# Patient Record
Sex: FEMALE | Race: WHITE | NOT HISPANIC OR LATINO | Employment: OTHER | URBAN - METROPOLITAN AREA
[De-identification: names, ages, dates, MRNs, and addresses within clinical notes are randomized per-mention and may not be internally consistent; named-entity substitution may affect disease eponyms.]

---

## 2017-01-25 ENCOUNTER — HOSPITAL ENCOUNTER (EMERGENCY)
Facility: CLINIC | Age: 57
Discharge: HOME | End: 2017-01-25
Attending: EMERGENCY MEDICINE | Admitting: EMERGENCY MEDICINE
Payer: MEDICARE

## 2017-01-25 VITALS
HEIGHT: 66 IN | TEMPERATURE: 98.3 F | DIASTOLIC BLOOD PRESSURE: 94 MMHG | OXYGEN SATURATION: 93 % | BODY MASS INDEX: 27.8 KG/M2 | RESPIRATION RATE: 18 BRPM | HEART RATE: 64 BPM | WEIGHT: 173 LBS | SYSTOLIC BLOOD PRESSURE: 144 MMHG

## 2017-01-25 DIAGNOSIS — R51.9 ACUTE NONINTRACTABLE HEADACHE, UNSPECIFIED HEADACHE TYPE: ICD-10-CM

## 2017-01-25 LAB — GLUCOSE SERPL-MCNC: 203 MG/DL (ref 70–99)

## 2017-01-25 PROCEDURE — 96374 THER/PROPH/DIAG INJ IV PUSH: CPT

## 2017-01-25 PROCEDURE — 99284 EMERGENCY DEPT VISIT MOD MDM: CPT | Mod: 25

## 2017-01-25 PROCEDURE — 25000125 ZZHC RX 250: Performed by: EMERGENCY MEDICINE

## 2017-01-25 PROCEDURE — 96375 TX/PRO/DX INJ NEW DRUG ADDON: CPT

## 2017-01-25 PROCEDURE — 25000128 H RX IP 250 OP 636: Performed by: EMERGENCY MEDICINE

## 2017-01-25 PROCEDURE — 96361 HYDRATE IV INFUSION ADD-ON: CPT

## 2017-01-25 PROCEDURE — 82947 ASSAY GLUCOSE BLOOD QUANT: CPT | Performed by: EMERGENCY MEDICINE

## 2017-01-25 RX ORDER — METOCLOPRAMIDE HYDROCHLORIDE 5 MG/ML
5 INJECTION INTRAMUSCULAR; INTRAVENOUS ONCE
Status: DISCONTINUED | OUTPATIENT
Start: 2017-01-25 | End: 2017-01-25 | Stop reason: DRUGHIGH

## 2017-01-25 RX ORDER — METOCLOPRAMIDE HYDROCHLORIDE 5 MG/ML
INJECTION INTRAMUSCULAR; INTRAVENOUS
Status: DISCONTINUED
Start: 2017-01-25 | End: 2017-01-25 | Stop reason: HOSPADM

## 2017-01-25 RX ORDER — METOCLOPRAMIDE HYDROCHLORIDE 5 MG/ML
10 INJECTION INTRAMUSCULAR; INTRAVENOUS ONCE
Status: COMPLETED | OUTPATIENT
Start: 2017-01-25 | End: 2017-01-25

## 2017-01-25 RX ORDER — DIPHENHYDRAMINE HYDROCHLORIDE 50 MG/ML
25 INJECTION INTRAMUSCULAR; INTRAVENOUS ONCE
Status: COMPLETED | OUTPATIENT
Start: 2017-01-25 | End: 2017-01-25

## 2017-01-25 RX ADMIN — DIPHENHYDRAMINE HYDROCHLORIDE 25 MG: 50 INJECTION, SOLUTION INTRAMUSCULAR; INTRAVENOUS at 02:36

## 2017-01-25 RX ADMIN — SODIUM CHLORIDE 1000 ML: 9 INJECTION, SOLUTION INTRAVENOUS at 02:41

## 2017-01-25 RX ADMIN — METOCLOPRAMIDE 10 MG: 5 INJECTION, SOLUTION INTRAMUSCULAR; INTRAVENOUS at 02:38

## 2017-01-25 ASSESSMENT — ENCOUNTER SYMPTOMS
NAUSEA: 1
SHORTNESS OF BREATH: 0
HEADACHES: 1
VOMITING: 1
PHOTOPHOBIA: 1
WEAKNESS: 0

## 2017-01-25 NOTE — ED PROVIDER NOTES
History     Chief Complaint:    Headache      HPI   Mary Lou Gil is a 56 year old female who presents with headache. The patient states that she normally takes verapamil daily for her migraines. However, she has recently run out of this as she states that was in [place] for the last 3 weeks. She reports that she developed a headache consistent with her typical migraine 4 days ago. She filled her prescription for verapamil two days ago and has been taking it since. She states that he headache worsened today, and she had an associated aura.  Her headache at this time is an 8/10. She also has nausea and reports that she vomited today, which she does not normally have with her headache, though she usually gets nauseas. She is also photophobic. The patient took two Imitrex doses tonight, without relief of her symptoms. She denies chest pain, shortness of breath, abdominal pain, abnormal vision, or extremity weakness. The patient also has a history of depression which she states is well managed at this time.  She also has a history of diabetes and states that he blood sugars usually run at around 250.     Allergies:  Nicotine Polacrilex   Neurontin  Norvasc  Percocet  Vinyl Ether    Medications:    Insulin detemir (levemir flexpen/flextouch) 100 unit/ml injection  Metformin (glucophage) 1000 mg tablet  Pregabalin (lyrica) 150 mg capsule  Tramadol (ultram) 50 mg tablet  Albuterol (proventil hfa) 108 (90 base) mcg/act inhaler  Prednisone (deltasone) 20 mg tablet  Doxycycline (vibramycin) 100 mg capsule  Duloxetine (cymbalta) 60 mg capsule  Fluticasone (flonase) 50 mcg/act nasal spray  Guaifenesin-codeine (robitussin ac) 100-10 mg/5ml soln  Acetaminophen (tylenol) 500 mg tablet  Ipratropium - albuterol 0.5 mg/2.5 mg/3 ml (duoneb) 0.5-2.5 (3) mg/3ml nebulization  Order for dme  Albuterol (2.5 mg/3ml) 0.083% nebulizer solution  Sumatriptan (imitrex) 100 mg tablet  Needles, any size  Blood glucose monitoring (no brand  "specified) test strip  Albuterol (proair hfa, proventil hfa, ventolin hfa) 108 (90 base) mcg/act inhaler  Flunisolide hfa (aerospan) 80 mcg/act aers  Ibuprofen (advil,motrin) 800 mg tablet  Verapamil (calan-sr) 240 mg tablet  Levothyroxine (synthroid, levothroid) 150 mcg tablet  Cholecalciferol 5000 units caps  Pravastatin (pravachol) 80 mg tablet  Fenofibrate 160 mg tablet  Magnesium oxide (mag-ox) 400 mg tablet  Ferrous sulfate dried 140 (45 fe) mg tbcr  Lisinopril-hydrochlorothiazide (prinzide,zestoretic) 20-25 mg per tablet  Glipizide (glipizide xl) 10 mg 24 hr tablet  Cyanocobalamin (vitamin b12) 1000 mcg/ml injection  Syringe/needle, disp, (bd eclipse syringe) 25g x 5/8\" 3 ml misc  Order for dme  Citalopram (celexa) 40 mg tablet  Vitamin c-electrolytes (emergen-c) 1000mg vitamin c super orange drink mix  Lidocaine (xylocaine) 5 % ointment  Order for dme  Omeprazole (prilosec) 20 mg capsule  Epinephrine (epipen) 0.3 mg/0.3ml injection  Blood glucose monitoring (accu-chek teresa plus) meter device kit  Diphenhydramine hcl (benadryl po)    Past Medical History:  Migraine  Diabetes mellitus (H)  Essential hypertension, benign  Unspecified hypothyroidism  Anemia, unspecified  Tobacco use disorder  Hyperlipidemia LDL goal < 100  Spinal stenosis  Uncomplicated asthma  Other chronic pain  Depressive disorder  Hyperlipidaemia  Degeneration of lumbar intervertebral disc  Bilateral carpal tunnel syndrome  Bilateral low back pain with sciatica, sciatica laterality unspecified  Endometrial hyperplasia  Scurvy  Dyspepsia  Neuropathy, diabetic   Pain in shoulder  Anemia    Past Surgical History:    Tonsillectomy and adenoidectomy   Appendectomy    Thyroidectomy   Hysteroscopy, surgical; w/ endometrial ablation, any method  Operative hysteroscopy with morcellator  Abdomen surgery    Family History:    CAD - Mother, Sister    Diabetes - Mother, Sister    Hypertension - Mother, Sister       Social History:  Marital " "Status:   Presents to the ED alone  Tobacco Use: current everyday smoker. 1 ppd for 25  Years   Alcohol Use: no  PCP: Abilio Casiano      Review of Systems   Eyes: Positive for photophobia. Negative for visual disturbance.   Respiratory: Negative for shortness of breath.    Cardiovascular: Negative for chest pain.   Gastrointestinal: Positive for nausea and vomiting.   Neurological: Positive for headaches. Negative for weakness.   Psychiatric/Behavioral: Negative for suicidal ideas.        Negative for homicidal ideation   All other systems reviewed and are negative.      Physical Exam   First Vitals:  BP: (!) 168/98 mmHg  Pulse: 86  Temp: 98.3  F (36.8  C)  Resp: 18  Height: 167.6 cm (5' 6\")  Weight: 78.472 kg (173 lb)  SpO2: 96 %    Physical Exam    General: Alert and cooperative with exam. Patient in moderate distress.  Patient lying in darkened room with eyes closed. Normal mentation  Head:  Scalp is NC/AT  Eyes:  No scleral icterus, PERRL  ENT:  The external nose and ears are normal. The oropharynx is normal and without erythema; mucus membranes are moist.   Neck:  Normal range of motion without rigidity.   CV:  Regular rate and rhythm    No pathologic murmur   Resp:  Breath sounds are clear bilaterally    Non-labored, no retractions or accessory muscle use  GI:  Abdomen is soft, no distension, no tenderness.   MS:  No lower extremity edema      No midline cervical, thoracic, or lumbar tenderness  Skin:  Warm and dry, No rash or lesions noted.  Neuro: Oriented x 3. No gross motor/sensory deficits. CN 2-12 intact        Emergency Department Course   Laboratory:  (0229) Glucose by meter: 203 (H)     Interventions:  (0236) Benadryl, 25 mg, IV injection    (0238) Reglan, 10 mg, IV   (0241) Normal Saline, 1 liter, IV bolus      Emergency Department Course:  Nursing notes and vitals reviewed.  I performed an exam of the patient as documented above.   (0322) I rechecked on the patient. She reports that her " headache is now a 1/10  Findings and plan explained to the patient. Patient discharged home with instructions regarding supportive care, medications, and reasons to return. The importance of close follow-up was reviewed.       Impression & Plan      Medical Decision Making:  Patient presenting with headache. History of similar headaches, no abnormal symptoms for patient. No evidence of acute intracranial hemorrhage, venous sinus thrombosis, central nervous system infection, ocular disease, hypertensive encephalitis, temporal arteritis, dental or sinus infection, artery dissection, or concern for carbon monoxide poisoning. No indication at this time for laboratory or imaging workup. Patient was provided with IV fluids, benadryl, reglan with significant improvement in headache on reevaluation. Patient was discharged home. Home supportive therapies and return precautions were discussed. Patient advised to return to ER if alteration in mental status, onset of fevers, visual changes, or peripheral weakness/numbness/tingling. Recommended follow up with pain clinic as need and continued use of verapamil and Tylenol for symptoms relief. Additional patient to follow-up with PCP regarding need for tighter glucose control.    Diagnosis:    ICD-10-CM    1. Acute nonintractable headache, unspecified headache type R51      Disposition:  Discharge to home.       Carlos ROWE, sina serving as a scribe on 1/25/2017 at 2:32 AM to personally document services performed by Dr. Dimas based on my observations and the provider's statements to me.     1/25/2017    EMERGENCY DEPARTMENT        William Dimas,   01/25/17 4040

## 2017-01-25 NOTE — ED AVS SNAPSHOT
Emergency Department    [address] [facility] [address]    Phone:  [phone]    Fax:  [phone]                                       Mar yLou Gil   MRN: [id number]    Department:   Emergency Department   Date of Visit:  1/25/2017           Patient Information     Date Of Birth          1960        Your diagnoses for this visit were:     Acute nonintractable headache, unspecified headache type        You were seen by William Dimas DO.      Follow-up Information     Follow up with Abilio Casiano MD In 2 days.    Specialties:  Internal Medicine, Pediatrics    Contact information:    [facility]  [address] [facility] [address]  [phone]          Follow up with  Emergency Department.    Specialty:  EMERGENCY MEDICINE    Why:  If symptoms worsen    Contact information:    [address] [facility] [address] [phone]        Discharge Instructions          * HEADACHE [unspecified]    The cause of your headache today is not clear, but it does not appear to be the sign of any serious illness.  Under stress, some people tense the muscles of their shoulder, neck and scalp without knowing it. If this condition lasts long enough, a TENSION HEADACHE can occur.  A MIGRAINE HEADACHE is caused by changes in blood flow to the brain. It can be mild or severe. A migraine attack may be triggered by emotional stress, hormone changes during the menstrual cycle, oral contraceptives, alcohol use, certain foods containing tyramine, eye strain, weather changes, missing meals, lack of sleep or oversleeping.  Other causes of headache include a viral illness, sinus, ear or throat infection, dental pain and TMJ (jaw joint) pain.  HOME CARE:    If you were given pain medicine for this headache, do not drive yourself home. Arrange for a ride, instead. When you get home, try to sleep. You should feel much better when you wake up.    If you are having  nausea or vomiting, follow a light diet until your headache is relieved.    If you have a migraine type headache, use sunglasses when in the daylight or around bright indoor lighting until symptoms improve. Bright glaring light can worsen this kind of headache.  FOLLOW UP with your doctor if the headache is not better within the next 24 hours. If you have frequent headaches you should discuss a treatment plan with your primary care doctor. By being aware of the earliest signs of headache, and starting treatment right away, you may be able to stop the pain yourself.  GET PROMPT MEDICAL ATTENTION if any of the following occur:    Worsening of your head pain or no improvement within 24 hours    Repeated vomiting (unable to keep liquids down)    Fever over 101 F (38.3 C)    Stiff neck    Extreme drowsiness, confusion or fainting    Weakness of an arm or leg or one side of the face    Difficulty with speech or vision    9684-3247 [facility], [address] [place] [address]. All rights reserved. This information is not intended as a substitute for professional medical care. Always follow your healthcare professional's instructions.    Return to the emergency department or seek medical care as instructed if your symptoms fail to improve or significantly worsen.    Take Tylenol as needed for symptom/pain relief; use as directed.    Follow-up as indicated on page 1.  Maintain adequate hydration and get plenty of rest.      24 Hour Appointment Hotline       To make an appointment at any [facility] clinic, call [phone] ([phone]). If you don't have a family doctor or clinic, we will help you find one. [facility] clinics are conveniently located to serve the needs of you and your family.             Review of your medicines      CONTINUE these medicines which may have CHANGED, or have new prescriptions. If we are uncertain of the size of tablets/capsules you have at home, strength may be listed as  something that might have changed.        Dose / Directions Last dose taken    * order for DME   What changed:  additional instructions   Quantity:  2 each        Equipment being ordered : bilateral hard wrist splints   Refills:  0        * order for DME   What changed:  Another medication with the same name was changed. Make sure you understand how and when to take each.   Quantity:  1 each        Equipment being ordered: tennis elbow splint left   Refills:  0        * order for DME   What changed:  Another medication with the same name was changed. Make sure you understand how and when to take each.   Quantity:  1 Device        Equipment being ordered: Nebulizer with tubing   Refills:  0        * Notice:  This list has 3 medication(s) that are the same as other medications prescribed for you. Read the directions carefully, and ask your doctor or other care provider to review them with you.      Our records show that you are taking the medicines listed below. If these are incorrect, please call your family doctor or clinic.        Dose / Directions Last dose taken    acetaminophen 500 MG tablet   Commonly known as:  TYLENOL   Dose:  1000 mg   Quantity:  180 tablet        Take 2 tablets (1,000 mg) by mouth every 8 hours   Refills:  0        * albuterol 108 (90 BASE) MCG/ACT Inhaler   Commonly known as:  PROAIR HFA/PROVENTIL HFA/VENTOLIN HFA   Dose:  2 puff   Quantity:  3 Inhaler        Inhale 2 puffs into the lungs every 6 hours as needed for shortness of breath / dyspnea or wheezing   Refills:  1        * albuterol (2.5 MG/3ML) 0.083% neb solution   Dose:  1 vial   Quantity:  25 vial        Take 1 vial (2.5 mg) by nebulization every 6 hours as needed for shortness of breath / dyspnea or wheezing   Refills:  0        * albuterol 108 (90 BASE) MCG/ACT Inhaler   Commonly known as:  PROVENTIL HFA   Dose:  2 puff   Quantity:  1 Inhaler        Inhale 2 puffs into the lungs every 6 hours   Refills:  0        BENADRYL PO    Dose:  25-50 mg        Take 25-50 mg by mouth See Admin Instructions 25 mg during the day if needed and 50 mg at bedtime for sleep as needed   Refills:  0        * blood glucose monitoring meter device kit   Commonly known as:  no brand specified   Dose:  1 kit   Quantity:  1 kit        1 kit as needed.   Refills:  0        * blood glucose monitoring meter device kit   Quantity:  1 kit        by In Vitro route as needed   Refills:  PRN        blood glucose monitoring test strip   Commonly known as:  no brand specified   Dose:  1 strip   Quantity:  2 Box        1 strip by In Vitro route 3 times daily Uses Accuchek Ana Paula   Refills:  11        cholecalciferol 5000 UNITS Caps   Dose:  1 capsule   Quantity:  100 capsule        Take 1 capsule (5,000 Units) by mouth daily FOR VITAMIN D DEFICIENCY (LOW VITAMIN D),TAKE 2 CAPSULES DOROTHY FOR THE NEXT 4 WEEKS, THEN 1 CAPSULE DAILY, MUST TAKE WITH WITH FAT CONTAINING MEAL   Refills:  3        citalopram 40 MG tablet   Commonly known as:  celeXA   Dose:  40 mg   Quantity:  90 tablet        Take 1 tablet (40 mg) by mouth daily TO CONTROL SYMPTOMS OF DEPRESSION   Refills:  PRN        cyanocobalamin 1000 MCG/ML injection   Commonly known as:  VITAMIN B12   Dose:  1 mL   Quantity:  30 mL        Inject 1 mL (1,000 mcg) Subcutaneous every 14 days INDICATION: FOR VITAMIN B12 SUPPLEMENTATION   Refills:  5        doxycycline 100 MG capsule   Commonly known as:  VIBRAMYCIN   Dose:  100 mg   Quantity:  20 capsule        Take 1 capsule (100 mg) by mouth 2 times daily   Refills:  0        DULoxetine 60 MG EC capsule   Commonly known as:  CYMBALTA   Dose:  120 mg   Quantity:  120 capsule        Take 2 capsules (120 mg) by mouth daily   Refills:  3        EPINEPHrine 0.3 MG/0.3ML injection   Dose:  0.3 mg   Quantity:  1 each        Inject 0.3 mLs (0.3 mg) into the muscle once as needed   Refills:  prn        fenofibrate 160 MG tablet   Dose:  160 mg   Quantity:  90 tablet        Take 1 tablet  (160 mg) by mouth At Bedtime with food. INDICATION: TO LOWER CHOLESTEROL   Refills:  3        ferrous sulfate Dried 140 (45 FE) MG Tbcr   Dose:  1 tablet   Quantity:  180 tablet        Take 1 tablet by mouth every morning (before breakfast) IRON SUPPLEMENT - PLEASE TAKE WITH 4 OZ OF OJ WITH PULP, SUBSTITUTION OF IRON SUPPLEMENT PERMITTED   Refills:  PRN        Flunisolide HFA 80 MCG/ACT Aers   Commonly known as:  AEROSPAN   Dose:  2 puff   Quantity:  1 Inhaler        Inhale 2 puffs into the lungs 2 times daily INDICATION: ASTHMA CONTROLLER, ALWAYS RINSE MOUTH AFTER USE   Refills:  5        fluticasone 50 MCG/ACT spray   Commonly known as:  FLONASE   Dose:  2 spray   Quantity:  16 g        Spray 2 sprays in nostril At Bedtime INDICATION: TO CONTROL NASAL ALLERGY SYMPTOMS, DO NOT BLOW NOSE FOR 30 MINUTES AFTER USING   Refills:  PRN        glipiZIDE 10 MG 24 hr tablet   Commonly known as:  glipiZIDE XL   Dose:  20 mg   Quantity:  180 tablet        Take 2 tablets (20 mg) by mouth daily INDICATION: TO TREAT DIABETES.   Refills:  3        guaiFENesin-codeine 100-10 MG/5ML Soln solution   Commonly known as:  ROBITUSSIN AC   Dose:  1 tsp.   Quantity:  120 mL        Take 5 mLs by mouth every 4 hours as needed for cough   Refills:  0        ibuprofen 800 MG tablet   Commonly known as:  ADVIL/MOTRIN   Dose:  800 mg   Quantity:  90 tablet        Take 1 tablet (800 mg) by mouth every 8 hours as needed for moderate pain or pain   Refills:  2        insulin detemir 100 UNIT/ML injection   Commonly known as:  LEVEMIR FLEXPEN/FLEXTOUCH   Dose:  20 Units   Quantity:  9 mL        Inject 20 Units Subcutaneous At Bedtime   Refills:  0        ipratropium - albuterol 0.5 mg/2.5 mg/3 mL 0.5-2.5 (3) MG/3ML neb solution   Commonly known as:  DUONEB   Quantity:  1 vial        One in neb in clinic   Refills:  0        levothyroxine 150 MCG tablet   Commonly known as:  SYNTHROID/LEVOTHROID   Dose:  150 mcg   Quantity:  90 tablet        Take 1  tablet (150 mcg) by mouth daily INDICATION: THYROID SUPPLEMENTATION, TAKE FIRST THING IN THE MORNING ON AN EMPTY STOMACH,  NO FOOD FOR AN HOUR   Refills:  3        lidocaine 5 % ointment   Commonly known as:  XYLOCAINE   Quantity:  50 g        Apply a quarter size amount to painful areas up to 4 times per day as needed.   Refills:  3        lisinopril-hydrochlorothiazide 20-25 MG per tablet   Commonly known as:  PRINZIDE/ZESTORETIC   Dose:  1 tablet   Quantity:  90 tablet        Take 1 tablet by mouth daily INDICATION:TO LOWER BLOOD PRESSURE AND TO PRESERVE KIDNEY FUNCTION   Refills:  1        magnesium oxide 400 MG tablet   Commonly known as:  MAG-OX   Dose:  400 mg   Quantity:  180 tablet        Take 1 tablet (400 mg) by mouth 2 times daily INDICATION: MAGNESIUM SUPPLEMENT   Refills:  3        metFORMIN 1000 MG tablet   Commonly known as:  GLUCOPHAGE   Dose:  1000 mg   Quantity:  60 tablet        Take 1 tablet (1,000 mg) by mouth 2 times daily (with meals) INDICATION: TO TREAT DIABETES, CONTROL BLOOD SUGAR AND TO CORRECT METABOLISM   Refills:  0        NEEDLES, ANY SIZE   Dose:  1 each   Quantity:  1 Box        Inject 1 each Subcutaneous At Bedtime   Refills:  prn        omeprazole 20 MG CR capsule   Commonly known as:  priLOSEC   Dose:  20 mg   Quantity:  90 capsule        Take 1 capsule (20 mg) by mouth 2 times daily INDICATION: TO CONTROL REFLUX SYMPTOMS   Refills:  3        pravastatin 80 MG tablet   Commonly known as:  PRAVACHOL   Dose:  80 mg   Quantity:  90 tablet        Take 1 tablet (80 mg) by mouth daily INDICATION: TO LOWER CHOLESTEROL AND TO HELP  PREVENT HEART DISEASE   Refills:  PRN        predniSONE 20 MG tablet   Commonly known as:  DELTASONE   Dose:  20 mg   Quantity:  10 tablet        Take 1 tablet (20 mg) by mouth 2 times daily   Refills:  0        pregabalin 150 MG capsule   Commonly known as:  LYRICA   Dose:  150 mg   Indication:  Neuropathic Pain   Quantity:  30 capsule        Take 1 capsule  "(150 mg) by mouth daily   Refills:  0        SUMAtriptan 100 MG tablet   Commonly known as:  IMITREX   Dose:  100 mg   Quantity:  18 tablet        Take 1 tablet (100 mg) by mouth every 8 hours as needed for migraine   Refills:  11        Syringe/Needle (Disp) 25G X 5/8\" 3 ML Misc   Commonly known as:  BD ECLIPSE SYRINGE   Dose:  1000 mcg   Quantity:  20 each        1,000 mcg See Admin Instructions AS DIRECTED FOR B12 SHOTS   Refills:  PRN        thin lancets   Commonly known as:  NO BRAND SPECIFIED   Dose:  1 Device   Quantity:  3 Box        1 Device 2 times daily   Refills:  2        traMADol 50 MG tablet   Commonly known as:  ULTRAM   Quantity:  100 tablet        Take 1-2 tablet(s) every 6 hours as needed for pain. 3-4 per day. May dispense on/after 10/13 to start taking on/after 10/15.   Refills:  0        verapamil 240 MG CR tablet   Commonly known as:  CALAN-SR   Dose:  240 mg   Quantity:  30 tablet        Take 1 tablet (240 mg) by mouth At Bedtime INDICATION: TO LOWER BLOOD PRESSURE AND TO CONTROL MIGRAINE HEADACHES   Refills:  3        vitamin C-electrolytes 1000mg vitamin C super orange drink mix   Commonly known as:  EMERGEN-C   Quantity:  90 packet        Mix 1 packet in 4-6oz water and take once daily, INDICATION: VITAMIN C SUPPLEMENTION   Refills:  PRN        * Notice:  This list has 5 medication(s) that are the same as other medications prescribed for you. Read the directions carefully, and ask your doctor or other care provider to review them with you.            Procedures and tests performed during your visit     Glucose    Peripheral IV catheter      Orders Needing Specimen Collection     None      Pending Results     No orders found from 1/24/2017 to 1/26/2017.            Pending Culture Results     No orders found from 1/24/2017 to 1/26/2017.       Test Results from your hospital stay           1/25/2017  3:04 AM - Interface, [employer] Results      Component Results     Component Value Ref Range & " Units Status    Glucose 203 (H) 70 - 99 mg/dL Final                Clinical Quality Measure: Blood Pressure Screening     Your blood pressure was checked while you were in the emergency department today. The last reading we obtained was  BP: (!) 168/98 mmHg . Please read the guidelines below about what these numbers mean and what you should do about them.  If your systolic blood pressure (the top number) is less than 120 and your diastolic blood pressure (the bottom number) is less than 80, then your blood pressure is normal. There is nothing more that you need to do about it.  If your systolic blood pressure (the top number) is 120-139 or your diastolic blood pressure (the bottom number) is 80-89, your blood pressure may be higher than it should be. You should have your blood pressure rechecked within a year by a primary care provider.  If your systolic blood pressure (the top number) is 140 or greater or your diastolic blood pressure (the bottom number) is 90 or greater, you may have high blood pressure. High blood pressure is treatable, but if left untreated over time it can put you at risk for heart attack, stroke, or kidney failure. You should have your blood pressure rechecked by a primary care provider within the next 4 weeks.  If your provider in the emergency department today gave you specific instructions to follow-up with your doctor or provider even sooner than that, you should follow that instruction and not wait for up to 4 weeks for your follow-up visit.        Thank you for choosing [facility]       Thank you for choosing [facility] for your care. Our goal is always to provide you with excellent care. Hearing back from our patients is one way we can continue to improve our services. Please take a few minutes to complete the written survey that you may receive in the mail after you visit with us. Thank you!        [facility] Information     [facility] gives you secure access to your electronic health record. If  you see a primary care provider, you can also send messages to your care team and make appointments. If you have questions, please call your primary care clinic.  If you do not have a primary care provider, please call [phone] and they will assist you.        Care EveryWhere ID     This is your Care EveryWhere ID. This could be used by other organizations to access your [facility] medical records  [id number]        After Visit Summary       This is your record. Keep this with you and show to your community pharmacist(s) and doctor(s) at your next visit.

## 2017-01-25 NOTE — ED AVS SNAPSHOT
Emergency Department    [address] [place] [id number]    Phone:  [phone]    Fax:  [phone]                                       Mary Lou Gil   MRN: [id number]    Department:   Emergency Department   Date of Visit:  1/25/2017           After Visit Summary Signature Page     I have received my discharge instructions, and my questions have been answered. I have discussed any challenges I see with this plan with the nurse or doctor.    ..........................................................................................................................................  Patient/Patient Representative Signature      ..........................................................................................................................................  Patient Representative Print Name and Relationship to Patient    ..................................................               ................................................  Date                                            Time    ..........................................................................................................................................  Reviewed by Signature/Title    ...................................................              ..............................................  Date                                                            Time

## 2017-01-25 NOTE — DISCHARGE INSTRUCTIONS
* HEADACHE [unspecified]    The cause of your headache today is not clear, but it does not appear to be the sign of any serious illness.  Under stress, some people tense the muscles of their shoulder, neck and scalp without knowing it. If this condition lasts long enough, a TENSION HEADACHE can occur.  A MIGRAINE HEADACHE is caused by changes in blood flow to the brain. It can be mild or severe. A migraine attack may be triggered by emotional stress, hormone changes during the menstrual cycle, oral contraceptives, alcohol use, certain foods containing tyramine, eye strain, weather changes, missing meals, lack of sleep or oversleeping.  Other causes of headache include a viral illness, sinus, ear or throat infection, dental pain and TMJ (jaw joint) pain.  HOME CARE:    If you were given pain medicine for this headache, do not drive yourself home. Arrange for a ride, instead. When you get home, try to sleep. You should feel much better when you wake up.    If you are having nausea or vomiting, follow a light diet until your headache is relieved.    If you have a migraine type headache, use sunglasses when in the daylight or around bright indoor lighting until symptoms improve. Bright glaring light can worsen this kind of headache.  FOLLOW UP with your doctor if the headache is not better within the next 24 hours. If you have frequent headaches you should discuss a treatment plan with your primary care doctor. By being aware of the earliest signs of headache, and starting treatment right away, you may be able to stop the pain yourself.  GET PROMPT MEDICAL ATTENTION if any of the following occur:    Worsening of your head pain or no improvement within 24 hours    Repeated vomiting (unable to keep liquids down)    Fever over 101 F (38.3 C)    Stiff neck    Extreme drowsiness, confusion or fainting    Weakness of an arm or leg or one side of the face    Difficulty with speech or vision    [id number] [address], [address]  [place] [address]. All rights reserved. This information is not intended as a substitute for professional medical care. Always follow your healthcare professional's instructions.    Return to the emergency department or seek medical care as instructed if your symptoms fail to improve or significantly worsen.    Take Tylenol as needed for symptom/pain relief; use as directed.    Follow-up as indicated on page 1.  Maintain adequate hydration and get plenty of rest.

## 2017-02-08 DIAGNOSIS — E11.65 TYPE 2 DIABETES MELLITUS WITH HYPERGLYCEMIA, WITH LONG-TERM CURRENT USE OF INSULIN (H): Primary | ICD-10-CM

## 2017-02-08 DIAGNOSIS — Z79.4 TYPE 2 DIABETES MELLITUS WITH HYPERGLYCEMIA, WITH LONG-TERM CURRENT USE OF INSULIN (H): Primary | ICD-10-CM

## 2017-02-08 NOTE — TELEPHONE ENCOUNTER
Metformin 1000mg         Last Written Prescription Date: 12/05/16  Last Fill Quantity: 60, # refills: 0  Last Office Visit with G, [facility] or [facility] prescribing provider:  09/01/16        BP Readings from Last 3 Encounters:   01/25/17 144/94   10/13/16 122/80   09/04/16 121/81     MICROL       29   7/5/2016  No results found for this basename: microalbumin  CREATININE   Date Value Ref Range Status   11/10/2016 0.71 0.52 - 1.04 mg/dL Final   ]  GFR ESTIMATE   Date Value Ref Range Status   11/10/2016 85 >60 mL/min/1.7m2 Final     Comment:     Non  GFR Calc   09/04/2016 >90  Non  GFR Calc   >60 mL/min/1.7m2 Final   08/26/2016 75 >60 mL/min/1.7m2 Final     GFR ESTIMATE IF BLACK   Date Value Ref Range Status   11/10/2016 >90   GFR Calc   >60 mL/min/1.7m2 Final   09/04/2016 >90   GFR Calc   >60 mL/min/1.7m2 Final   08/26/2016 >90 >60 mL/min/1.7m2 Final     CHOL      221   11/10/2016  HDL       44   11/10/2016  LDL      132   11/10/2016  LDL      201   7/5/2016  TRIG      225   11/10/2016  CHOLHDLRATIO      5.0   8/31/2015  AST        9   11/10/2016  ALT       24   11/10/2016  A1C     11.1   11/10/2016  A1C     11.5   7/5/2016  A1C      7.2   8/31/2015  A1C      8.8   5/14/2015  A1C      9.0   3/2/2015  POTASSIUM   Date Value Ref Range Status   11/10/2016 3.8 3.4 - 5.3 mmol/L Final         Thank you -  Zamzam Mejia, Pharmacy Technician  [facility]  On Behalf Of [facility]

## 2017-02-09 NOTE — TELEPHONE ENCOUNTER
Routing refill request to provider for review/approval because:  Labs not current:  Due for A1c    Marcellus Dalton, PharmD  [facility]  [phone]

## 2017-03-07 ENCOUNTER — HOSPITAL ENCOUNTER (EMERGENCY)
Facility: CLINIC | Age: 57
Discharge: HOME | End: 2017-03-07
Attending: EMERGENCY MEDICINE | Admitting: EMERGENCY MEDICINE
Payer: MEDICARE

## 2017-03-07 ENCOUNTER — TELEPHONE (OUTPATIENT)
Dept: PALLIATIVE MEDICINE | Facility: CLINIC | Age: 57
End: 2017-03-07

## 2017-03-07 VITALS
BODY MASS INDEX: 27.97 KG/M2 | DIASTOLIC BLOOD PRESSURE: 85 MMHG | OXYGEN SATURATION: 96 % | HEART RATE: 94 BPM | SYSTOLIC BLOOD PRESSURE: 155 MMHG | TEMPERATURE: 98.6 F | WEIGHT: 174 LBS | HEIGHT: 66 IN | RESPIRATION RATE: 18 BRPM

## 2017-03-07 DIAGNOSIS — S39.012A LOW BACK STRAIN, INITIAL ENCOUNTER: ICD-10-CM

## 2017-03-07 DIAGNOSIS — M51.369 DEGENERATION OF LUMBAR INTERVERTEBRAL DISC: ICD-10-CM

## 2017-03-07 DIAGNOSIS — G56.03 BILATERAL CARPAL TUNNEL SYNDROME: ICD-10-CM

## 2017-03-07 PROCEDURE — 25000128 H RX IP 250 OP 636: Performed by: EMERGENCY MEDICINE

## 2017-03-07 PROCEDURE — 99284 EMERGENCY DEPT VISIT MOD MDM: CPT | Mod: 25

## 2017-03-07 PROCEDURE — 96374 THER/PROPH/DIAG INJ IV PUSH: CPT

## 2017-03-07 RX ORDER — KETOROLAC TROMETHAMINE 30 MG/ML
30 INJECTION, SOLUTION INTRAMUSCULAR; INTRAVENOUS ONCE
Status: COMPLETED | OUTPATIENT
Start: 2017-03-07 | End: 2017-03-07

## 2017-03-07 RX ORDER — KETOROLAC TROMETHAMINE 30 MG/ML
60 INJECTION, SOLUTION INTRAMUSCULAR; INTRAVENOUS ONCE
Status: DISCONTINUED | OUTPATIENT
Start: 2017-03-07 | End: 2017-03-07

## 2017-03-07 RX ADMIN — KETOROLAC TROMETHAMINE 30 MG: 30 INJECTION, SOLUTION INTRAMUSCULAR at 02:25

## 2017-03-07 ASSESSMENT — ENCOUNTER SYMPTOMS
CHILLS: 0
ROS GI COMMENTS: NEGATIVE FOR BOWEL INCONTINENCE
WEAKNESS: 0
BACK PAIN: 1
FEVER: 0
NUMBNESS: 0

## 2017-03-07 NOTE — ED PROVIDER NOTES
"  History     Chief Complaint:  Back Pain    HPI   Mary Lou Gil is a 56 year old female who presents with back pain. The patient states that she was diagnosed with spinal stenosis about a year ago. She manages her pain with medications through her pain clinic, and states that she is out of tramadol at this time. She states that this evening she opened a car door an closed it, and this twisting motion caused her to \"tweak\" her right low back. This has been painful since, worse with movement. She denies loss of bowel or bladder function, radiation down her leg, fevers, chills, numbness, or weakness.    Allergies:  Nicotine Polacrilex  Neurontin [Gabapentin]  Norvasc [Amlodipine Besylate]  Percocet [Oxycodone-Acetaminophen]  Vinyl Ether    Medications:    Prednisone (deltasone) 20 mg tablet  Metformin (glucophage) 1000 mg tablet  Insulin detemir (levemir flexpen/flextouch) 100 unit/ml injection  Pregabalin (lyrica) 150 mg capsule  Tramadol (ultram) 50 mg tablet  Albuterol (proventil hfa) 108 (90 base) mcg/act inhaler  Doxycycline (vibramycin) 100 mg capsule  Duloxetine (cymbalta) 60 mg capsule  Fluticasone (flonase) 50 mcg/act nasal spray  Guaifenesin-codeine (robitussin ac) 100-10 mg/5ml soln  Acetaminophen (tylenol) 500 mg tablet  Ipratropium - albuterol 0.5 mg/2.5 mg/3 ml (duoneb) 0.5-2.5 (3) mg/3ml nebulization  Albuterol (2.5 mg/3ml) 0.083% nebulizer solution  Sumatriptan (imitrex) 100 mg tablet  Needles, any size  Blood glucose monitoring (no brand specified) test strip  Albuterol (proair hfa, proventil hfa, ventolin hfa) 108 (90 base) mcg/act inhaler  Flunisolide hfa (aerospan) 80 mcg/act aers  Ibuprofen (advil,motrin) 800 mg tablet  Verapamil (calan-sr) 240 mg tablet  Levothyroxine (synthroid, levothroid) 150 mcg tablet  Cholecalciferol 5000 units caps  Pravastatin (pravachol) 80 mg tablet  Fenofibrate 160 mg tablet  Magnesium oxide (mag-ox) 400 mg tablet  Ferrous sulfate dried 140 (45 fe) mg " "tbcr  Lisinopril-hydrochlorothiazide (prinzide,zestoretic) 20-25 mg per tablet  Glipizide (glipizide xl) 10 mg 24 hr tablet  Cyanocobalamin (vitamin b12) 1000 mcg/ml injection  Syringe/needle, disp, (bd eclipse syringe) 25g x 5/8\" 3 ml misc  Citalopram (celexa) 40 mg tablet  Vitamin c-electrolytes (emergen-c) 1000mg vitamin c super orange drink mix  Lidocaine (xylocaine) 5 % ointment  Omeprazole (prilosec) 20 mg capsule  Thin (no brand specified) lancets  Epinephrine (epipen) 0.3 mg/0.3ml injection  Blood glucose monitoring (accu-chek teresa plus) meter device kit  Diphenhydramine hcl (benadryl po)  Blood glucose monitoring suppl (blood glucose meter) kit    Past Medical History:    Migraine headace  Depression  Anemia, unspecified  Degeneration of lumbar intervertebral disc  Depressive disorder  Diabetes mellitus (H)  Essential hypertension, benign  Hyperlipidaemia  Migraine  Other chronic pain  Spinal stenosis  Tobacco use disorder  Uncomplicated asthma  Unspecified hypothyroidism  Palpitations  Carpal tunnel syndrome on both sides  Bilateral low back pain with sciatica, sciatica laterality unspecified  Intermittent claudication (H)  Gastroesophageal reflux disease with esophagitis  Iron deficiency  Scurvy  Dyspepsia  CAD (coronary artery disease)  Atypical chest pain  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  Pain in shoulder  Neuropathy, diabetic (H)  Endometrial hyperplasia    Past Surgical History:    Tonsillectomy and adenoidectomy  Appendectomy  C  delivery only   Ligate fallopian tube,postpartum -Tubal Ligation  Thyroidectomy   Operative hysteroscopy with morcellator  Abdomen surgery  Biopsy    Family History:    CAD - Mother   Diabetes - Mother   Hypertension - Mother, Sister    Diabetes - Sister     Social History:  Marital Status:   Presents to the ED alone  Tobacco Use: Current everyday smoker, 1 ppd for 25 years  Alcohol Use: No  PCP: Abilio Casiano      Review of Systems   Constitutional: " "Negative for chills and fever.   Gastrointestinal:        Negative for bowel incontinence   Genitourinary:        Negative for urinary incontinence   Musculoskeletal: Positive for back pain.        Negative for leg pain   Neurological: Negative for weakness and numbness.   All other systems reviewed and are negative.        Physical Exam   First Vitals:  BP: 155/85  Pulse: 94  Temp: 98.6  F (37  C)  Resp: 18  Height: 167.6 cm (5' 6\")  Weight: 78.9 kg (174 lb)  SpO2: 96 %    Physical Exam  SKIN:  Lower extremities equally warm to touch.    HEMATOLOGIC/IMMUNOLOGIC/LYMPHATIC:  No pallor.  CARDIOVASCULAR:  Regular rate and rhythm.  RESPIRATORY:  No respiratory distress, breath sounds equal and normal.  GASTROINTESTINAL:  Soft, nontender abdomen.  MUSCULOSKELETAL:  ROM of the torso exacerbates/reproduces the back pain.  NEUROLOGIC:  Alert, conversant.  No weakness of the lower limbs.  Normal sensation to touch of both lower extremities.  PSYCHIATRIC:  Normal mood.    Emergency Department Course   Interventions:  (0225) Toradol, 30 mg, IV injection     Emergency Department Course:  Nursing notes and vitals reviewed.  I performed an exam of the patient as documented above.   (0251) I rechecked on the patient. Findings and plan explained to the patient. Patient discharged home with instructions regarding supportive care, medications, and reasons to return. The importance of close follow-up was reviewed.     Impression & Plan      Medical Decision Making:  This patient presents with a strain of her right lower back. This is complicated by her chronic low back pain history. No red flag findings on exam or history to mandate any testing. She was improved with an injection of Toradol and I advised follow up with her pain clinic regarding further management and prescriptions if needed.     Diagnosis:    ICD-10-CM    1. Low back strain, initial encounter S39.012A      Disposition:  Discharge to home.     Carlos ROWE am " serving as a scribe on 3/7/2017 at 4:18 AM to personally document services performed by Dr. Horan based on my observations and the provider's statements to me.      3/7/2017    EMERGENCY DEPARTMENT       Al Horan MD  03/07/17 [id number]

## 2017-03-07 NOTE — DISCHARGE INSTRUCTIONS

## 2017-03-07 NOTE — ED AVS SNAPSHOT
Emergency Department    [address] [place] [address]    Phone:  [phone]    Fax:  [phone]                                       Mary Lou Gil   MRN: [id number]    Department:   Emergency Department   Date of Visit:  3/7/2017           Patient Information     Date Of Birth          1960        Your diagnoses for this visit were:     Low back strain, initial encounter        You were seen by Al Horan MD.      Follow-up Information     Please follow up.    Why:  follow up with your pain clinic for further pain management        Discharge Instructions         Back Sprain or Strain    Injury to the muscles (strain) or ligaments (sprain) around the spine can be troubling. Injury may occur after a sudden forceful twisting or bending force such as in a car accident, after a simple awkward movement, or after lifting something heavy with poor body positioning. In any case, muscle spasm is often present and adds to the pain.  Thankfully, most people feel better in 1 to 2 weeks, and most of the rest in 1 to 2 months. Most people can remain active. Unless you had a forceful physical injury such as a car accident or fall, X-rays are usually not ordered for the first evaluation of a back sprain or strain. If pain continues and does not respond to medical treatment, your healthcare provider may order X-rays and other tests.  Home care  The following guidelines will help you care for your injury at home:    When in bed, try to find a comfortable position. A firm mattress is best. Try lying flat on your back with pillows under your knees. You can also try lying on your side with your knees bent up toward your chest and a pillow between your knees.    Don't sit for long periods. Try not to take long car rides or take other trips that have you sitting for a long time. This puts more stress on the lower back than standing or walking.    During the first 24 to 72 hours after an injury  or flare-up, apply an ice pack to the painful area for 20 minutes. Then remove it for 20 minutes. Do this for 60 to 90 minutes, or several times a day, This will reduce swelling and pain. Be sure to wrap the ice pack in a thin towel or plastic to protect your skin.    You can start with ice, then switch to heat. Heat from a hot shower, hot bath, or heating pad reduces swelling and pain and works well for muscle spasms. Put heat on the painful area for 20 minutes, then remove for 20 minutes. Do this for 60 to 90 minutes, or several times a day. Do not use a heating pad while sleeping. It can burn the skin.    You can alternate the ice and heat. Talk with your healthcare provider to find out the best treatment or therapy for your back pain.    Therapeutic massage will help relax the back muscles without stretching them.    Be aware of safe lifting methods. Do not lift anything over 15 pounds until all of the pain is gone.  Medicines  Talk to your healthcare provider before using medicines, especially if you have other health problems or are taking other medicines.    You may use acetaminophen or ibuprofen to control pain, unless another pain medicine was prescribed. If you have chronic conditions like diabetes, liver or kidney disease, stomach ulcers, or gastrointestinal bleeding, or are taking blood-thinner medicines, talk with your doctor before taking any medicines.    Be careful if you are given prescription medicines, narcotics, or medicine for muscle spasm. They can cause drowsiness, and affect your coordination, reflexes, and judgment. Do not drive or operate heavy machinery.  Follow-up care  Follow up with your healthcare provider or this facility as advised. You may need physical therapy or more tests if your symptoms get worse.  If you had X-rays today, they didn t show any broken bones, breaks, or fractures. Sometimes fractures don t show up on the first X-ray. Bruises and sprains can sometimes hurt as much  as a fracture. These injuries can take time to heal completely. If your symptoms don t improve or they get worse, talk with your healthcare provider. You may need a repeat X-ray.  Call 911  Call for emergency care if any of the following occur:    Trouble breathing    Confused    Very drowsy or trouble awakening    Fainting or loss of consciousness    Rapid or very slow heart rate    Loss of bowel or bladder control  When to seek medical advice  Call your healthcare provider right away if any of the following occur:    Pain gets worse or spreads to your arms or legs    Weakness or numbness in one or both arms or legs    Numbness in the groin or genital area    [id number] [employer]. [address] [place] [address]. All rights reserved. This information is not intended as a substitute for professional medical care. Always follow your healthcare professional's instructions.          24 Hour Appointment Hotline       To make an appointment at any [facility], call [phone] ([phone]). If you don't have a family doctor or clinic, we will help you find one. [facility] are conveniently located to serve the needs of you and your family.             Review of your medicines      CONTINUE these medicines which may have CHANGED, or have new prescriptions. If we are uncertain of the size of tablets/capsules you have at home, strength may be listed as something that might have changed.        Dose / Directions Last dose taken    * order for DME   What changed:  additional instructions   Quantity:  2 each        Equipment being ordered : bilateral hard wrist splints   Refills:  0        * order for DME   What changed:  Another medication with the same name was changed. Make sure you understand how and when to take each.   Quantity:  1 each        Equipment being ordered: tennis elbow splint left   Refills:  0        * order for DME   What changed:  Another medication with the same  name was changed. Make sure you understand how and when to take each.   Quantity:  1 Device        Equipment being ordered: Nebulizer with tubing   Refills:  0        * Notice:  This list has 3 medication(s) that are the same as other medications prescribed for you. Read the directions carefully, and ask your doctor or other care provider to review them with you.      Our records show that you are taking the medicines listed below. If these are incorrect, please call your family doctor or clinic.        Dose / Directions Last dose taken    acetaminophen 500 MG tablet   Commonly known as:  TYLENOL   Dose:  1000 mg   Quantity:  180 tablet        Take 2 tablets (1,000 mg) by mouth every 8 hours   Refills:  0        * albuterol 108 (90 BASE) MCG/ACT Inhaler   Commonly known as:  PROAIR HFA/PROVENTIL HFA/VENTOLIN HFA   Dose:  2 puff   Quantity:  3 Inhaler        Inhale 2 puffs into the lungs every 6 hours as needed for shortness of breath / dyspnea or wheezing   Refills:  1        * albuterol (2.5 MG/3ML) 0.083% neb solution   Dose:  1 vial   Quantity:  25 vial        Take 1 vial (2.5 mg) by nebulization every 6 hours as needed for shortness of breath / dyspnea or wheezing   Refills:  0        * albuterol 108 (90 BASE) MCG/ACT Inhaler   Commonly known as:  PROVENTIL HFA   Dose:  2 puff   Quantity:  1 Inhaler        Inhale 2 puffs into the lungs every 6 hours   Refills:  0        BENADRYL PO   Dose:  25-50 mg        Take 25-50 mg by mouth See Admin Instructions 25 mg during the day if needed and 50 mg at bedtime for sleep as needed   Refills:  0        * blood glucose monitoring meter device kit   Commonly known as:  no brand specified   Dose:  1 kit   Quantity:  1 kit        1 kit as needed.   Refills:  0        * blood glucose monitoring meter device kit   Quantity:  1 kit        by In Vitro route as needed   Refills:  PRN        blood glucose monitoring test strip   Commonly known as:  no brand specified   Dose:  1  strip   Quantity:  2 Box        1 strip by In Vitro route 3 times daily Uses Accucheda Goss   Refills:  11        cholecalciferol 5000 UNITS Caps   Dose:  1 capsule   Quantity:  100 capsule        Take 1 capsule (5,000 Units) by mouth daily FOR VITAMIN D DEFICIENCY (LOW VITAMIN D),TAKE 2 CAPSULES DOROTHY FOR THE NEXT 4 WEEKS, THEN 1 CAPSULE DAILY, MUST TAKE WITH WITH FAT CONTAINING MEAL   Refills:  3        citalopram 40 MG tablet   Commonly known as:  celeXA   Dose:  40 mg   Quantity:  90 tablet        Take 1 tablet (40 mg) by mouth daily TO CONTROL SYMPTOMS OF DEPRESSION   Refills:  PRN        cyanocobalamin 1000 MCG/ML injection   Commonly known as:  VITAMIN B12   Dose:  1 mL   Quantity:  30 mL        Inject 1 mL (1,000 mcg) Subcutaneous every 14 days INDICATION: FOR VITAMIN B12 SUPPLEMENTATION   Refills:  5        doxycycline 100 MG capsule   Commonly known as:  VIBRAMYCIN   Dose:  100 mg   Quantity:  20 capsule        Take 1 capsule (100 mg) by mouth 2 times daily   Refills:  0        DULoxetine 60 MG EC capsule   Commonly known as:  CYMBALTA   Dose:  120 mg   Quantity:  120 capsule        Take 2 capsules (120 mg) by mouth daily   Refills:  3        EPINEPHrine 0.3 MG/0.3ML injection   Dose:  0.3 mg   Quantity:  1 each        Inject 0.3 mLs (0.3 mg) into the muscle once as needed   Refills:  prn        fenofibrate 160 MG tablet   Dose:  160 mg   Quantity:  90 tablet        Take 1 tablet (160 mg) by mouth At Bedtime with food. INDICATION: TO LOWER CHOLESTEROL   Refills:  3        ferrous sulfate Dried 140 (45 FE) MG Tbcr   Dose:  1 tablet   Quantity:  180 tablet        Take 1 tablet by mouth every morning (before breakfast) IRON SUPPLEMENT - PLEASE TAKE WITH 4 OZ OF OJ WITH PULP, SUBSTITUTION OF IRON SUPPLEMENT PERMITTED   Refills:  PRN        Flunisolide HFA 80 MCG/ACT Aers   Commonly known as:  AEROSPAN   Dose:  2 puff   Quantity:  1 Inhaler        Inhale 2 puffs into the lungs 2 times daily INDICATION: ASTHMA  CONTROLLER, ALWAYS RINSE MOUTH AFTER USE   Refills:  5        fluticasone 50 MCG/ACT spray   Commonly known as:  FLONASE   Dose:  2 spray   Quantity:  16 g        Spray 2 sprays in nostril At Bedtime INDICATION: TO CONTROL NASAL ALLERGY SYMPTOMS, DO NOT BLOW NOSE FOR 30 MINUTES AFTER USING   Refills:  PRN        glipiZIDE 10 MG 24 hr tablet   Commonly known as:  glipiZIDE XL   Dose:  20 mg   Quantity:  180 tablet        Take 2 tablets (20 mg) by mouth daily INDICATION: TO TREAT DIABETES.   Refills:  3        guaiFENesin-codeine 100-10 MG/5ML Soln solution   Commonly known as:  ROBITUSSIN AC   Dose:  1 tsp.   Quantity:  120 mL        Take 5 mLs by mouth every 4 hours as needed for cough   Refills:  0        ibuprofen 800 MG tablet   Commonly known as:  ADVIL/MOTRIN   Dose:  800 mg   Quantity:  90 tablet        Take 1 tablet (800 mg) by mouth every 8 hours as needed for moderate pain or pain   Refills:  2        insulin detemir 100 UNIT/ML injection   Commonly known as:  LEVEMIR FLEXPEN/FLEXTOUCH   Dose:  20 Units   Quantity:  9 mL        Inject 20 Units Subcutaneous At Bedtime   Refills:  0        ipratropium - albuterol 0.5 mg/2.5 mg/3 mL 0.5-2.5 (3) MG/3ML neb solution   Commonly known as:  DUONEB   Quantity:  1 vial        One in neb in clinic   Refills:  0        levothyroxine 150 MCG tablet   Commonly known as:  SYNTHROID/LEVOTHROID   Dose:  150 mcg   Quantity:  90 tablet        Take 1 tablet (150 mcg) by mouth daily INDICATION: THYROID SUPPLEMENTATION, TAKE FIRST THING IN THE MORNING ON AN EMPTY STOMACH,  NO FOOD FOR AN HOUR   Refills:  3        lidocaine 5 % ointment   Commonly known as:  XYLOCAINE   Quantity:  50 g        Apply a quarter size amount to painful areas up to 4 times per day as needed.   Refills:  3        lisinopril-hydrochlorothiazide 20-25 MG per tablet   Commonly known as:  PRINZIDE/ZESTORETIC   Dose:  1 tablet   Quantity:  90 tablet        Take 1 tablet by mouth daily INDICATION:TO LOWER  "BLOOD PRESSURE AND TO PRESERVE KIDNEY FUNCTION   Refills:  1        magnesium oxide 400 MG tablet   Commonly known as:  MAG-OX   Dose:  400 mg   Quantity:  180 tablet        Take 1 tablet (400 mg) by mouth 2 times daily INDICATION: MAGNESIUM SUPPLEMENT   Refills:  3        metFORMIN 1000 MG tablet   Commonly known as:  GLUCOPHAGE   Dose:  1000 mg   Quantity:  60 tablet        Take 1 tablet (1,000 mg) by mouth 2 times daily (with meals) INDICATION: TO TREAT DIABETES, CONTROL BLOOD SUGAR AND TO CORRECT METABOLISM   Refills:  0        NEEDLES, ANY SIZE   Dose:  1 each   Quantity:  1 Box        Inject 1 each Subcutaneous At Bedtime   Refills:  prn        omeprazole 20 MG CR capsule   Commonly known as:  priLOSEC   Dose:  20 mg   Quantity:  90 capsule        Take 1 capsule (20 mg) by mouth 2 times daily INDICATION: TO CONTROL REFLUX SYMPTOMS   Refills:  3        pravastatin 80 MG tablet   Commonly known as:  PRAVACHOL   Dose:  80 mg   Quantity:  90 tablet        Take 1 tablet (80 mg) by mouth daily INDICATION: TO LOWER CHOLESTEROL AND TO HELP  PREVENT HEART DISEASE   Refills:  PRN        predniSONE 20 MG tablet   Commonly known as:  DELTASONE   Dose:  20 mg   Quantity:  10 tablet        Take 1 tablet (20 mg) by mouth 2 times daily   Refills:  0        pregabalin 150 MG capsule   Commonly known as:  LYRICA   Dose:  150 mg   Indication:  Neuropathic Pain   Quantity:  30 capsule        Take 1 capsule (150 mg) by mouth daily   Refills:  0        SUMAtriptan 100 MG tablet   Commonly known as:  IMITREX   Dose:  100 mg   Quantity:  18 tablet        Take 1 tablet (100 mg) by mouth every 8 hours as needed for migraine   Refills:  11        Syringe/Needle (Disp) 25G X 5/8\" 3 ML Misc   Commonly known as:  BD ECLIPSE SYRINGE   Dose:  1000 mcg   Quantity:  20 each        1,000 mcg See Admin Instructions AS DIRECTED FOR B12 SHOTS   Refills:  PRN        thin lancets   Commonly known as:  NO BRAND SPECIFIED   Dose:  1 Device   Quantity: "  3 Box        1 Device 2 times daily   Refills:  2        traMADol 50 MG tablet   Commonly known as:  ULTRAM   Quantity:  100 tablet        Take 1-2 tablet(s) every 6 hours as needed for pain. 3-4 per day. May dispense on/after 10/13 to start taking on/after 10/15.   Refills:  0        verapamil 240 MG CR tablet   Commonly known as:  CALAN-SR   Dose:  240 mg   Quantity:  30 tablet        Take 1 tablet (240 mg) by mouth At Bedtime INDICATION: TO LOWER BLOOD PRESSURE AND TO CONTROL MIGRAINE HEADACHES   Refills:  3        vitamin C-electrolytes 1000mg vitamin C super orange drink mix   Commonly known as:  EMERGEN-C   Quantity:  90 packet        Mix 1 packet in 4-6oz water and take once daily, INDICATION: VITAMIN C SUPPLEMENTION   Refills:  PRN        * Notice:  This list has 5 medication(s) that are the same as other medications prescribed for you. Read the directions carefully, and ask your doctor or other care provider to review them with you.            Orders Needing Specimen Collection     None      Pending Results     No orders found from 3/5/2017 to 3/8/2017.            Pending Culture Results     No orders found from 3/5/2017 to 3/8/2017.             Test Results from your hospital stay            Clinical Quality Measure: Blood Pressure Screening     Your blood pressure was checked while you were in the emergency department today. The last reading we obtained was  BP: 155/85 . Please read the guidelines below about what these numbers mean and what you should do about them.  If your systolic blood pressure (the top number) is less than 120 and your diastolic blood pressure (the bottom number) is less than 80, then your blood pressure is normal. There is nothing more that you need to do about it.  If your systolic blood pressure (the top number) is 120-139 or your diastolic blood pressure (the bottom number) is 80-89, your blood pressure may be higher than it should be. You should have your blood pressure  rechecked within a year by a primary care provider.  If your systolic blood pressure (the top number) is 140 or greater or your diastolic blood pressure (the bottom number) is 90 or greater, you may have high blood pressure. High blood pressure is treatable, but if left untreated over time it can put you at risk for heart attack, stroke, or kidney failure. You should have your blood pressure rechecked by a primary care provider within the next 4 weeks.  If your provider in the emergency department today gave you specific instructions to follow-up with your doctor or provider even sooner than that, you should follow that instruction and not wait for up to 4 weeks for your follow-up visit.        Thank you for choosing [facility]       Thank you for choosing [facility] for your care. Our goal is always to provide you with excellent care. Hearing back from our patients is one way we can continue to improve our services. Please take a few minutes to complete the written survey that you may receive in the mail after you visit with us. Thank you!        [employer] Information     [facility] gives you secure access to your electronic health record. If you see a primary care provider, you can also send messages to your care team and make appointments. If you have questions, please call your primary care clinic.  If you do not have a primary care provider, please call [phone] and they will assist you.        Care EveryWhere ID     This is your Care EveryWhere ID. This could be used by other organizations to access your [facility] medical records  [id number]        After Visit Summary       This is your record. Keep this with you and show to your community pharmacist(s) and doctor(s) at your next visit.

## 2017-03-07 NOTE — TELEPHONE ENCOUNTER
Nurse Line Message on 3/7/17 at 3:32    Patient would like a call back about doing some injections as well as her Tramadol refill. Call back number is [phone].     NITISH MartinezN, RN  Care Coordinator  [facility]

## 2017-03-07 NOTE — ED AVS SNAPSHOT
Emergency Department    [address] [place] [address]    Phone:  [phone]    Fax:  [phone]                                       Mary Lou Gil   MRN: [id number]    Department:   Emergency Department   Date of Visit:  3/7/2017           After Visit Summary Signature Page     I have received my discharge instructions, and my questions have been answered. I have discussed any challenges I see with this plan with the nurse or doctor.    ..........................................................................................................................................  Patient/Patient Representative Signature      ..........................................................................................................................................  Patient Representative Print Name and Relationship to Patient    ..................................................               ................................................  Date                                            Time    ..........................................................................................................................................  Reviewed by Signature/Title    ...................................................              ..............................................  Date                                                            Time

## 2017-03-08 RX ORDER — TRAMADOL HYDROCHLORIDE 50 MG/1
TABLET ORAL
Qty: 21 TABLET | Refills: 0 | Status: SHIPPED | OUTPATIENT
Start: 2017-03-09 | End: 2017-03-14

## 2017-03-08 NOTE — TELEPHONE ENCOUNTER
I spoke with Mary Lou and she reports she has been having a lot of increased pain recently. She reports she went to the ER yesterday for low back strain and wanted us to know she received a 60 mg Toradol injection which was helpful.      She wants to discuss her hip pain and possibly repeating the SI joint injections she has had in the past and I asked her to schedule an appointment with Monica as it has been about 7 months since she has been seen here.     She is also requesting a refill of her Tramadol and reports she has been out for about 1 week. It looks like Monica has filled this in the past. Last refill from Monica was on 10/7/16 but that her PCP has filled it one time since then on 12/5/16. Monica do you want her to request this from her PCP or do you want to fill it for her?    Verena Champagne, NITISHN, RN  Care Coordinator  [facility]

## 2017-03-08 NOTE — TELEPHONE ENCOUNTER
I can refill the Tramadol 50 mg.  She needs to get on my schedule as this is a controlled substance. I am giving her a limited supply.  I look forward to seeing her.  Monica Stone CNP    [facility]

## 2017-03-09 NOTE — TELEPHONE ENCOUNTER
Called patient. Scheduled her for 03/14/17 for follow up. Aware that script would be faxed to [facility] by PEREZ San and that this script is only 7 day supply.     Jyotsna Aburto  BSN-RN Care Coordinator  [facility]

## 2017-03-09 NOTE — TELEPHONE ENCOUNTER
Rx faxed to [facility]. Fax confirmation received. Hardcopy destroyed.   Gricelda San CMA   [facility]

## 2017-03-14 ENCOUNTER — OFFICE VISIT (OUTPATIENT)
Dept: PALLIATIVE MEDICINE | Facility: CLINIC | Age: 57
End: 2017-03-14
Payer: MEDICARE

## 2017-03-14 DIAGNOSIS — M51.369 DEGENERATION OF LUMBAR INTERVERTEBRAL DISC: ICD-10-CM

## 2017-03-14 DIAGNOSIS — G89.4 CHRONIC PAIN SYNDROME: ICD-10-CM

## 2017-03-14 DIAGNOSIS — G56.03 BILATERAL CARPAL TUNNEL SYNDROME: ICD-10-CM

## 2017-03-14 DIAGNOSIS — M77.12 LATERAL EPICONDYLITIS OF LEFT ELBOW: ICD-10-CM

## 2017-03-14 DIAGNOSIS — N95.1 PERIMENOPAUSAL: ICD-10-CM

## 2017-03-14 PROCEDURE — 99207 ZZC NO BILLABLE SERVICE THIS VISIT: CPT | Performed by: NURSE PRACTITIONER

## 2017-03-14 RX ORDER — TRAMADOL HYDROCHLORIDE 50 MG/1
TABLET ORAL
Qty: 21 TABLET | Refills: 0 | Status: SHIPPED | OUTPATIENT
Start: 2017-03-14 | End: 2017-04-13

## 2017-03-14 RX ORDER — TRAMADOL HYDROCHLORIDE 50 MG/1
TABLET ORAL
Qty: 21 TABLET | Refills: 0 | Status: SHIPPED | OUTPATIENT
Start: 2017-03-14 | End: 2017-03-21

## 2017-03-14 RX ORDER — DULOXETIN HYDROCHLORIDE 60 MG/1
120 CAPSULE, DELAYED RELEASE ORAL DAILY
Qty: 120 CAPSULE | Refills: 3 | Status: SHIPPED | OUTPATIENT
Start: 2017-03-14 | End: 2017-09-06

## 2017-03-14 NOTE — PROGRESS NOTES
Patient  Arrived to late to be seen. Refills given  Monica Stone CNP    [facility]

## 2017-03-21 ENCOUNTER — OFFICE VISIT (OUTPATIENT)
Dept: INTERNAL MEDICINE | Facility: CLINIC | Age: 57
End: 2017-03-21
Payer: MEDICARE

## 2017-03-21 VITALS
SYSTOLIC BLOOD PRESSURE: 132 MMHG | TEMPERATURE: 98 F | OXYGEN SATURATION: 98 % | DIASTOLIC BLOOD PRESSURE: 88 MMHG | BODY MASS INDEX: 28.34 KG/M2 | HEART RATE: 83 BPM | WEIGHT: 175.6 LBS

## 2017-03-21 DIAGNOSIS — F33.0 MAJOR DEPRESSIVE DISORDER, RECURRENT EPISODE, MILD (H): ICD-10-CM

## 2017-03-21 DIAGNOSIS — J45.30 MILD PERSISTENT ASTHMA, UNCOMPLICATED: ICD-10-CM

## 2017-03-21 DIAGNOSIS — Z12.31 VISIT FOR SCREENING MAMMOGRAM: Primary | ICD-10-CM

## 2017-03-21 DIAGNOSIS — M77.12 LATERAL EPICONDYLITIS OF LEFT ELBOW: ICD-10-CM

## 2017-03-21 DIAGNOSIS — M51.369 DEGENERATION OF LUMBAR INTERVERTEBRAL DISC: ICD-10-CM

## 2017-03-21 DIAGNOSIS — I10 ESSENTIAL HYPERTENSION, BENIGN: ICD-10-CM

## 2017-03-21 DIAGNOSIS — T78.2XXD ANAPHYLACTIC REACTION, SUBSEQUENT ENCOUNTER: ICD-10-CM

## 2017-03-21 DIAGNOSIS — R79.0 LOW IRON STORES: ICD-10-CM

## 2017-03-21 DIAGNOSIS — M48.07 SPINAL STENOSIS OF LUMBOSACRAL REGION: ICD-10-CM

## 2017-03-21 DIAGNOSIS — E11.65 TYPE 2 DIABETES MELLITUS WITH HYPERGLYCEMIA, WITH LONG-TERM CURRENT USE OF INSULIN (H): ICD-10-CM

## 2017-03-21 DIAGNOSIS — G43.909 MIGRAINE WITHOUT STATUS MIGRAINOSUS, NOT INTRACTABLE, UNSPECIFIED MIGRAINE TYPE: ICD-10-CM

## 2017-03-21 DIAGNOSIS — E53.8 VITAMIN B12 DEFICIENCY (NON ANEMIC): ICD-10-CM

## 2017-03-21 DIAGNOSIS — J45.901 ASTHMA EXACERBATION: ICD-10-CM

## 2017-03-21 DIAGNOSIS — E78.5 HYPERLIPIDEMIA WITH TARGET LDL LESS THAN 100: ICD-10-CM

## 2017-03-21 DIAGNOSIS — N95.1 PERIMENOPAUSAL: ICD-10-CM

## 2017-03-21 DIAGNOSIS — K21.00 GASTROESOPHAGEAL REFLUX DISEASE WITH ESOPHAGITIS: ICD-10-CM

## 2017-03-21 DIAGNOSIS — E54 SCURVY: ICD-10-CM

## 2017-03-21 DIAGNOSIS — Z79.4 TYPE 2 DIABETES MELLITUS WITH HYPERGLYCEMIA, WITH LONG-TERM CURRENT USE OF INSULIN (H): ICD-10-CM

## 2017-03-21 DIAGNOSIS — E55.9 VITAMIN D DEFICIENCY: ICD-10-CM

## 2017-03-21 DIAGNOSIS — E03.9 HYPOTHYROIDISM, UNSPECIFIED TYPE: ICD-10-CM

## 2017-03-21 DIAGNOSIS — G89.4 CHRONIC PAIN SYNDROME: ICD-10-CM

## 2017-03-21 LAB
ERYTHROCYTE [DISTWIDTH] IN BLOOD BY AUTOMATED COUNT: 14.9 % (ref 10–15)
HCT VFR BLD AUTO: 47.3 % (ref 35–47)
HGB BLD-MCNC: 15.7 G/DL (ref 11.7–15.7)
MCH RBC QN AUTO: 30.7 PG (ref 26.5–33)
MCHC RBC AUTO-ENTMCNC: 33.2 G/DL (ref 31.5–36.5)
MCV RBC AUTO: 93 FL (ref 78–100)
PLATELET # BLD AUTO: 292 10E9/L (ref 150–450)
RBC # BLD AUTO: 5.11 10E12/L (ref 3.8–5.2)
WBC # BLD AUTO: 8.7 10E9/L (ref 4–11)

## 2017-03-21 PROCEDURE — 83540 ASSAY OF IRON: CPT | Performed by: INTERNAL MEDICINE

## 2017-03-21 PROCEDURE — 83036 HEMOGLOBIN GLYCOSYLATED A1C: CPT | Performed by: INTERNAL MEDICINE

## 2017-03-21 PROCEDURE — 82607 VITAMIN B-12: CPT | Performed by: INTERNAL MEDICINE

## 2017-03-21 PROCEDURE — 80053 COMPREHEN METABOLIC PANEL: CPT | Performed by: INTERNAL MEDICINE

## 2017-03-21 PROCEDURE — 83550 IRON BINDING TEST: CPT | Performed by: INTERNAL MEDICINE

## 2017-03-21 PROCEDURE — 83721 ASSAY OF BLOOD LIPOPROTEIN: CPT | Performed by: INTERNAL MEDICINE

## 2017-03-21 PROCEDURE — 82306 VITAMIN D 25 HYDROXY: CPT | Performed by: INTERNAL MEDICINE

## 2017-03-21 PROCEDURE — 82728 ASSAY OF FERRITIN: CPT | Performed by: INTERNAL MEDICINE

## 2017-03-21 PROCEDURE — 99215 OFFICE O/P EST HI 40 MIN: CPT | Performed by: INTERNAL MEDICINE

## 2017-03-21 PROCEDURE — 84439 ASSAY OF FREE THYROXINE: CPT | Performed by: INTERNAL MEDICINE

## 2017-03-21 PROCEDURE — 84443 ASSAY THYROID STIM HORMONE: CPT | Performed by: INTERNAL MEDICINE

## 2017-03-21 PROCEDURE — 36415 COLL VENOUS BLD VENIPUNCTURE: CPT | Performed by: INTERNAL MEDICINE

## 2017-03-21 PROCEDURE — 85027 COMPLETE CBC AUTOMATED: CPT | Performed by: INTERNAL MEDICINE

## 2017-03-21 RX ORDER — CITALOPRAM HYDROBROMIDE 40 MG/1
40 TABLET ORAL DAILY
Qty: 90 TABLET | Status: SHIPPED | OUTPATIENT
Start: 2017-03-21 | End: 2018-03-14

## 2017-03-21 RX ORDER — EPINEPHRINE 0.3 MG/.3ML
0.3 INJECTION SUBCUTANEOUS
Qty: 0.3 ML | Status: SHIPPED | OUTPATIENT
Start: 2017-03-21 | End: 2019-04-23

## 2017-03-21 RX ORDER — PREDNISONE 20 MG/1
20 TABLET ORAL 2 TIMES DAILY
Qty: 10 TABLET | Refills: 0 | Status: SHIPPED | OUTPATIENT
Start: 2017-03-21 | End: 2017-03-26

## 2017-03-21 RX ORDER — PRAVASTATIN SODIUM 80 MG/1
80 TABLET ORAL DAILY
Qty: 90 TABLET | Status: SHIPPED | OUTPATIENT
Start: 2017-03-21 | End: 2018-01-31

## 2017-03-21 RX ORDER — FENOFIBRATE 160 MG/1
160 TABLET ORAL AT BEDTIME
Qty: 90 TABLET | Refills: 3 | Status: SHIPPED | OUTPATIENT
Start: 2017-03-21 | End: 2018-07-16

## 2017-03-21 RX ORDER — VERAPAMIL HYDROCHLORIDE 240 MG/1
240 TABLET, FILM COATED, EXTENDED RELEASE ORAL AT BEDTIME
Qty: 30 TABLET | Refills: 3 | Status: SHIPPED | OUTPATIENT
Start: 2017-03-21 | End: 2018-01-31

## 2017-03-21 RX ORDER — LEVOTHYROXINE SODIUM 150 UG/1
150 TABLET ORAL DAILY
Qty: 90 TABLET | Refills: 3 | Status: SHIPPED
Start: 2017-03-21 | End: 2018-01-31

## 2017-03-21 RX ORDER — LISINOPRIL AND HYDROCHLOROTHIAZIDE 20; 25 MG/1; MG/1
1 TABLET ORAL DAILY
Qty: 90 TABLET | Refills: 1 | Status: SHIPPED | OUTPATIENT
Start: 2017-03-21 | End: 2018-01-31

## 2017-03-21 RX ORDER — SUMATRIPTAN 100 MG/1
100 TABLET, FILM COATED ORAL EVERY 8 HOURS PRN
Qty: 18 TABLET | Refills: 11 | Status: SHIPPED | OUTPATIENT
Start: 2017-03-21 | End: 2018-07-13

## 2017-03-21 RX ORDER — PREGABALIN 150 MG/1
150 CAPSULE ORAL DAILY
Qty: 30 CAPSULE | Refills: 0 | Status: SHIPPED | OUTPATIENT
Start: 2017-03-21 | End: 2017-09-06

## 2017-03-21 NOTE — PATIENT INSTRUCTIONS
** FOLLOW UP PLAN**:    PLEASE SCHEDULE OFFICE VISIT SOONEST AVAILABILITY FROM TODAY TO FOLLOW UP ON  DIABETES, CHOLESTEROL, AND TO REVIEW TEST RESULTS      YOU HAVE BEEN REFERRED FOR ORTHOPEDICS TO ADDRESS ISSUES RAISED TODAY REGARDING BACK PAIN  PLEASE  MAKE SURE TO SCHEDULE YOUR APPOINTMENT(S) BY TELEPHONE      YOU MAY CONTACT THE CLINIC IF ANY QUESTIONS OR CONCERNS -[phone] OR VIA [facility]           There are now two [facility] sites for patients to contact for services:    [address]  [place], [place] [id number] [phone]     [address] [address] [phone]

## 2017-03-21 NOTE — NURSING NOTE
"Chief Complaint   Patient presents with     Hypertension     Diabetes     Lipids     Thyroid Problem     Hospital F/U       Initial /88  Pulse 83  Temp 98  F (36.7  C) (Oral)  Wt 175 lb 9.6 oz (79.7 kg)  SpO2 98%  BMI 28.34 kg/m2 Estimated body mass index is 28.34 kg/(m^2) as calculated from the following:    Height as of 3/7/17: 5' 6\" (1.676 m).    Weight as of this encounter: 175 lb 9.6 oz (79.7 kg).  Medication Reconciliation: complete    "

## 2017-03-21 NOTE — PROGRESS NOTES
SUBJECTIVE:                                                    Mary Lou Gil is a 56 year old female who presents to clinic today for the following health issues:        Diabetes Follow-up    Patient is checking blood sugars: twice daily.    Blood sugar testing frequency justification: Uncontrolled diabetes  Results are as follows:         am - 138 -270         bedtime - 138 -300    Diabetic concerns: None     Symptoms of hypoglycemia (low blood sugar): none     Paresthesias (numbness or burning in feet) or sores: Yes      Date of last diabetic eye exam: 10/16     Hyperlipidemia Follow-Up      Rate your low fat/cholesterol diet?: fair    Taking statin?  Yes, no muscle aches from statin    Other lipid medications/supplements?:  none     Hypertension Follow-up      Outpatient blood pressures are not being checked.    Low Salt Diet: low salt       Hypothyroidism Follow-up      Since last visit, patient describes the following symptoms: Weight stable, no hair loss, no skin changes, no constipation, no loose stools       Amount of exercise or physical activity: None    Problems taking medications regularly: No    Medication side effects: none    Diet: regular (no restrictions)    ED/UC Followup:    Facility:  [facility]  Date of visit: 3-7-17 & 1-25-17  Reason for visit: Low back and headache  Current Status: stable   Has not seen ortho for spinal stenosis.    Has instituted some lifestyle changes with regards to diet and increased exercise.       Other significant issues as outlined and addressed in the plan section of this note.     Problem list and histories reviewed & adjusted, as indicated.    Mary Lou was not able to follow-up after the last lab draw and so is due for follow-up labs at this time to properly assess her.  Additional history: as documented    Labs reviewed in EPIC    Reviewed and updated as needed this visit by clinical staff  Tobacco  Allergies       Reviewed and updated as needed  this visit by Provider         ROS:  14 point ROS negative except as above      OBJECTIVE:                                                    /88  Pulse 83  Temp 98  F (36.7  C) (Oral)  Wt 175 lb 9.6 oz (79.7 kg)  SpO2 98%  BMI 28.34 kg/m2  Body mass index is 28.34 kg/(m^2).  GENERAL: healthy, alert and no distress  NECK: no adenopathy, no asymmetry, masses, or scars and thyroid normal to palpation  RESP: lungs clear to auscultation - no rales, rhonchi or wheezes  CV: regular rate and rhythm, normal S1 S2, no S3 or S4, no murmur, click or rub, no peripheral edema and peripheral pulses strong  ABDOMEN: soft, nontender, no hepatosplenomegaly, no masses and bowel sounds normal  MS: no gross musculoskeletal defects noted, no edema, back exam deferred    Diagnostic Test Results:  Results for orders placed or performed during the hospital encounter of 01/25/17   Glucose   Result Value Ref Range    Glucose 203 (H) 70 - 99 mg/dL        ASSESSMENT/PLAN:                                                    Diabetes Type II, A1c Uncontrolled, insulin dependent   Associated with the following complications    Renal Complications:  None    Ophthalmologic Complications: None    Neurologic Complications: Peripheral autonomic neuropathy    Peripheral Vascular Complications:  None    Other: None   Plan:  No changes in the patient's current treatment plan    Hyperlipidemia; controlled   Plan:  No changes in the patient's current treatment plan    Hypertension; controlled   Associated with the following complications:    Diabetes   Plan:  No changes in the patient's current treatment plan    Depression; recurrent episode-- Full remission   Associated with the following complications:    None   Plan:  No changes in the patient's current treatment plan    Hypothyroidism; status unknown   Plan:  No changes in the patient's current treatment plan      DIAGNOSIS/PLAN:     ICD-10-CM    1. Visit for screening mammogram Z12.31 MA Screen  Bilateral w/Philip   2. Asthma exacerbation J45.901 predniSONE (DELTASONE) 20 MG tablet   3. Mild persistent asthma, uncomplicated J45.30 Flunisolide HFA (AEROSPAN) 80 MCG/ACT AERS   4. Major depressive disorder, recurrent episode, mild (H) F33.0 citalopram (CELEXA) 40 MG tablet   5. Anaphylactic reaction, subsequent encounter T78.2XXD EPINEPHrine 0.3 MG/0.3ML injection   6. Chronic pain syndrome G89.4 pregabalin (LYRICA) 150 MG capsule   7. Lateral epicondylitis of left elbow M77.12 pregabalin (LYRICA) 150 MG capsule   8. Degeneration of lumbar intervertebral disc M51.36 pregabalin (LYRICA) 150 MG capsule   9. Type 2 diabetes mellitus with hyperglycemia, with long-term current use of insulin (H) E11.65 blood glucose monitoring (NO BRAND SPECIFIED) test strip    Z79.4 metFORMIN (GLUCOPHAGE) 1000 MG tablet     insulin detemir (LEVEMIR FLEXPEN/FLEXTOUCH) 100 UNIT/ML injection     Hemoglobin A1c     Comprehensive metabolic panel   10. Hypothyroidism, unspecified type E03.9 levothyroxine (SYNTHROID/LEVOTHROID) 150 MCG tablet     TSH with free T4 reflex   11. Migraine without status migrainosus, not intractable, unspecified migraine type G43.909 SUMAtriptan (IMITREX) 100 MG tablet     verapamil (CALAN-SR) 240 MG CR tablet   12. Perimenopausal N95.1 cholecalciferol 5000 UNITS CAPS   13. Hyperlipidemia with target LDL less than 100 E78.5 pravastatin (PRAVACHOL) 80 MG tablet     fenofibrate 160 MG tablet     Comprehensive metabolic panel     LDL cholesterol direct   14. Essential hypertension, benign I10 lisinopril-hydrochlorothiazide (PRINZIDE/ZESTORETIC) 20-25 MG per tablet     CBC with platelets   15. Scurvy E54 vitamin C-electrolytes (EMERGEN-C) 1000mg vitamin C super orange drink mix   16. Low iron stores R79.0 vitamin C-electrolytes (EMERGEN-C) 1000mg vitamin C super orange drink mix     Ferritin     CBC with platelets     Iron and iron binding capacity   17. Gastroesophageal reflux disease with esophagitis K21.0  omeprazole (PRILOSEC) 20 MG CR capsule   18. Vitamin D deficiency E55.9 Vitamin D Deficiency   19. Vitamin B12 deficiency (non anemic) E53.8 Vitamin B12   20. Spinal stenosis of lumbosacral region M48.07 ORTHOPEDICS ADULT REFERRAL       SIGNIFICANT ISSUES RE The primary encounter diagnosis was Visit for screening mammogram. Diagnoses of Asthma exacerbation, Mild persistent asthma, uncomplicated, Major depressive disorder, recurrent episode, mild (H), Anaphylactic reaction, subsequent encounter, Chronic pain syndrome, Lateral epicondylitis of left elbow, Degeneration of lumbar intervertebral disc, Type 2 diabetes mellitus with hyperglycemia, with long-term current use of insulin (H), Hypothyroidism, unspecified type, Migraine without status migrainosus, not intractable, unspecified migraine type, Perimenopausal, Hyperlipidemia with target LDL less than 100, Essential hypertension, benign, Scurvy, Low iron stores, Gastroesophageal reflux disease with esophagitis, Vitamin D deficiency, Vitamin B12 deficiency (non anemic), and Spinal stenosis of lumbosacral region were also pertinent to this visit. AS NOTED AND ADDRESSED ABOVE   MEDS AND AND LABS AS ORDERED TO ADDRESS PREVIOUS AND CURRENT ABNORMAL INDICES    SEE PATIENT INSTRUCTION SECTION FOR FOLLOW UP PLAN    Mary Lou IS TO CONTINUE OTHER TREATMENT REGIMEN/PLANS EXCEPT AS INDICATED    COMPLIANCE WITH MEDICATIONS DIET AND EXERCISE PLANS ENCOURAGED    DISCONTINUED MEDS:  Medications Discontinued During This Encounter   Medication Reason     albuterol (PROVENTIL HFA) 108 (90 BASE) MCG/ACT inhaler Medication Reconciliation Clean Up     Blood Glucose Monitoring Suppl (BLOOD GLUCOSE METER) KIT Medication Reconciliation Clean Up     guaiFENesin-codeine (ROBITUSSIN AC) 100-10 MG/5ML SOLN Therapy completed     ipratropium - albuterol 0.5 mg/2.5 mg/3 mL (DUONEB) 0.5-2.5 (3) MG/3ML nebulization Therapy completed     order for DME Therapy completed     order for DME Therapy  "completed     Syringe/Needle, Disp, (BD ECLIPSE SYRINGE) 25G X 5/8\" 3 ML MISC Medication Reconciliation Clean Up     traMADol (ULTRAM) 50 MG tablet Medication Reconciliation Clean Up     order for DME Therapy completed     doxycycline (VIBRAMYCIN) 100 MG capsule Therapy completed     predniSONE (DELTASONE) 20 MG tablet      ferrous sulfate Dried 140 (45 FE) MG TBCR      fluticasone (FLONASE) 50 MCG/ACT nasal spray      albuterol (2.5 MG/3ML) 0.083% nebulizer solution      Flunisolide HFA (AEROSPAN) 80 MCG/ACT AERS Reorder     citalopram (CELEXA) 40 MG tablet Reorder     EPINEPHrine (EPIPEN) 0.3 MG/0.3ML injection Reorder     blood glucose monitoring (NO BRAND SPECIFIED) test strip Reorder     pregabalin (LYRICA) 150 MG capsule Reorder     SUMAtriptan (IMITREX) 100 MG tablet Reorder     metFORMIN (GLUCOPHAGE) 1000 MG tablet Reorder     insulin detemir (LEVEMIR FLEXPEN/FLEXTOUCH) 100 UNIT/ML injection Reorder     levothyroxine (SYNTHROID, LEVOTHROID) 150 MCG tablet Reorder     verapamil (CALAN-SR) 240 MG tablet Reorder     cholecalciferol 5000 UNITS CAPS Reorder     pravastatin (PRAVACHOL) 80 MG tablet Reorder     fenofibrate 160 MG tablet Reorder     lisinopril-hydrochlorothiazide (PRINZIDE,ZESTORETIC) 20-25 MG per tablet Reorder     vitamin C-electrolytes (EMERGEN-C) 1000mg vitamin C super orange drink mix Reorder     omeprazole (PRILOSEC) 20 MG capsule Reorder       CURRENT MED LIST WITH CHANGES AS NOTED BELOW:  Current Outpatient Prescriptions   Medication Sig Dispense Refill     predniSONE (DELTASONE) 20 MG tablet Take 1 tablet (20 mg) by mouth 2 times daily 10 tablet 0     Flunisolide HFA (AEROSPAN) 80 MCG/ACT AERS Inhale 2 puffs into the lungs 2 times daily INDICATION: ASTHMA CONTROLLER, ALWAYS RINSE MOUTH AFTER USE 1 Inhaler 5     citalopram (CELEXA) 40 MG tablet Take 1 tablet (40 mg) by mouth daily TO CONTROL SYMPTOMS OF DEPRESSION 90 tablet PRN     EPINEPHrine 0.3 MG/0.3ML injection Inject 0.3 mLs (0.3 mg) " into the muscle once as needed 0.3 mL prn     blood glucose monitoring (NO BRAND SPECIFIED) test strip 1 strip by In Vitro route 3 times daily Uses [employer] [place] [address]     pregabalin (LYRICA) 150 MG capsule Take 1 capsule (150 mg) by mouth daily 30 capsule 0     SUMAtriptan (IMITREX) 100 MG tablet Take 1 tablet (100 mg) by mouth every 8 hours as needed for migraine 18 tablet 11     metFORMIN (GLUCOPHAGE) 1000 MG tablet Take 1 tablet (1,000 mg) by mouth 2 times daily (with meals) INDICATION: TO TREAT DIABETES, CONTROL BLOOD SUGAR AND TO CORRECT METABOLISM 60 tablet 0     insulin detemir (LEVEMIR FLEXPEN/FLEXTOUCH) 100 UNIT/ML injection Inject 20 Units Subcutaneous At Bedtime 9 mL 0     levothyroxine (SYNTHROID/LEVOTHROID) 150 MCG tablet Take 1 tablet (150 mcg) by mouth daily INDICATION: THYROID SUPPLEMENTATION, TAKE FIRST THING IN THE MORNING ON AN EMPTY STOMACH,  NO FOOD FOR AN HOUR 90 tablet 3     verapamil (CALAN-SR) 240 MG CR tablet Take 1 tablet (240 mg) by mouth At Bedtime INDICATION: TO LOWER BLOOD PRESSURE AND TO CONTROL MIGRAINE HEADACHES 30 tablet 3     cholecalciferol 5000 UNITS CAPS Take 1 capsule (5,000 Units) by mouth daily FOR VITAMIN D DEFICIENCY (LOW VITAMIN D),TAKE 2 CAPSULES DOROTHY FOR THE NEXT 4 WEEKS, THEN 1 CAPSULE DAILY, MUST TAKE WITH WITH FAT CONTAINING MEAL 100 capsule 3     pravastatin (PRAVACHOL) 80 MG tablet Take 1 tablet (80 mg) by mouth daily INDICATION: TO LOWER CHOLESTEROL AND TO HELP  PREVENT HEART DISEASE 90 tablet PRN     fenofibrate 160 MG tablet Take 1 tablet (160 mg) by mouth At Bedtime with food. INDICATION: TO LOWER CHOLESTEROL 90 tablet 3     lisinopril-hydrochlorothiazide (PRINZIDE/ZESTORETIC) 20-25 MG per tablet Take 1 tablet by mouth daily INDICATION:TO LOWER BLOOD PRESSURE AND TO PRESERVE KIDNEY FUNCTION 90 tablet 1     vitamin C-electrolytes (EMERGEN-C) 1000mg vitamin C super orange drink mix Mix 1 packet in 4-6oz water and take once daily, INDICATION: VITAMIN C  SUPPLEMENTION 90 packet PRN     omeprazole (PRILOSEC) 20 MG CR capsule Take 1 capsule (20 mg) by mouth 2 times daily INDICATION: TO CONTROL REFLUX SYMPTOMS 90 capsule 3     traMADol (ULTRAM) 50 MG tablet Take 1tablet(s) every 6 hours as needed for pain. 3-4 per day. May dispense on/after 03/14/17 to start taking on/after 03/14/17. 21 tablet 0     DULoxetine (CYMBALTA) 60 MG EC capsule Take 2 capsules (120 mg) by mouth daily 120 capsule 3     acetaminophen (TYLENOL) 500 MG tablet Take 2 tablets (1,000 mg) by mouth every 8 hours 180 tablet 0     NEEDLES, ANY SIZE Inject 1 each Subcutaneous At Bedtime 1 Box prn     albuterol (PROAIR HFA, PROVENTIL HFA, VENTOLIN HFA) 108 (90 BASE) MCG/ACT inhaler Inhale 2 puffs into the lungs every 6 hours as needed for shortness of breath / dyspnea or wheezing 3 Inhaler 1     ibuprofen (ADVIL,MOTRIN) 800 MG tablet Take 1 tablet (800 mg) by mouth every 8 hours as needed for moderate pain or pain 90 tablet 2     magnesium oxide (MAG-OX) 400 MG tablet Take 1 tablet (400 mg) by mouth 2 times daily INDICATION: MAGNESIUM SUPPLEMENT 180 tablet 3     glipiZIDE (GLIPIZIDE XL) 10 MG 24 hr tablet Take 2 tablets (20 mg) by mouth daily INDICATION: TO TREAT DIABETES. 180 tablet 3     cyanocobalamin (VITAMIN B12) 1000 MCG/ML injection Inject 1 mL (1,000 mcg) Subcutaneous every 14 days INDICATION: FOR VITAMIN B12 SUPPLEMENTATION 30 mL 5     lidocaine (XYLOCAINE) 5 % ointment Apply a quarter size amount to painful areas up to 4 times per day as needed. 50 g 3     thin (NO BRAND SPECIFIED) lancets 1 Device 2 times daily 3 Box 2     blood glucose monitoring (ACCU-CHEK ZAFAR PLUS) meter device kit by In Vitro route as needed 1 kit PRN     DiphenhydrAMINE HCl (BENADRYL PO) Take 25-50 mg by mouth See Admin Instructions 25 mg during the day if needed and 50 mg at bedtime for sleep as needed       [DISCONTINUED] metFORMIN (GLUCOPHAGE) 1000 MG tablet Take 1 tablet (1,000 mg) by mouth 2 times daily (with meals)  INDICATION: TO TREAT DIABETES, CONTROL BLOOD SUGAR AND TO CORRECT METABOLISM 60 tablet 0     [DISCONTINUED] insulin detemir (LEVEMIR FLEXPEN/FLEXTOUCH) 100 UNIT/ML injection Inject 20 Units Subcutaneous At Bedtime 9 mL 0     [DISCONTINUED] pregabalin (LYRICA) 150 MG capsule Take 1 capsule (150 mg) by mouth daily 30 capsule 0     [DISCONTINUED] albuterol (PROVENTIL HFA) 108 (90 BASE) MCG/ACT inhaler Inhale 2 puffs into the lungs every 6 hours (Patient not taking: Reported on 3/21/2017) 1 Inhaler 0     [DISCONTINUED] albuterol (2.5 MG/3ML) 0.083% nebulizer solution Take 1 vial (2.5 mg) by nebulization every 6 hours as needed for shortness of breath / dyspnea or wheezing (Patient not taking: Reported on 3/21/2017) 25 vial 0     [DISCONTINUED] verapamil (CALAN-SR) 240 MG tablet Take 1 tablet (240 mg) by mouth At Bedtime INDICATION: TO LOWER BLOOD PRESSURE AND TO CONTROL MIGRAINE HEADACHES 30 tablet 3     [DISCONTINUED] levothyroxine (SYNTHROID, LEVOTHROID) 150 MCG tablet Take 1 tablet (150 mcg) by mouth daily INDICATION: THYROID SUPPLEMENTATION, TAKE FIRST THING IN THE MORNING ON AN EMPTY STOMACH,  NO FOOD FOR AN HOUR 90 tablet 3     [DISCONTINUED] pravastatin (PRAVACHOL) 80 MG tablet Take 1 tablet (80 mg) by mouth daily INDICATION: TO LOWER CHOLESTEROL AND TO HELP  PREVENT HEART DISEASE 90 tablet PRN     [DISCONTINUED] fenofibrate 160 MG tablet Take 1 tablet (160 mg) by mouth At Bedtime with food. INDICATION: TO LOWER CHOLESTEROL 90 tablet 3     [DISCONTINUED] lisinopril-hydrochlorothiazide (PRINZIDE,ZESTORETIC) 20-25 MG per tablet Take 1 tablet by mouth daily INDICATION:TO LOWER BLOOD PRESSURE AND TO PRESERVE KIDNEY FUNCTION 90 tablet 1     [DISCONTINUED] citalopram (CELEXA) 40 MG tablet Take 1 tablet (40 mg) by mouth daily TO CONTROL SYMPTOMS OF DEPRESSION 90 tablet PRN     [DISCONTINUED] ezetimibe (ZETIA) 10 MG tablet Take 1 tablet (10 mg) by mouth daily INDICATION: TO LOWER CHOLESTEROL 90 tablet 2      [DISCONTINUED] EPINEPHrine (EPIPEN) 0.3 MG/0.3ML injection Inject 0.3 mLs (0.3 mg) into the muscle once as needed 1 each prn     [DISCONTINUED] Ascorbic Acid Buffered (VITAMIN C EFFERVESCENT) PDEF Take 1 packet by mouth daily. EMERGEN- C, INDICATION: VITAMIN C SUPPLEMENTATION 90 g PRN                 Patient Instructions   ** FOLLOW UP PLAN**:    PLEASE SCHEDULE OFFICE VISIT SOONEST AVAILABILITY FROM TODAY TO FOLLOW UP ON  DIABETES, CHOLESTEROL, AND TO REVIEW TEST RESULTS      YOU HAVE BEEN REFERRED FOR ORTHOPEDICS TO ADDRESS ISSUES RAISED TODAY REGARDING BACK PAIN  PLEASE  MAKE SURE TO SCHEDULE YOUR APPOINTMENT(S) BY TELEPHONE      YOU MAY CONTACT THE CLINIC IF ANY QUESTIONS OR CONCERNS -[phone] OR VIA [facility]           There are now two [facility] sites for patients to contact for services:    [address], [address]  [place], [place] [address] [phone]     [address] [address]  [phone]        Abilio Casiano MD  [facility]

## 2017-03-21 NOTE — MR AVS SNAPSHOT
After Visit Summary   3/21/2017    Mary Lou Gil    MRN: [id number]           Patient Information     Date Of Birth          1960        Visit Information        Provider Department      3/21/2017 5:00 PM Abilio Casiano MD [facility]        Today's Diagnoses     Visit for screening mammogram    -  1    Asthma exacerbation        Mild persistent asthma, uncomplicated        Major depressive disorder, recurrent episode, mild (H)        Anaphylactic reaction, subsequent encounter        Chronic pain syndrome        Lateral epicondylitis of left elbow        Degeneration of lumbar intervertebral disc        Type 2 diabetes mellitus with hyperglycemia, with long-term current use of insulin (H)        Hypothyroidism, unspecified type        Migraine without status migrainosus, not intractable, unspecified migraine type        Perimenopausal        Hyperlipidemia with target LDL less than 100        Essential hypertension, benign        Scurvy        Low iron stores        Gastroesophageal reflux disease with esophagitis        Vitamin D deficiency        Vitamin B12 deficiency (non anemic)        Spinal stenosis of lumbosacral region          Care Instructions    ** FOLLOW UP PLAN**:    PLEASE SCHEDULE OFFICE VISIT SOONEST AVAILABILITY FROM TODAY TO FOLLOW UP ON  DIABETES, CHOLESTEROL, AND TO REVIEW TEST RESULTS      YOU HAVE BEEN REFERRED FOR ORTHOPEDICS TO ADDRESS ISSUES RAISED TODAY REGARDING BACK PAIN  PLEASE  MAKE SURE TO SCHEDULE YOUR APPOINTMENT(S) BY TELEPHONE      YOU MAY CONTACT THE CLINIC IF ANY QUESTIONS OR CONCERNS -[phone] OR VIA [employer]           There are now two [facility] sites for patients to contact for services:    [facility] - [address], [address]  [place], [place] [address] [phone]     [address] [address] [phone]          Follow-ups after  your visit        Additional Services     ORTHOPEDICS ADULT REFERRAL       Your provider has referred you to: [facility] [phone]   [email or web address]      Please be aware that coverage of these services is subject to the terms and limitations of your health insurance plan.  Call member services at your health plan with any benefit or coverage questions.      Please bring the following to your appointment:    >>   Any x-rays, CTs or MRIs which have been performed.  Contact the facility where they were done to arrange for  prior to your scheduled appointment.  Any new CT, MRI or other procedures ordered by your specialist must be performed at a [facility] facility or coordinated by your clinic's referral office.    >>   List of current medications   >>   This referral request   >>   Any documents/labs given to you for this referral                  Follow-up notes from your care team     See patient instructions section of the AVS Return in about 4 weeks (around 4/18/2017) for DIABETES CHECK, FOLLOW UP TEST RESULTS AND SYMPTOMS.      Future tests that were ordered for you today     Open Future Orders        Priority Expected Expires Ordered    MA Screen Bilateral w/Philip Routine 3/21/2017 3/21/2018 3/21/2017            Who to contact     If you have questions or need follow up information about today's clinic visit or your schedule please contact [facility] directly at [phone].  Normal or non-critical lab and imaging results will be communicated to you by MyChart, letter or phone within 4 business days after the clinic has received the results. If you do not hear from us within 7 days, please contact the clinic through MyChart or phone. If you have a critical or abnormal lab result, we will notify you by phone as soon as possible.  Submit refill requests through [employer] or call your pharmacy and they will forward the refill  request to us. Please allow 3 business days for your refill to be completed.          Additional Information About Your Visit        [employer] Information     [employer] gives you secure access to your electronic health record. If you see a primary care provider, you can also send messages to your care team and make appointments. If you have questions, please call your primary care clinic.  If you do not have a primary care provider, please call [phone] and they will assist you.        Care EveryWhere ID     This is your Care EveryWhere ID. This could be used by other organizations to access your [facility] medical records  [id number]        Your Vitals Were     Pulse Temperature Pulse Oximetry BMI (Body Mass Index)          83 98  F (36.7  C) (Oral) 98% 28.34 kg/m2         Blood Pressure from Last 3 Encounters:   03/21/17 132/88   03/07/17 155/85   01/25/17 (!) 144/94    Weight from Last 3 Encounters:   03/21/17 175 lb 9.6 oz (79.7 kg)   03/07/17 174 lb (78.9 kg)   01/25/17 173 lb (78.5 kg)              We Performed the Following     CBC with platelets     Comprehensive metabolic panel     Ferritin     Hemoglobin A1c     Iron and iron binding capacity     LDL cholesterol direct     ORTHOPEDICS ADULT REFERRAL     TSH with free T4 reflex     Vitamin B12     Vitamin D Deficiency          Today's Medication Changes          These changes are accurate as of: 3/21/17  6:18 PM.  If you have any questions, ask your nurse or doctor.               These medicines have changed or have updated prescriptions.        Dose/Directions    albuterol 108 (90 BASE) MCG/ACT Inhaler   Commonly known as:  PROAIR HFA/PROVENTIL HFA/VENTOLIN HFA   This may have changed:  Another medication with the same name was removed. Continue taking this medication, and follow the directions you see here.   Used for:  Tobacco use disorder   Changed by:  Abilio Casiano MD        Dose:  2 puff   Inhale 2 puffs into the lungs every 6 hours as needed  for shortness of breath / dyspnea or wheezing   Quantity:  3 Inhaler   Refills:  1         Stop taking these medicines if you haven't already. Please contact your care team if you have questions.     ferrous sulfate Dried 140 (45 FE) MG Tbcr   Stopped by:  Abilio Casiano MD           fluticasone 50 MCG/ACT spray   Commonly known as:  FLONASE   Stopped by:  Abilio Casiano MD                Where to get your medicines      These medications were sent to [place] [address]     Phone:  [phone]     blood glucose monitoring test strip    cholecalciferol 5000 UNITS Caps    citalopram 40 MG tablet    EPINEPHrine 0.3 MG/0.3ML injection    fenofibrate 160 MG tablet    Flunisolide HFA 80 MCG/ACT Aers    insulin detemir 100 UNIT/ML injection    levothyroxine 150 MCG tablet    lisinopril-hydrochlorothiazide 20-25 MG per tablet    metFORMIN 1000 MG tablet    omeprazole 20 MG CR capsule    pravastatin 80 MG tablet    predniSONE 20 MG tablet    SUMAtriptan 100 MG tablet    verapamil 240 MG CR tablet    vitamin C-electrolytes 1000mg vitamin C super orange drink mix         Some of these will need a paper prescription and others can be bought over the counter.  Ask your nurse if you have questions.     Bring a paper prescription for each of these medications     pregabalin 150 MG capsule                Primary Care Provider Office Phone # Fax #    Abilio Casiano [phone]       [address] [address]        Thank you!     Thank you for choosing [facility]  for your care. Our goal is always to provide you with excellent care. Hearing back from our patients is one way we can continue to improve our services. Please take a few minutes to complete the written survey that you may receive in the mail after your visit with us. Thank you!             Your  Updated Medication List - Protect others around you: Learn how to safely use, store and throw away your medicines at www.disposemymeds.org.          This list is accurate as of: 3/21/17  6:18 PM.  Always use your most recent med list.                   Brand Name Dispense Instructions for use    acetaminophen 500 MG tablet    TYLENOL    180 tablet    Take 2 tablets (1,000 mg) by mouth every 8 hours       albuterol 108 (90 BASE) MCG/ACT Inhaler    PROAIR HFA/PROVENTIL HFA/VENTOLIN HFA    3 Inhaler    Inhale 2 puffs into the lungs every 6 hours as needed for shortness of breath / dyspnea or wheezing       BENADRYL PO      Take 25-50 mg by mouth See Admin Instructions 25 mg during the day if needed and 50 mg at bedtime for sleep as needed       blood glucose monitoring meter device kit     1 kit    by In Vitro route as needed       blood glucose monitoring test strip    no brand specified    2 Box    1 strip by In Vitro route 3 times daily Uses [employer]uchek Ana Paula       cholecalciferol 5000 UNITS Caps     100 capsule    Take 1 capsule (5,000 Units) by mouth daily FOR VITAMIN D DEFICIENCY (LOW VITAMIN D),TAKE 2 CAPSULES DOROTHY FOR THE NEXT 4 WEEKS, THEN 1 CAPSULE DAILY, MUST TAKE WITH WITH FAT CONTAINING MEAL       citalopram 40 MG tablet    celeXA    90 tablet    Take 1 tablet (40 mg) by mouth daily TO CONTROL SYMPTOMS OF DEPRESSION       cyanocobalamin 1000 MCG/ML injection    VITAMIN B12    30 mL    Inject 1 mL (1,000 mcg) Subcutaneous every 14 days INDICATION: FOR VITAMIN B12 SUPPLEMENTATION       DULoxetine 60 MG EC capsule    CYMBALTA    120 capsule    Take 2 capsules (120 mg) by mouth daily       EPINEPHrine 0.3 MG/0.3ML injection     0.3 mL    Inject 0.3 mLs (0.3 mg) into the muscle once as needed       fenofibrate 160 MG tablet     90 tablet    Take 1 tablet (160 mg) by mouth At Bedtime with food. INDICATION: TO LOWER CHOLESTEROL       Flunisolide HFA 80 MCG/ACT Aers    AEROSPAN    1 Inhaler    Inhale 2 puffs into the  lungs 2 times daily INDICATION: ASTHMA CONTROLLER, ALWAYS RINSE MOUTH AFTER USE       glipiZIDE 10 MG 24 hr tablet    glipiZIDE XL    180 tablet    Take 2 tablets (20 mg) by mouth daily INDICATION: TO TREAT DIABETES.       ibuprofen 800 MG tablet    ADVIL/MOTRIN    90 tablet    Take 1 tablet (800 mg) by mouth every 8 hours as needed for moderate pain or pain       insulin detemir 100 UNIT/ML injection    LEVEMIR FLEXPEN/FLEXTOUCH    9 mL    Inject 20 Units Subcutaneous At Bedtime       levothyroxine 150 MCG tablet    SYNTHROID/LEVOTHROID    90 tablet    Take 1 tablet (150 mcg) by mouth daily INDICATION: THYROID SUPPLEMENTATION, TAKE FIRST THING IN THE MORNING ON AN EMPTY STOMACH,  NO FOOD FOR AN HOUR       lidocaine 5 % ointment    XYLOCAINE    50 g    Apply a quarter size amount to painful areas up to 4 times per day as needed.       lisinopril-hydrochlorothiazide 20-25 MG per tablet    PRINZIDE/ZESTORETIC    90 tablet    Take 1 tablet by mouth daily INDICATION:TO LOWER BLOOD PRESSURE AND TO PRESERVE KIDNEY FUNCTION       magnesium oxide 400 MG tablet    MAG-OX    180 tablet    Take 1 tablet (400 mg) by mouth 2 times daily INDICATION: MAGNESIUM SUPPLEMENT       metFORMIN 1000 MG tablet    GLUCOPHAGE    60 tablet    Take 1 tablet (1,000 mg) by mouth 2 times daily (with meals) INDICATION: TO TREAT DIABETES, CONTROL BLOOD SUGAR AND TO CORRECT METABOLISM       NEEDLES, ANY SIZE     1 Box    Inject 1 each Subcutaneous At Bedtime       omeprazole 20 MG CR capsule    priLOSEC    90 capsule    Take 1 capsule (20 mg) by mouth 2 times daily INDICATION: TO CONTROL REFLUX SYMPTOMS       pravastatin 80 MG tablet    PRAVACHOL    90 tablet    Take 1 tablet (80 mg) by mouth daily INDICATION: TO LOWER CHOLESTEROL AND TO HELP  PREVENT HEART DISEASE       predniSONE 20 MG tablet    DELTASONE    10 tablet    Take 1 tablet (20 mg) by mouth 2 times daily       pregabalin 150 MG capsule    LYRICA    30 capsule    Take 1 capsule (150 mg)  by mouth daily       SUMAtriptan 100 MG tablet    IMITREX    18 tablet    Take 1 tablet (100 mg) by mouth every 8 hours as needed for migraine       thin lancets    NO BRAND SPECIFIED    3 Box    1 Device 2 times daily       traMADol 50 MG tablet    ULTRAM    21 tablet    Take 1tablet(s) every 6 hours as needed for pain. 3-4 per day. May dispense on/after 03/14/17 to start taking on/after 03/14/17.       verapamil 240 MG CR tablet    CALAN-SR    30 tablet    Take 1 tablet (240 mg) by mouth At Bedtime INDICATION: TO LOWER BLOOD PRESSURE AND TO CONTROL MIGRAINE HEADACHES       vitamin C-electrolytes 1000mg vitamin C super orange drink mix    EMERGEN-C    90 packet    Mix 1 packet in 4-6oz water and take once daily, INDICATION: VITAMIN C SUPPLEMENTION

## 2017-03-22 LAB
ALBUMIN SERPL-MCNC: 4.1 G/DL (ref 3.4–5)
ALP SERPL-CCNC: 72 U/L (ref 40–150)
ALT SERPL W P-5'-P-CCNC: 29 U/L (ref 0–50)
ANION GAP SERPL CALCULATED.3IONS-SCNC: 8 MMOL/L (ref 3–14)
AST SERPL W P-5'-P-CCNC: 13 U/L (ref 0–45)
BILIRUB SERPL-MCNC: 0.3 MG/DL (ref 0.2–1.3)
BUN SERPL-MCNC: 17 MG/DL (ref 7–30)
CALCIUM SERPL-MCNC: 9.2 MG/DL (ref 8.5–10.1)
CHLORIDE SERPL-SCNC: 104 MMOL/L (ref 94–109)
CO2 SERPL-SCNC: 28 MMOL/L (ref 20–32)
CREAT SERPL-MCNC: 0.69 MG/DL (ref 0.52–1.04)
FERRITIN SERPL-MCNC: 25 NG/ML (ref 8–252)
GFR SERPL CREATININE-BSD FRML MDRD: 89 ML/MIN/1.7M2
GLUCOSE SERPL-MCNC: 189 MG/DL (ref 70–99)
HBA1C MFR BLD: 11.1 % (ref 4.3–6)
IRON SATN MFR SERPL: 17 % (ref 15–46)
IRON SERPL-MCNC: 72 UG/DL (ref 35–180)
LDLC SERPL DIRECT ASSAY-MCNC: 129 MG/DL
POTASSIUM SERPL-SCNC: 4.2 MMOL/L (ref 3.4–5.3)
PROT SERPL-MCNC: 7.7 G/DL (ref 6.8–8.8)
SODIUM SERPL-SCNC: 140 MMOL/L (ref 133–144)
T4 FREE SERPL-MCNC: 1.06 NG/DL (ref 0.76–1.46)
TIBC SERPL-MCNC: 432 UG/DL (ref 240–430)
TSH SERPL DL<=0.005 MIU/L-ACNC: 36.52 MU/L (ref 0.4–4)
VIT B12 SERPL-MCNC: 789 PG/ML (ref 193–986)

## 2017-03-22 ASSESSMENT — ANXIETY QUESTIONNAIRES
1. FEELING NERVOUS, ANXIOUS, OR ON EDGE: MORE THAN HALF THE DAYS
6. BECOMING EASILY ANNOYED OR IRRITABLE: SEVERAL DAYS
2. NOT BEING ABLE TO STOP OR CONTROL WORRYING: MORE THAN HALF THE DAYS
5. BEING SO RESTLESS THAT IT IS HARD TO SIT STILL: MORE THAN HALF THE DAYS
3. WORRYING TOO MUCH ABOUT DIFFERENT THINGS: MORE THAN HALF THE DAYS

## 2017-03-22 ASSESSMENT — PATIENT HEALTH QUESTIONNAIRE - PHQ9: 5. POOR APPETITE OR OVEREATING: MORE THAN HALF THE DAYS

## 2017-03-22 ASSESSMENT — ASTHMA QUESTIONNAIRES: ACT_TOTALSCORE: 24

## 2017-03-23 LAB — DEPRECATED CALCIDIOL+CALCIFEROL SERPL-MC: 40 UG/L (ref 20–75)

## 2017-03-23 ASSESSMENT — PATIENT HEALTH QUESTIONNAIRE - PHQ9: SUM OF ALL RESPONSES TO PHQ QUESTIONS 1-9: 7

## 2017-03-25 ENCOUNTER — TELEPHONE (OUTPATIENT)
Dept: INTERNAL MEDICINE | Facility: CLINIC | Age: 57
End: 2017-03-25

## 2017-03-25 NOTE — TELEPHONE ENCOUNTER
-PRIOR AUTHORIZATION REQUIRED-  This medication requires a Prior Authorization in order for the Insurance to cover.  Please contact the insurance to start the PA for this medication.  Please inform the pharmacy if the PA gets approved or denied.  AEROSPAN  NON-FORMULARY DRUG, CONTACT PRESCRIBER  TRY 2 - QVAR OR COVERED DRUGS  ID:[id number]  Insurance Company:[employer]  Phone #:[phone]

## 2017-03-30 NOTE — TELEPHONE ENCOUNTER
Prior authorization    Medication name aerospan  Insurance aetna  Insurance ID number [id number]  Prior authorization faxed through cover my meds

## 2017-04-05 ENCOUNTER — TRANSFERRED RECORDS (OUTPATIENT)
Dept: HEALTH INFORMATION MANAGEMENT | Facility: CLINIC | Age: 57
End: 2017-04-05

## 2017-04-07 ENCOUNTER — TELEPHONE (OUTPATIENT)
Dept: PALLIATIVE MEDICINE | Facility: CLINIC | Age: 57
End: 2017-04-07

## 2017-04-07 NOTE — TELEPHONE ENCOUNTER
Pt would like call back from a nurse in regards to back pain.   Pt would not give any more info then that.     Rochelle REAL    [facility] [place]

## 2017-04-07 NOTE — TELEPHONE ENCOUNTER
"Pt had been scheduled with Monica on 3/14 but arrived too late to be seen.     Reviewed chart and AVS from the 3/21 visit with Dr. Casiano states:    YOU HAVE BEEN REFERRED FOR ORTHOPEDICS TO ADDRESS ISSUES RAISED TODAY REGARDING BACK PAIN  PLEASE MAKE SURE TO SCHEDULE YOUR APPOINTMENT(S) BY TELEPHONE    Pt was referred to [facility].    -----------  Called pt. States that she was supposed to come in at 11:00 am and she \"just can't make it.\" States that she is struggling with depression along with the back pain. States that she keeps getting spasms.  Pt states that she knows that Monica wants to see her. Let her know that we can reschedule her appointment. Pt would like an afternoon appointment. Scheduled her for an appt on Thursday 4/13 at 3 pm.     Asked pt if she has scheduled the appointment with [facility] on May 24th in [place]. Let her know that it was good that she made this appointment.    She is going to be leaving for [place] on 4/17 and is planning to be there for 2 weeks because her daughter is having a baby.     Let her know that I hope she starts feeling better soon and that we will see her next week.     LEE Irwin, RN-BC  Patient Care Supervisor/Care Coordinator  [facility]    "

## 2017-04-13 ENCOUNTER — OFFICE VISIT (OUTPATIENT)
Dept: PALLIATIVE MEDICINE | Facility: CLINIC | Age: 57
End: 2017-04-13
Payer: MEDICARE

## 2017-04-13 VITALS
BODY MASS INDEX: 28.25 KG/M2 | SYSTOLIC BLOOD PRESSURE: 135 MMHG | WEIGHT: 175 LBS | HEART RATE: 85 BPM | DIASTOLIC BLOOD PRESSURE: 84 MMHG | OXYGEN SATURATION: 98 %

## 2017-04-13 DIAGNOSIS — M51.369 DEGENERATION OF LUMBAR INTERVERTEBRAL DISC: ICD-10-CM

## 2017-04-13 DIAGNOSIS — M62.838 MUSCLE SPASM: Primary | ICD-10-CM

## 2017-04-13 DIAGNOSIS — G56.03 BILATERAL CARPAL TUNNEL SYNDROME: ICD-10-CM

## 2017-04-13 PROCEDURE — 99214 OFFICE O/P EST MOD 30 MIN: CPT | Performed by: NURSE PRACTITIONER

## 2017-04-13 RX ORDER — LIDOCAINE/PRILOCAINE 2.5 %-2.5%
CREAM (GRAM) TOPICAL
Qty: 30 G | Refills: 1 | Status: SHIPPED | OUTPATIENT
Start: 2017-04-13 | End: 2018-03-14

## 2017-04-13 RX ORDER — TRAMADOL HYDROCHLORIDE 50 MG/1
TABLET ORAL
Qty: 50 TABLET | Refills: 0 | Status: SHIPPED | OUTPATIENT
Start: 2017-04-13 | End: 2017-05-31

## 2017-04-13 RX ORDER — TRAMADOL HYDROCHLORIDE 50 MG/1
TABLET ORAL
Qty: 21 TABLET | Refills: 0 | Status: SHIPPED | OUTPATIENT
Start: 2017-04-13 | End: 2017-04-13

## 2017-04-13 RX ORDER — TIZANIDINE 2 MG/1
2-4 TABLET ORAL 3 TIMES DAILY
Qty: 90 TABLET | Refills: 1 | Status: SHIPPED | OUTPATIENT
Start: 2017-04-13 | End: 2017-08-29

## 2017-04-13 ASSESSMENT — PAIN SCALES - GENERAL: PAINLEVEL: MODERATE PAIN (5)

## 2017-04-13 NOTE — Clinical Note
Patient is ready to transition pain care back to you. I will her in June. She has completed pain program with Physical Therapy  And behavioral health

## 2017-04-13 NOTE — MR AVS SNAPSHOT
After Visit Summary   4/13/2017    Mary Lou Gil    MRN: [id number]           Patient Information     Date Of Birth          1960        Visit Information        Provider Department      4/13/2017 3:00 PM Monica Stone APRN CNP [facility]        Today's Diagnoses     Muscle spasm    -  1    Bilateral carpal tunnel syndrome        Degeneration of lumbar intervertebral disc          Care Instructions        ----------------------------------------------------------------  Nurse Triage line:  [phone]   Call this number with any questions or concerns. You may leave a detailed message anytime. Calls are typically returned Monday through Friday between 8 AM and 4:30 PM. We usually get back to you within 2 business days depending on the issue/request.     Plan:   Pharmacology:  Continue with all other medications   Opioid management:  Renewed Tramadol 1-2 tabs every 6 hrs limit limit 3 per day    Future Appointments:   Interventional : none   Activity/PT: pacing to avoid flares, splints to right hand as needed,         Psychology/Counseling:  Too be determined  Referrals:  None   Continue with mindfulness and relaxation as your are doing   Enc smoking cessation     Procedures none     DME for seated walker     Follow up 2 months   Medication refills:    For non-narcotic medications, call your pharmacy directly to request a refill. The pharmacy will contact the [facility] for authorization. Please allow 3-4 days for these refills to be processed.     For narcotic refills, call the nurse triage line or send a [employer] message. Please contact us 7-10 days before your refill is due. The message MUST include the name of the specific medication(s) requested and how you would like to receive the prescription(s). The options are as follows:    Pain Clinic staff can mail the prescription to your pharmacy. Please tell us the name of the pharmacy.    You may  pick the prescription up at the Pain Clinic (tell us the location) or during a clinic visit with your pain provider    Pain Clinic staff can deliver the prescription to the [facility] pharmacy in the clinic building. Please tell us the location.      Scheduling number: [phone].  Call this number to schedule or change appointments.    We believe regular attendance is key to your success in our program.    Any time you are unable to keep your appointment we ask that you call us at least 24 hours in advance to let us know. This will allow us to offer the appointment time to another patient.             Follow-ups after your visit        Your next 10 appointments already scheduled     May 25, 2017  5:00 PM CDT   [facility]    [address] [address] [phone]           Bring a current list of meds and any records pertaining to this visit.  For Physicals, please bring immunization records and any forms needing to be filled out.  Please arrive 10 minutes early to complete paperwork.              Who to contact     If you have questions or need follow up information about today's clinic visit or your schedule please contact [facility] directly at [phone].  Normal or non-critical lab and imaging results will be communicated to you by MyChart, letter or phone within 4 business days after the clinic has received the results. If you do not hear from us within 7 days, please contact the clinic through [employer] or phone. If you have a critical or abnormal lab result, we will notify you by phone as soon as possible.  Submit refill requests through [employer] or call your pharmacy and they will forward the refill request to us. Please allow 3 business days for your refill to be completed.          Additional Information About Your Visit        MyChart Information     [employer]  gives you secure access to your electronic health record. If you see a primary care provider, you can also send messages to your care team and make appointments. If you have questions, please call your primary care clinic.  If you do not have a primary care provider, please call [phone] and they will assist you.        Care EveryWhere ID     This is your Care EveryWhere ID. This could be used by other organizations to access your [facility] medical records  [id number]        Your Vitals Were     Pulse Pulse Oximetry BMI (Body Mass Index)             85 98% 28.25 kg/m2          Blood Pressure from Last 3 Encounters:   04/13/17 135/84   03/21/17 132/88   03/07/17 155/85    Weight from Last 3 Encounters:   04/13/17 79.4 kg (175 lb)   03/21/17 79.7 kg (175 lb 9.6 oz)   03/07/17 78.9 kg (174 lb)              Today, you had the following     No orders found for display         Today's Medication Changes          These changes are accurate as of: 4/13/17  3:54 PM.  If you have any questions, ask your nurse or doctor.               Start taking these medicines.        Dose/Directions    lidocaine-prilocaine cream   Commonly known as:  EMLA   Used for:  Muscle spasm        Apply dime to nickel size amount qid prn to low left back   Quantity:  30 g   Refills:  1       order for DME   Used for:  Bilateral carpal tunnel syndrome, Degeneration of lumbar intervertebral disc, Muscle spasm        Equipment being ordered: walker with seat and hand breaks   Quantity:  1 each   Refills:  0       tiZANidine 2 MG tablet   Commonly known as:  ZANAFLEX   Used for:  Muscle spasm        Dose:  2-4 mg   Take 1-2 tablets (2-4 mg) by mouth 3 times daily   Quantity:  90 tablet   Refills:  1       traMADol 50 MG tablet   Commonly known as:  ULTRAM   Used for:  Bilateral carpal tunnel syndrome, Degeneration of lumbar intervertebral disc        Take 1tablet(s) every 6 hours as needed for pain. 3-4 per day. May dispense on/after 04/13/17 to  start taking on/after 04/14/17.   Quantity:  50 tablet   Refills:  0            Where to get your medicines      These medications were sent to [place], [place] - [address]  [address] [facility] [address]     Phone:  [phone]     lidocaine-prilocaine cream    tiZANidine 2 MG tablet         Some of these will need a paper prescription and others can be bought over the counter.  Ask your nurse if you have questions.     Bring a paper prescription for each of these medications     order for DME    traMADol 50 MG tablet                Primary Care Provider Office Phone # Fax #    Abilio Casiano [phone]       [facility] [address]  [facility] [address]        Thank you!     Thank you for choosing [facility]  for your care. Our goal is always to provide you with excellent care. Hearing back from our patients is one way we can continue to improve our services. Please take a few minutes to complete the written survey that you may receive in the mail after your visit with us. Thank you!             Your Updated Medication List - Protect others around you: Learn how to safely use, store and throw away your medicines at www.disposemymeds.org.          This list is accurate as of: 4/13/17  3:54 PM.  Always use your most recent med list.                   Brand Name Dispense Instructions for use    acetaminophen 500 MG tablet    TYLENOL    180 tablet    Take 2 tablets (1,000 mg) by mouth every 8 hours       albuterol 108 (90 BASE) MCG/ACT Inhaler    PROAIR HFA/PROVENTIL HFA/VENTOLIN HFA    3 Inhaler    Inhale 2 puffs into the lungs every 6 hours as needed for shortness of breath / dyspnea or wheezing       BENADRYL PO      Take 25-50 mg by mouth See Admin Instructions 25 mg during the day if needed and 50 mg at bedtime for sleep as needed       blood glucose monitoring meter device kit     1 kit    by In Vitro route as needed        blood glucose monitoring test strip    no brand specified    2 Box    1 strip by In Vitro route 3 times daily Uses Accuchek Ana Paula       cholecalciferol 5000 UNITS Caps     100 capsule    Take 1 capsule (5,000 Units) by mouth daily FOR VITAMIN D DEFICIENCY (LOW VITAMIN D),TAKE 2 CAPSULES DOROTHY FOR THE NEXT 4 WEEKS, THEN 1 CAPSULE DAILY, MUST TAKE WITH WITH FAT CONTAINING MEAL       citalopram 40 MG tablet    celeXA    90 tablet    Take 1 tablet (40 mg) by mouth daily TO CONTROL SYMPTOMS OF DEPRESSION       cyanocobalamin 1000 MCG/ML injection    VITAMIN B12    30 mL    Inject 1 mL (1,000 mcg) Subcutaneous every 14 days INDICATION: FOR VITAMIN B12 SUPPLEMENTATION       DULoxetine 60 MG EC capsule    CYMBALTA    120 capsule    Take 2 capsules (120 mg) by mouth daily       EPINEPHrine 0.3 MG/0.3ML injection     0.3 mL    Inject 0.3 mLs (0.3 mg) into the muscle once as needed       fenofibrate 160 MG tablet     90 tablet    Take 1 tablet (160 mg) by mouth At Bedtime with food. INDICATION: TO LOWER CHOLESTEROL       Flunisolide HFA 80 MCG/ACT Aers    AEROSPAN    1 Inhaler    Inhale 2 puffs into the lungs 2 times daily INDICATION: ASTHMA CONTROLLER, ALWAYS RINSE MOUTH AFTER USE       glipiZIDE 10 MG 24 hr tablet    glipiZIDE XL    180 tablet    Take 2 tablets (20 mg) by mouth daily INDICATION: TO TREAT DIABETES.       ibuprofen 800 MG tablet    ADVIL/MOTRIN    90 tablet    Take 1 tablet (800 mg) by mouth every 8 hours as needed for moderate pain or pain       insulin detemir 100 UNIT/ML injection    LEVEMIR FLEXPEN/FLEXTOUCH    9 mL    Inject 20 Units Subcutaneous At Bedtime       levothyroxine 150 MCG tablet    SYNTHROID/LEVOTHROID    90 tablet    Take 1 tablet (150 mcg) by mouth daily INDICATION: THYROID SUPPLEMENTATION, TAKE FIRST THING IN THE MORNING ON AN EMPTY STOMACH,  NO FOOD FOR AN HOUR       lidocaine 5 % ointment    XYLOCAINE    50 g    Apply a quarter size amount to painful areas up to 4 times per day as  needed.       lidocaine-prilocaine cream    EMLA    30 g    Apply dime to nickel size amount qid prn to low left back       lisinopril-hydrochlorothiazide 20-25 MG per tablet    PRINZIDE/ZESTORETIC    90 tablet    Take 1 tablet by mouth daily INDICATION:TO LOWER BLOOD PRESSURE AND TO PRESERVE KIDNEY FUNCTION       magnesium oxide 400 MG tablet    MAG-OX    180 tablet    Take 1 tablet (400 mg) by mouth 2 times daily INDICATION: MAGNESIUM SUPPLEMENT       metFORMIN 1000 MG tablet    GLUCOPHAGE    60 tablet    Take 1 tablet (1,000 mg) by mouth 2 times daily (with meals) INDICATION: TO TREAT DIABETES, CONTROL BLOOD SUGAR AND TO CORRECT METABOLISM       NEEDLES, ANY SIZE     1 Box    Inject 1 each Subcutaneous At Bedtime       omeprazole 20 MG CR capsule    priLOSEC    90 capsule    Take 1 capsule (20 mg) by mouth 2 times daily INDICATION: TO CONTROL REFLUX SYMPTOMS       order for DME     1 each    Equipment being ordered: walker with seat and hand breaks       pravastatin 80 MG tablet    PRAVACHOL    90 tablet    Take 1 tablet (80 mg) by mouth daily INDICATION: TO LOWER CHOLESTEROL AND TO HELP  PREVENT HEART DISEASE       predniSONE 20 MG tablet    DELTASONE    10 tablet    Take 1 tablet (20 mg) by mouth 2 times daily       pregabalin 150 MG capsule    LYRICA    30 capsule    Take 1 capsule (150 mg) by mouth daily       SUMAtriptan 100 MG tablet    IMITREX    18 tablet    Take 1 tablet (100 mg) by mouth every 8 hours as needed for migraine       thin lancets    NO BRAND SPECIFIED    3 Box    1 Device 2 times daily       tiZANidine 2 MG tablet    ZANAFLEX    90 tablet    Take 1-2 tablets (2-4 mg) by mouth 3 times daily       traMADol 50 MG tablet    ULTRAM    50 tablet    Take 1tablet(s) every 6 hours as needed for pain. 3-4 per day. May dispense on/after 04/13/17 to start taking on/after 04/14/17.       verapamil 240 MG CR tablet    CALAN-SR    30 tablet    Take 1 tablet (240 mg) by mouth At Bedtime INDICATION: TO  LOWER BLOOD PRESSURE AND TO CONTROL MIGRAINE HEADACHES       vitamin C-electrolytes 1000mg vitamin C super orange drink mix    EMERGEN-C    90 packet    Mix 1 packet in 4-6oz water and take once daily, INDICATION: VITAMIN C SUPPLEMENTION

## 2017-04-13 NOTE — NURSING NOTE
"Chief Complaint   Patient presents with     Pain     Follow up       Initial /84  Pulse 85  Wt 79.4 kg (175 lb)  SpO2 98%  BMI 28.25 kg/m2 Estimated body mass index is 28.25 kg/(m^2) as calculated from the following:    Height as of 3/7/17: 1.676 m (5' 6\").    Weight as of this encounter: 79.4 kg (175 lb).  Medication Reconciliation: su Ahktar [facility]      "

## 2017-04-13 NOTE — PROGRESS NOTES
Interval history :This a  follow up examination  for Mary Lou Gil is a 54 year old female, who is  being seen in follow up for chronic multifocal pain.  Last visit 16  Today's visit 2017    Primary Care Provider: Abilio Casiano MD  Patient presents with:  Pain: Follow up  -Low back pain comes right more than left, knees and feet   Very depressed, not suicidal     Has migraine last week. Increase frequency over next 2 months.  Short duration and one lasted 3 days. 3-4 /month average.  8 months since last follow up,  Missed appointment last week ( canceled). Struggles with getting her due to new housing location and access to free way.   Going to [place] to see daughter  on 17    Assessment:  1.Chronic pain    Multifocal low back stable can not tell if S I joint injections were helpful      DPN,(feet more so)  joint pain stable      Wrist and hand pain >>> off and on for about a year last 2 months less problematic    Better with splints defer referral     Distal left arm lateral epicondylitis no complaints today       Migraines      Increase frequency since last visit   2.Anxiety    Limiting treatment factor seems stable   3.Bruxism    Limiting treatment factor   4.Social chaos    stable housing getting back into [employer] program    Grief loss    Plan:   Pharmacology:  Continue with all other medications   Opioid management:  Renewed Tramadol 1-2 tabs every 6 hrs limit limit 3 per day    Future Appointments:   Interventional : none   Activity/PT: pacing to avoid flares, splints to right hand as needed,         Psychology/Counseling:  Too be determined  Referrals:  None   Continue with mindfulness and relaxation as your are doing   Enc smoking cessation     Procedures none     DME for seated walker     follow up 2 months     Interval History :  Current Pain:   Description aching  tender,throbbing  Headache: Migraine resolution with  Imitrex. HA frequency improved but has  HA today for past 3  "hour and over past month HA increase noted. Reports increase stress with court hearing Report 1-3 HA this month, 3- 4 hr duration.   Location top of head, + visual changes, light, sound and olfactory sensitive.   Low back focal  Pain rating 5, average 6  range 4-10/10   Quality  Continuous with back and left elbow   Aggravating factors moving   Relieving factors  \"nothing\"     Quality of life reduced, not in stable, housing sleep poor, not on Remeron, stopped Melatonin    Self care \"few times per day \" plan self care for car travel to [place]. Awareness some.  Progress in Program: Counseling no, (Only initial eval), Physical Therapy  done Groups completed    Current Medications:  Tramadol  0-3 /day, Lyrica 150 QD, Ibuprofen prn Cymbalta 120 mg QD, Verapamil 240 Imitrex prn, Excedrin prn, Tylenol.  PMHX:  Past Medical History:   Diagnosis Date     Anemia, unspecified 2010     Degeneration of lumbar intervertebral disc 2016     Depressive disorder      Diabetes mellitus (H) 2010    Dx in      Essential hypertension, benign 2010     Hyperlipidaemia      Hyperlipidemia LDL goal < 100      Migraine      Other chronic pain     back, legs and feet     Spinal stenosis      Tobacco use disorder 2010     Uncomplicated asthma     ? with allergic reactions?     Unspecified hypothyroidism 2010     Past Surgical History:  Past Surgical History:   Procedure Laterality Date     ABDOMEN SURGERY       BIOPSY       C APPENDECTOMY  1974    open     C  DELIVERY ONLY      , Low Cervical     C LIGATE FALLOPIAN TUBE,POSTPARTUM      Tubal Ligation     HC HYSTEROSCOPY, SURGICAL; W/ ENDOMETRIAL ABLATION, ANY METHOD      Novasure     HC REMOVE TONSILS/ADENOIDS,<13 Y/O      T & A <14y.o.     OPERATIVE HYSTEROSCOPY WITH MORCELLATOR N/A 2014    Procedure: OPERATIVE HYSTEROSCOPY WITH MORCELLATOR;  Surgeon: Ketan Edwards MD;  Location: [facility] OR     THYROIDECTOMY        PAST " MEDICATION TRIALS: Gabapentin, butalbital, Amitriptyline     Current Medications:   Current Outpatient Prescriptions   Medication     predniSONE (DELTASONE) 20 MG tablet     Flunisolide HFA (AEROSPAN) 80 MCG/ACT AERS     citalopram (CELEXA) 40 MG tablet     EPINEPHrine 0.3 MG/0.3ML injection     pregabalin (LYRICA) 150 MG capsule     SUMAtriptan (IMITREX) 100 MG tablet     metFORMIN (GLUCOPHAGE) 1000 MG tablet     insulin detemir (LEVEMIR FLEXPEN/FLEXTOUCH) 100 UNIT/ML injection     levothyroxine (SYNTHROID/LEVOTHROID) 150 MCG tablet     verapamil (CALAN-SR) 240 MG CR tablet     cholecalciferol 5000 UNITS CAPS     pravastatin (PRAVACHOL) 80 MG tablet     fenofibrate 160 MG tablet     lisinopril-hydrochlorothiazide (PRINZIDE/ZESTORETIC) 20-25 MG per tablet     vitamin C-electrolytes (EMERGEN-C) 1000mg vitamin C super orange drink mix     omeprazole (PRILOSEC) 20 MG CR capsule     traMADol (ULTRAM) 50 MG tablet     DULoxetine (CYMBALTA) 60 MG EC capsule     acetaminophen (TYLENOL) 500 MG tablet     albuterol (PROAIR HFA, PROVENTIL HFA, VENTOLIN HFA) 108 (90 BASE) MCG/ACT inhaler     ibuprofen (ADVIL,MOTRIN) 800 MG tablet     magnesium oxide (MAG-OX) 400 MG tablet     glipiZIDE (GLIPIZIDE XL) 10 MG 24 hr tablet     cyanocobalamin (VITAMIN B12) 1000 MCG/ML injection     lidocaine (XYLOCAINE) 5 % ointment     thin (NO BRAND SPECIFIED) lancets     DiphenhydrAMINE HCl (BENADRYL PO)     blood glucose monitoring (NO BRAND SPECIFIED) test strip     NEEDLES, ANY SIZE     [DISCONTINUED] ezetimibe (ZETIA) 10 MG tablet     blood glucose monitoring (ACCU-CHEK ZAFAR PLUS) meter device kit     [DISCONTINUED] Ascorbic Acid Buffered (VITAMIN C EFFERVESCENT) PDEF     No current facility-administered medications for this visit.    New problem since last visit: Yes, ER for back spasms on 3/7/17.    ROS: twelve systems review negative except for: headache, depression, anxiety and stress cough, HTN, constipation.    Physical  Exam  Constitutional:Blood pressure 135/84, pulse 85, weight 79.4 kg (175 lb), SpO2 98 %, not currently breastfeeding., bodyweight/BMI:Body mass index is 28.25 kg/(m^2).  Psyche:  Fully oriented, Behavioral Observations: Eye contact  Good . Mood  and spirits are blunted Working status yes   Musculoskeletal exam:  Gait:  Slow velocity Steady reciprocating.  Neuro exam:   Alert,  KATE @ 3 mm, Speech clear fluent and appropriate.  PUCKETT x 4,   Skin/Vascular/ Autonomic:  warm, dry and intact    Other:   MNPMP:  reviewed as expected without evidence of abuse, misuse or diversion.  Analgesia fair  Adverse effects none   Activity poor   Adherence poor     Opioid management will continue with not ordered, Low DIRE score     Time spent:  20 Minutes including >50 %   minutes counseling and coordinating care for the above identified medical problems    Signed: Monica Stone RN, BC, C.A.N.P.  [facility]

## 2017-04-13 NOTE — PATIENT INSTRUCTIONS
----------------------------------------------------------------  Nurse Triage line:  [phone]   Call this number with any questions or concerns. You may leave a detailed message anytime. Calls are typically returned Monday through Friday between 8 AM and 4:30 PM. We usually get back to you within 2 business days depending on the issue/request.     Plan:   Pharmacology:  Continue with all other medications   Opioid management:  Renewed Tramadol 1-2 tabs every 6 hrs limit limit 3 per day    Future Appointments:   Interventional : none   Activity/PT: pacing to avoid flares, splints to right hand as needed,         Psychology/Counseling:  Too be determined  Referrals:  None   Continue with mindfulness and relaxation as your are doing   Enc smoking cessation     Procedures none     DME for seated walker     Follow up 2 months   Medication refills:    For non-narcotic medications, call your pharmacy directly to request a refill. The pharmacy will contact the [facility] for authorization. Please allow 3-4 days for these refills to be processed.     For narcotic refills, call the nurse triage line or send a [employer] message. Please contact us 7-10 days before your refill is due. The message MUST include the name of the specific medication(s) requested and how you would like to receive the prescription(s). The options are as follows:    Pain Clinic staff can mail the prescription to your pharmacy. Please tell us the name of the pharmacy.    You may pick the prescription up at the Pain Clinic (tell us the location) or during a clinic visit with your pain provider    Pain Clinic staff can deliver the prescription to the [facility] pharmacy in the clinic building. Please tell us the location.      Scheduling number: [phone].  Call this number to schedule or change appointments.    We believe regular attendance is key to your success in our program.    Any time you are unable to keep your appointment we  ask that you call us at least 24 hours in advance to let us know. This will allow us to offer the appointment time to another patient.

## 2017-04-17 NOTE — PROGRESS NOTES
Dear Mary Lou Gil,   Your recent lab/test results revealed some abnormalities that I feel need to be looked at a little closer. You A1 C is still extremely elevated at 11.1 and should be addressed in a timely fashion. Please make a follow-up appointment if you do not already have one scheduled to discuss these issues At your soonest and earliest convenience.    Regards,  Dr. Oh Venegas [facility]

## 2017-05-04 ENCOUNTER — TELEPHONE (OUTPATIENT)
Dept: INTERNAL MEDICINE | Facility: CLINIC | Age: 57
End: 2017-05-04

## 2017-05-04 NOTE — TELEPHONE ENCOUNTER
See progress note needs to see Endocrin/ before see's Dr Casiano . LM to have her call here appt today MD states she needs to see Birdie first so appt today not needed.

## 2017-05-18 ENCOUNTER — TRANSFERRED RECORDS (OUTPATIENT)
Dept: HEALTH INFORMATION MANAGEMENT | Facility: CLINIC | Age: 57
End: 2017-05-18

## 2017-05-22 ENCOUNTER — TELEPHONE (OUTPATIENT)
Dept: PALLIATIVE MEDICINE | Facility: CLINIC | Age: 57
End: 2017-05-22

## 2017-05-22 DIAGNOSIS — M54.50 ACUTE MIDLINE LOW BACK PAIN WITHOUT SCIATICA: ICD-10-CM

## 2017-05-22 DIAGNOSIS — M47.817 FACET ARTHRITIS OF LUMBOSACRAL REGION: Primary | ICD-10-CM

## 2017-05-22 NOTE — TELEPHONE ENCOUNTER
"Spoke with Debra. \"The pain is getting ridiculous\". States it's in the same areas of the lumbar spine. Saw a DrEloise Serrano in orthopedics. He had no recommendations for surgery, that she would just have to deal with it and work with pain clinic.     She reports pain has intensified since seeing provider in April, 2017. Gets a sense of fatigue in the legs, but denies tingling/numbness/weakness. Denies any change in bowel/bladder habits. Also with increased foot pain but feels likely related to neuropathy. Has had procedures with interventionalists in the past, LESI and SI joints, but states Monica had told her about something she could do at an office visit appointment. Said trigger point sounded familiar.     Self-cares: Has been using heating pad only. Discussed possible benefits of alternating ice and heat as that will help both any inflammation to painful areas and aid in muscle relaxation.     Let her know I would send her message to Monica who may advise clinical follow up appointment/evaluation before any type of office based or other procedure recommended. She agrees with plan. Aware provider out of office until Tuesday 5/23/17.    Gricelda Butler, MSN, RN-BC  Care Coordinator  [facility]      "

## 2017-05-22 NOTE — TELEPHONE ENCOUNTER
Pt states they are in extreme pain and would like to see about getting another injection. Please adv

## 2017-05-23 NOTE — TELEPHONE ENCOUNTER
Routing encounter to MA pool to print and fax order as requested.    Gricelda Butler, MSN, RN-BC  Care Coordinator  [facility]

## 2017-05-23 NOTE — TELEPHONE ENCOUNTER
Marie Wood and Julius Anderson ordered injection below. Last lumbar MRI  3.5 years ago, 11/26/13.     Do you want updated imaging? Please review and advise.    Gricelda Butler, MSN, RN-BC  Care Coordinator  [facility] [place]

## 2017-05-23 NOTE — TELEPHONE ENCOUNTER
Spoke with Debra and no additional imaging done by Dr. Serrano and he did not recommend any type of injection. Patient did not have any other explanation for how her pain had changed except as already explained in message below. Reviewed that information with Debra again and she had nothing more to add.    Routing back to provider.    Gricelda Butler, MSN, RN-BC  Care Coordinator  [facility] [place]

## 2017-05-23 NOTE — TELEPHONE ENCOUNTER
Pre-screening Questions for Radiology Injections:    Injection to be done at which interventional clinic site? [facility]    Procedure ordered by Dr. Stone    Procedure ordered?  Bilateral Facet Joint Injections    What insurance would patient like us to bill for this procedure? Medicare and [employer]      Worker's comp-Any injection DO NOT SCHEDULE and route to Berta Hayden.      LIA insurance - For SI joint injections, DO NOT SCHEDULE and route Berta Hayden.      [employer]- MBB's must be scheduled at LEAST two weeks apart      [employer] - Any injection besides hip/shoulder/knee joint DO NOT SCHEDULE and route to Berta Hayden. She will obtain PA and call pt back to schedule procedure or notify pt of denial.     HP CIGNA-PA REQUIRED FOR NON-SULEMAN OR Joint injections    Any chance of pregnancy? NO   If YES, do NOT schedule and route to RN pool    Is an  needed? No     Patient has a drive home? (mandatory) YES: INFORMED    Is patient taking any blood thinners (plavix, coumadin, jantoven, warfarin, heparin, pradaxa or dabigatran )? No   If hold needed, do NOT schedule, route to RN pool     Is patient taking any aspirin products? No     If more than 325mg/day do NOT schedule; route to RN pool     For CERVICAL procedures, hold all aspirin products for 6 days.      Does the patient have a bleeding or clotting disorder? No     If yes, okay to schedule AND route to RN nurse pool    **For any patients with platelet count <100, must be forwarded to provider**    Is patient diabetic?  Yes  If YES, have them bring their glucometer.    Does patient have an active infection or treated for one within the past week? No     Is patient currently taking any antibiotics?  No     For patients on chronic, preventative, or prophylactic antibiotics, procedures may be scheduled.     For patients on antibiotics for active or recent infection:    Julius De Dios [place], [place]-antibiotic  course must have been completed for 4 days    Marie Tabor-antibiotic course must have been completed for 7 days    Is patient currently taking any steroid medications? (i.e. Prednisone, Medrol)  No     For patients on steroid medications:    Julius De Dios [place], [place]-steroid course must have been completed for 4 days    Marie Tabor-steroid course must have been completed for 7 days    Reviewed with patient:  If you are started on any steroids or antibiotics between now and your appointment, you must contact us because it may affect our ability to perform your procedure.  Yes    Is patient actively being treated for cancer or immunocompromised, including the spleen having been removed? No    If YES, do NOT schedule and route to RN pool     **For Dr. Barbosa patients without spleens should have the chart sent to her**    Are you able to get on and off an exam table with minimal or no assistance? Yes  If NO, do NOT schedule and route to RN pool    Are you able to roll over and lay on your stomach with minimal or no assistance? Yes  If NO, do NOT schedule and route to RN pool     Any allergies to contrast dye, iodine, shellfish, or numbing and steroid medications? No  If YES, route to RN pool AND add allergy information to appointment notes    Allergies: Nicotine polacrilex; Neurontin [gabapentin]; Norvasc [amlodipine besylate]; Percocet [oxycodone-acetaminophen]; and Vinyl ether        Has the patient had a flu shot or any other vaccinations within 7 days before or after the procedure.  No        Does patient have an MRI/CT?  NO  (SI joint, hip injections, lumbar sympathetic blocks, and stellate ganglion blocks do not require an MRI)    Was the MRI done w/in the last 3 years?  No MRI was done in 2013. Pt states she does not believe she has had imaging elsewhere. Per  procedure grid, needs MRI or X-Ray within past 3 yrs.     Was MRI done at [facility]? No      If not, where was it done?  N/A       If [facility] was not done at [facility] do NOT schedule and route to nursing.  If pt has an imaging disc, the injection may be scheduled but pt has to bring disc to appt. If they show up w/out disc the injection cannot be done    Reminders (please tell patient if applicable):       Instructed pt to arrive 30 minutes early for IV start if this is for a cervical procedure, ALL sympathetic (stellate ganglion, hypogastric, or lumbar sympathetic block) and all sedation procedures (RFA, spinal cord stimulation trials).  Not Applicable    -IVs are not routinely placed for [place] and [place] cervical case       If NPO for sedation, informed patient that it is okay to take medications with sips of water (except if they are to hold blood thinners).  Not Applicable   *DO take blood pressure medication if it is prescribed*      If this is for a cervical SULEMAN, informed patient that aspirin needs to be held for 6 days.   Not Applicable      For all patients not having spinal cord stimulator (SCS) trials or radiofrequency ablations (RFAs), informed patient:  IV sedation is not provided for this procedure.  If you feel that an oral anti-anxiety medication is needed, you can discuss this further with your referring provider or primary care provider.  The Pain Clinic provider will discuss specifics of what the procedure includes at your appointment.  Most procedures last 10-20 minutes.  We use numbing medications to help with any discomfort during the procedure.  NO      Do not schedule procedures requiring IV placement in the first appointment of the day or first appointment after lunch.       For patients 85 or older we recommend having an adult stay w/ them for the remainder of the day.       Does the patient have any questions?  NO  Rochelle Chen  [facility]

## 2017-05-23 NOTE — TELEPHONE ENCOUNTER
Did the patient have any imaging done with Dr. Serrano?  If so this would be helpful to review. Also did this provider recommend any injections. It does not seem her pain is different that when she went to the ED on 3/7/17. If it is different this would be good to know and also how is it different.  I can make some recommendations once I review any updated imaging or if her pain presentation has changed.    Monica Stone, CNP    [facility]

## 2017-05-23 NOTE — TELEPHONE ENCOUNTER
I will order an L4-5, L5-S1 bilateral facet injection.please fax referral to [facility]  If only able to do one level would then do L4-5   Monica Stone, CNP    [facility]

## 2017-05-23 NOTE — TELEPHONE ENCOUNTER
Discussed with provider- OK to schedule at  Pain Clinic. Ignore part of order where it says [facility].

## 2017-05-23 NOTE — TELEPHONE ENCOUNTER
Spoke with Debra - she does not want injection to be done at [facility], wants it done here at pain clinic office in [place].    Routing to  to schedule.    Will also route back to ordering provider so she's aware that patient prefers pain clinic for injections.    Gricelda Butler, MSN, RN-BC  Care Coordinator  [facility]

## 2017-05-24 ENCOUNTER — HOSPITAL ENCOUNTER (EMERGENCY)
Facility: CLINIC | Age: 57
Discharge: HOME | End: 2017-05-24
Attending: EMERGENCY MEDICINE | Admitting: EMERGENCY MEDICINE
Payer: MEDICARE

## 2017-05-24 VITALS
OXYGEN SATURATION: 99 % | DIASTOLIC BLOOD PRESSURE: 89 MMHG | SYSTOLIC BLOOD PRESSURE: 146 MMHG | HEART RATE: 78 BPM | BODY MASS INDEX: 27.97 KG/M2 | TEMPERATURE: 97.7 F | RESPIRATION RATE: 18 BRPM | WEIGHT: 174 LBS | HEIGHT: 66 IN

## 2017-05-24 DIAGNOSIS — G89.29 CHRONIC LOW BACK PAIN WITHOUT SCIATICA, UNSPECIFIED BACK PAIN LATERALITY: ICD-10-CM

## 2017-05-24 DIAGNOSIS — M54.50 CHRONIC LOW BACK PAIN WITHOUT SCIATICA, UNSPECIFIED BACK PAIN LATERALITY: ICD-10-CM

## 2017-05-24 PROCEDURE — 99282 EMERGENCY DEPT VISIT SF MDM: CPT

## 2017-05-24 ASSESSMENT — ENCOUNTER SYMPTOMS
CHILLS: 0
BACK PAIN: 1
WEAKNESS: 0
NUMBNESS: 0
FEVER: 0

## 2017-05-24 NOTE — TELEPHONE ENCOUNTER
Spoke with Debra, she reports no new type of pain symptoms, just that it's more constant now. States no new numbness or radiation of pain, but legs feel more tired than they used to. No change in characteristics of pain.    No car accidents, any type of trauma, no cancer diagnosis.     Routing back to Dr. Wood for review.    Gricelda Butler, MSN, RN-BC  Care Coordinator  [facility] [place]

## 2017-05-24 NOTE — ED AVS SNAPSHOT
Emergency Department    [address] [place] [address]    Phone:  [phone]    Fax:  [phone]                                       Mary Lou Gil   MRN: [id number]    Department:   Emergency Department   Date of Visit:  5/24/2017           Patient Information     Date Of Birth          1960        Your diagnoses for this visit were:     Chronic low back pain without sciatica, unspecified back pain laterality        You were seen by Al Horan MD.      Follow-up Information     Please follow up.    Why:  follow up for your injections - double your Tramadol and Tiznidine dosing        Discharge Instructions         Discharge Instructions  Back Pain  You were seen today for back pain. Back pain can have many causes, but most will get better without surgery or other specific treatment. Sometimes there is a herniated ( slipped ) disc. We don t usually do MRI scans to look for these right away, since most herniated discs will get better on their own with time.  Today, we did not find any evidence that your back pain was caused by a serious condition, such as an infection, fracture, or tumor. However, sometimes symptoms develop over time and cannot be found during an emergency visit, so it is very important that you follow up with your primary doctor.  Return to the Emergency Department if:    You develop a fever with your back pain.     You have weakness or change in sensation in one or both legs.    You lose control of your bowels or bladder, or can t empty your bladder.    Your pain gets much worse.     Follow-up with your doctor:    Unless your pain has completely gone away, please make an appointment with your doctor within one week.  You may need further management of your back pain, such as more pain medication, imaging such as an X-ray or MRI, or physical therapy.    What can I do to help myself?    Remain Active -- People are often afraid that they will hurt their back  further or delay recovery by remaining active, but this is one of the best things you can do for your back. In fact, prolonged bed rest is not recommended. Studies have shown that people with low back pain recover faster when they remain active. Movement helps to bring blood flow to the muscles and relieve muscle spasms as well as preventing loss of muscle strength.    Heat -- Using a heating pad can help with low back pain during the first few weeks. Do not sleep with a heating pad, as you can be burned.     Pain medications - You may take a pain medication such as Tylenol  (acetaminophen), Advil , Nuprin  (ibuprofen) or Aleve  (naproxen).  If you have been given a narcotic such as Vicodin  (hydrocodone with acetaminophen), Percocet  (oxycodone with acetaminophen), codeine, or a muscle relaxant such as Flexeril  (cyclobenzaprine) or Soma  (carisoprodol), do not drive for four hours after you have taken it. If the narcotic contains Tylenol  (acetaminophen), do not take Tylenol  with it. All narcotics will cause constipation, so eat a high fiber diet.   If you were given a prescription for medicine here today, be sure to read all of the information (including the package insert) that comes with your prescription.  This will include important information about the medicine, its side effects, and any warnings that you need to know about.  The pharmacist who fills the prescription can provide more information and answer questions you may have about the medicine.  If you have questions or concerns that the pharmacist cannot address, please call or return to the Emergency Department.     Future Appointments        Provider Department Dept Phone Center    5/31/2017 1:15 PM Angelica Wood MD [facility] [phone]  PAIN MGMT    6/1/2017 3:00 PM Susan Ramirez MD [facility] [phone] [place]    7/14/2017 4:30 PM Abilio Casiano MD [facility] [phone]  OX      24 Hour Appointment Hotline       To make an appointment at any [facility] [place], call [phone] ([phone]). If you don't have a family doctor or clinic, we will help you find one. [facility] are conveniently located to serve the needs of you and your family.             Review of your medicines      Our records show that you are taking the medicines listed below. If these are incorrect, please call your family doctor or clinic.        Dose / Directions Last dose taken    acetaminophen 500 MG tablet   Commonly known as:  TYLENOL   Dose:  1000 mg   Quantity:  180 tablet        Take 2 tablets (1,000 mg) by mouth every 8 hours   Refills:  0        albuterol 108 (90 BASE) MCG/ACT Inhaler   Commonly known as:  PROAIR HFA/PROVENTIL HFA/VENTOLIN HFA   Dose:  2 puff   Quantity:  3 Inhaler        Inhale 2 puffs into the lungs every 6 hours as needed for shortness of breath / dyspnea or wheezing   Refills:  1        BENADRYL PO   Dose:  25-50 mg        Take 25-50 mg by mouth See Admin Instructions 25 mg during the day if needed and 50 mg at bedtime for sleep as needed   Refills:  0        blood glucose monitoring meter device kit   Quantity:  1 kit        by In Vitro route as needed   Refills:  PRN        blood glucose monitoring test strip   Commonly known as:  no brand specified   Dose:  1 strip   Quantity:  2 Box        1 strip by In Vitro route 3 times daily Uses Accuchek Ana Paula   Refills:  11        cholecalciferol 5000 UNITS Caps   Dose:  1 capsule   Quantity:  100 capsule        Take 1 capsule (5,000 Units) by mouth daily FOR VITAMIN D DEFICIENCY (LOW VITAMIN D),TAKE 2 CAPSULES DOROTHY FOR THE NEXT 4 WEEKS, THEN 1 CAPSULE DAILY, MUST TAKE WITH WITH FAT CONTAINING MEAL   Refills:  3        citalopram 40 MG tablet   Commonly known as:  celeXA   Dose:  40 mg   Quantity:  90 tablet        Take 1 tablet (40 mg) by mouth daily TO CONTROL SYMPTOMS OF DEPRESSION   Refills:  PRN        cyanocobalamin 1000  MCG/ML injection   Commonly known as:  VITAMIN B12   Dose:  1 mL   Quantity:  30 mL        Inject 1 mL (1,000 mcg) Subcutaneous every 14 days INDICATION: FOR VITAMIN B12 SUPPLEMENTATION   Refills:  5        DULoxetine 60 MG EC capsule   Commonly known as:  CYMBALTA   Dose:  120 mg   Quantity:  120 capsule        Take 2 capsules (120 mg) by mouth daily   Refills:  3        EPINEPHrine 0.3 MG/0.3ML injection   Dose:  0.3 mg   Quantity:  0.3 mL        Inject 0.3 mLs (0.3 mg) into the muscle once as needed   Refills:  prn        fenofibrate 160 MG tablet   Dose:  160 mg   Quantity:  90 tablet        Take 1 tablet (160 mg) by mouth At Bedtime with food. INDICATION: TO LOWER CHOLESTEROL   Refills:  3        flunisolide HFA 80 MCG/ACT Aers oral inhaler   Commonly known as:  AEROSPAN   Dose:  2 puff   Quantity:  1 Inhaler        Inhale 2 puffs into the lungs 2 times daily INDICATION: ASTHMA CONTROLLER, ALWAYS RINSE MOUTH AFTER USE   Refills:  5        glipiZIDE 10 MG 24 hr tablet   Commonly known as:  glipiZIDE XL   Dose:  20 mg   Quantity:  180 tablet        Take 2 tablets (20 mg) by mouth daily INDICATION: TO TREAT DIABETES.   Refills:  3        ibuprofen 800 MG tablet   Commonly known as:  ADVIL/MOTRIN   Dose:  800 mg   Quantity:  90 tablet        Take 1 tablet (800 mg) by mouth every 8 hours as needed for moderate pain or pain   Refills:  2        insulin detemir 100 UNIT/ML injection   Commonly known as:  LEVEMIR FLEXPEN/FLEXTOUCH   Dose:  20 Units   Quantity:  9 mL        Inject 20 Units Subcutaneous At Bedtime   Refills:  0        levothyroxine 150 MCG tablet   Commonly known as:  SYNTHROID/LEVOTHROID   Dose:  150 mcg   Quantity:  90 tablet        Take 1 tablet (150 mcg) by mouth daily INDICATION: THYROID SUPPLEMENTATION, TAKE FIRST THING IN THE MORNING ON AN EMPTY STOMACH,  NO FOOD FOR AN HOUR   Refills:  3        lidocaine 5 % ointment   Commonly known as:  XYLOCAINE   Quantity:  50 g        Apply a quarter size  amount to painful areas up to 4 times per day as needed.   Refills:  3        lidocaine-prilocaine cream   Commonly known as:  EMLA   Quantity:  30 g        Apply dime to nickel size amount qid prn to low left back   Refills:  1        lisinopril-hydrochlorothiazide 20-25 MG per tablet   Commonly known as:  PRINZIDE/ZESTORETIC   Dose:  1 tablet   Quantity:  90 tablet        Take 1 tablet by mouth daily INDICATION:TO LOWER BLOOD PRESSURE AND TO PRESERVE KIDNEY FUNCTION   Refills:  1        magnesium oxide 400 MG tablet   Commonly known as:  MAG-OX   Dose:  400 mg   Quantity:  180 tablet        Take 1 tablet (400 mg) by mouth 2 times daily INDICATION: MAGNESIUM SUPPLEMENT   Refills:  3        metFORMIN 1000 MG tablet   Commonly known as:  GLUCOPHAGE   Dose:  1000 mg   Quantity:  60 tablet        Take 1 tablet (1,000 mg) by mouth 2 times daily (with meals) INDICATION: TO TREAT DIABETES, CONTROL BLOOD SUGAR AND TO CORRECT METABOLISM   Refills:  0        NEEDLES, ANY SIZE   Dose:  1 each   Quantity:  1 Box        Inject 1 each Subcutaneous At Bedtime   Refills:  prn        omeprazole 20 MG CR capsule   Commonly known as:  priLOSEC   Dose:  20 mg   Quantity:  90 capsule        Take 1 capsule (20 mg) by mouth 2 times daily INDICATION: TO CONTROL REFLUX SYMPTOMS   Refills:  3        order for DME   Quantity:  1 each        Equipment being ordered: walker with seat and hand breaks   Refills:  0        pravastatin 80 MG tablet   Commonly known as:  PRAVACHOL   Dose:  80 mg   Quantity:  90 tablet        Take 1 tablet (80 mg) by mouth daily INDICATION: TO LOWER CHOLESTEROL AND TO HELP  PREVENT HEART DISEASE   Refills:  PRN        predniSONE 20 MG tablet   Commonly known as:  DELTASONE   Dose:  20 mg   Quantity:  10 tablet        Take 1 tablet (20 mg) by mouth 2 times daily   Refills:  0        pregabalin 150 MG capsule   Commonly known as:  LYRICA   Dose:  150 mg   Indication:  Neuropathic Pain   Quantity:  30 capsule         Take 1 capsule (150 mg) by mouth daily   Refills:  0        SUMAtriptan 100 MG tablet   Commonly known as:  IMITREX   Dose:  100 mg   Quantity:  18 tablet        Take 1 tablet (100 mg) by mouth every 8 hours as needed for migraine   Refills:  11        thin lancets   Commonly known as:  NO BRAND SPECIFIED   Dose:  1 Device   Quantity:  3 Box        1 Device 2 times daily   Refills:  2        tiZANidine 2 MG tablet   Commonly known as:  ZANAFLEX   Dose:  2-4 mg   Quantity:  90 tablet        Take 1-2 tablets (2-4 mg) by mouth 3 times daily   Refills:  1        traMADol 50 MG tablet   Commonly known as:  ULTRAM   Quantity:  50 tablet        Take 1tablet(s) every 6 hours as needed for pain. 3-4 per day. May dispense on/after 04/13/17 to start taking on/after 04/14/17.   Refills:  0        verapamil 240 MG CR tablet   Commonly known as:  CALAN-SR   Dose:  240 mg   Quantity:  30 tablet        Take 1 tablet (240 mg) by mouth At Bedtime INDICATION: TO LOWER BLOOD PRESSURE AND TO CONTROL MIGRAINE HEADACHES   Refills:  3        vitamin C-electrolytes 1000mg vitamin C super orange drink mix   Commonly known as:  EMERGEN-C   Quantity:  90 packet        Mix 1 packet in 4-6oz water and take once daily, INDICATION: VITAMIN C SUPPLEMENTION   Refills:  PRN                Orders Needing Specimen Collection     None      Pending Results     No orders found from 5/22/2017 to 5/25/2017.            Pending Culture Results     No orders found from 5/22/2017 to 5/25/2017.            Pending Results Instructions     If you had any lab results that were not finalized at the time of your Discharge, you can call the ED Lab Result RN at [phone]. You will be contacted by this team for any positive Lab results or changes in treatment. The nurses are available 7 days a week from 10A to 6:30P.  You can leave a message 24 hours per day and they will return your call.        Test Results From Your Hospital Stay               Clinical Quality  Measure: Blood Pressure Screening     Your blood pressure was checked while you were in the emergency department today. The last reading we obtained was  BP: 146/89 . Please read the guidelines below about what these numbers mean and what you should do about them.  If your systolic blood pressure (the top number) is less than 120 and your diastolic blood pressure (the bottom number) is less than 80, then your blood pressure is normal. There is nothing more that you need to do about it.  If your systolic blood pressure (the top number) is 120-139 or your diastolic blood pressure (the bottom number) is 80-89, your blood pressure may be higher than it should be. You should have your blood pressure rechecked within a year by a primary care provider.  If your systolic blood pressure (the top number) is 140 or greater or your diastolic blood pressure (the bottom number) is 90 or greater, you may have high blood pressure. High blood pressure is treatable, but if left untreated over time it can put you at risk for heart attack, stroke, or kidney failure. You should have your blood pressure rechecked by a primary care provider within the next 4 weeks.  If your provider in the emergency department today gave you specific instructions to follow-up with your doctor or provider even sooner than that, you should follow that instruction and not wait for up to 4 weeks for your follow-up visit.        Thank you for choosing [facility]       Thank you for choosing [facility] for your care. Our goal is always to provide you with excellent care. Hearing back from our patients is one way we can continue to improve our services. Please take a few minutes to complete the written survey that you may receive in the mail after you visit with us. Thank you!        [facility] Information     [employer] gives you secure access to your electronic health record. If you see a primary care provider, you can also send messages to your care team and make  appointments. If you have questions, please call your primary care clinic.  If you do not have a primary care provider, please call [phone] and they will assist you.        Care EveryWhere ID     This is your Care EveryWhere ID. This could be used by other organizations to access your [facility] medical records  [id number]        After Visit Summary       This is your record. Keep this with you and show to your community pharmacist(s) and doctor(s) at your next visit.

## 2017-05-24 NOTE — TELEPHONE ENCOUNTER
Routing to  to schedule - see messages below. There was a question if MRI was too old since slightly over 3 years, but we are good to go with scheduling.    Gricelda Butler, MSN, RN-BC  Care Coordinator  [facility]

## 2017-05-24 NOTE — ED AVS SNAPSHOT
Emergency Department    [address] [place] [address]    Phone:  [phone]    Fax:  [phone]                                       Mary Lou Gil   MRN: [id number]    Department:   Emergency Department   Date of Visit:  5/24/2017           After Visit Summary Signature Page     I have received my discharge instructions, and my questions have been answered. I have discussed any challenges I see with this plan with the nurse or doctor.    ..........................................................................................................................................  Patient/Patient Representative Signature      ..........................................................................................................................................  Patient Representative Print Name and Relationship to Patient    ..................................................               ................................................  Date                                            Time    ..........................................................................................................................................  Reviewed by Signature/Title    ...................................................              ..............................................  Date                                                            Time

## 2017-05-24 NOTE — TELEPHONE ENCOUNTER
Sounds good.  You can schedule her injection with me.  No need to update the MRI    Angelica Wood MD

## 2017-05-24 NOTE — TELEPHONE ENCOUNTER
Has anything changed in the last 3.5 years in terms of her pain symptoms? New numbness/radiation etc.... Has she had any trauma or a new cancer diagnosis?  If the answer is no to the above questions I am okay doing this procedure with the old MRI.     Angelica Wood MD

## 2017-05-25 NOTE — TELEPHONE ENCOUNTER
Spoke with Doreen again from [facility]. She has been trying to reach the person who started this process to find out what is holding up their ability to fill the DME order. She is going to call back - direct dial line for BV nursing staff given.    Gricelda Butler, MSN, RN-BC  Care Coordinator  [facility] [place]

## 2017-05-25 NOTE — ED PROVIDER NOTES
History     Chief Complaint:  Back Pain      HPI   Mary Lou Gil is a 56 year old female with history of chronic lumbar pain and spinal stenosis who presents for evaluation of acute on chronic low back pain.  The patient reports she is seen by Dr. Monica Stone at a pain clinic in [place] an is scheduled to have facet joint injections at L5-S1 next Wednesday.  She presents tonight as she has had more intense constant pain throughout the day.  She denies any recent injuries or trauma.  The patient denies radiation of pain to the legs.  She reports she has been taking one tablet of Tramadol and one of Tizanidine every 4-6 hours which have not given good improvement.  She also notes she was evaluated by an ortho spine surgeon, Dr. Marcellus Serrano, who told her there was nothing he could do for her and told her to follow up with pain clinic  The patient reports she has done PT but did not keep up with the exercises and does not often exercise. She denies fever, chills, bowel or bladder incontinence, numbness, tingling, or weakness in the legs, and IV drug use.      Allergies:  Nicotine Polacrilex - anaphylaxis   Neurontin [Gabapentin] - rash   Norvasc [Amlodipine Besylate] - constipation   Percocet [Oxycodone-Acetaminophen] - itching   Vinyl Ether - swelling, cough, headache     Medications:    Tizanidine  Emla  Tramadol  Prednisone  Aerospan  Celexa  Epipen  Lyrica  Imitrex  Metformin  Levemir  Levothyroxine  Verapamil  Cholecalciferol  Pravastatin  Prilosec  Cymbalta  Tylenol   Benadryl  Glipizide  Albuterol     Past Medical History:    Anemia  Degeneration of lumbar intervertebral disc   Hypothyroidism  Carpal tunnel, bilateral  Palpitations  Asthma  Type II Diabetes  Peripheral neuropathy  Bilateral low back pain with sciatica  Migraines  Chronic pain  Intermittent claudication  GERD  HLD  Endometrial hyperplasia  Scurvy  Dyspepsia  HTN  Spinal stenosis    Past Surgical History:    Abdominal  "surgery  Biopsy  Appendectomy    Tubal ligation   Hysteroscopy, endometrial ablation  Tonsillectomy and adenoidectomy  Thyroidectomy  Hysteroscopy with morcellator   Family History:  Mother: CAD, Diabetes, HTN  Sister: CAD, Diabetes, HTN,     Social History:  Relationship status:   The patient is a current smoker. 1.00 pack/day for 25 years  The patient denies alcohol use.   The patient presents alone.     Review of Systems   Constitutional: Negative for chills and fever.   Musculoskeletal: Positive for back pain.        Negative for bilateral leg pain.    Neurological: Negative for weakness and numbness.        Negative for bowel or bladder incontinence.    All other systems reviewed and are negative.      Physical Exam   First Vitals:  BP: 146/89  Pulse: 78  Temp: 97.7  F (36.5  C)  Height: 167.6 cm (5' 6\")  Weight: 78.9 kg (174 lb)  SpO2: 99 %      Physical Exam  SKIN:  Warm and dry.  HEMATOLOGIC/IMMUNOLOGIC/LYMPHATIC:  No pallor.  EYES:  Normal conjunctivae.  CARDIOVASCULAR:  Regular rate and rhythm.  RESPIRATORY:  No respiratory distress.  GASTROINTESTINAL:  Soft, nontender abdomen.  MUSCULOSKELETAL:  ROM of the torso exacerbates/reproduces the back pain.  NEUROLOGIC:  Alert, conversant.  No weakness of the lower limbs.  Normal sensation to touch of both lower extremities.  Normal gait.  PSYCHIATRIC:  Normal mood.    Emergency Department Course     ED Course:  Nursing notes and past medical history reviewed.   I performed a physical examination of the patient as documented above.  I explained the plan with the patient who consents to this.   The patient underwent the workup as described above.   Findings and plan explained to the Patient. Patient discharged home with instructions regarding supportive care, medications, and reasons to return. The importance of close follow-up was reviewed.     Impression & Plan      Medical Decision Making:  Mary Lou Gil is a 56 year old female with chronic " low back pain which really has not changed much in character. She has no red flag findings on examination or history to require imaging.  I gave instructions on increasing the dosing of her Tramadol and Tizanidine and advised follow up for her facet injections as scheduled.        Diagnosis:    ICD-10-CM   1. Chronic low back pain without sciatica, unspecified back pain laterality M54.5    G89.29         Disposition:   Discharge to home with follow up as above.         I, Duran Cruz, am serving as a scribe on 5/24/2017 at 9:55 PM to personally document services performed by Al Horan MD, based on my observations and the provider's statements to me.       Al Horan MD  05/25/17 [id number]

## 2017-05-25 NOTE — TELEPHONE ENCOUNTER
I last saw the patient 4/13 and the order for the walker then. We did not go over the items listed below. The patient will need to come in for a visit or me to address items below.   Monica Stone CNP    [facility]

## 2017-05-25 NOTE — TELEPHONE ENCOUNTER
Spoke with patient and went in detail over facet joint injection process. Patient comfortable with procedure.     She mentioned we had given her an order for DME - Walker with seat. When she tried to pick it up, [facility] in [place] said they were missing information. Called [facility] - Doreen - who is going to look for details. Was on hold several minutes - will attempt call back.    Gricelda Butler, MSN, RN-BC  Care Coordinator  [facility] [place]

## 2017-05-25 NOTE — TELEPHONE ENCOUNTER
I am ok with the increase of Tramadol. Please enter notation on med note for this medication. She should call us in time for her next refill , especially given the upcoming holiday.  Is the increase helping?

## 2017-05-25 NOTE — TELEPHONE ENCOUNTER
Follow up on DME order for wheelchair given 4/13/17 - [facility] calling back:    1. They do not have the medical records needed to proceed with DME order. Patient is Medicare and requires specific verbage in an office visit with same date as order.  2. Office visit note must say: Walker is needed due to mobility limitations that prevent her from participating activities of daily living. Patient is safe to use the walker. A seat is needed for periods during activities of daily living when using walker.  3. Office visit note must either be the same day of the order or before.  4. Must be office visit note, cannot be a letter of medical necessity.      On Tramadol comments below, med note was entered. She did report the increase in Tramadol was helping the pain.    Routing to provider - since office visit note is required for walker, do you want Mary Lou to see you in follow up or her PCP?    Gricelda Butler, MSN, RN-BC  Care Coordinator  [facility]

## 2017-05-25 NOTE — TELEPHONE ENCOUNTER
Debra reported she went to [facility] emergency department where she was instructed to increase her Tramadol use to two tablets every 6 hours.     Sending to provider to review visit note and awareness of instructed Tramadol increase.    Gricelda Butler MSN, RN-BC  Care Coordinator  [facility] [place]

## 2017-05-25 NOTE — DISCHARGE INSTRUCTIONS
Discharge Instructions  Back Pain  You were seen today for back pain. Back pain can have many causes, but most will get better without surgery or other specific treatment. Sometimes there is a herniated ( slipped ) disc. We don t usually do MRI scans to look for these right away, since most herniated discs will get better on their own with time.  Today, we did not find any evidence that your back pain was caused by a serious condition, such as an infection, fracture, or tumor. However, sometimes symptoms develop over time and cannot be found during an emergency visit, so it is very important that you follow up with your primary doctor.  Return to the Emergency Department if:    You develop a fever with your back pain.     You have weakness or change in sensation in one or both legs.    You lose control of your bowels or bladder, or can t empty your bladder.    Your pain gets much worse.     Follow-up with your doctor:    Unless your pain has completely gone away, please make an appointment with your doctor within one week.  You may need further management of your back pain, such as more pain medication, imaging such as an X-ray or MRI, or physical therapy.    What can I do to help myself?    Remain Active -- People are often afraid that they will hurt their back further or delay recovery by remaining active, but this is one of the best things you can do for your back. In fact, prolonged bed rest is not recommended. Studies have shown that people with low back pain recover faster when they remain active. Movement helps to bring blood flow to the muscles and relieve muscle spasms as well as preventing loss of muscle strength.    Heat -- Using a heating pad can help with low back pain during the first few weeks. Do not sleep with a heating pad, as you can be burned.     Pain medications - You may take a pain medication such as Tylenol  (acetaminophen), Advil , Nuprin  (ibuprofen) or Aleve  (naproxen).  If you have  been given a narcotic such as Vicodin  (hydrocodone with acetaminophen), Percocet  (oxycodone with acetaminophen), codeine, or a muscle relaxant such as Flexeril  (cyclobenzaprine) or Soma  (carisoprodol), do not drive for four hours after you have taken it. If the narcotic contains Tylenol  (acetaminophen), do not take Tylenol  with it. All narcotics will cause constipation, so eat a high fiber diet.   If you were given a prescription for medicine here today, be sure to read all of the information (including the package insert) that comes with your prescription.  This will include important information about the medicine, its side effects, and any warnings that you need to know about.  The pharmacist who fills the prescription can provide more information and answer questions you may have about the medicine.  If you have questions or concerns that the pharmacist cannot address, please call or return to the Emergency Department.

## 2017-05-26 NOTE — TELEPHONE ENCOUNTER
Spoke with Debra and let her know what we needed to do for the wheelchair order. Follow up scheduling 5/31/17 with [facility].    Gricelda Butler, MSN, RN-BC  Care Coordinator  [facility] [place]

## 2017-05-30 NOTE — PROGRESS NOTES
[facility] - Procedure Note    Date of Visit: 5/31/2017    Pre procedure Diagnosis: facet arthropathy   Post procedure Diagnosis: Same  Procedure performed: Bilateral L4-5, L5-S1 facet joint injections  Anesthesia: none  Complications: none  Operators: Angelica Wood MD     Indications:   Mary Lou Gil is a 56 year old female was sent by Dr. Monica Stone for bilateral lumbar facet joint injections.  They have a history of low back pain.  Exam shows pain with extension/rotation bilaterally and they have tried conservative treatment including PT, medications, previous bilateral SI joint injections, and previous bilateral L4-5 transforaminal SULEMAN's.    Options/alternatives, benefits and risks were discussed with the patient including bleeding, infection, flared pain, tissue trauma, exposure to radiation, reaction to medications including seizure, spinal cord injury, paralysis, weakness, numbness and headache.   Questions were answered to her satisfaction and she agrees to proceed. Voluntary informed consent was obtained and signed.     Vitals were reviewed: Yes  Allergies were reviewed:  Yes   Medications were reviewed:  Yes   Pre-procedure pain score: 3/10    Imaging:  MRI lumbar spine 11/2/2017     IMPRESSION  IMPRESSION:  1. L4-L5 prominent ligamentum flavum thickening and relatively severe  central stenosis. There may be a superimposed 1-2 mm synovial cyst to  the right of midline.  2. 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 There is a  mild grade 1 degenerative spondylolisthesis noted at L5-S1.        Procedure:  After getting informed consent, patient was brought into the procedure suite and was placed in a prone position on the procedure table.   A Pause for the Cause was performed.  Patient was prepped and draped in sterile fashion.     Under AP fluoroscopic guidance the L4-5, L5-S1 facet joints on the bilateral side were identified, and the C-arm was rotated  obliquely to the affected side to open the joint space. A total of 1 ml of 1% lidocaine was injected at the needle entry point and needle tract. Then a 22 gauge 3.5 inch quincke type spinal needle was inserted and advanced under fluoroscopic guidance targeting the superior articular pillar of each joint. Once the needle made a contact with SAP, it was rotated and was then advanced into the joint.    AP fluoroscopic views were obtained to confirm the needle placement. Then,  Omnipaque 300 contrast dye was injected after negative aspiration for heme and CSF in each joint, confirming appropriate placement.  A total of 1mL of Omnipaque was used and 9mL was wasted.    The injection was then accomplished using a solution containing 2ml of 0.5% bupivacaine mixed with 2ml of 1% Lidocaine and 40mg of kenolog, divided between the 4 joints. The needles were removed..     Hemostasis was achieved, the area was cleaned, and bandaids were placed when appropriate.  The patient tolerated the procedure well, and was taken to the recovery room.    Images were saved to PACS.    Post-procedure pain score: 1-2/10  Follow-up includes:   -f/u phone call in one week  -f/u with ANA MARIA Stone CNP, if she gets good pain relief from todays injections but it is short lasting could consider lumbar medial branch block's and possible RFA - L3,4,5 bilateral.  Alternatively, we could repeat Bilateral L4-5 TRANSFORAMINAL EPIDURAL STEROID INJECTION's as she did get good pain relief from this procedure in the past.   This would require another lumbar MRI as hers is more than 3 years old.     Angelica WoodMD Pain Management

## 2017-05-31 ENCOUNTER — OFFICE VISIT (OUTPATIENT)
Dept: PALLIATIVE MEDICINE | Facility: CLINIC | Age: 57
End: 2017-05-31
Payer: MEDICARE

## 2017-05-31 ENCOUNTER — RADIANT APPOINTMENT (OUTPATIENT)
Dept: GENERAL RADIOLOGY | Facility: CLINIC | Age: 57
End: 2017-05-31
Attending: ANESTHESIOLOGY

## 2017-05-31 ENCOUNTER — RADIOLOGY INJECTION OFFICE VISIT (OUTPATIENT)
Dept: PALLIATIVE MEDICINE | Facility: CLINIC | Age: 57
End: 2017-05-31
Payer: MEDICARE

## 2017-05-31 VITALS
SYSTOLIC BLOOD PRESSURE: 160 MMHG | DIASTOLIC BLOOD PRESSURE: 90 MMHG | HEART RATE: 65 BPM | BODY MASS INDEX: 28.25 KG/M2 | OXYGEN SATURATION: 99 % | WEIGHT: 175 LBS

## 2017-05-31 VITALS — DIASTOLIC BLOOD PRESSURE: 90 MMHG | SYSTOLIC BLOOD PRESSURE: 160 MMHG | OXYGEN SATURATION: 99 % | HEART RATE: 65 BPM

## 2017-05-31 DIAGNOSIS — M47.816 FACET ARTHROPATHY, LUMBAR: ICD-10-CM

## 2017-05-31 DIAGNOSIS — M51.369 DEGENERATION OF LUMBAR INTERVERTEBRAL DISC: ICD-10-CM

## 2017-05-31 DIAGNOSIS — Z74.09 MOBILITY IMPAIRED: ICD-10-CM

## 2017-05-31 DIAGNOSIS — M47.817 SPONDYLOSIS OF LUMBOSACRAL REGION WITHOUT MYELOPATHY OR RADICULOPATHY: Primary | ICD-10-CM

## 2017-05-31 DIAGNOSIS — Z91.81 PERSONAL HISTORY OF FALL: ICD-10-CM

## 2017-05-31 DIAGNOSIS — M47.817 FACET ARTHRITIS OF LUMBOSACRAL REGION: Primary | ICD-10-CM

## 2017-05-31 DIAGNOSIS — M62.838 MUSCLE SPASM: ICD-10-CM

## 2017-05-31 DIAGNOSIS — G56.03 BILATERAL CARPAL TUNNEL SYNDROME: ICD-10-CM

## 2017-05-31 PROCEDURE — 99214 OFFICE O/P EST MOD 30 MIN: CPT | Mod: 25 | Performed by: NURSE PRACTITIONER

## 2017-05-31 PROCEDURE — 64494 INJ PARAVERT F JNT L/S 2 LEV: CPT | Mod: 50 | Performed by: ANESTHESIOLOGY

## 2017-05-31 PROCEDURE — 64493 INJ PARAVERT F JNT L/S 1 LEV: CPT | Mod: 50 | Performed by: ANESTHESIOLOGY

## 2017-05-31 RX ORDER — TRAMADOL HYDROCHLORIDE 50 MG/1
TABLET ORAL
Qty: 165 TABLET | Refills: 0 | Status: SHIPPED | OUTPATIENT
Start: 2017-06-06 | End: 2017-08-29

## 2017-05-31 ASSESSMENT — PAIN SCALES - GENERAL
PAINLEVEL: MILD PAIN (3)
PAINLEVEL: NO PAIN (1)
PAINLEVEL: MILD PAIN (2)

## 2017-05-31 NOTE — NURSING NOTE
Injection intake:    If this procedure is requiring IV sedation has patient been NPO for 6  Hours? NA    Is patient on coumadin, plavix or other prescribed blood thinner?   No    If patient is on coumadin was it held for 5 days?   NA    If patient is on plavix was it held for 7 days?    NA     Does patient take aspirin?  No    If this is for a cervical procedure and patient is on aspirin has it been held for 6 days?   NA    Any allergies to contrast dye, iodine, steroid and/or numbing medications?  NO    Is patient currently taking antibiotics or have an active infection?  NO    Does patient have a ? Yes       Is patient pregnant or breastfeeding?  NO    Are the vital signs normal?  Yes

## 2017-05-31 NOTE — NURSING NOTE
I faxed patients prescription to the [facility] pharmacy in [place]. I received conformation that the fax went through. I destroyed the hard copy.      Jodie Akhtar [facility]

## 2017-05-31 NOTE — PATIENT INSTRUCTIONS
Plan:   Pharmacology:  Continue with all other medications   Opioid management:  Renewed Tramadol 1-2 tabs every 6 hrs limit limit 5-6 per day. New prescription to start 6/06/17. Next month reduce to 3-4 per day     Future Appointments:   Interventional : none   Activity/PT: pacing to avoid flares, splints to right hand as needed,         Psychology/Counseling:  Too be determined  Referrals:  None   Continue with mindfulness and relaxation as your are doing   Enc smoking cessation     Procedures none     DME for seated walker and for cane   disability parking application filled out for 3 months     Follow up 2-3 weeks if desires other wise transfer pain care to PCP. This was discussed by phone with Dr. Casiano on 5/30/17    ----------------------------------------------------------------  Nurse Triage line:  [phone]   Call this number with any questions or concerns. You may leave a detailed message anytime. Calls are typically returned Monday through Friday between 8 AM and 4:30 PM. We usually get back to you within 2 business days depending on the issue/request.       Medication refills:    For non-narcotic medications, call your pharmacy directly to request a refill. The pharmacy will contact the [facility] Center for authorization. Please allow 3-4 days for these refills to be processed.     For narcotic refills, call the nurse triage line or send a [employer] message. Please contact us 7-10 days before your refill is due. The message MUST include the name of the specific medication(s) requested and how you would like to receive the prescription(s). The options are as follows:    Pain Clinic staff can mail the prescription to your pharmacy. Please tell us the name of the pharmacy.    You may pick the prescription up at the Pain Clinic (tell us the location) or during a clinic visit with your pain provider    Pain Clinic staff can deliver the prescription to the [facility] pharmacy in the clinic building.  Please tell us the location.      Scheduling number: [phone].  Call this number to schedule or change appointments.    We believe regular attendance is key to your success in our program.    Any time you are unable to keep your appointment we ask that you call us at least 24 hours in advance to let us know. This will allow us to offer the appointment time to another patient.

## 2017-05-31 NOTE — PROGRESS NOTES
Interval history :This a  follow up examination  for Mary Lou Gil is a 54 year old female, who is  being seen in follow up for chronic multifocal pain.  Last visit 04/13/17  Today's visit 05/31/2017    Primary Care Provider: Abilio Casiano MD  Patient presents with:  Pain: follow up  - s/p lumbar facet injection today bilateral l4-5 and L5-S1 . Low back pain is good now.Low back pain comes right more than left, knees and feet   Wearing copper belt as needed  S/p back injury with turning and lifting 3/2016 was in ED 3/7/17 and 5/24/17/.  Asking for walker  Last visit with seat. Medicare requires visit to document pain, need for walker and safety.   -walks <200 feet w/o needing to rest or take breaks   - falls x 1 associated with increase pain and balance     - pain intense and almost continuous up until SULEMAN today.    Has migraine/ 3 months.  Short duration and one lasted 3 days.       Assessment:  1.Chronic pain    Multifocal low back stable can not tell if S I joint injections were helpful    Acute on chronic with subacute pain poorly controlled    Safety and limited mobility due to pain    DPN,(feet more so)  joint pain stable      Wrist and hand pain >>> off and on for about a year last 2 months   less problematic    Better with splints defer referral     Distal left arm lateral epicondylitis no complaints today       Migraines      stable frequency since last visit   2.Anxiety    Limiting treatment factor seems stable   3.Bruxism    Limiting treatment factor   4.Social chaos    stable housing getting back into [employer] program    Grief loss    Plan:   Pharmacology:  Continue with all other medications   Opioid management:  Renewed Tramadol 1-2 tabs every 6 hrs limit limit 5-6 per day. New prescription to start 6/06/17. Next month reduce to 3-4 per day     Future Appointments:   Interventional : none   Activity/PT: pacing to avoid flares, splints to right hand as needed,         Psychology/Counseling:  "  Referrals:  None   Continue with mindfulness and relaxation as your are doing   Enc smoking cessation     Procedures none     DME for seated walker and for cane   disability parking application filled out for 3 months     Follow up 2-3 weeks if desires other wise transfer pain care to PCP. This was discussed by phone with Dr. Casiano on 5/30/17    Injection consideration per Suzi Wood MD   f/u with ANA MARIA Stone CNP, if she gets good pain relief from todays injections but it is short lasting could consider lumbar medial branch block's and possible RFA - L3,4,5 bilateral.  Alternatively, we could repeat Bilateral L4-5 TRANSFORAMINAL EPIDURAL STEROID INJECTION's as she did get good pain relief from this procedure in the past.   This would require another lumbar MRI as hers is more than 3 years old.     Interval History :  Current Pain:   Description aching  tender,throbbing  Headache: Migraine resolution with  Imitrex. HA frequency improved but has  HA today for past 3 hour and over past month HA increase noted. Reports increase stress with court hearing Report 1-3 HA this month, 3- 4 hr duration.   Location top of head, + visual changes, light, sound and olfactory sensitive.   Low back focal  Pain rating 1, average 7  range 2-10/10   Quality  Continuous with back  Until today after facet injection  Aggravating factors moving   Relieving factors  \"nothing\"     Quality of life reduced, not in stable, housing sleep poor, not on Remeron, stopped Melatonin    Self care \"few times per day \" plan self care for car travel to [place]. Awareness some.  Progress in Program: Counseling no, (Only initial eval), Physical Therapy  done Groups completed    Current Medications:  Tramadol  5 /day, Lyrica 150 QD, Ibuprofen prn Cymbalta 120 mg QD, Verapamil 240 Imitrex prn, Excedrin prn, Tylenol.  PMHX:  Past Medical History:   Diagnosis Date     Anemia, unspecified 2/1/2010     Degeneration of lumbar intervertebral disc 7/11/2016 "     Depressive disorder      Diabetes mellitus (H) 2010    Dx in      Essential hypertension, benign 2010     Hyperlipidaemia      Hyperlipidemia LDL goal < 100      Migraine      Other chronic pain     back, legs and feet     Spinal stenosis      Tobacco use disorder 2010     Uncomplicated asthma     ? with allergic reactions?     Unspecified hypothyroidism 2010     Past Surgical History:  Past Surgical History:   Procedure Laterality Date     ABDOMEN SURGERY       BIOPSY       C APPENDECTOMY  1974    open     C  DELIVERY ONLY      , Low Cervical     C LIGATE FALLOPIAN TUBE,POSTPARTUM      Tubal Ligation     HC HYSTEROSCOPY, SURGICAL; W/ ENDOMETRIAL ABLATION, ANY METHOD      Novasure     HC REMOVE TONSILS/ADENOIDS,<13 Y/O      T & A <14y.o.     OPERATIVE HYSTEROSCOPY WITH MORCELLATOR N/A 2014    Procedure: OPERATIVE HYSTEROSCOPY WITH MORCELLATOR;  Surgeon: Ketan Edwards MD;  Location: [facility] OR     THYROIDECTOMY        PAST MEDICATION TRIALS: Gabapentin, butalbital, Amitriptyline     Current Medications:   Current Outpatient Prescriptions   Medication     lidocaine-prilocaine (EMLA) cream     traMADol (ULTRAM) 50 MG tablet     predniSONE (DELTASONE) 20 MG tablet     Flunisolide HFA (AEROSPAN) 80 MCG/ACT AERS     citalopram (CELEXA) 40 MG tablet     EPINEPHrine 0.3 MG/0.3ML injection     pregabalin (LYRICA) 150 MG capsule     SUMAtriptan (IMITREX) 100 MG tablet     metFORMIN (GLUCOPHAGE) 1000 MG tablet     insulin detemir (LEVEMIR FLEXPEN/FLEXTOUCH) 100 UNIT/ML injection     levothyroxine (SYNTHROID/LEVOTHROID) 150 MCG tablet     verapamil (CALAN-SR) 240 MG CR tablet     cholecalciferol 5000 UNITS CAPS     pravastatin (PRAVACHOL) 80 MG tablet     fenofibrate 160 MG tablet     lisinopril-hydrochlorothiazide (PRINZIDE/ZESTORETIC) 20-25 MG per tablet     vitamin C-electrolytes (EMERGEN-C) 1000mg vitamin C super orange drink mix     omeprazole (PRILOSEC) 20  MG CR capsule     DULoxetine (CYMBALTA) 60 MG EC capsule     acetaminophen (TYLENOL) 500 MG tablet     albuterol (PROAIR HFA, PROVENTIL HFA, VENTOLIN HFA) 108 (90 BASE) MCG/ACT inhaler     ibuprofen (ADVIL,MOTRIN) 800 MG tablet     magnesium oxide (MAG-OX) 400 MG tablet     glipiZIDE (GLIPIZIDE XL) 10 MG 24 hr tablet     cyanocobalamin (VITAMIN B12) 1000 MCG/ML injection     lidocaine (XYLOCAINE) 5 % ointment     thin (NO BRAND SPECIFIED) lancets     DiphenhydrAMINE HCl (BENADRYL PO)     tiZANidine (ZANAFLEX) 2 MG tablet     order for DME     blood glucose monitoring (NO BRAND SPECIFIED) test strip     NEEDLES, ANY SIZE     [DISCONTINUED] ezetimibe (ZETIA) 10 MG tablet     blood glucose monitoring (ACCU-CHEK ZAFAR PLUS) meter device kit     [DISCONTINUED] Ascorbic Acid Buffered (VITAMIN C EFFERVESCENT) PDEF     No current facility-administered medications for this visit.    New problem since last visit: Yes, ER for back spasms on 3/7/17.    ROS: twelve systems review negative except for: headache, depression, anxiety and stress cough, HTN, constipation.    Physical Exam  Constitutional:Blood pressure 160/90, pulse 65, weight 79.4 kg (175 lb), SpO2 99 %, not currently breastfeeding., bodyweight/BMI:Body mass index is 28.25 kg/(m^2).  Psyche:  Fully oriented, Behavioral Observations: Eye contact  Good . Mood  and spirits are blunted Working status yes   Musculoskeletal exam:  Gait:  Slow velocity Steady reciprocating. Sitting comfortably   Neuro exam:   Alert,  KATE @ 3 mm, Speech clear fluent and appropriate.  PUCKETT x 4,   Skin/Vascular/ Autonomic:  warm, dry and intact    Other:   MNPMP:  reviewed as expected without evidence of abuse, misuse or diversion.  Analgesia fair  Adverse effects none   Activity poor   Adherence poor     Opioid management will continue with not ordered, Low DIRE score     Time spent: 35  Minutes including >50 %   minutes counseling and coordinating care for the above identified medical  problems    Signed: Monica Stone RN, BC, C.A.N.P.  [facility]

## 2017-05-31 NOTE — MR AVS SNAPSHOT
After Visit Summary   5/31/2017    Mary Lou Gil    MRN: [id number]           Patient Information     Date Of Birth          1960        Visit Information        Provider Department      5/31/2017 1:15 PM Angelica Wood MD [facility]        Care Instructions    [facility] Procedure Discharge Instructions    Today you saw:    Dr. Angelica Wood    Your procedure:  Facet joint injection      Medications used:  Lidocaine (anesthetic)  Bupivacaine (anesthetic) Kenalog (steroid)  Omnipaque (contrast)                Be cautious when walking as numbness and/or weakness in the legs may occur up to 6-8 hours after the procedure due to effect of the local anesthetic    Do not drive for 6 hours. The effect of the local anesthetic could slow your reflexes.     Avoid strenuous activity for the first 24 hours. You may resume your regular activities after that.     You may shower, however avoid swimming, tub baths or hot tubs for 24 hours following your procedure    You may have a mild to moderate increase in pain for several days following the injection.      You may use ice packs for 10-15 minutes, 3 to 4 times a day at the injection site for comfort    Do not use heat to painful areas for 6 to 8 hours. This will give the local anesthetic time to wear off and prevent you from accidentally burning your skin.    You may use anti-inflammatory medications (such as Ibuprofen/Advil or Aleve) or Tylenol for pain control if necessary    If you have diabetes, check your blood sugar more frequently than usual as your blood sugar may be higher than normal for 10-14 days following a steroid injection. Contact your doctor who manages your diabetes if your blood sugar is higher than usual    It may take up to 14 days for the steroid medication to start working although you may feel the effect as early as a few days after the procedure.       If you experience any of the following, call the pain  center nursing line during work hours at [phone] or on-call physician after hours at [phone]:  -Fever over 100 degree F  -Swelling, bleeding, redness, drainage, warmth at the injection site  -Progressive weakness or numbness in your legs or arms  -Loss of bowel or bladder function  -Unusual headache that is not relieved by Tylenol  -Unusual new onset of pain that is not improving    Phone #s:  Appointment scheduling line: [phone]          Follow-ups after your visit        Your next 10 appointments already scheduled     May 31, 2017  3:00 PM CDT   Return Visit with KARTHIKEYAN Vazquez CNP   [facility] ([facility])    [address]  [address]  [facility] [address]   [phone]            Jun 01, 2017  3:00 PM CDT   New Visit with Susan Ramirez MD   [facility]    [address]  [facility] [address] [phone]            Jul 14, 2017  4:30 PM CDT   [facility] Visit with Abilio [facility]iano MD   [facility]    [address] [address] [phone]           Bring a current list of meds and any records pertaining to this visit.  For Physicals, please bring immunization records and any forms needing to be filled out.  Please arrive 10 minutes early to complete paperwork.              Who to contact     If you have questions or need follow up information about today's clinic visit or your schedule please contact [facility] directly at [phone].  Normal or non-critical lab and imaging results will be communicated to you by MyChart, letter or phone within 4 business days after the clinic has received the results. If you do not hear from us within 7 days, please contact the clinic through MyChart or phone. If you have a critical or abnormal lab result, we will notify  you by phone as soon as possible.  Submit refill requests through [employer] or call your pharmacy and they will forward the refill request to us. Please allow 3 business days for your refill to be completed.          Additional Information About Your Visit        [employer] Information     [employer] gives you secure access to your electronic health record. If you see a primary care provider, you can also send messages to your care team and make appointments. If you have questions, please call your primary care clinic.  If you do not have a primary care provider, please call [phone] and they will assist you.        Care EveryWhere ID     This is your Care EveryWhere ID. This could be used by other organizations to access your [facility] medical records  [id number]        Your Vitals Were     Pulse Pulse Oximetry Breastfeeding?             79 98% No          Blood Pressure from Last 3 Encounters:   05/31/17 144/90   05/24/17 146/89   04/13/17 135/84    Weight from Last 3 Encounters:   05/24/17 78.9 kg (174 lb)   04/13/17 79.4 kg (175 lb)   03/21/17 79.7 kg (175 lb 9.6 oz)              Today, you had the following     No orders found for display       Primary Care Provider Office Phone # Fax #    bAilio Casiano [phone]       [facility] [address] [facility] [address]        Thank you!     Thank you for choosing [facility]  for your care. Our goal is always to provide you with excellent care. Hearing back from our patients is one way we can continue to improve our services. Please take a few minutes to complete the written survey that you may receive in the mail after your visit with us. Thank you!             Your Updated Medication List - Protect others around you: Learn how to safely use, store and throw away your medicines at www.disposemymeds.org.          This list is accurate as of: 5/31/17  1:58 PM.  Always use your most recent med list.                    Brand Name Dispense Instructions for use    acetaminophen 500 MG tablet    TYLENOL    180 tablet    Take 2 tablets (1,000 mg) by mouth every 8 hours       albuterol 108 (90 BASE) MCG/ACT Inhaler    PROAIR HFA/PROVENTIL HFA/VENTOLIN HFA    3 Inhaler    Inhale 2 puffs into the lungs every 6 hours as needed for shortness of breath / dyspnea or wheezing       BENADRYL PO      Take 25-50 mg by mouth See Admin Instructions 25 mg during the day if needed and 50 mg at bedtime for sleep as needed       blood glucose monitoring meter device kit     1 kit    by In Vitro route as needed       blood glucose monitoring test strip    no brand specified    2 Box    1 strip by In Vitro route 3 times daily Uses [employer]uchek Ana Paula       cholecalciferol 5000 UNITS Caps     100 capsule    Take 1 capsule (5,000 Units) by mouth daily FOR VITAMIN D DEFICIENCY (LOW VITAMIN D),TAKE 2 CAPSULES DOROTHY FOR THE NEXT 4 WEEKS, THEN 1 CAPSULE DAILY, MUST TAKE WITH WITH FAT CONTAINING MEAL       citalopram 40 MG tablet    celeXA    90 tablet    Take 1 tablet (40 mg) by mouth daily TO CONTROL SYMPTOMS OF DEPRESSION       cyanocobalamin 1000 MCG/ML injection    VITAMIN B12    30 mL    Inject 1 mL (1,000 mcg) Subcutaneous every 14 days INDICATION: FOR VITAMIN B12 SUPPLEMENTATION       DULoxetine 60 MG EC capsule    CYMBALTA    120 capsule    Take 2 capsules (120 mg) by mouth daily       EPINEPHrine 0.3 MG/0.3ML injection     0.3 mL    Inject 0.3 mLs (0.3 mg) into the muscle once as needed       fenofibrate 160 MG tablet     90 tablet    Take 1 tablet (160 mg) by mouth At Bedtime with food. INDICATION: TO LOWER CHOLESTEROL       flunisolide HFA 80 MCG/ACT Aers oral inhaler    AEROSPAN    1 Inhaler    Inhale 2 puffs into the lungs 2 times daily INDICATION: ASTHMA CONTROLLER, ALWAYS RINSE MOUTH AFTER USE       glipiZIDE 10 MG 24 hr tablet    glipiZIDE XL    180 tablet    Take 2 tablets (20 mg) by mouth daily INDICATION: TO TREAT DIABETES.       ibuprofen 800 MG  tablet    ADVIL/MOTRIN    90 tablet    Take 1 tablet (800 mg) by mouth every 8 hours as needed for moderate pain or pain       insulin detemir 100 UNIT/ML injection    LEVEMIR FLEXPEN/FLEXTOUCH    9 mL    Inject 20 Units Subcutaneous At Bedtime       levothyroxine 150 MCG tablet    SYNTHROID/LEVOTHROID    90 tablet    Take 1 tablet (150 mcg) by mouth daily INDICATION: THYROID SUPPLEMENTATION, TAKE FIRST THING IN THE MORNING ON AN EMPTY STOMACH,  NO FOOD FOR AN HOUR       lidocaine 5 % ointment    XYLOCAINE    50 g    Apply a quarter size amount to painful areas up to 4 times per day as needed.       lidocaine-prilocaine cream    EMLA    30 g    Apply dime to nickel size amount qid prn to low left back       lisinopril-hydrochlorothiazide 20-25 MG per tablet    PRINZIDE/ZESTORETIC    90 tablet    Take 1 tablet by mouth daily INDICATION:TO LOWER BLOOD PRESSURE AND TO PRESERVE KIDNEY FUNCTION       magnesium oxide 400 MG tablet    MAG-OX    180 tablet    Take 1 tablet (400 mg) by mouth 2 times daily INDICATION: MAGNESIUM SUPPLEMENT       metFORMIN 1000 MG tablet    GLUCOPHAGE    60 tablet    Take 1 tablet (1,000 mg) by mouth 2 times daily (with meals) INDICATION: TO TREAT DIABETES, CONTROL BLOOD SUGAR AND TO CORRECT METABOLISM       NEEDLES, ANY SIZE     1 Box    Inject 1 each Subcutaneous At Bedtime       omeprazole 20 MG CR capsule    priLOSEC    90 capsule    Take 1 capsule (20 mg) by mouth 2 times daily INDICATION: TO CONTROL REFLUX SYMPTOMS       order for DME     1 each    Equipment being ordered: walker with seat and hand breaks       pravastatin 80 MG tablet    PRAVACHOL    90 tablet    Take 1 tablet (80 mg) by mouth daily INDICATION: TO LOWER CHOLESTEROL AND TO HELP  PREVENT HEART DISEASE       predniSONE 20 MG tablet    DELTASONE    10 tablet    Take 1 tablet (20 mg) by mouth 2 times daily       pregabalin 150 MG capsule    LYRICA    30 capsule    Take 1 capsule (150 mg) by mouth daily       SUMAtriptan 100  MG tablet    IMITREX    18 tablet    Take 1 tablet (100 mg) by mouth every 8 hours as needed for migraine       thin lancets    NO BRAND SPECIFIED    3 Box    1 Device 2 times daily       tiZANidine 2 MG tablet    ZANAFLEX    90 tablet    Take 1-2 tablets (2-4 mg) by mouth 3 times daily       traMADol 50 MG tablet    ULTRAM    50 tablet    Take 1tablet(s) every 6 hours as needed for pain. 3-4 per day. May dispense on/after 04/13/17 to start taking on/after 04/14/17.       verapamil 240 MG CR tablet    CALAN-SR    30 tablet    Take 1 tablet (240 mg) by mouth At Bedtime INDICATION: TO LOWER BLOOD PRESSURE AND TO CONTROL MIGRAINE HEADACHES       vitamin C-electrolytes 1000mg vitamin C super orange drink mix    EMERGEN-C    90 packet    Mix 1 packet in 4-6oz water and take once daily, INDICATION: VITAMIN C SUPPLEMENTION

## 2017-05-31 NOTE — NURSING NOTE
"Chief Complaint   Patient presents with     Pain     follow up       Initial /90  Pulse 65  Wt 79.4 kg (175 lb)  SpO2 99%  BMI 28.25 kg/m2 Estimated body mass index is 28.25 kg/(m^2) as calculated from the following:    Height as of 5/24/17: 1.676 m (5' 6\").    Weight as of this encounter: 79.4 kg (175 lb).  Medication Reconciliation: su Akhtar [facility]        "

## 2017-05-31 NOTE — MR AVS SNAPSHOT
After Visit Summary   5/31/2017    Mary Lou Gil    MRN: [id number]           Patient Information     Date Of Birth          1960        Visit Information        Provider Department      5/31/2017 3:00 PM Monica Stone APRN CNP [facility]      Today's Diagnoses     Facet arthritis of lumbosacral region (H)    -  1    Bilateral carpal tunnel syndrome        Degeneration of lumbar intervertebral disc        Muscle spasm        Personal history of fall        Mobility impaired          Care Instructions    Plan:   Pharmacology:  Continue with all other medications   Opioid management:  Renewed Tramadol 1-2 tabs every 6 hrs limit limit 5-6 per day. New prescription to start 6/06/17. Next month reduce to 3-4 per day     Future Appointments:   Interventional : none   Activity/PT: pacing to avoid flares, splints to right hand as needed,         Psychology/Counseling:  Too be determined  Referrals:  None   Continue with mindfulness and relaxation as your are doing   Enc smoking cessation     Procedures none     DME for seated walker and for cane   disability parking application filled out for 3 months     Follow up 2-3 weeks if desires other wise transfer pain care to PCP. This was discussed by phone with Dr. Casiano on 5/30/17    ----------------------------------------------------------------  Nurse Triage line:  [phone]   Call this number with any questions or concerns. You may leave a detailed message anytime. Calls are typically returned Monday through Friday between 8 AM and 4:30 PM. We usually get back to you within 2 business days depending on the issue/request.       Medication refills:    For non-narcotic medications, call your pharmacy directly to request a refill. The pharmacy will contact the [facility] for authorization. Please allow 3-4 days for these refills to be processed.     For narcotic refills, call the nurse triage line or  send a [employer] message. Please contact us 7-10 days before your refill is due. The message MUST include the name of the specific medication(s) requested and how you would like to receive the prescription(s). The options are as follows:    Pain Clinic staff can mail the prescription to your pharmacy. Please tell us the name of the pharmacy.    You may pick the prescription up at the Pain Clinic (tell us the location) or during a clinic visit with your pain provider    Pain Clinic staff can deliver the prescription to the [place] pharmacy in the clinic building. Please tell us the location.      Scheduling number: [phone].  Call this number to schedule or change appointments.    We believe regular attendance is key to your success in our program.    Any time you are unable to keep your appointment we ask that you call us at least 24 hours in advance to let us know. This will allow us to offer the appointment time to another patient.               Follow-ups after your visit        Follow-up notes from your care team     Discussed this visit Return in about 2 weeks (around 6/14/2017) for Routine Visit.      Your next 10 appointments already scheduled     Jun 01, 2017  3:00 PM CDT   [facility] Visit with Susan Ramirez MD   [facility]    [address]  [facility] [place] [address] [phone]            Jul 14, 2017  4:30 PM CDT   Office Visit with Abilio Casiano MD   [facility]    [address] [address] [phone]           Bring a current list of meds and any records pertaining to this visit.  For Physicals, please bring immunization records and any forms needing to be filled out.  Please arrive 10 minutes early to complete paperwork.              Who to contact     If you have questions or need follow up information about today's clinic visit or your schedule  please contact [facility] directly at [phone].  Normal or non-critical lab and imaging results will be communicated to you by MyChart, letter or phone within 4 business days after the clinic has received the results. If you do not hear from us within 7 days, please contact the clinic through [employer] or phone. If you have a critical or abnormal lab result, we will notify you by phone as soon as possible.  Submit refill requests through [employer] or call your pharmacy and they will forward the refill request to us. Please allow 3 business days for your refill to be completed.          Additional Information About Your Visit        [employer] Information     [employer] gives you secure access to your electronic health record. If you see a primary care provider, you can also send messages to your care team and make appointments. If you have questions, please call your primary care clinic.  If you do not have a primary care provider, please call [phone] and they will assist you.        Care EveryWhere ID     This is your Care EveryWhere ID. This could be used by other organizations to access your [facility] medical records  [id number]        Your Vitals Were     Pulse Pulse Oximetry BMI (Body Mass Index)             65 99% 28.25 kg/m2          Blood Pressure from Last 3 Encounters:   05/31/17 160/90   05/31/17 160/90   05/24/17 146/89    Weight from Last 3 Encounters:   05/31/17 79.4 kg (175 lb)   05/24/17 78.9 kg (174 lb)   04/13/17 79.4 kg (175 lb)              Today, you had the following     No orders found for display         Today's Medication Changes          These changes are accurate as of: 5/31/17  3:39 PM.  If you have any questions, ask your nurse or doctor.               Start taking these medicines.        Dose/Directions    * order for DME   Used for:  Muscle spasm, Facet arthritis of lumbosacral region (H), Personal history of fall, Mobility impaired   Started by:  Bertha  KARTHIKEYAN Barton CNP        Single prong cane   Quantity:  1 each   Refills:  0       * order for DME   Used for:  Degeneration of lumbar intervertebral disc, Muscle spasm, Facet arthritis of lumbosacral region (H), Personal history of fall, Mobility impaired   Started by:  Monica Stone APRN CNP        Equipment being ordered: walker with seat and hand breaks   Quantity:  1 each   Refills:  0       * Notice:  This list has 2 medication(s) that are the same as other medications prescribed for you. Read the directions carefully, and ask your doctor or other care provider to review them with you.      These medicines have changed or have updated prescriptions.        Dose/Directions    traMADol 50 MG tablet   Commonly known as:  ULTRAM   This may have changed:  additional instructions   Used for:  Bilateral carpal tunnel syndrome, Degeneration of lumbar intervertebral disc   Changed by:  Monica Stone APRN CNP        Start taking on:  6/6/2017   Take 1tablet(s) every 6 hours as needed for pain. 5-6 per day. May dispense on/after 06/4/17 to start taking on/after 06/06/17. 30 day supply   Quantity:  165 tablet   Refills:  0            Where to get your medicines      Some of these will need a paper prescription and others can be bought over the counter.  Ask your nurse if you have questions.     Bring a paper prescription for each of these medications     order for DME    order for DME    traMADol 50 MG tablet                Primary Care Provider Office Phone # Fax #    Abilio Casiano [phone]       [facility] [address] [facility] [address]        Thank you!     Thank you for choosing [facility]  for your care. Our goal is always to provide you with excellent care. Hearing back from our patients is one way we can continue to improve our services. Please take a few minutes to complete the written survey that you may  receive in the mail after your visit with us. Thank you!             Your Updated Medication List - Protect others around you: Learn how to safely use, store and throw away your medicines at www.disposemymeds.org.          This list is accurate as of: 5/31/17  3:39 PM.  Always use your most recent med list.                   Brand Name Dispense Instructions for use    acetaminophen 500 MG tablet    TYLENOL    180 tablet    Take 2 tablets (1,000 mg) by mouth every 8 hours       albuterol 108 (90 BASE) MCG/ACT Inhaler    PROAIR HFA/PROVENTIL HFA/VENTOLIN HFA    3 Inhaler    Inhale 2 puffs into the lungs every 6 hours as needed for shortness of breath / dyspnea or wheezing       BENADRYL PO      Take 25-50 mg by mouth See Admin Instructions 25 mg during the day if needed and 50 mg at bedtime for sleep as needed       blood glucose monitoring meter device kit     1 kit    by In Vitro route as needed       blood glucose monitoring test strip    no brand specified    2 Box    1 strip by In Vitro route 3 times daily Uses Accuchek Ana Paula       cholecalciferol 5000 UNITS Caps     100 capsule    Take 1 capsule (5,000 Units) by mouth daily FOR VITAMIN D DEFICIENCY (LOW VITAMIN D),TAKE 2 CAPSULES DOROTHY FOR THE NEXT 4 WEEKS, THEN 1 CAPSULE DAILY, MUST TAKE WITH WITH FAT CONTAINING MEAL       citalopram 40 MG tablet    celeXA    90 tablet    Take 1 tablet (40 mg) by mouth daily TO CONTROL SYMPTOMS OF DEPRESSION       cyanocobalamin 1000 MCG/ML injection    VITAMIN B12    30 mL    Inject 1 mL (1,000 mcg) Subcutaneous every 14 days INDICATION: FOR VITAMIN B12 SUPPLEMENTATION       DULoxetine 60 MG EC capsule    CYMBALTA    120 capsule    Take 2 capsules (120 mg) by mouth daily       EPINEPHrine 0.3 MG/0.3ML injection     0.3 mL    Inject 0.3 mLs (0.3 mg) into the muscle once as needed       fenofibrate 160 MG tablet     90 tablet    Take 1 tablet (160 mg) by mouth At Bedtime with food. INDICATION: TO LOWER CHOLESTEROL        flunisolide HFA 80 MCG/ACT Aers oral inhaler    AEROSPAN    1 Inhaler    Inhale 2 puffs into the lungs 2 times daily INDICATION: ASTHMA CONTROLLER, ALWAYS RINSE MOUTH AFTER USE       glipiZIDE 10 MG 24 hr tablet    glipiZIDE XL    180 tablet    Take 2 tablets (20 mg) by mouth daily INDICATION: TO TREAT DIABETES.       ibuprofen 800 MG tablet    ADVIL/MOTRIN    90 tablet    Take 1 tablet (800 mg) by mouth every 8 hours as needed for moderate pain or pain       insulin detemir 100 UNIT/ML injection    LEVEMIR FLEXPEN/FLEXTOUCH    9 mL    Inject 20 Units Subcutaneous At Bedtime       levothyroxine 150 MCG tablet    SYNTHROID/LEVOTHROID    90 tablet    Take 1 tablet (150 mcg) by mouth daily INDICATION: THYROID SUPPLEMENTATION, TAKE FIRST THING IN THE MORNING ON AN EMPTY STOMACH,  NO FOOD FOR AN HOUR       lidocaine 5 % ointment    XYLOCAINE    50 g    Apply a quarter size amount to painful areas up to 4 times per day as needed.       lidocaine-prilocaine cream    EMLA    30 g    Apply dime to nickel size amount qid prn to low left back       lisinopril-hydrochlorothiazide 20-25 MG per tablet    PRINZIDE/ZESTORETIC    90 tablet    Take 1 tablet by mouth daily INDICATION:TO LOWER BLOOD PRESSURE AND TO PRESERVE KIDNEY FUNCTION       magnesium oxide 400 MG tablet    MAG-OX    180 tablet    Take 1 tablet (400 mg) by mouth 2 times daily INDICATION: MAGNESIUM SUPPLEMENT       metFORMIN 1000 MG tablet    GLUCOPHAGE    60 tablet    Take 1 tablet (1,000 mg) by mouth 2 times daily (with meals) INDICATION: TO TREAT DIABETES, CONTROL BLOOD SUGAR AND TO CORRECT METABOLISM       NEEDLES, ANY SIZE     1 Box    Inject 1 each Subcutaneous At Bedtime       omeprazole 20 MG CR capsule    priLOSEC    90 capsule    Take 1 capsule (20 mg) by mouth 2 times daily INDICATION: TO CONTROL REFLUX SYMPTOMS       * order for DME     1 each    Single prong cane       * order for DME     1 each    Equipment being ordered: walker with seat and hand  breaks       pravastatin 80 MG tablet    PRAVACHOL    90 tablet    Take 1 tablet (80 mg) by mouth daily INDICATION: TO LOWER CHOLESTEROL AND TO HELP  PREVENT HEART DISEASE       predniSONE 20 MG tablet    DELTASONE    10 tablet    Take 1 tablet (20 mg) by mouth 2 times daily       pregabalin 150 MG capsule    LYRICA    30 capsule    Take 1 capsule (150 mg) by mouth daily       SUMAtriptan 100 MG tablet    IMITREX    18 tablet    Take 1 tablet (100 mg) by mouth every 8 hours as needed for migraine       thin lancets    NO BRAND SPECIFIED    3 Box    1 Device 2 times daily       tiZANidine 2 MG tablet    ZANAFLEX    90 tablet    Take 1-2 tablets (2-4 mg) by mouth 3 times daily       traMADol 50 MG tablet   Start taking on:  6/6/2017    ULTRAM    165 tablet    Take 1tablet(s) every 6 hours as needed for pain. 5-6 per day. May dispense on/after 06/4/17 to start taking on/after 06/06/17. 30 day supply       verapamil 240 MG CR tablet    CALAN-SR    30 tablet    Take 1 tablet (240 mg) by mouth At Bedtime INDICATION: TO LOWER BLOOD PRESSURE AND TO CONTROL MIGRAINE HEADACHES       vitamin C-electrolytes 1000mg vitamin C super orange drink mix    EMERGEN-C    90 packet    Mix 1 packet in 4-6oz water and take once daily, INDICATION: VITAMIN C SUPPLEMENTION       * Notice:  This list has 2 medication(s) that are the same as other medications prescribed for you. Read the directions carefully, and ask your doctor or other care provider to review them with you.

## 2017-05-31 NOTE — PATIENT INSTRUCTIONS
[facility] Procedure Discharge Instructions    Today you saw:    Dr. Angelica Wood    Your procedure:  Facet joint injection      Medications used:  Lidocaine (anesthetic)  Bupivacaine (anesthetic) Kenalog (steroid)  Omnipaque (contrast)                Be cautious when walking as numbness and/or weakness in the legs may occur up to 6-8 hours after the procedure due to effect of the local anesthetic    Do not drive for 6 hours. The effect of the local anesthetic could slow your reflexes.     Avoid strenuous activity for the first 24 hours. You may resume your regular activities after that.     You may shower, however avoid swimming, tub baths or hot tubs for 24 hours following your procedure    You may have a mild to moderate increase in pain for several days following the injection.      You may use ice packs for 10-15 minutes, 3 to 4 times a day at the injection site for comfort    Do not use heat to painful areas for 6 to 8 hours. This will give the local anesthetic time to wear off and prevent you from accidentally burning your skin.    You may use anti-inflammatory medications (such as Ibuprofen/Advil or Aleve) or Tylenol for pain control if necessary    If you have diabetes, check your blood sugar more frequently than usual as your blood sugar may be higher than normal for 10-14 days following a steroid injection. Contact your doctor who manages your diabetes if your blood sugar is higher than usual    It may take up to 14 days for the steroid medication to start working although you may feel the effect as early as a few days after the procedure.       If you experience any of the following, call the pain center nursing line during work hours at [phone] or on-call physician after hours at [phone]:  -Fever over 100 degree F  -Swelling, bleeding, redness, drainage, warmth at the injection site  -Progressive weakness or numbness in your legs or arms  -Loss of bowel or bladder function  -Unusual  headache that is not relieved by Tylenol  -Unusual new onset of pain that is not improving    Phone #s:  Appointment scheduling line: [phone]

## 2017-05-31 NOTE — NURSING NOTE
Discharge Information    IV Discontiued Time:  NA    Amount of Fluid Infused:  NA    Discharge Criteria = When patient returns to baseline or as per MD order    Consciousness:  Pt is fully awake    Circulation:  BP +/- 20% of pre-procedure level    Respiration:  Patient is able to breathe deeply    O2 Sat:  Patient is able to maintain O2 Sat >92% on room air    Activity:  Moves 4 extremities on command    Ambulation:  Patient is able to stand and walk or stand and pivot into wheelchair    Dressing:  Clean/dry or No Dressing    Notes:   Discharge instructions and AVS given to patient    Patient meets criteria for discharge?  YES    Admitted to PCU?  No    Responsible adult present to accompany patient home?  Yes    Signature/Title:    Verena Champagne RN Care Coordinator  [facility] [place]

## 2017-06-01 ENCOUNTER — OFFICE VISIT (OUTPATIENT)
Dept: ENDOCRINOLOGY | Facility: CLINIC | Age: 57
End: 2017-06-01
Payer: MEDICARE

## 2017-06-01 VITALS
HEIGHT: 66 IN | DIASTOLIC BLOOD PRESSURE: 86 MMHG | TEMPERATURE: 98.4 F | OXYGEN SATURATION: 98 % | BODY MASS INDEX: 28.06 KG/M2 | WEIGHT: 174.6 LBS | HEART RATE: 98 BPM | SYSTOLIC BLOOD PRESSURE: 127 MMHG

## 2017-06-01 DIAGNOSIS — E03.9 HYPOTHYROIDISM, UNSPECIFIED TYPE: ICD-10-CM

## 2017-06-01 DIAGNOSIS — E78.5 HYPERLIPIDEMIA LDL GOAL <130: ICD-10-CM

## 2017-06-01 DIAGNOSIS — E11.65 TYPE 2 DIABETES MELLITUS WITH HYPERGLYCEMIA, WITH LONG-TERM CURRENT USE OF INSULIN (H): Primary | ICD-10-CM

## 2017-06-01 DIAGNOSIS — Z79.4 TYPE 2 DIABETES MELLITUS WITH HYPERGLYCEMIA, WITH LONG-TERM CURRENT USE OF INSULIN (H): Primary | ICD-10-CM

## 2017-06-01 DIAGNOSIS — I10 ESSENTIAL HYPERTENSION, BENIGN: ICD-10-CM

## 2017-06-01 PROCEDURE — 99204 OFFICE O/P NEW MOD 45 MIN: CPT | Performed by: INTERNAL MEDICINE

## 2017-06-01 PROCEDURE — 99207 C FOOT EXAM  NO CHARGE: CPT | Performed by: INTERNAL MEDICINE

## 2017-06-01 NOTE — NURSING NOTE
"Chief Complaint   Patient presents with     Referral     Dr tapia for DM2        Initial /86 (BP Location: Left arm, Patient Position: Chair, Cuff Size: Adult Large)  Pulse 98  Temp 98.4  F (36.9  C) (Oral)  Ht 1.676 m (5' 6\")  Wt 79.2 kg (174 lb 9.6 oz)  SpO2 98%  BMI 28.18 kg/m2 Estimated body mass index is 28.18 kg/(m^2) as calculated from the following:    Height as of this encounter: 1.676 m (5' 6\").    Weight as of this encounter: 79.2 kg (174 lb 9.6 oz).  Medication Reconciliation: complete     ENDOCRINOLOGY INTAKE FORM    Patient Name:  Mary Lou Gil  :  1960    Is patient Diabetic?   Yes: Type 2   Does patient have non-diabetic or other endocrine issues?  Yes: hypothyroidism     Vitals: /86 (BP Location: Left arm, Patient Position: Chair, Cuff Size: Adult Large)  Pulse 98  Temp 98.4  F (36.9  C) (Oral)  Ht 1.676 m (5' 6\")  Wt 79.2 kg (174 lb 9.6 oz)  SpO2 98%  BMI 28.18 kg/m2  BMI= Body mass index is 28.18 kg/(m^2).    Flu vaccine:  No  Pneumonia vaccine:  Yes: 5/14/15, 11    Smoking and Alcohol use:  Social History   Substance Use Topics     Smoking status: Current Every Day Smoker     Packs/day: 1.00     Years: 25.00     Types: Cigarettes     Smokeless tobacco: Never Used      Comment: started at age 19 and has quit for 10 years      Alcohol use No       Foot Exam: No  Eye Exam:  Yes: 18  Dental Exam:  No  Aspirin Use:  No    Lab Results   Component Value Date    A1C 11.1 2017    A1C 11.1 11/10/2016    A1C 11.5 2016    A1C 7.2 2015    A1C 8.8 2015       Lab Results   Component Value Date    MICROL 29 2016     No results found for: MICROALBUMIN      Staff Signature:  Angela Alicia CMA (AAMA)        "

## 2017-06-01 NOTE — MR AVS SNAPSHOT
After Visit Summary   2017    Mary Lou Gil    MRN: [id number]           Patient Information     Date Of Birth          1960        Visit Information        Provider Department      2017 3:00 PM Susan Ramirez MD [facility]        Today's Diagnoses     Type 2 diabetes mellitus with hyperglycemia, with long-term current use of insulin (H)    -  1    Hypothyroidism, unspecified type        Hyperlipidemia LDL goal <130        Essential hypertension, benign          Care Instructions    [facility] & [place] locations   Dr Ramirez, Endocrinology Department      [facility]   [address] [place] [address]  Appointment [phone]  Fax: [phone]  [place]: Monday and Tuesday         [place] [address] [place] [address]  Appointment [phone]  Fax: [phone]  [place]: Wednesday and Thursday          To provide the best diabetic care, please bring your blood glucose meter to each and every visit with your Endocrinologist.  Your blood glucose meter/insulin pump will be downloaded at every appointment.      Please arrive 15 minutes before your scheduled appointment.  This will allow for your blood glucose meter/insulin pump to be downloaded.    Continue metformin and glipizide at current dose  Increase levemire to 25 units/day and after 2 weeks increase further to 30 units/day if blood glucose is > 150 in morning  Take current dose of levothyroxine REGULARLY. Take it on empty stomach when you are back home in AM.    Labs in .  Please make a lab appointment for blood work and follow up clinic appointment in 1 week after that to discuss results.    Your provider has referred you to Diabetes Education: For all [facility]:  Phone [phone]; Fax [phone]  Please call and make the appointment.                    Follow-ups after your visit         Additional Services     DIABETES EDUCATOR REFERRAL       Your provider has referred you to Diabetes Education: For all [facility]:  Phone [phone]; Fax [phone]    This is a Previous Diagnosis     Type of diabetes is Type 2 - On Insulin                                                          A1C is: Lab Results       Component                Value               Date                       A1C                      11.1                03/21/2017            If an urgent visit is needed or A1C is above 12, Care Team to call the diabetes education team at [phone] or send a message to the diabetes education pool (P DIAB ED-PATIENT CARE).    Diabetes education focus: Continuous glucose monitor Diagnostic CGM (minimum of 72-hours)      Education needs: None                                                                                                                                                      Please be aware that coverage of these services is subject to the terms and limitations of your health insurance plan.  Call member services at your health plan to determine Diabetes Self-Management Training benefits and ask which blood glucose monitor brands are covered by your plan.      Please bring the following to your appointment:    -   List of current medications   -   List of blood glucose monitor brands that are covered by your insurance plan  -   Blood glucose monitor and log book  -   Food records for the 3 days prior to your visit      The Certified Diabetes Educator may make diabetes medication adjustments per  the CDE Protocol and Collaborative Practice Agreement.                  Your next 10 appointments already scheduled     Jul 14, 2017  4:30 PM CDT   Office Visit with Abilio Casiano MD   [facility]    [address] [place] [id number] [phone]           Bring a current list of meds and any  "records pertaining to this visit.  For Physicals, please bring immunization records and any forms needing to be filled out.  Please arrive 10 minutes early to complete paperwork.              Who to contact     If you have questions or need follow up information about today's clinic visit or your schedule please contact [facility] directly at [phone].  Normal or non-critical lab and imaging results will be communicated to you by MyChart, letter or phone within 4 business days after the clinic has received the results. If you do not hear from us within 7 days, please contact the clinic through [employer] or phone. If you have a critical or abnormal lab result, we will notify you by phone as soon as possible.  Submit refill requests through [employer] or call your pharmacy and they will forward the refill request to us. Please allow 3 business days for your refill to be completed.          Additional Information About Your Visit        MyChart Information     [employer] gives you secure access to your electronic health record. If you see a primary care provider, you can also send messages to your care team and make appointments. If you have questions, please call your primary care clinic.  If you do not have a primary care provider, please call [phone] and they will assist you.        Care EveryWhere ID     This is your Care EveryWhere ID. This could be used by other organizations to access your [facility] medical records  [id number]        Your Vitals Were     Pulse Temperature Height Pulse Oximetry BMI (Body Mass Index)       98 98.4  F (36.9  C) (Oral) 1.676 m (5' 6\") 98% 28.18 kg/m2        Blood Pressure from Last 3 Encounters:   06/01/17 127/86   05/31/17 160/90   05/31/17 160/90    Weight from Last 3 Encounters:   06/01/17 79.2 kg (174 lb 9.6 oz)   05/31/17 79.4 kg (175 lb)   05/24/17 78.9 kg (174 lb)              We Performed the Following     DIABETES EDUCATOR REFERRAL     FOOT EXAM        "   Today's Medication Changes          These changes are accurate as of: 6/1/17  3:34 PM.  If you have any questions, ask your nurse or doctor.               These medicines have changed or have updated prescriptions.        Dose/Directions    insulin detemir 100 UNIT/ML injection   Commonly known as:  LEVEMIR FLEXPEN/FLEXTOUCH   This may have changed:  how much to take   Used for:  Type 2 diabetes mellitus with hyperglycemia, with long-term current use of insulin (H)   Changed by:  Susan Ramirez MD        Dose:  25 Units   Inject 25 Units Subcutaneous At Bedtime   Quantity:  15 mL   Refills:  0            Where to get your medicines      These medications were sent to [facility] [place] [address]     Phone:  [phone]     blood glucose monitoring test strip    insulin detemir 100 UNIT/ML injection                Primary Care Provider Office Phone # Fax #    Abilio Casiano [phone]       [address] [address]        Thank you!     Thank you for choosing [facility]  for your care. Our goal is always to provide you with excellent care. Hearing back from our patients is one way we can continue to improve our services. Please take a few minutes to complete the written survey that you may receive in the mail after your visit with us. Thank you!             Your Updated Medication List - Protect others around you: Learn how to safely use, store and throw away your medicines at www.disposemymeds.org.          This list is accurate as of: 6/1/17  3:34 PM.  Always use your most recent med list.                   Brand Name Dispense Instructions for use    acetaminophen 500 MG tablet    TYLENOL    180 tablet    Take 2 tablets (1,000 mg) by mouth every 8 hours       albuterol 108 (90 BASE) MCG/ACT Inhaler    PROAIR HFA/PROVENTIL HFA/VENTOLIN HFA    3 Inhaler     Inhale 2 puffs into the lungs every 6 hours as needed for shortness of breath / dyspnea or wheezing       BENADRYL PO      Take 25-50 mg by mouth See Admin Instructions 25 mg during the day if needed and 50 mg at bedtime for sleep as needed       blood glucose monitoring meter device kit     1 kit    by In Vitro route as needed       blood glucose monitoring test strip    no brand specified    200 strip    1 strip by In Vitro route 3 times daily Uses Accuchek Ana Paula       cholecalciferol 5000 UNITS Caps     100 capsule    Take 1 capsule (5,000 Units) by mouth daily FOR VITAMIN D DEFICIENCY (LOW VITAMIN D),TAKE 2 CAPSULES DOROTHY FOR THE NEXT 4 WEEKS, THEN 1 CAPSULE DAILY, MUST TAKE WITH WITH FAT CONTAINING MEAL       citalopram 40 MG tablet    celeXA    90 tablet    Take 1 tablet (40 mg) by mouth daily TO CONTROL SYMPTOMS OF DEPRESSION       cyanocobalamin 1000 MCG/ML injection    VITAMIN B12    30 mL    Inject 1 mL (1,000 mcg) Subcutaneous every 14 days INDICATION: FOR VITAMIN B12 SUPPLEMENTATION       DULoxetine 60 MG EC capsule    CYMBALTA    120 capsule    Take 2 capsules (120 mg) by mouth daily       EPINEPHrine 0.3 MG/0.3ML injection     0.3 mL    Inject 0.3 mLs (0.3 mg) into the muscle once as needed       fenofibrate 160 MG tablet     90 tablet    Take 1 tablet (160 mg) by mouth At Bedtime with food. INDICATION: TO LOWER CHOLESTEROL       flunisolide HFA 80 MCG/ACT Aers oral inhaler    AEROSPAN    1 Inhaler    Inhale 2 puffs into the lungs 2 times daily INDICATION: ASTHMA CONTROLLER, ALWAYS RINSE MOUTH AFTER USE       glipiZIDE 10 MG 24 hr tablet    glipiZIDE XL    180 tablet    Take 2 tablets (20 mg) by mouth daily INDICATION: TO TREAT DIABETES.       ibuprofen 800 MG tablet    ADVIL/MOTRIN    90 tablet    Take 1 tablet (800 mg) by mouth every 8 hours as needed for moderate pain or pain       insulin detemir 100 UNIT/ML injection    LEVEMIR FLEXPEN/FLEXTOUCH    15 mL    Inject 25 Units Subcutaneous At Bedtime        levothyroxine 150 MCG tablet    SYNTHROID/LEVOTHROID    90 tablet    Take 1 tablet (150 mcg) by mouth daily INDICATION: THYROID SUPPLEMENTATION, TAKE FIRST THING IN THE MORNING ON AN EMPTY STOMACH,  NO FOOD FOR AN HOUR       lidocaine 5 % ointment    XYLOCAINE    50 g    Apply a quarter size amount to painful areas up to 4 times per day as needed.       lidocaine-prilocaine cream    EMLA    30 g    Apply dime to nickel size amount qid prn to low left back       lisinopril-hydrochlorothiazide 20-25 MG per tablet    PRINZIDE/ZESTORETIC    90 tablet    Take 1 tablet by mouth daily INDICATION:TO LOWER BLOOD PRESSURE AND TO PRESERVE KIDNEY FUNCTION       magnesium oxide 400 MG tablet    MAG-OX    180 tablet    Take 1 tablet (400 mg) by mouth 2 times daily INDICATION: MAGNESIUM SUPPLEMENT       metFORMIN 1000 MG tablet    GLUCOPHAGE    60 tablet    Take 1 tablet (1,000 mg) by mouth 2 times daily (with meals) INDICATION: TO TREAT DIABETES, CONTROL BLOOD SUGAR AND TO CORRECT METABOLISM       NEEDLES, ANY SIZE     1 Box    Inject 1 each Subcutaneous At Bedtime       omeprazole 20 MG CR capsule    priLOSEC    90 capsule    Take 1 capsule (20 mg) by mouth 2 times daily INDICATION: TO CONTROL REFLUX SYMPTOMS       * order for DME     1 each    Single prong cane       * order for DME     1 each    Equipment being ordered: walker with seat and hand breaks       pravastatin 80 MG tablet    PRAVACHOL    90 tablet    Take 1 tablet (80 mg) by mouth daily INDICATION: TO LOWER CHOLESTEROL AND TO HELP  PREVENT HEART DISEASE       predniSONE 20 MG tablet    DELTASONE    10 tablet    Take 1 tablet (20 mg) by mouth 2 times daily       pregabalin 150 MG capsule    LYRICA    30 capsule    Take 1 capsule (150 mg) by mouth daily       SUMAtriptan 100 MG tablet    IMITREX    18 tablet    Take 1 tablet (100 mg) by mouth every 8 hours as needed for migraine       thin lancets    NO BRAND SPECIFIED    3 Box    1 Device 2 times daily        tiZANidine 2 MG tablet    ZANAFLEX    90 tablet    Take 1-2 tablets (2-4 mg) by mouth 3 times daily       traMADol 50 MG tablet   Start taking on:  6/6/2017    ULTRAM    165 tablet    Take 1tablet(s) every 6 hours as needed for pain. 5-6 per day. May dispense on/after 06/4/17 to start taking on/after 06/06/17. 30 day supply       verapamil 240 MG CR tablet    CALAN-SR    30 tablet    Take 1 tablet (240 mg) by mouth At Bedtime INDICATION: TO LOWER BLOOD PRESSURE AND TO CONTROL MIGRAINE HEADACHES       vitamin C-electrolytes 1000mg vitamin C super orange drink mix    EMERGEN-C    90 packet    Mix 1 packet in 4-6oz water and take once daily, INDICATION: VITAMIN C SUPPLEMENTION       * Notice:  This list has 2 medication(s) that are the same as other medications prescribed for you. Read the directions carefully, and ask your doctor or other care provider to review them with you.
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
## 2017-06-01 NOTE — PATIENT INSTRUCTIONS
[facility] & [place] locations   Dr Ramirez, Endocrinology Department      [facility]   [address] [place] [address]  Appointment [phone]  Fax: [phone]  [place]: Monday and Tuesday         [facility]   [address] [place] [address]  Appointment [phone]  Fax: [phone]  [place]: Wednesday and Thursday          To provide the best diabetic care, please bring your blood glucose meter to each and every visit with your Endocrinologist.  Your blood glucose meter/insulin pump will be downloaded at every appointment.      Please arrive 15 minutes before your scheduled appointment.  This will allow for your blood glucose meter/insulin pump to be downloaded.    Continue metformin and glipizide at current dose  Increase levemire to 25 units/day and after 2 weeks increase further to 30 units/day if blood glucose is > 150 in morning  Take current dose of levothyroxine REGULARLY. Take it on empty stomach when you are back home in AM.    Labs in .  Please make a lab appointment for blood work and follow up clinic appointment in 1 week after that to discuss results.    Your provider has referred you to Diabetes Education: For all [facility]:  Phone [phone]; Fax [phone]  Please call and make the appointment.

## 2017-06-07 ENCOUNTER — TELEPHONE (OUTPATIENT)
Dept: PALLIATIVE MEDICINE | Facility: CLINIC | Age: 57
End: 2017-06-07

## 2017-06-07 NOTE — TELEPHONE ENCOUNTER
Patient had a Bilateral Facet Joint injection on 5/31/2017.  Called patient for an update.      Left message that we were calling for an update about how s/he was doing after the injection.  LM that if s/he has any problems or questions to call the nurse line at [phone].     Gricelda San, [facility]

## 2017-08-29 ENCOUNTER — ALLIED HEALTH/NURSE VISIT (OUTPATIENT)
Dept: EDUCATION SERVICES | Facility: CLINIC | Age: 57
End: 2017-08-29
Payer: MEDICARE

## 2017-08-29 VITALS — BODY MASS INDEX: 28.47 KG/M2 | WEIGHT: 176.4 LBS

## 2017-08-29 DIAGNOSIS — E11.22 TYPE 2 DIABETES MELLITUS WITH DIABETIC CHRONIC KIDNEY DISEASE (H): Primary | ICD-10-CM

## 2017-08-29 DIAGNOSIS — G56.03 BILATERAL CARPAL TUNNEL SYNDROME: ICD-10-CM

## 2017-08-29 DIAGNOSIS — Z79.4 TYPE 2 DIABETES MELLITUS WITH HYPERGLYCEMIA, WITH LONG-TERM CURRENT USE OF INSULIN (H): ICD-10-CM

## 2017-08-29 DIAGNOSIS — M62.838 MUSCLE SPASM: ICD-10-CM

## 2017-08-29 DIAGNOSIS — M51.369 DEGENERATION OF LUMBAR INTERVERTEBRAL DISC: ICD-10-CM

## 2017-08-29 DIAGNOSIS — E11.65 TYPE 2 DIABETES MELLITUS WITH HYPERGLYCEMIA, WITH LONG-TERM CURRENT USE OF INSULIN (H): ICD-10-CM

## 2017-08-29 PROCEDURE — G0108 DIAB MANAGE TRN  PER INDIV: HCPCS

## 2017-08-29 RX ORDER — TRAMADOL HYDROCHLORIDE 50 MG/1
TABLET ORAL
Qty: 165 TABLET | Refills: 0 | Status: SHIPPED | OUTPATIENT
Start: 2017-08-29 | End: 2018-06-21

## 2017-08-29 RX ORDER — TIZANIDINE 2 MG/1
2-4 TABLET ORAL 3 TIMES DAILY
Qty: 90 TABLET | Refills: 1 | Status: SHIPPED | OUTPATIENT
Start: 2017-08-29 | End: 2019-03-19

## 2017-08-29 NOTE — TELEPHONE ENCOUNTER
Tizanidine      Last Written Prescription Date:  4/13  Last Fill Quantity: 90,   # refills: 1  Last Office Visit with [facility], [facility] or [facility] prescribing provider: 7/14/18  Future Office visit:    Next 5 appointments (look out 90 days)     Sep 06, 2017  2:00 PM CDT   Office Visit with Xavi Garcia MD   [facility]    [address] [place] [id number]   [phone]                   Routing refill request to provider for review/approval because:  Drug not on the [facility], [facility] or [facility] refill protocol or controlled substance    Tramadol      Last Written Prescription Date:  6/4/17  Last Fill Quantity: 165,   # refills: 0  Last Office Visit with [facility], [facility] or [facility] prescribing provider: 7/14  Future Office visit:    Next 5 appointments (look out 90 days)     Sep 06, 2017  2:00 PM CDT   Office Visit with Xavi Garcia MD   [facility] ([facility]    [address] [place] [id number]   [phone]                   Routing refill request to provider for review/approval because:  Drug not on the [facility], [facility] or [facility] refill protocol or controlled substance      Thank you!  Lynn Jiang CPhT   [facility]

## 2017-08-29 NOTE — Clinical Note
Lizzette will see you next week, she has not been taking DB meds or Cymbalta consistently. Just FYI.  Thank you! Palak Morillo RD, LD, CDE

## 2017-08-29 NOTE — TELEPHONE ENCOUNTER
metformin         Last Written Prescription Date: 03/21/17  Last Fill Quantity: 60, # refills: 0  Last Office Visit with [facility], [facility] or  [facility] prescribing provider:  07/14/17   Next 5 appointments (look out 90 days)     Sep 06, 2017  2:00 PM CDT   Office Visit with Xavi Garcia MD   [facility] ([facility])    [address] [address] [phone]                   BP Readings from Last 3 Encounters:   06/01/17 127/86   05/31/17 160/90   05/31/17 160/90     Lab Results   Component Value Date    MICROL 29 07/05/2016     Lab Results   Component Value Date    UMALCR 25.84 07/05/2016     Creatinine   Date Value Ref Range Status   03/21/2017 0.69 0.52 - 1.04 mg/dL Final   ]  GFR Estimate   Date Value Ref Range Status   03/21/2017 89 >60 mL/min/1.7m2 Final     Comment:     Non  GFR Calc   11/10/2016 85 >60 mL/min/1.7m2 Final     Comment:     Non  GFR Calc   09/04/2016 >90  Non  GFR Calc   >60 mL/min/1.7m2 Final     GFR Estimate If Black   Date Value Ref Range Status   03/21/2017 >90   GFR Calc   >60 mL/min/1.7m2 Final   11/10/2016 >90   GFR Calc   >60 mL/min/1.7m2 Final   09/04/2016 >90   GFR Calc   >60 mL/min/1.7m2 Final     Lab Results   Component Value Date    CHOL 221 11/10/2016     Lab Results   Component Value Date    HDL 44 11/10/2016     Lab Results   Component Value Date     03/21/2017     11/10/2016     Lab Results   Component Value Date    TRIG 225 11/10/2016     Lab Results   Component Value Date    CHOLHDLRATIO 5.0 08/31/2015     Lab Results   Component Value Date    AST 13 03/21/2017     Lab Results   Component Value Date    ALT 29 03/21/2017     Lab Results   Component Value Date    A1C 11.1 03/21/2017    A1C 11.1 11/10/2016    A1C 11.5 07/05/2016    A1C 7.2 08/31/2015    A1C 8.8 05/14/2015     Potassium   Date Value  Ref Range Status   03/21/2017 4.2 3.4 - 5.3 mmol/L Final

## 2017-08-29 NOTE — PATIENT INSTRUCTIONS
My Diabetes Care Goals:    Monitoring: check blood sugar each day at different times  Recommend take as prescribed-   Levemir 25 units at bedtime, and Metformin 1000 mg in morning and evening, and Glipizide XL 2 tabs in morning    Follow up:  Follow-up diabetes education appointment scheduled on 9/26.      Bring blood glucose meter and logbook with you to all doctor and follow-up appointments.     [facility] for the [facility]:  For Your Diabetes Education and Nutrition Appointments Call:  [phone]   For Diabetes Education or Nutrition Related Questions:   Phone: [phone]  E-mail: [email or web address]  Fax: [phone]   If you need a medication refill please contact your pharmacy. Please allow 3 business days for your refills to be completed.    Instructions for emailing the Diabetes Educators    If you need to communicate a non-urgent message to a Diabetes Educator via email, please send to [email or web address].    Please follow the following email guidelines:    Subject line: Secure: your clinic name (example: Secure: [place])  In the email please include: First name, middle initial, last name and date of birth.    We will be in touch with you within one (1) business day.

## 2017-08-29 NOTE — PROGRESS NOTES
"Diabetes Self Management Training: Individual Review Visit    Mary Lou Gil presents today for education and evaluation of glucose control related to Type 2 diabetes.    She is accompanied by self. She lives alone.    Patient's diabetes management related comments/concerns: I have a lot of questions.   She has had DB 11 years she thinks, had DB ed but it has been a long time.   \"I'm really needing to lose at least 20#\" in hopes of decreasing medication.     \"I want to change, like I was looking up juice machines, because I'm not eating. If I made a juice mixture like a V8 would that be supportive of health? Or the glucose drinks?\" She does not cook meals, though it might change since she feels good now.     She voices that she is not doing what she should be to care for diabetes, she is busy, stressed. Was recently with family in [place] and didn't take DB meds daily. She tells me that she feels better when not taking them.  She says she feels good, more clearheaded, better energy. USed to be in her apartment for 2 or 3 days and not leave. Lately she has been cleaning out apartment, more involved.  She thinks this is because of not taking the Cymbalta. Plans to talk to MD at upcoming appointment, .    She is trying to katrin her house and her health under control. Wants to know about healthy eating, what to buy, what is ok, what about carbs?    Mary Lou tells me that her mom and sister  on the same day last year, it is taking her awhile to get herself back together.     Patient's emotional response to diabetes: expresses readiness to learn    Patient would like this visit to be focused around the following diabetes-related behaviors and goals: Healthy Eating, Monitoring, Taking Medication and Reducing Risks    ASSESSMENT:  Patient Problem List and Family Medical History reviewed for relevant medical history, current medical status, and diabetes risk factors.    Family is in [place] so she is often there " "visiting. Was recently there for a month with her sister.    Current Diabetes Management per Patient:  Taking diabetes medications?   yes:     Diabetes Medication(s)     Biguanides Sig    metFORMIN (GLUCOPHAGE) 1000 MG tablet Take 1 tablet (1,000 mg) by mouth 2 times daily (with meals) INDICATION: TO TREAT DIABETES, CONTROL BLOOD SUGAR AND TO CORRECT METABOLISM    Insulin Sig    insulin detemir (LEVEMIR FLEXPEN/FLEXTOUCH) 100 UNIT/ML injection Inject 25 Units Subcutaneous At Bedtime    Sulfonylureas Sig    glipiZIDE (GLIPIZIDE XL) 10 MG 24 hr tablet Take 2 tablets (20 mg) by mouth daily INDICATION: TO TREAT DIABETES.        However, she describes that she has not been taking meds as prescribed. Sometimes skipping, sometimes doubling up on Levemir if high, \"like over 400\".    Past Diabetes Education: Yes, long ago.    Patient glucose self monitoring as follows: Not checking right now, left meter at her sister's a couple weeks ago.   When checks, it might be 2-3 times/day.   BG meter: Freestyle Lite meter given today  BG results: post-lunch glucose- 367 today in the office     BG values are: Not in goal  Patient's most recent   Lab Results   Component Value Date    A1C 11.1 03/21/2017    is not meeting goal of not listed , not meeting ADA goal <7%    Nutrition:  Patient skips breakfast regularly, has an inconsistent intake of carbohydrates and focuses on sugar  Lizzette says \"I eat really bad. I'm not doing breakfast because of Levothyroxine\". \"I can go most of the day without eating until I feel sick\".     She loves to bake, uses Splenda in baking, brownies etc. Says that she is adventurous with food. Wonders if Splenda is ok?   She likes fresh foods, fruit, vegetables.  \"I eat a lot of salad\". But \"I love sweets\".   Wonders about certain foods, carbs, sugars. Used to count carbs, now just looking at sugar on labels.      Breakfast - No, \"just coffee and a cigarette\"  Lunch - Greek yogurt and 2 turkey dogs for " "lunch, might make home made pancakes and syrup  Dinner - vegetable stir chavez with fish  Snacks - cheese    Beverages: always sugar free Pop (Diet), Tea , Coffee and Water     Biggest Challenge to Healthy Eating: knowing what to eat and depression/low mood    Physical Activity:    Not addressed today    Diabetes Risk Factors:  family history, age over 45 years and overweight/obesity    Diabetes Complications:  Chronic Complications Discussed:   retinopathy  nephropathy  neuropathy  cardiovascular disease  gum disease  Also acute complication- vaginal yeast or bacterial infection with high BG    Vitals:  There were no vitals taken for this visit.  Estimated body mass index is 28.18 kg/(m^2) as calculated from the following:    Height as of 6/1/17: 1.676 m (5' 6\").    Weight as of 6/1/17: 79.2 kg (174 lb 9.6 oz).   Last 3 BP:   BP Readings from Last 3 Encounters:   06/01/17 127/86   05/31/17 160/90   05/31/17 160/90       History   Smoking Status     Current Every Day Smoker     Packs/day: 1.00     Years: 25.00     Types: Cigarettes   Smokeless Tobacco     Never Used     Comment: started at age 19 and has quit for 10 years        Labs:  Lab Results   Component Value Date    A1C 11.1 03/21/2017     Lab Results   Component Value Date     03/21/2017     Lab Results   Component Value Date     03/21/2017     11/10/2016     HDL Cholesterol   Date Value Ref Range Status   11/10/2016 44 (L) >49 mg/dL Final   ]  GFR Estimate   Date Value Ref Range Status   03/21/2017 89 >60 mL/min/1.7m2 Final     Comment:     Non  GFR Calc     GFR Estimate If Black   Date Value Ref Range Status   03/21/2017 >90   GFR Calc   >60 mL/min/1.7m2 Final     Lab Results   Component Value Date    CR 0.69 03/21/2017     No results found for: MICROALBUMIN    Socio/Economic Considerations:    Support system: family but they are out of state    Health Beliefs and Attitudes:   Patient Activation Measure " Survey Score:  MOHAN Score (Last Two) 4/17/2012 8/7/2013   MOHAN Raw Score 39 42   Activation Score 56.4 66   MOHAN Level 3 3       Stage of Change: PREPARATION (Decided to change - considering how)      Diabetes knowledge and skills assessment:     Patient is knowledgeable in diabetes management concepts related to: Needs comprehensive review    Patient needs further education on the following diabetes management concepts: Healthy Eating, Being Active, Monitoring, Taking Medication, Problem Solving, Reducing Risks and Healthy Coping    Barriers to Learning Assessment: No Barriers identified    Based on learning assessment above, most appropriate setting for further diabetes education would be: Individual setting.    INTERVENTION:   Education provided today on:  AADE Self-Care Behaviors:  Healthy Eating: answered some questions, will need full review of meal planning options  Monitoring: purpose, log and interpret results, individual blood glucose targets and frequency of monitoring  Taking Medication: action of prescribed medication, side effects of prescribed medications, when to take medications. She has not been taking meds in part because she thought she couldn't take near Levothyroxine. Reminded don't eat with that med, but ok to take diabetes meds with it. However, she may want to wait to take DB meds with food if they upset stomach.    Opportunities for ongoing education and support in diabetes-self management were discussed.    Pt verbalized understanding of concepts discussed and recommendations provided today.       Education Materials Provided:  BG Log Sheet and Freestyle Lite meter kit    PLAN:  See Patient Instructions for co-developed, patient-stated behavior change goals.  Keep a blood glucose record for next visit.  Take meds as prescribed  AVS printed and provided to patient today.    FOLLOW-UP:  Follow-up appointment scheduled on 9/26.  Chart routed to referring provider.    Ongoing plan for education  and support: Follow-up visit with diabetes educator in 1 mnoth and Follow-up with primary care provider next week    Palak Morillo RD, SANDRA, CDE    Time Spent: 60 minutes  Encounter Type: Individual    Any diabetes medication dose changes were made via the CDE Protocol and Collaborative Practice Agreement with the patient's referring provider. A copy of this encounter was shared with the provider.

## 2017-08-30 NOTE — TELEPHONE ENCOUNTER
Routing refill request to provider for review/approval because:  Labs out of range:  A1C 11.1

## 2017-08-30 NOTE — TELEPHONE ENCOUNTER
Per my last note on 5/3/17  Follow up 2-3 weeks if desires other wise transfer pain care to PCP. This was discussed by phone with Dr. Casiano on 5/30/17.  Routing to PCP for refill of requested medication   Monica Brandon-Nakul, CNP    [facility]

## 2017-08-30 NOTE — TELEPHONE ENCOUNTER
rx tramadol 50mg brought downstairs to [facility] and tizanidine 2mg brought downstairs to [facility]

## 2017-09-01 DIAGNOSIS — E03.9 HYPOTHYROIDISM, UNSPECIFIED TYPE: ICD-10-CM

## 2017-09-01 DIAGNOSIS — E11.65 TYPE 2 DIABETES MELLITUS WITH HYPERGLYCEMIA, WITH LONG-TERM CURRENT USE OF INSULIN (H): ICD-10-CM

## 2017-09-01 DIAGNOSIS — Z79.4 TYPE 2 DIABETES MELLITUS WITH HYPERGLYCEMIA, WITH LONG-TERM CURRENT USE OF INSULIN (H): ICD-10-CM

## 2017-09-01 LAB
HBA1C MFR BLD: 12.4 % (ref 4.3–6)
T4 FREE SERPL-MCNC: 1.13 NG/DL (ref 0.76–1.46)
TSH SERPL DL<=0.005 MIU/L-ACNC: 43.86 MU/L (ref 0.4–4)

## 2017-09-01 PROCEDURE — 36415 COLL VENOUS BLD VENIPUNCTURE: CPT | Performed by: INTERNAL MEDICINE

## 2017-09-01 PROCEDURE — 84443 ASSAY THYROID STIM HORMONE: CPT | Performed by: INTERNAL MEDICINE

## 2017-09-01 PROCEDURE — 84439 ASSAY OF FREE THYROXINE: CPT | Performed by: INTERNAL MEDICINE

## 2017-09-01 PROCEDURE — 83036 HEMOGLOBIN GLYCOSYLATED A1C: CPT | Performed by: INTERNAL MEDICINE

## 2017-09-05 ENCOUNTER — TELEPHONE (OUTPATIENT)
Dept: ENDOCRINOLOGY | Facility: CLINIC | Age: 57
End: 2017-09-05

## 2017-09-05 NOTE — TELEPHONE ENCOUNTER
Component      Latest Ref Rng & Units 9/1/2017   Hemoglobin A1C      4.3 - 6.0 % 12.4 (H)   T4 Free      0.76 - 1.46 ng/dL 1.13   TSH      0.40 - 4.00 mU/L 43.86 (H)     Increasing A1c.  TSH is also high.  Questionable compliance.    Last clinic visit was in June 2017  Please ask patient to make clinic appointment in next few weeks  Please ask to bring glucometer to next clinic visit    Please help her scheduled  Can see Madai Garcia at [place] ([phone]) if she has earlier openings.

## 2017-09-05 NOTE — LETTER
[facility]  [address] [place] [address]  [phone]      September 25, 2017      Mary Lou Gil  [address] [place] [address] [address]  [facility] [address]                  Dear Mary Lou    This is to remind you that your physician expected you to return for a follow up clinic visit. Please call and schedule this right away.   You may call our office at [phone] ([place]) or [phone] ([place]) to schedule an appointment. If Dr Ramirez does not have an opening within the next few weeks please see Madai Garcia in [place] [phone]    Please disregard this notice if you have recently had an appointment.      Sincerely,      Dr. Susan Ramirez

## 2017-09-06 ENCOUNTER — OFFICE VISIT (OUTPATIENT)
Dept: INTERNAL MEDICINE | Facility: CLINIC | Age: 57
End: 2017-09-06
Payer: MEDICARE

## 2017-09-06 DIAGNOSIS — I10 ESSENTIAL HYPERTENSION, BENIGN: ICD-10-CM

## 2017-09-06 DIAGNOSIS — M53.3 SI (SACROILIAC) JOINT DYSFUNCTION: ICD-10-CM

## 2017-09-06 DIAGNOSIS — Z79.4 INSULIN-REQUIRING OR DEPENDENT TYPE II DIABETES MELLITUS (H): ICD-10-CM

## 2017-09-06 DIAGNOSIS — M25.552 HIP PAIN, LEFT: ICD-10-CM

## 2017-09-06 DIAGNOSIS — R30.0 DYSURIA: ICD-10-CM

## 2017-09-06 DIAGNOSIS — J45.30 MILD PERSISTENT ASTHMA, UNCOMPLICATED: ICD-10-CM

## 2017-09-06 DIAGNOSIS — M77.12 LATERAL EPICONDYLITIS OF LEFT ELBOW: ICD-10-CM

## 2017-09-06 DIAGNOSIS — G56.03 BILATERAL CARPAL TUNNEL SYNDROME: ICD-10-CM

## 2017-09-06 DIAGNOSIS — G89.29 OTHER CHRONIC PAIN: ICD-10-CM

## 2017-09-06 DIAGNOSIS — R53.81 PHYSICAL DECONDITIONING: ICD-10-CM

## 2017-09-06 DIAGNOSIS — E11.9 INSULIN-REQUIRING OR DEPENDENT TYPE II DIABETES MELLITUS (H): ICD-10-CM

## 2017-09-06 DIAGNOSIS — M51.369 DEGENERATION OF LUMBAR INTERVERTEBRAL DISC: ICD-10-CM

## 2017-09-06 DIAGNOSIS — E11.42 TYPE 2 DIABETES MELLITUS WITH DIABETIC POLYNEUROPATHY, WITH LONG-TERM CURRENT USE OF INSULIN (H): Primary | ICD-10-CM

## 2017-09-06 DIAGNOSIS — G89.4 CHRONIC PAIN SYNDROME: ICD-10-CM

## 2017-09-06 DIAGNOSIS — Z79.4 TYPE 2 DIABETES MELLITUS WITH DIABETIC POLYNEUROPATHY, WITH LONG-TERM CURRENT USE OF INSULIN (H): Primary | ICD-10-CM

## 2017-09-06 DIAGNOSIS — N89.8 VAGINAL ITCHING: ICD-10-CM

## 2017-09-06 LAB
ALBUMIN UR-MCNC: NEGATIVE MG/DL
APPEARANCE UR: CLEAR
BILIRUB UR QL STRIP: NEGATIVE
COLOR UR AUTO: YELLOW
CREAT UR-MCNC: 62 MG/DL
GLUCOSE UR STRIP-MCNC: >=1000 MG/DL
HGB UR QL STRIP: NEGATIVE
KETONES UR STRIP-MCNC: NEGATIVE MG/DL
LEUKOCYTE ESTERASE UR QL STRIP: NEGATIVE
MICROALBUMIN UR-MCNC: 36 MG/L
MICROALBUMIN/CREAT UR: 58.9 MG/G CR (ref 0–25)
NITRATE UR QL: NEGATIVE
PH UR STRIP: 6 PH (ref 5–7)
RBC #/AREA URNS AUTO: ABNORMAL /HPF
SOURCE: ABNORMAL
SP GR UR STRIP: 1.01 (ref 1–1.03)
SPECIMEN SOURCE: NORMAL
UROBILINOGEN UR STRIP-ACNC: 0.2 EU/DL (ref 0.2–1)
WBC #/AREA URNS AUTO: ABNORMAL /HPF
WET PREP SPEC: NORMAL

## 2017-09-06 PROCEDURE — 87210 SMEAR WET MOUNT SALINE/INK: CPT | Performed by: INTERNAL MEDICINE

## 2017-09-06 PROCEDURE — 99215 OFFICE O/P EST HI 40 MIN: CPT | Performed by: INTERNAL MEDICINE

## 2017-09-06 PROCEDURE — 81001 URINALYSIS AUTO W/SCOPE: CPT | Performed by: INTERNAL MEDICINE

## 2017-09-06 PROCEDURE — 82043 UR ALBUMIN QUANTITATIVE: CPT | Performed by: INTERNAL MEDICINE

## 2017-09-06 RX ORDER — GLIPIZIDE 10 MG/1
10 TABLET, FILM COATED, EXTENDED RELEASE ORAL DAILY
Qty: 30 TABLET | Refills: 1 | Status: SHIPPED | OUTPATIENT
Start: 2017-09-06 | End: 2018-01-16

## 2017-09-06 RX ORDER — IBUPROFEN 800 MG/1
800 TABLET, FILM COATED ORAL EVERY 8 HOURS PRN
Qty: 90 TABLET | Refills: 2 | Status: SHIPPED | OUTPATIENT
Start: 2017-09-06 | End: 2018-06-28

## 2017-09-06 RX ORDER — LIDOCAINE 50 MG/G
OINTMENT TOPICAL
Qty: 50 G | Refills: 3 | Status: SHIPPED | OUTPATIENT
Start: 2017-09-06 | End: 2018-06-28

## 2017-09-06 ASSESSMENT — ANXIETY QUESTIONNAIRES
7. FEELING AFRAID AS IF SOMETHING AWFUL MIGHT HAPPEN: SEVERAL DAYS
GAD7 TOTAL SCORE: 10
GAD7 TOTAL SCORE: 10
4. TROUBLE RELAXING: SEVERAL DAYS
GAD7 TOTAL SCORE: 10
7. FEELING AFRAID AS IF SOMETHING AWFUL MIGHT HAPPEN: SEVERAL DAYS
5. BEING SO RESTLESS THAT IT IS HARD TO SIT STILL: SEVERAL DAYS
3. WORRYING TOO MUCH ABOUT DIFFERENT THINGS: MORE THAN HALF THE DAYS
6. BECOMING EASILY ANNOYED OR IRRITABLE: SEVERAL DAYS
1. FEELING NERVOUS, ANXIOUS, OR ON EDGE: MORE THAN HALF THE DAYS
2. NOT BEING ABLE TO STOP OR CONTROL WORRYING: MORE THAN HALF THE DAYS

## 2017-09-06 ASSESSMENT — PATIENT HEALTH QUESTIONNAIRE - PHQ9
10. IF YOU CHECKED OFF ANY PROBLEMS, HOW DIFFICULT HAVE THESE PROBLEMS MADE IT FOR YOU TO DO YOUR WORK, TAKE CARE OF THINGS AT HOME, OR GET ALONG WITH OTHER PEOPLE: SOMEWHAT DIFFICULT
SUM OF ALL RESPONSES TO PHQ QUESTIONS 1-9: 9
SUM OF ALL RESPONSES TO PHQ QUESTIONS 1-9: 9

## 2017-09-06 NOTE — MR AVS SNAPSHOT
After Visit Summary   9/6/2017    Mary Lou Gil    MRN: [id number]           Patient Information     Date Of Birth          1960        Visit Information        Provider Department      9/6/2017 2:00 PM Xavi Garcia MD [facility]        Today's Diagnoses     Type 2 diabetes mellitus with diabetic polyneuropathy, with long-term current use of insulin (H)    -  1    Mild persistent asthma, uncomplicated        Hip pain, left        Physical deconditioning        Other chronic pain        SI (sacroiliac) joint dysfunction        Bilateral carpal tunnel syndrome        Degeneration of lumbar intervertebral disc        Vaginal itching        Dysuria        Chronic pain syndrome        Lateral epicondylitis of left elbow        Essential hypertension, benign        Insulin-requiring or dependent type II diabetes mellitus (H)          Care Instructions    *  Resume Levemir insulin 25 units once per day, every day    *  Metformin 1000 mg twice per day with food.     *  Glipizide XL 10 mg once per day every day.  (reduce the dose from 20 mg) Take with the largest meal of the day    *  Make appointment with Dr. Ramirez (endocrinologist) to help with diabetes management    *  Continue to meet with Diabetic Educators to help manage the diabetes.    *  You need to see  Psychiatrist to help manage the depression more effectively, because you are going to need more than the Citalopram.     *  Ask the [employer]  to help facilitate a referral to psychiatrist.     *  Continue all other medications at the same doses.  Contact your usual pharmacy if you need refills.     *  Return to see me in 1 month regardless of how you feel, return sooner if needed.  Call [phone] to schedule this appointment.      *   mg once per day every day.          5 GOALS IN MANAGING DIABETES (i.e. to give the best chance to prevent diabetic complications and vascular disease):  "        1.  Aggressive blood pressure control, under 130/80 ideally.  Using medications if needed    Your blood pressure is under good control    BP Readings from Last 4 Encounters:   09/06/17 140/86   06/01/17 127/86   05/31/17 160/90   05/31/17 160/90       2.  Aggressive LDL cholesterol (bad cholesterol) lowering as needed.  Your goal is an LDL under 100 for sure, preferably under 70.     *  All patients with diabetes are recommended to be on a \"statin\" cholesterol lowering medication regardless of the cholesterol levels to give the best chance at avoiding vascular disease.      New guidelines identify four high-risk groups who could benefit from statins:   *people with pre-existing heart disease, such as those who have had a heart attack;   *people ages 40 to 75 who have diabetes of any type  *patients ages 40 to 75 with at least a 7.5% risk of developing cardiovascular disease over the next decade, according to a formula described in the guidelines  *patients with the sort of super-high cholesterol that sometimes runs in families, as evidenced by an LDL of 190 milligrams per deciliter or higher    *  Your cholesterol levels are fairly well controlled.    Recent Labs   Lab Test  03/21/17   1825  11/10/16   1034  07/05/16   1030   08/31/15   1349  06/29/15   0845   CHOL   --   221*  313*   < >  243*  213*   HDL   --   44*  40*   < >  49*  48*   LDL  129*  132*  Cannot estimate LDL when triglyceride exceeds 400 mg/dL  201*   < >  161*  131*   TRIG   --   225*  506*   < >  164*  171*   CHOLHDLRATIO   --    --    --    --   5.0  4.4    < > = values in this interval not displayed.       3.  Aggressive diabetic management.  The target for A1C (3 months average blood sugar) is ideally below 6.5, preferably below 7.5    Your diabetes is under poor control, simply because you have not been taking your medications.      Lab Results   Component Value Date    A1C 12.4 09/01/2017    A1C 11.1 03/21/2017    A1C 11.1 " 11/10/2016    A1C 11.5 07/05/2016    A1C 7.2 08/31/2015    A1C 8.8 05/14/2015    A1C 9.0 03/02/2015    A1C 7.0 11/14/2013    A1C 7.9 10/07/2013    A1C 8.1 08/13/2013    A1C 9.0 06/18/2013    A1C 7.7 02/28/2013    A1C 6.5 04/17/2012    A1C 5.9 08/02/2011    A1C 5.7 11/23/2010    A1C 5.6 07/06/2010    A1C 5.7 02/01/2010       4.  No smoking    5.  Aspirin tablet once per day, every day unless there is a specific reason that you cannot take aspirin.       *You should take Aspirin 81 mg once per day.         Hemoglobin A1C (3 month average blood sugar) values:    5.0 97  5.5 111  6.0 126  ---------------  6.5 140  7.0 154  7.5 169  8.0 183  ---------------  8.5 197  9.0 215  9.5 226  10.0 240  10.5 255  11.0 269  11.5 283  12.0 298  12.5 312  13.0 326  13.5 341  14.0 355  >14 YIKES!            Follow-ups after your visit        Your next 10 appointments already scheduled     Sep 26, 2017  1:30 PM CDT   Diabetic Education with  DIABETIC ED RESOURCE   [facility]    [address] [place] [id number] [phone]              Who to contact     If you have questions or need follow up information about today's clinic visit or your schedule please contact [facility] directly at [phone].  Normal or non-critical lab and imaging results will be communicated to you by MyChart, letter or phone within 4 business days after the clinic has received the results. If you do not hear from us within 7 days, please contact the clinic through MyChart or phone. If you have a critical or abnormal lab result, we will notify you by phone as soon as possible.  Submit refill requests through [employer] or call your pharmacy and they will forward the refill request to us. Please allow 3 business days for your refill to be completed.          Additional Information About Your Visit        [employer] Information     [employer] gives you secure  access to your electronic health record. If you see a primary care provider, you can also send messages to your care team and make appointments. If you have questions, please call your primary care clinic.  If you do not have a primary care provider, please call [phone] and they will assist you.        Care EveryWhere ID     This is your Care EveryWhere ID. This could be used by other organizations to access your [facility] medical records  [id number]        Your Vitals Were     Pulse Temperature Pulse Oximetry BMI (Body Mass Index)          84 98.3  F (36.8  C) (Oral) 97% 28.18 kg/m2         Blood Pressure from Last 3 Encounters:   09/06/17 140/86   06/01/17 127/86   05/31/17 160/90    Weight from Last 3 Encounters:   09/06/17 174 lb 9.6 oz (79.2 kg)   08/29/17 176 lb 6.4 oz (80 kg)   06/01/17 174 lb 9.6 oz (79.2 kg)              We Performed the Following     Albumin Random Urine Quantitative with Creat Ratio     UA with Microscopic reflex to Culture     Wet prep          Today's Medication Changes          These changes are accurate as of: 9/6/17  3:28 PM.  If you have any questions, ask your nurse or doctor.               These medicines have changed or have updated prescriptions.        Dose/Directions    glipiZIDE 10 MG 24 hr tablet   Commonly known as:  glipiZIDE XL   This may have changed:  how much to take   Used for:  Type 2 diabetes mellitus with diabetic polyneuropathy, with long-term current use of insulin (H)   Changed by:  Xavi Garcia MD        Dose:  10 mg   Take 1 tablet (10 mg) by mouth daily INDICATION: TO TREAT DIABETES.   Quantity:  30 tablet   Refills:  1            Where to get your medicines      These medications were sent to [facility] [address] [address]     Phone:  [phone]     glipiZIDE 10 MG 24 hr tablet    ibuprofen 800 MG tablet    insulin detemir 100 UNIT/ML injection    lidocaine 5 %  ointment                Primary Care Provider Office Phone # Fax #    Abilio Casiano [phone]       [address] [facility] [address]        Equal Access to Services     RISHI MOJICA : Hadalfa zelalem fong kalyano Sojosé miguelali, waaxda luqadaha, qaybta kaalmada adeegyada, marley price laKanerosa vizcaino. So [facility] [phone].    ATENCIÓN: Si habla español, tiene a ybarra disposición servicios gratuitos de asistencia lingüística. Llame al [phone].    We comply with applicable federal civil rights laws and [place] laws. We do not discriminate on the basis of race, color, national origin, age, disability sex, sexual orientation or gender identity.            Thank you!     Thank you for choosing [facility]  for your care. Our goal is always to provide you with excellent care. Hearing back from our patients is one way we can continue to improve our services. Please take a few minutes to complete the written survey that you may receive in the mail after your visit with us. Thank you!             Your Updated Medication List - Protect others around you: Learn how to safely use, store and throw away your medicines at www.disposemymeds.org.          This list is accurate as of: 9/6/17  3:28 PM.  Always use your most recent med list.                   Brand Name Dispense Instructions for use Diagnosis    albuterol 108 (90 BASE) MCG/ACT Inhaler    PROAIR HFA/PROVENTIL HFA/VENTOLIN HFA    3 Inhaler    Inhale 2 puffs into the lungs every 6 hours as needed for shortness of breath / dyspnea or wheezing    Tobacco use disorder       BENADRYL PO      Take 25-50 mg by mouth See Admin Instructions 25 mg during the day if needed and 50 mg at bedtime for sleep as needed        blood glucose monitoring meter device kit     1 kit    by In Vitro route as needed    Type 2 diabetes, HbA1c goal < 7% (H)       blood glucose monitoring test strip    no brand specified    200 strip    1 strip  by In Vitro route 3 times daily Uses Freestyle Lite meter    Type 2 diabetes mellitus with hyperglycemia, with long-term current use of insulin (H)       cholecalciferol 5000 UNITS Caps     100 capsule    Take 1 capsule (5,000 Units) by mouth daily FOR VITAMIN D DEFICIENCY (LOW VITAMIN D),TAKE 2 CAPSULES DOROTHY FOR THE NEXT 4 WEEKS, THEN 1 CAPSULE DAILY, MUST TAKE WITH WITH FAT CONTAINING MEAL    Perimenopausal       citalopram 40 MG tablet    celeXA    90 tablet    Take 1 tablet (40 mg) by mouth daily TO CONTROL SYMPTOMS OF DEPRESSION    Major depressive disorder, recurrent episode, mild (H)       cyanocobalamin 1000 MCG/ML injection    VITAMIN B12    30 mL    Inject 1 mL (1,000 mcg) Subcutaneous every 14 days INDICATION: FOR VITAMIN B12 SUPPLEMENTATION    Encounter for long-term (current) use of medications       EPINEPHrine 0.3 MG/0.3ML injection 2-pack    EPIPEN/ADRENACLICK/or ANY BX GENERIC EQUIV    0.3 mL    Inject 0.3 mLs (0.3 mg) into the muscle once as needed    Anaphylactic reaction, subsequent encounter       fenofibrate 160 MG tablet     90 tablet    Take 1 tablet (160 mg) by mouth At Bedtime with food. INDICATION: TO LOWER CHOLESTEROL    Hyperlipidemia with target LDL less than 100       flunisolide HFA 80 MCG/ACT Aers oral inhaler    AEROSPAN    1 Inhaler    Inhale 2 puffs into the lungs 2 times daily INDICATION: ASTHMA CONTROLLER, ALWAYS RINSE MOUTH AFTER USE    Mild persistent asthma, uncomplicated       glipiZIDE 10 MG 24 hr tablet    glipiZIDE XL    30 tablet    Take 1 tablet (10 mg) by mouth daily INDICATION: TO TREAT DIABETES.    Type 2 diabetes mellitus with diabetic polyneuropathy, with long-term current use of insulin (H)       ibuprofen 800 MG tablet    ADVIL/MOTRIN    90 tablet    Take 1 tablet (800 mg) by mouth every 8 hours as needed for moderate pain or pain    Hip pain, left, Physical deconditioning, Other chronic pain, SI (sacroiliac) joint dysfunction       insulin detemir 100 UNIT/ML  injection    LEVEMIR FLEXPEN/FLEXTOUCH    15 mL    Inject 25 Units Subcutaneous At Bedtime        levothyroxine 150 MCG tablet    SYNTHROID/LEVOTHROID    90 tablet    Take 1 tablet (150 mcg) by mouth daily INDICATION: THYROID SUPPLEMENTATION, TAKE FIRST THING IN THE MORNING ON AN EMPTY STOMACH,  NO FOOD FOR AN HOUR    Hypothyroidism, unspecified type       lidocaine 5 % ointment    XYLOCAINE    50 g    Apply a quarter size amount to painful areas up to 4 times per day as needed.    Bilateral carpal tunnel syndrome, Degeneration of lumbar intervertebral disc       lidocaine-prilocaine cream    EMLA    30 g    Apply dime to nickel size amount qid prn to low left back    Muscle spasm       lisinopril-hydrochlorothiazide 20-25 MG per tablet    PRINZIDE/ZESTORETIC    90 tablet    Take 1 tablet by mouth daily INDICATION:TO LOWER BLOOD PRESSURE AND TO PRESERVE KIDNEY FUNCTION    Essential hypertension, benign       magnesium oxide 400 MG tablet    MAG-OX    180 tablet    Take 1 tablet (400 mg) by mouth 2 times daily INDICATION: MAGNESIUM SUPPLEMENT    Cramp of both lower extremities       metFORMIN 1000 MG tablet    GLUCOPHAGE    60 tablet    TAKE ONE TABLET BY MOUTH TWICE A DAY WITH MEALS    Type 2 diabetes mellitus with hyperglycemia, with long-term current use of insulin (H)       NEEDLES, ANY SIZE     1 Box    Inject 1 each Subcutaneous At Bedtime    Type 2 diabetes, HbA1c goal < 7% (H)       omeprazole 20 MG CR capsule    priLOSEC    90 capsule    Take 1 capsule (20 mg) by mouth 2 times daily INDICATION: TO CONTROL REFLUX SYMPTOMS    Gastroesophageal reflux disease with esophagitis       pravastatin 80 MG tablet    PRAVACHOL    90 tablet    Take 1 tablet (80 mg) by mouth daily INDICATION: TO LOWER CHOLESTEROL AND TO HELP  PREVENT HEART DISEASE    Hyperlipidemia with target LDL less than 100       SUMAtriptan 100 MG tablet    IMITREX    18 tablet    Take 1 tablet (100 mg) by mouth every 8 hours as needed for migraine     Migraine without status migrainosus, not intractable, unspecified migraine type       thin lancets    NO BRAND SPECIFIED    3 Box    1 Device 2 times daily    Type 2 diabetes, HbA1c goal < 7% (H)       tiZANidine 2 MG tablet    ZANAFLEX    90 tablet    Take 1-2 tablets (2-4 mg) by mouth 3 times daily    Muscle spasm       traMADol 50 MG tablet    ULTRAM    165 tablet    Take 1tablet(s) every 6 hours as needed for pain. 5-6 per day. May dispense on/after 06/4/17 to start taking on/after 06/06/17. 30 day supply    Bilateral carpal tunnel syndrome, Degeneration of lumbar intervertebral disc       verapamil 240 MG CR tablet    CALAN-SR    30 tablet    Take 1 tablet (240 mg) by mouth At Bedtime INDICATION: TO LOWER BLOOD PRESSURE AND TO CONTROL MIGRAINE HEADACHES    Migraine without status migrainosus, not intractable, unspecified migraine type

## 2017-09-06 NOTE — PATIENT INSTRUCTIONS
"*  Resume Levemir insulin 25 units once per day, every day    *  Metformin 1000 mg twice per day with food.     *  Glipizide XL 10 mg once per day every day.  (reduce the dose from 20 mg) Take with the largest meal of the day    *  Make appointment with Dr. Ramirez (endocrinologist) to help with diabetes management    *  Continue to meet with Diabetic Educators to help manage the diabetes.    *  You need to see  Psychiatrist to help manage the depression more effectively, because you are going to need more than the Citalopram.     *  Ask the [employer]  to help facilitate a referral to psychiatrist.     *  Continue all other medications at the same doses.  Contact your usual pharmacy if you need refills.     *  Return to see me in 1 month regardless of how you feel, return sooner if needed.  Call [phone] to schedule this appointment.      *   mg once per day every day.          5 GOALS IN MANAGING DIABETES (i.e. to give the best chance to prevent diabetic complications and vascular disease):         1.  Aggressive blood pressure control, under 130/80 ideally.  Using medications if needed    Your blood pressure is under good control    BP Readings from Last 4 Encounters:   09/06/17 139/86   06/01/17 127/86   05/31/17 160/90   05/31/17 160/90       2.  Aggressive LDL cholesterol (bad cholesterol) lowering as needed.  Your goal is an LDL under 100 for sure, preferably under 70.     *  All patients with diabetes are recommended to be on a \"statin\" cholesterol lowering medication regardless of the cholesterol levels to give the best chance at avoiding vascular disease.      New guidelines identify four high-risk groups who could benefit from statins:   *people with pre-existing heart disease, such as those who have had a heart attack;   *people ages 40 to 75 who have diabetes of any type  *patients ages 40 to 75 with at least a 7.5% risk of developing cardiovascular disease over the next decade, according " to a formula described in the guidelines  *patients with the sort of super-high cholesterol that sometimes runs in families, as evidenced by an LDL of 190 milligrams per deciliter or higher    *  Your cholesterol levels are fairly well controlled.    Recent Labs   Lab Test  03/21/17   1825  11/10/16   1034  07/05/16   1030   08/31/15   1349  06/29/15   0845   CHOL   --   221*  313*   < >  243*  213*   HDL   --   44*  40*   < >  49*  48*   LDL  129*  132*  Cannot estimate LDL when triglyceride exceeds 400 mg/dL  201*   < >  161*  131*   TRIG   --   225*  506*   < >  164*  171*   CHOLHDLRATIO   --    --    --    --   5.0  4.4    < > = values in this interval not displayed.       3.  Aggressive diabetic management.  The target for A1C (3 months average blood sugar) is ideally below 6.5, preferably below 7.5    Your diabetes is under poor control, simply because you have not been taking your medications.      Lab Results   Component Value Date    A1C 12.4 09/01/2017    A1C 11.1 03/21/2017    A1C 11.1 11/10/2016    A1C 11.5 07/05/2016    A1C 7.2 08/31/2015    A1C 8.8 05/14/2015    A1C 9.0 03/02/2015    A1C 7.0 11/14/2013    A1C 7.9 10/07/2013    A1C 8.1 08/13/2013    A1C 9.0 06/18/2013    A1C 7.7 02/28/2013    A1C 6.5 04/17/2012    A1C 5.9 08/02/2011    A1C 5.7 11/23/2010    A1C 5.6 07/06/2010    A1C 5.7 02/01/2010       4.  No smoking    5.  Aspirin tablet once per day, every day unless there is a specific reason that you cannot take aspirin.       *You should take Aspirin 81 mg once per day.         Hemoglobin A1C (3 month average blood sugar) values:    5.0 97  5.5 111  6.0 126  ---------------  6.5 140  7.0 154  7.5 169  8.0 183  ---------------  8.5 197  9.0 215  9.5 226  10.0 240  10.5 255  11.0 269  11.5 283  12.0 298  12.5 312  13.0 326  13.5 341  14.0 355  >14 YIKES!
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
## 2017-09-06 NOTE — TELEPHONE ENCOUNTER
Called pt, advised of below, was on her way to see MD, states will call clinic after that appt to schedule appt with Dr Ramirez. Will leave encounter open until appt scheduled. Adriana Villaseñor RN

## 2017-09-06 NOTE — NURSING NOTE
"Chief Complaint   Patient presents with     Establish Care     Vaginal Problem     itching and burning X 2 weeks       Initial /86  Pulse 84  Temp 98.3  F (36.8  C) (Oral)  Wt 174 lb 9.6 oz (79.2 kg)  SpO2 97%  BMI 28.18 kg/m2 Estimated body mass index is 28.18 kg/(m^2) as calculated from the following:    Height as of 6/1/17: 5' 6\" (1.676 m).    Weight as of this encounter: 174 lb 9.6 oz (79.2 kg).  Medication Reconciliation: complete   Cyn Walton CMA      "

## 2017-09-07 ASSESSMENT — ANXIETY QUESTIONNAIRES: GAD7 TOTAL SCORE: 10

## 2017-09-07 ASSESSMENT — PATIENT HEALTH QUESTIONNAIRE - PHQ9: SUM OF ALL RESPONSES TO PHQ QUESTIONS 1-9: 9

## 2017-09-24 VITALS
WEIGHT: 174.6 LBS | HEART RATE: 84 BPM | DIASTOLIC BLOOD PRESSURE: 86 MMHG | SYSTOLIC BLOOD PRESSURE: 139 MMHG | OXYGEN SATURATION: 97 % | BODY MASS INDEX: 28.18 KG/M2 | TEMPERATURE: 98.3 F

## 2017-09-25 NOTE — TELEPHONE ENCOUNTER
Please send a letter and try to contact pt on phone.  Its important that she needs to be seen to adjust her medication dosages.  Can see Madai Garcia at [facility] ([phone]) if she has earlier openings.

## 2017-09-25 NOTE — TELEPHONE ENCOUNTER
Ok. Can you check if she is taking thyroid medication regularly?  If yes then I can send a new Rx for higher dose.  If not then she needs to start taking it regularly.

## 2017-09-29 ENCOUNTER — MYC MEDICAL ADVICE (OUTPATIENT)
Dept: INTERNAL MEDICINE | Facility: CLINIC | Age: 57
End: 2017-09-29

## 2017-10-03 NOTE — TELEPHONE ENCOUNTER
Call to pt. Recording states the person you have called is unavailable right now. Please try again.

## 2017-10-18 DIAGNOSIS — Z79.4 TYPE 2 DIABETES MELLITUS WITH HYPERGLYCEMIA, WITH LONG-TERM CURRENT USE OF INSULIN (H): ICD-10-CM

## 2017-10-18 DIAGNOSIS — E11.65 TYPE 2 DIABETES MELLITUS WITH HYPERGLYCEMIA, WITH LONG-TERM CURRENT USE OF INSULIN (H): ICD-10-CM

## 2017-10-18 NOTE — TELEPHONE ENCOUNTER
Pt advised.    Leaving to [place] tomorrow, will call us back after 1 week to make appt with Dr Garcia.

## 2017-10-18 NOTE — TELEPHONE ENCOUNTER
According to progress notes, patient has transferred care to Dr. Garcia.  He is out of clinic today as well.  Will refill for one month, but patient needs to follow-up with Dr. Garcia in the next few weeks, as he had recommended at his last appointment with her.

## 2017-10-19 NOTE — TELEPHONE ENCOUNTER
Call to pt. States she is taking her medication regularly but not consistently at the same time. States she is working on it. States it may vary by 1-2 hours. Pt is planning to schedule f/u apts soon. Is on her way out of town now. OK with adjusting dosage if needed.

## 2017-10-22 ENCOUNTER — HEALTH MAINTENANCE LETTER (OUTPATIENT)
Age: 57
End: 2017-10-22

## 2017-10-23 NOTE — TELEPHONE ENCOUNTER
First she needs to take it regularly.    Can see Madai Garcia at [facility] [employer] ([phone]) if she has earlier openings.

## 2017-10-23 NOTE — TELEPHONE ENCOUNTER
Pt will take Levothyroxine consistenly on daily basis for 1-2 months then call back to see if Dr. Ramirez wants her to get level rechecked.

## 2017-11-22 ENCOUNTER — TELEPHONE (OUTPATIENT)
Dept: ENDOCRINOLOGY | Facility: CLINIC | Age: 57
End: 2017-11-22

## 2017-11-22 NOTE — LETTER
[facility]  [address]  [facility] [address] [phone]    [facility]  [address]  [facility] [address]  [phone]        11/22/2017  Mary Lou Gil  [address]  [facility] [address]    Dear Mary Lou    I care about your health and have reviewed your health plan. I have reviewed your medical conditions, medication list, and lab results and am making recommendations based on this review, to better manage your health.    You are in particular need of attention regarding:  -Diabetes    I am recommending that you:  -schedule a FOLLOWUP OFFICE APPOINTMENT with me.          Please call us at [phone] ([place]),  [phone] ([place]) or use [employer] to address the above recommendations.     Thank you for trusting [facility] and we appreciate the opportunity to serve you.  We look forward to supporting your healthcare needs in the future.    Healthy Regards,    Susan Ramirez MD  Endocrinology  [facility]  November 22, 2017

## 2017-11-22 NOTE — TELEPHONE ENCOUNTER
Panel Management Review      Patient has the following on her problem list:     Diabetes    ASA: Not Required     Last A1C  Lab Results   Component Value Date    A1C 12.4 09/01/2017    A1C 11.1 03/21/2017    A1C 11.1 11/10/2016    A1C 11.5 07/05/2016    A1C 7.2 08/31/2015     A1C tested: FAILED    Last LDL:    Lab Results   Component Value Date    CHOL 221 11/10/2016     Lab Results   Component Value Date    HDL 44 11/10/2016     Lab Results   Component Value Date     03/21/2017     11/10/2016     Lab Results   Component Value Date    TRIG 225 11/10/2016     Lab Results   Component Value Date    CHOLHDLRATIO 5.0 08/31/2015     Lab Results   Component Value Date    NHDL 177 11/10/2016       Is the patient on a Statin? NO             Is the patient on Aspirin? NO    Medications     HMG CoA Reductase Inhibitors    pravastatin (PRAVACHOL) 80 MG tablet          Last three blood pressure readings:  BP Readings from Last 3 Encounters:   09/06/17 139/86   06/01/17 127/86   05/31/17 160/90       Date of last diabetes office visit: 6/1/17     Tobacco History:     History   Smoking Status     Current Every Day Smoker     Packs/day: 1.00     Years: 25.00     Types: Cigarettes   Smokeless Tobacco     Never Used     Comment: started at age 19 and has quit for 10 years              Composite cancer screening  Chart review shows that this patient is due/due soon for the following None  Summary:    Patient is due/failing the following:   FOLLOW UP    Action needed:   Patient needs office visit for dm.    Type of outreach:    Sent letter.    Questions for provider review:    None                                                                                                                                    Angela Alicia CMA (AAMA)       Chart routed to closed.

## 2017-12-11 NOTE — TELEPHONE ENCOUNTER
Patient also reports that she went out of town for Thanksgiving, forgot her medication at her family's house. She has been out of Levothyroxine for 2 weeks.     Advise she call pharmacy, get a refill, take consistently for 1-2 months as per direction below.     Patient agrees with plan.     We scheduled follow up visit for 1/16/18.

## 2018-01-16 ENCOUNTER — OFFICE VISIT (OUTPATIENT)
Dept: ENDOCRINOLOGY | Facility: CLINIC | Age: 58
End: 2018-01-16
Payer: MEDICARE

## 2018-01-16 VITALS
SYSTOLIC BLOOD PRESSURE: 127 MMHG | TEMPERATURE: 98.3 F | DIASTOLIC BLOOD PRESSURE: 82 MMHG | HEART RATE: 82 BPM | OXYGEN SATURATION: 99 % | WEIGHT: 168.4 LBS | HEIGHT: 66 IN | BODY MASS INDEX: 27.06 KG/M2

## 2018-01-16 DIAGNOSIS — Z79.4 TYPE 2 DIABETES MELLITUS WITH DIABETIC POLYNEUROPATHY, WITH LONG-TERM CURRENT USE OF INSULIN (H): ICD-10-CM

## 2018-01-16 DIAGNOSIS — E11.42 TYPE 2 DIABETES MELLITUS WITH DIABETIC POLYNEUROPATHY, WITH LONG-TERM CURRENT USE OF INSULIN (H): ICD-10-CM

## 2018-01-16 DIAGNOSIS — Z79.4 INSULIN-REQUIRING OR DEPENDENT TYPE II DIABETES MELLITUS (H): Primary | ICD-10-CM

## 2018-01-16 DIAGNOSIS — E03.9 HYPOTHYROIDISM, UNSPECIFIED TYPE: ICD-10-CM

## 2018-01-16 DIAGNOSIS — Z23 NEED FOR PROPHYLACTIC VACCINATION AND INOCULATION AGAINST INFLUENZA: ICD-10-CM

## 2018-01-16 DIAGNOSIS — E11.9 INSULIN-REQUIRING OR DEPENDENT TYPE II DIABETES MELLITUS (H): Primary | ICD-10-CM

## 2018-01-16 LAB
ALBUMIN UR-MCNC: NEGATIVE MG/DL
APPEARANCE UR: CLEAR
BILIRUB UR QL STRIP: NEGATIVE
COLOR UR AUTO: YELLOW
GLUCOSE UR STRIP-MCNC: 500 MG/DL
HBA1C MFR BLD: 12.5 % (ref 4.3–6)
HGB UR QL STRIP: NEGATIVE
KETONES UR STRIP-MCNC: 80 MG/DL
LEUKOCYTE ESTERASE UR QL STRIP: NEGATIVE
NITRATE UR QL: NEGATIVE
PH UR STRIP: 5.5 PH (ref 5–7)
SOURCE: ABNORMAL
SP GR UR STRIP: 1.01 (ref 1–1.03)
UROBILINOGEN UR STRIP-ACNC: 0.2 EU/DL (ref 0.2–1)

## 2018-01-16 PROCEDURE — 81003 URINALYSIS AUTO W/O SCOPE: CPT | Performed by: INTERNAL MEDICINE

## 2018-01-16 PROCEDURE — 84439 ASSAY OF FREE THYROXINE: CPT | Performed by: INTERNAL MEDICINE

## 2018-01-16 PROCEDURE — 86376 MICROSOMAL ANTIBODY EACH: CPT | Performed by: INTERNAL MEDICINE

## 2018-01-16 PROCEDURE — 90686 IIV4 VACC NO PRSV 0.5 ML IM: CPT | Performed by: INTERNAL MEDICINE

## 2018-01-16 PROCEDURE — 99214 OFFICE O/P EST MOD 30 MIN: CPT | Mod: 25 | Performed by: INTERNAL MEDICINE

## 2018-01-16 PROCEDURE — 83036 HEMOGLOBIN GLYCOSYLATED A1C: CPT | Performed by: INTERNAL MEDICINE

## 2018-01-16 PROCEDURE — 36415 COLL VENOUS BLD VENIPUNCTURE: CPT | Performed by: INTERNAL MEDICINE

## 2018-01-16 PROCEDURE — G0008 ADMIN INFLUENZA VIRUS VAC: HCPCS | Performed by: INTERNAL MEDICINE

## 2018-01-16 PROCEDURE — 84443 ASSAY THYROID STIM HORMONE: CPT | Performed by: INTERNAL MEDICINE

## 2018-01-16 RX ORDER — GLIPIZIDE 10 MG/1
10 TABLET, FILM COATED, EXTENDED RELEASE ORAL DAILY
Qty: 30 TABLET | Refills: 1 | Status: SHIPPED | OUTPATIENT
Start: 2018-01-16 | End: 2018-04-25

## 2018-01-16 RX ORDER — METFORMIN HCL 500 MG
1000 TABLET, EXTENDED RELEASE 24 HR ORAL
Qty: 60 TABLET | Refills: 3 | Status: SHIPPED | OUTPATIENT
Start: 2018-01-16 | End: 2018-07-11

## 2018-01-16 NOTE — NURSING NOTE
"Chief Complaint   Patient presents with     RECHECK     follow up dm2 LOV 17       Initial /82 (BP Location: Right arm, Patient Position: Chair, Cuff Size: Adult Large)  Pulse 82  Temp 98.3  F (36.8  C) (Oral)  Ht 1.676 m (5' 6\")  Wt 76.4 kg (168 lb 6.4 oz)  SpO2 99%  BMI 27.18 kg/m2 Estimated body mass index is 27.18 kg/(m^2) as calculated from the following:    Height as of this encounter: 1.676 m (5' 6\").    Weight as of this encounter: 76.4 kg (168 lb 6.4 oz).  Medication Reconciliation: complete     ENDOCRINOLOGY INTAKE FORM    Patient Name:  Mary Lou Gil  :  1960    Is patient Diabetic?   Yes: type 2   Does patient have non-diabetic or other endocrine issues?  Yes: hypothyroidism     Vitals: /82 (BP Location: Right arm, Patient Position: Chair, Cuff Size: Adult Large)  Pulse 82  Temp 98.3  F (36.8  C) (Oral)  Ht 1.676 m (5' 6\")  Wt 76.4 kg (168 lb 6.4 oz)  SpO2 99%  BMI 27.18 kg/m2  BMI= Body mass index is 27.18 kg/(m^2).    Flu vaccine:  No  Pneumonia vaccine:  Yes: 5/14/15, 11    Smoking and Alcohol use:  Social History   Substance Use Topics     Smoking status: Current Every Day Smoker     Packs/day: 1.00     Years: 25.00     Types: Cigarettes, Other     Smokeless tobacco: Never Used      Comment: started at age 19 and has quit for 10 years      Alcohol use No       Foot Exam: Yes: 19  Eye Exam:  No  Dental Exam:  No  Aspirin Use:  No    Lab Results   Component Value Date    A1C 12.4 2017    A1C 11.1 2017    A1C 11.1 11/10/2016    A1C 11.5 2016    A1C 7.2 2015       Lab Results   Component Value Date    MICROL 36 2017     No results found for: MICROALBUMIN    Staff Signature:  Angela Alicia CMA ([facility])        "

## 2018-01-16 NOTE — MR AVS SNAPSHOT
After Visit Summary   2018    Mary Lou Gil    MRN: [id number]           Patient Information     Date Of Birth          1960        Visit Information        Provider Department      2018 2:30 PM Susan Ramirez MD [facility]        Today's Diagnoses     Hypothyroidism, unspecified type    -  1    Insulin-requiring or dependent type II diabetes mellitus (H)        Hypothyroidism        Type 2 diabetes mellitus with diabetic polyneuropathy, with long-term current use of insulin (H)          Care Instructions    [facility]   Dr Ramirez, Endocrinology Department      [facility]   [address]  [place] [address]  Appointment [phone]  Fax: [phone]  [place]: Monday and Tuesday         [place] [address] [facility]  [place] [address]  Appointment [phone]  Fax: [phone]  [place]: Wednesday and Thursday          Please arrive 30 minutes before your scheduled appointment.   First go to the main floor lab suit  for previsit A1c lab appointment and then come upstairs and get checked in with  for clinic visit.  This will allow for your blood glucose meter/insulin pump to be downloaded and glycosylated hemoglobin (A1c) to be obtained prior to your appointment.    Labs today  Increase levemire to 30 Units/day  Continue glipizide at current dose 10 mg/day with food  Stop regular metfomrin  Start metfomrin XR   Start Metformin at a dose of 500 mg once a day X 1 week. If tolerated increase dose to 1000 mg with supper. Major side effects with metformin includes gastrointestinal upset with nausea,abdominal cramps and diarrhea.  Follow up in 6-8 weeks    Your provider has referred you to Diabetes Education: For all [facility]:  Phone [phone]; Fax [phone]  Please call and make the appointment.--> continuous  glucose monitoring    Recommend checking blood sugars before meals and at bedtime.    If Blood glucose are low more often-> 2-3 times/week- give us a call.  The patient is advised to Make better food choices: reduce carbs, Reduce portion size, weight loss and exercise 3-4 times a week.  Discussed hypoglycemia signs and symptoms as well as management in detail.                      Follow-ups after your visit        Additional Services     DIABETES EDUCATOR REFERRAL       Your provider has referred you to Diabetes Education: For all [facility]:  Phone [phone]; Fax [phone]    This is a Previous Diagnosis     Type of diabetes is Type 2 - On Insulin                                                          A1C is: Lab Results       Component                Value               Date                       A1C                      12.4                09/01/2017            If an urgent visit is needed or A1C is above 12, Care Team to call the diabetes education team at [phone] or send a message to the diabetes education pool (P DIAB ED-PATIENT CARE).    Diabetes education focus: Continuous glucose monitor Diagnostic CGM (minimum of 72-hours)      Education needs: None                                                                                                                                                      Please be aware that coverage of these services is subject to the terms and limitations of your health insurance plan.  Call member services at your health plan to determine Diabetes Self-Management Training benefits and ask which blood glucose monitor brands are covered by your plan.      Please bring the following to your appointment:    -   List of current medications   -   List of blood glucose monitor brands that are covered by your insurance plan  -   Blood glucose monitor and log book  -   Food records for the 3 days prior to your visit      The Certified Diabetes Educator may make  "diabetes medication adjustments per  the CDE Protocol and Collaborative Practice Agreement.                  Who to contact     If you have questions or need follow up information about today's clinic visit or your schedule please contact [facility] directly at [phone].  Normal or non-critical lab and imaging results will be communicated to you by shoutrhart, letter or phone within 4 business days after the clinic has received the results. If you do not hear from us within 7 days, please contact the clinic through shoutrhart or phone. If you have a critical or abnormal lab result, we will notify you by phone as soon as possible.  Submit refill requests through Pley or call your pharmacy and they will forward the refill request to us. Please allow 3 business days for your refill to be completed.          Additional Information About Your Visit        shoutrharFloqq Information     Pley gives you secure access to your electronic health record. If you see a primary care provider, you can also send messages to your care team and make appointments. If you have questions, please call your primary care clinic.  If you do not have a primary care provider, please call [phone] and they will assist you.        Care EveryWhere ID     This is your Care EveryWhere ID. This could be used by other organizations to access your [place] medical records  [id number]        Your Vitals Were     Pulse Temperature Height Pulse Oximetry BMI (Body Mass Index)       82 98.3  F (36.8  C) (Oral) 1.676 m (5' 6\") 99% 27.18 kg/m2        Blood Pressure from Last 3 Encounters:   01/16/18 127/82   09/06/17 139/86   06/01/17 127/86    Weight from Last 3 Encounters:   01/16/18 76.4 kg (168 lb 6.4 oz)   09/06/17 79.2 kg (174 lb 9.6 oz)   08/29/17 80 kg (176 lb 6.4 oz)              We Performed the Following     DIABETES EDUCATOR REFERRAL          Today's Medication Changes          These changes are accurate as of: 1/16/18  3:10 PM.  " If you have any questions, ask your nurse or doctor.               Start taking these medicines.        Dose/Directions    metFORMIN 500 MG 24 hr tablet   Commonly known as:  GLUCOPHAGE-XR   Used for:  Type 2 diabetes mellitus with diabetic polyneuropathy, with long-term current use of insulin (H)   Replaces:  metFORMIN 1000 MG tablet   Started by:  Susan Ramirez MD        Dose:  1000 mg   Take 2 tablets (1,000 mg) by mouth daily (with dinner)   Quantity:  60 tablet   Refills:  3         These medicines have changed or have updated prescriptions.        Dose/Directions    insulin detemir 100 UNIT/ML injection   Commonly known as:  LEVEMIR FLEXPEN/FLEXTOUCH   This may have changed:  how much to take   Used for:  Type 2 diabetes mellitus with diabetic polyneuropathy, with long-term current use of insulin (H)   Changed by:  Susan Ramirez MD        Dose:  30 Units   Inject 30 Units Subcutaneous At Bedtime   Quantity:  30 mL   Refills:  1         Stop taking these medicines if you haven't already. Please contact your care team if you have questions.     metFORMIN 1000 MG tablet   Commonly known as:  GLUCOPHAGE   Replaced by:  metFORMIN 500 MG 24 hr tablet   Stopped by:  Susan Ramirez MD                Where to get your medicines      These medications were sent to [facility] [place] [id number]     Phone:  [phone]     glipiZIDE 10 MG 24 hr tablet    insulin detemir 100 UNIT/ML injection    metFORMIN 500 MG 24 hr tablet                Primary Care Provider Office Phone # Fax #    Abilio Casiano [phone]       XXX RESIGNED XXX  [facility] [address]        Equal Access to Services     [facility] : Tigre roque Solisa, waaxda luqadaha, qaybta kaalclover barrett, marley vizcaino. So [facility] [phone].    ATENCIÓN: Si habla español, tiene a ybarra disposición servicios  earl de asistencia lingüística. [employer] [phone].    We comply with applicable federal civil rights laws and [place] laws. We do not discriminate on the basis of race, color, national origin, age, disability, sex, sexual orientation, or gender identity.            Thank you!     Thank you for choosing [facility]  for your care. Our goal is always to provide you with excellent care. Hearing back from our patients is one way we can continue to improve our services. Please take a few minutes to complete the written survey that you may receive in the mail after your visit with us. Thank you!             Your Updated Medication List - Protect others around you: Learn how to safely use, store and throw away your medicines at www.disposemymeds.org.          This list is accurate as of: 1/16/18  3:10 PM.  Always use your most recent med list.                   Brand Name Dispense Instructions for use Diagnosis    albuterol 108 (90 BASE) MCG/ACT Inhaler    PROAIR HFA/PROVENTIL HFA/VENTOLIN HFA    3 Inhaler    Inhale 2 puffs into the lungs every 6 hours as needed for shortness of breath / dyspnea or wheezing    Tobacco use disorder       BENADRYL PO      Take 25-50 mg by mouth See Admin Instructions 25 mg during the day if needed and 50 mg at bedtime for sleep as needed        blood glucose monitoring meter device kit     1 kit    by In Vitro route as needed    Type 2 diabetes, HbA1c goal < 7% (H)       blood glucose monitoring test strip    no brand specified    200 strip    1 strip by In Vitro route 3 times daily Uses Freestyle Lite meter    Type 2 diabetes mellitus with hyperglycemia, with long-term current use of insulin (H)       cholecalciferol 5000 UNITS Caps     100 capsule    Take 1 capsule (5,000 Units) by mouth daily FOR VITAMIN D DEFICIENCY (LOW VITAMIN D),TAKE 2 CAPSULES DOROTHY FOR THE NEXT 4 WEEKS, THEN 1 CAPSULE DAILY, MUST TAKE WITH WITH FAT CONTAINING MEAL    Perimenopausal        citalopram 40 MG tablet    celeXA    90 tablet    Take 1 tablet (40 mg) by mouth daily TO CONTROL SYMPTOMS OF DEPRESSION    Major depressive disorder, recurrent episode, mild (H)       cyanocobalamin 1000 MCG/ML injection    VITAMIN B12    30 mL    Inject 1 mL (1,000 mcg) Subcutaneous every 14 days INDICATION: FOR VITAMIN B12 SUPPLEMENTATION    Encounter for long-term (current) use of medications       EPINEPHrine 0.3 MG/0.3ML injection 2-pack    EPIPEN/ADRENACLICK/or ANY BX GENERIC EQUIV    0.3 mL    Inject 0.3 mLs (0.3 mg) into the muscle once as needed    Anaphylactic reaction, subsequent encounter       fenofibrate 160 MG tablet     90 tablet    Take 1 tablet (160 mg) by mouth At Bedtime with food. INDICATION: TO LOWER CHOLESTEROL    Hyperlipidemia with target LDL less than 100       flunisolide HFA 80 MCG/ACT Aers oral inhaler    AEROSPAN    1 Inhaler    Inhale 2 puffs into the lungs 2 times daily INDICATION: ASTHMA CONTROLLER, ALWAYS RINSE MOUTH AFTER USE    Mild persistent asthma, uncomplicated       glipiZIDE 10 MG 24 hr tablet    glipiZIDE XL    30 tablet    Take 1 tablet (10 mg) by mouth daily INDICATION: TO TREAT DIABETES.    Type 2 diabetes mellitus with diabetic polyneuropathy, with long-term current use of insulin (H)       ibuprofen 800 MG tablet    ADVIL/MOTRIN    90 tablet    Take 1 tablet (800 mg) by mouth every 8 hours as needed for moderate pain or pain    Hip pain, left, Physical deconditioning, Other chronic pain, SI (sacroiliac) joint dysfunction       insulin detemir 100 UNIT/ML injection    LEVEMIR FLEXPEN/FLEXTOUCH    30 mL    Inject 30 Units Subcutaneous At Bedtime    Type 2 diabetes mellitus with diabetic polyneuropathy, with long-term current use of insulin (H)       levothyroxine 150 MCG tablet    SYNTHROID/LEVOTHROID    90 tablet    Take 1 tablet (150 mcg) by mouth daily INDICATION: THYROID SUPPLEMENTATION, TAKE FIRST THING IN THE MORNING ON AN EMPTY STOMACH,  NO FOOD FOR AN HOUR     Hypothyroidism, unspecified type       lidocaine 5 % ointment    XYLOCAINE    50 g    Apply a quarter size amount to painful areas up to 4 times per day as needed.    Bilateral carpal tunnel syndrome, Degeneration of lumbar intervertebral disc       lidocaine-prilocaine cream    EMLA    30 g    Apply dime to nickel size amount qid prn to low left back    Muscle spasm       lisinopril-hydrochlorothiazide 20-25 MG per tablet    PRINZIDE/ZESTORETIC    90 tablet    Take 1 tablet by mouth daily INDICATION:TO LOWER BLOOD PRESSURE AND TO PRESERVE KIDNEY FUNCTION    Essential hypertension, benign       magnesium oxide 400 MG tablet    MAG-OX    180 tablet    Take 1 tablet (400 mg) by mouth 2 times daily INDICATION: MAGNESIUM SUPPLEMENT    Cramp of both lower extremities       metFORMIN 500 MG 24 hr tablet    GLUCOPHAGE-XR    60 tablet    Take 2 tablets (1,000 mg) by mouth daily (with dinner)    Type 2 diabetes mellitus with diabetic polyneuropathy, with long-term current use of insulin (H)       NEEDLES, ANY SIZE     1 Box    Inject 1 each Subcutaneous At Bedtime    Type 2 diabetes, HbA1c goal < 7% (H)       omeprazole 20 MG CR capsule    priLOSEC    90 capsule    Take 1 capsule (20 mg) by mouth 2 times daily INDICATION: TO CONTROL REFLUX SYMPTOMS    Gastroesophageal reflux disease with esophagitis       order for DME     1 each    One pair diabetic shoes    Type 2 diabetes mellitus with diabetic polyneuropathy, with long-term current use of insulin (H)       pravastatin 80 MG tablet    PRAVACHOL    90 tablet    Take 1 tablet (80 mg) by mouth daily INDICATION: TO LOWER CHOLESTEROL AND TO HELP  PREVENT HEART DISEASE    Hyperlipidemia with target LDL less than 100       SUMAtriptan 100 MG tablet    IMITREX    18 tablet    Take 1 tablet (100 mg) by mouth every 8 hours as needed for migraine    Migraine without status migrainosus, not intractable, unspecified migraine type       thin lancets    NO BRAND SPECIFIED    3 Box    1  Device 2 times daily    Type 2 diabetes, HbA1c goal < 7% (H)       tiZANidine 2 MG tablet    ZANAFLEX    90 tablet    Take 1-2 tablets (2-4 mg) by mouth 3 times daily    Muscle spasm       traMADol 50 MG tablet    ULTRAM    165 tablet    Take 1tablet(s) every 6 hours as needed for pain. 5-6 per day. May dispense on/after 06/4/17 to start taking on/after 06/06/17. 30 day supply    Bilateral carpal tunnel syndrome, Degeneration of lumbar intervertebral disc       verapamil 240 MG CR tablet    CALAN-SR    30 tablet    Take 1 tablet (240 mg) by mouth At Bedtime INDICATION: TO LOWER BLOOD PRESSURE AND TO CONTROL MIGRAINE HEADACHES    Migraine without status migrainosus, not intractable, unspecified migraine type
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
## 2018-01-16 NOTE — PATIENT INSTRUCTIONS
[facility] & [place] locations   Dr Ramirez, Endocrinology Department      [facility]   [address]  [place] [address]  Appointment [phone]  Fax: [phone]  [place]: Monday and Tuesday         [facility]   [address]  [place] [address]  Appointment [phone]  Fax: [phone]  [place]: Wednesday and Thursday          Please arrive 30 minutes before your scheduled appointment.   First go to the main floor lab suit  for previsit A1c lab appointment and then come upstairs and get checked in with  for clinic visit.  This will allow for your blood glucose meter/insulin pump to be downloaded and glycosylated hemoglobin (A1c) to be obtained prior to your appointment.    Labs today  Increase levemire to 30 Units/day  Continue glipizide at current dose 10 mg/day with food  Stop regular metfomrin  Start metfomrin XR   Start Metformin at a dose of 500 mg once a day X 1 week. If tolerated increase dose to 1000 mg with supper. Major side effects with metformin includes gastrointestinal upset with nausea,abdominal cramps and diarrhea.  Follow up in 6-8 weeks    Your provider has referred you to Diabetes Education: For all [facility]:  Phone [phone]; Fax [phone]  Please call and make the appointment.--> continuous glucose monitoring    Recommend checking blood sugars before meals and at bedtime.    If Blood glucose are low more often-> 2-3 times/week- give us a call.  The patient is advised to Make better food choices: reduce carbs, Reduce portion size, weight loss and exercise 3-4 times a week.  Discussed hypoglycemia signs and symptoms as well as management in detail.
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
## 2018-01-16 NOTE — PROGRESS NOTES

## 2018-01-17 LAB
T4 FREE SERPL-MCNC: 2 NG/DL (ref 0.76–1.46)
TSH SERPL DL<=0.005 MIU/L-ACNC: 1.14 MU/L (ref 0.4–4)

## 2018-01-18 ENCOUNTER — OFFICE VISIT (OUTPATIENT)
Dept: URGENT CARE | Facility: URGENT CARE | Age: 58
End: 2018-01-18
Payer: MEDICARE

## 2018-01-18 VITALS
WEIGHT: 168 LBS | HEART RATE: 112 BPM | TEMPERATURE: 100.6 F | OXYGEN SATURATION: 98 % | DIASTOLIC BLOOD PRESSURE: 98 MMHG | RESPIRATION RATE: 16 BRPM | BODY MASS INDEX: 27.12 KG/M2 | SYSTOLIC BLOOD PRESSURE: 142 MMHG

## 2018-01-18 DIAGNOSIS — J10.1 INFLUENZA A: Primary | ICD-10-CM

## 2018-01-18 DIAGNOSIS — R50.9 FEVER AND CHILLS: ICD-10-CM

## 2018-01-18 DIAGNOSIS — F17.200 TOBACCO USE DISORDER: ICD-10-CM

## 2018-01-18 LAB
FLUAV+FLUBV AG SPEC QL: NEGATIVE
FLUAV+FLUBV AG SPEC QL: POSITIVE
SPECIMEN SOURCE: ABNORMAL
THYROPEROXIDASE AB SERPL-ACNC: <10 IU/ML

## 2018-01-18 PROCEDURE — 87804 INFLUENZA ASSAY W/OPTIC: CPT | Performed by: FAMILY MEDICINE

## 2018-01-18 PROCEDURE — 99214 OFFICE O/P EST MOD 30 MIN: CPT | Performed by: FAMILY MEDICINE

## 2018-01-18 RX ORDER — CODEINE PHOSPHATE AND GUAIFENESIN 10; 100 MG/5ML; MG/5ML
SOLUTION ORAL
Qty: 120 ML | Refills: 0 | Status: SHIPPED | OUTPATIENT
Start: 2018-01-18 | End: 2018-01-23

## 2018-01-18 RX ORDER — OSELTAMIVIR PHOSPHATE 75 MG/1
75 CAPSULE ORAL 2 TIMES DAILY
Qty: 10 CAPSULE | Refills: 0 | Status: SHIPPED | OUTPATIENT
Start: 2018-01-18 | End: 2018-01-23

## 2018-01-18 NOTE — PROGRESS NOTES
SUBJECTIVE: Mary Lou Gil is a 57 year old female presenting with a chief complaint of fever, nasal congestion and cough .  Onset of symptoms was 1 day(s) ago.  Course of illness is worsening.    Severity moderate  Current and Associated symptoms: cough - non-productive  Treatment measures tried include Tylenol/Ibuprofen.  Predisposing factors include tobacco use.    Past Medical History:   Diagnosis Date     Anemia, unspecified 2/1/2010     Degeneration of lumbar intervertebral disc 7/11/2016     Depressive disorder      Diabetes mellitus (H) 2/1/2010    Dx in 2006     Essential hypertension, benign 2/1/2010     Hyperlipidaemia      Hyperlipidemia LDL goal < 100      Migraine      Other chronic pain     back, legs and feet     Spinal stenosis      Tobacco use disorder 2/1/2010     Uncomplicated asthma     ? with allergic reactions?     Unspecified hypothyroidism 2/1/2010     Allergies   Allergen Reactions     Nicotine Polacrilex Anaphylaxis     PROBABLY REACTION TO VINYL IN NICOTINE PATCH     Neurontin [Gabapentin] Rash     Norvasc [Amlodipine Besylate]      constipation     Percocet [Oxycodone-Acetaminophen] Itching     Vinyl Ether Swelling and Cough     Vinyl causes throat and lip swelling, cough and headache     Social History   Substance Use Topics     Smoking status: Current Every Day Smoker     Packs/day: 1.00     Years: 25.00     Types: Cigarettes, Other     Smokeless tobacco: Never Used      Comment: started at age 19 and has quit for 10 years      Alcohol use No       ROS:  SKIN: no rash  GI: no vomiting    OBJECTIVE:  BP (!) 142/98  Pulse 112  Temp 100.6  F (38.1  C) (Oral)  Resp 16  Wt 168 lb (76.2 kg)  SpO2 98%  BMI 27.12 kg/k9AMKAPVY APPEARANCE: healthy, alert and no distress  EYES: EOMI,  PERRL, conjunctiva clear  HENT: ear canals and TM's normal.  Nose and mouth without ulcers, erythema or lesions  NECK: supple, nontender, no lymphadenopathy  RESP: lungs clear to auscultation - no rales,  rhonchi or wheezes  CV: regular rates and rhythm, normal S1 S2, no murmur noted  SKIN: no suspicious lesions or rashes      ICD-10-CM    1. Influenza A J10.1 oseltamivir (TAMIFLU) 75 MG capsule     guaiFENesin-codeine (ROBITUSSIN AC) 100-10 MG/5ML SOLN solution   2. Fever and chills R50.9 Influenza A/B antigen   3. Tobacco use disorder F17.200        Fluids/Rest, f/u if worse/not any better

## 2018-01-18 NOTE — PATIENT INSTRUCTIONS
Influenza (Adult)    Influenza is also called the flu. It is a viral illness that affects the air passages of your lungs. It is different from the common cold. The flu can easily be passed from one to person to another. It may be spread through the air by coughing and sneezing. Or it can be spread by touching the sick person and then touching your own eyes, nose, or mouth.  The flu starts 1 to 3 days after you are exposed to the flu virus. It may last for 1 to 2 weeks but many people feel tired or fatigued for many weeks afterward. You usually don t need to take antibiotics unless you have a complication. This might be an ear or sinus infection or pneumonia.  Symptoms of the flu may be mild or severe. They can include extreme tiredness (wanting to stay in bed all day), chills, fevers, muscle aches, soreness with eye movement, headache, and a dry, hacking cough.  Home care  Follow these guidelines when caring for yourself at home:    Avoid being around cigarette smoke, whether yours or other people s.    Acetaminophen or ibuprofen will help ease your fever, muscle aches, and headache. Don t give aspirin to anyone younger than 18 who has the flu. Aspirin can harm the liver.    Nausea and loss of appetite are common with the flu. Eat light meals. Drink 6 to 8 glasses of liquids every day. Good choices are water, sport drinks, soft drinks without caffeine, juices, tea, and soup. Extra fluids will also help loosen secretions in your nose and lungs.    Over-the-counter cold medicines will not make the flu go away faster. But the medicines may help with coughing, sore throat, and congestion in your nose and sinuses. Don t use a decongestant if you have high blood pressure.    Stay home until your fever has been gone for at least 24 hours without using medicine to reduce fever.  Follow-up care  Follow up with your healthcare provider, or as advised, if you are not getting better over the next week.  If you are age 65 or  older, talk with your provider about getting a pneumococcal vaccine every 5 years. You should also get this vaccine if you have chronic asthma or COPD. All adults should get a flu vaccine every fall. Ask your provider about this.  When to seek medical advice  Call your healthcare provider right away if any of these occur:    Cough with lots of colored mucus (sputum) or blood in your mucus    Chest pain, shortness of breath, wheezing, or trouble breathing    Severe headache, or face, neck, or ear pain    New rash with fever    Fever of 100.4 F (38 C) or higher, or as directed by your healthcare provider    Confusion, behavior change, or seizure    Severe weakness or dizziness    You get a new fever or cough after getting better for a few days  Date Last Reviewed: 1/1/2017 2000-2017 [employer]. [address] [place] [address], [place] [address]. All rights reserved. This information is not intended as a substitute for professional medical care. Always follow your healthcare professional's instructions.

## 2018-01-18 NOTE — MR AVS SNAPSHOT
After Visit Summary   1/18/2018    Mary Lou Gil    MRN: [id number]           Patient Information     Date Of Birth          1960        Visit Information        Provider Department      1/18/2018 2:15 PM Raheem Cobb DO [facility]        Today's Diagnoses     Influenza A    -  1    Fever and chills        Tobacco use disorder          Care Instructions      Influenza (Adult)    Influenza is also called the flu. It is a viral illness that affects the air passages of your lungs. It is different from the common cold. The flu can easily be passed from one to person to another. It may be spread through the air by coughing and sneezing. Or it can be spread by touching the sick person and then touching your own eyes, nose, or mouth.  The flu starts 1 to 3 days after you are exposed to the flu virus. It may last for 1 to 2 weeks but many people feel tired or fatigued for many weeks afterward. You usually don t need to take antibiotics unless you have a complication. This might be an ear or sinus infection or pneumonia.  Symptoms of the flu may be mild or severe. They can include extreme tiredness (wanting to stay in bed all day), chills, fevers, muscle aches, soreness with eye movement, headache, and a dry, hacking cough.  Home care  Follow these guidelines when caring for yourself at home:    Avoid being around cigarette smoke, whether yours or other people s.    Acetaminophen or ibuprofen will help ease your fever, muscle aches, and headache. Don t give aspirin to anyone younger than 18 who has the flu. Aspirin can harm the liver.    Nausea and loss of appetite are common with the flu. Eat light meals. Drink 6 to 8 glasses of liquids every day. Good choices are water, sport drinks, soft drinks without caffeine, juices, tea, and soup. Extra fluids will also help loosen secretions in your nose and lungs.    Over-the-counter cold medicines will not make the flu go away  faster. But the medicines may help with coughing, sore throat, and congestion in your nose and sinuses. Don t use a decongestant if you have high blood pressure.    Stay home until your fever has been gone for at least 24 hours without using medicine to reduce fever.  Follow-up care  Follow up with your healthcare provider, or as advised, if you are not getting better over the next week.  If you are age 65 or older, talk with your provider about getting a pneumococcal vaccine every 5 years. You should also get this vaccine if you have chronic asthma or COPD. All adults should get a flu vaccine every fall. Ask your provider about this.  When to seek medical advice  Call your healthcare provider right away if any of these occur:    Cough with lots of colored mucus (sputum) or blood in your mucus    Chest pain, shortness of breath, wheezing, or trouble breathing    Severe headache, or face, neck, or ear pain    New rash with fever    Fever of 100.4 F (38 C) or higher, or as directed by your healthcare provider    Confusion, behavior change, or seizure    Severe weakness or dizziness    You get a new fever or cough after getting better for a few days  Date Last Reviewed: 1/1/2017 2000-2017 [employer]. [address] [place] [address]. All rights reserved. This information is not intended as a substitute for professional medical care. Always follow your healthcare professional's instructions.                Follow-ups after your visit        Who to contact     If you have questions or need follow up information about today's clinic visit or your schedule please contact [facility] directly at [phone].  Normal or non-critical lab and imaging results will be communicated to you by MyChart, letter or phone within 4 business days after the clinic has received the results. If you do not hear from us within 7 days, please contact the clinic through MyChart or phone.  If you have a critical or abnormal lab result, we will notify you by phone as soon as possible.  Submit refill requests through [employer] or call your pharmacy and they will forward the refill request to us. Please allow 3 business days for your refill to be completed.          Additional Information About Your Visit        [employer] Information     [employer] gives you secure access to your electronic health record. If you see a primary care provider, you can also send messages to your care team and make appointments. If you have questions, please call your primary care clinic.  If you do not have a primary care provider, please call [phone] and they will assist you.        Care EveryWhere ID     This is your Care EveryWhere ID. This could be used by other organizations to access your [place] medical records  [id number]        Your Vitals Were     Pulse Temperature Respirations Pulse Oximetry BMI (Body Mass Index)       112 100.6  F (38.1  C) (Oral) 16 98% 27.12 kg/m2        Blood Pressure from Last 3 Encounters:   01/18/18 (!) 142/98   01/16/18 127/82   09/06/17 139/86    Weight from Last 3 Encounters:   01/18/18 168 lb (76.2 kg)   01/16/18 168 lb 6.4 oz (76.4 kg)   09/06/17 174 lb 9.6 oz (79.2 kg)              We Performed the Following     Influenza A/B antigen          Today's Medication Changes          These changes are accurate as of: 1/18/18  3:19 PM.  If you have any questions, ask your nurse or doctor.               Start taking these medicines.        Dose/Directions    guaiFENesin-codeine 100-10 MG/5ML Soln solution   Commonly known as:  ROBITUSSIN AC   Used for:  Influenza A   Started by:  Raheem Cobb DO        1-2 tsp po q 4-6hrs prn cough   Quantity:  120 mL   Refills:  0       oseltamivir 75 MG capsule   Commonly known as:  TAMIFLU   Used for:  Influenza A   Started by:  Raheem Cobb DO        Dose:  75 mg   Take 1 capsule (75 mg) by mouth 2 times daily for 5 days   Quantity:  10 capsule    Refills:  0            Where to get your medicines      These medications were sent to [facility] [place], [place] - [address]  [address] [facility] [address]     Phone:  [phone]     oseltamivir 75 MG capsule         Some of these will need a paper prescription and others can be bought over the counter.  Ask your nurse if you have questions.     Bring a paper prescription for each of these medications     guaiFENesin-codeine 100-10 MG/5ML Soln solution                Primary Care Provider Office Phone # Fax #    Abilio Casiano [phone]       XXX RESIGNED XXX  [facility] [address]        Equal Access to Services     RISHI MOJICA : Tigre Granda, waradhada easton, qaybta kaalmada arnold, marley vizcaino. So [facility] [phone].    ATENCIÓN: Si habla español, tiene a ybarra disposición servicios gratuitos de asistencia lingüística. Llame al [phone].    We comply with applicable federal civil rights laws and [place] laws. We do not discriminate on the basis of race, color, national origin, age, disability, sex, sexual orientation, or gender identity.            Thank you!     Thank you for choosing [facility]  for your care. Our goal is always to provide you with excellent care. Hearing back from our patients is one way we can continue to improve our services. Please take a few minutes to complete the written survey that you may receive in the mail after your visit with us. Thank you!             Your Updated Medication List - Protect others around you: Learn how to safely use, store and throw away your medicines at www.disposemymeds.org.          This list is accurate as of: 1/18/18  3:19 PM.  Always use your most recent med list.                   Brand Name Dispense Instructions for use Diagnosis    albuterol 108 (90 BASE) MCG/ACT Inhaler    PROAIR HFA/PROVENTIL HFA/VENTOLIN HFA     3 Inhaler    Inhale 2 puffs into the lungs every 6 hours as needed for shortness of breath / dyspnea or wheezing    Tobacco use disorder       BENADRYL PO      Take 25-50 mg by mouth See Admin Instructions 25 mg during the day if needed and 50 mg at bedtime for sleep as needed        blood glucose monitoring meter device kit     1 kit    by In Vitro route as needed    Type 2 diabetes, HbA1c goal < 7% (H)       blood glucose monitoring test strip    no brand specified    200 strip    1 strip by In Vitro route 3 times daily Uses Freestyle Lite meter    Type 2 diabetes mellitus with hyperglycemia, with long-term current use of insulin (H)       cholecalciferol 5000 UNITS Caps     100 capsule    Take 1 capsule (5,000 Units) by mouth daily FOR VITAMIN D DEFICIENCY (LOW VITAMIN D),TAKE 2 CAPSULES DOROTHY FOR THE NEXT 4 WEEKS, THEN 1 CAPSULE DAILY, MUST TAKE WITH WITH FAT CONTAINING MEAL    Perimenopausal       citalopram 40 MG tablet    celeXA    90 tablet    Take 1 tablet (40 mg) by mouth daily TO CONTROL SYMPTOMS OF DEPRESSION    Major depressive disorder, recurrent episode, mild (H)       cyanocobalamin 1000 MCG/ML injection    VITAMIN B12    30 mL    Inject 1 mL (1,000 mcg) Subcutaneous every 14 days INDICATION: FOR VITAMIN B12 SUPPLEMENTATION    Encounter for long-term (current) use of medications       EPINEPHrine 0.3 MG/0.3ML injection 2-pack    EPIPEN/ADRENACLICK/or ANY BX GENERIC EQUIV    0.3 mL    Inject 0.3 mLs (0.3 mg) into the muscle once as needed    Anaphylactic reaction, subsequent encounter       fenofibrate 160 MG tablet     90 tablet    Take 1 tablet (160 mg) by mouth At Bedtime with food. INDICATION: TO LOWER CHOLESTEROL    Hyperlipidemia with target LDL less than 100       flunisolide HFA 80 MCG/ACT Aers oral inhaler    AEROSPAN    1 Inhaler    Inhale 2 puffs into the lungs 2 times daily INDICATION: ASTHMA CONTROLLER, ALWAYS RINSE MOUTH AFTER USE    Mild persistent asthma, uncomplicated       glipiZIDE  10 MG 24 hr tablet    glipiZIDE XL    30 tablet    Take 1 tablet (10 mg) by mouth daily INDICATION: TO TREAT DIABETES.    Type 2 diabetes mellitus with diabetic polyneuropathy, with long-term current use of insulin (H)       guaiFENesin-codeine 100-10 MG/5ML Soln solution    ROBITUSSIN AC    120 mL    1-2 tsp po q 4-6hrs prn cough    Influenza A       ibuprofen 800 MG tablet    ADVIL/MOTRIN    90 tablet    Take 1 tablet (800 mg) by mouth every 8 hours as needed for moderate pain or pain    Hip pain, left, Physical deconditioning, Other chronic pain, SI (sacroiliac) joint dysfunction       insulin detemir 100 UNIT/ML injection    LEVEMIR FLEXPEN/FLEXTOUCH    30 mL    Inject 30 Units Subcutaneous At Bedtime    Type 2 diabetes mellitus with diabetic polyneuropathy, with long-term current use of insulin (H)       levothyroxine 150 MCG tablet    SYNTHROID/LEVOTHROID    90 tablet    Take 1 tablet (150 mcg) by mouth daily INDICATION: THYROID SUPPLEMENTATION, TAKE FIRST THING IN THE MORNING ON AN EMPTY STOMACH,  NO FOOD FOR AN HOUR    Hypothyroidism, unspecified type       lidocaine 5 % ointment    XYLOCAINE    50 g    Apply a quarter size amount to painful areas up to 4 times per day as needed.    Bilateral carpal tunnel syndrome, Degeneration of lumbar intervertebral disc       lidocaine-prilocaine cream    EMLA    30 g    Apply dime to nickel size amount qid prn to low left back    Muscle spasm       lisinopril-hydrochlorothiazide 20-25 MG per tablet    PRINZIDE/ZESTORETIC    90 tablet    Take 1 tablet by mouth daily INDICATION:TO LOWER BLOOD PRESSURE AND TO PRESERVE KIDNEY FUNCTION    Essential hypertension, benign       magnesium oxide 400 MG tablet    MAG-OX    180 tablet    Take 1 tablet (400 mg) by mouth 2 times daily INDICATION: MAGNESIUM SUPPLEMENT    Cramp of both lower extremities       metFORMIN 500 MG 24 hr tablet    GLUCOPHAGE-XR    60 tablet    Take 2 tablets (1,000 mg) by mouth daily (with dinner)    Type 2  diabetes mellitus with diabetic polyneuropathy, with long-term current use of insulin (H)       NEEDLES, ANY SIZE     1 Box    Inject 1 each Subcutaneous At Bedtime    Type 2 diabetes, HbA1c goal < 7% (H)       omeprazole 20 MG CR capsule    priLOSEC    90 capsule    Take 1 capsule (20 mg) by mouth 2 times daily INDICATION: TO CONTROL REFLUX SYMPTOMS    Gastroesophageal reflux disease with esophagitis       order for DME     1 each    One pair diabetic shoes    Type 2 diabetes mellitus with diabetic polyneuropathy, with long-term current use of insulin (H)       oseltamivir 75 MG capsule    TAMIFLU    10 capsule    Take 1 capsule (75 mg) by mouth 2 times daily for 5 days    Influenza A       pravastatin 80 MG tablet    PRAVACHOL    90 tablet    Take 1 tablet (80 mg) by mouth daily INDICATION: TO LOWER CHOLESTEROL AND TO HELP  PREVENT HEART DISEASE    Hyperlipidemia with target LDL less than 100       SUMAtriptan 100 MG tablet    IMITREX    18 tablet    Take 1 tablet (100 mg) by mouth every 8 hours as needed for migraine    Migraine without status migrainosus, not intractable, unspecified migraine type       thin lancets    NO BRAND SPECIFIED    3 Box    1 Device 2 times daily    Type 2 diabetes, HbA1c goal < 7% (H)       tiZANidine 2 MG tablet    ZANAFLEX    90 tablet    Take 1-2 tablets (2-4 mg) by mouth 3 times daily    Muscle spasm       traMADol 50 MG tablet    ULTRAM    165 tablet    Take 1tablet(s) every 6 hours as needed for pain. 5-6 per day. May dispense on/after 06/4/17 to start taking on/after 06/06/17. 30 day supply    Bilateral carpal tunnel syndrome, Degeneration of lumbar intervertebral disc       verapamil 240 MG CR tablet    CALAN-SR    30 tablet    Take 1 tablet (240 mg) by mouth At Bedtime INDICATION: TO LOWER BLOOD PRESSURE AND TO CONTROL MIGRAINE HEADACHES    Migraine without status migrainosus, not intractable, unspecified migraine type

## 2018-01-23 ENCOUNTER — TELEPHONE (OUTPATIENT)
Dept: ENDOCRINOLOGY | Facility: CLINIC | Age: 58
End: 2018-01-23

## 2018-01-23 DIAGNOSIS — E03.9 HYPOTHYROIDISM, UNSPECIFIED TYPE: ICD-10-CM

## 2018-01-23 DIAGNOSIS — E11.42 TYPE 2 DIABETES MELLITUS WITH DIABETIC POLYNEUROPATHY, WITH LONG-TERM CURRENT USE OF INSULIN (H): Primary | ICD-10-CM

## 2018-01-23 DIAGNOSIS — Z79.4 TYPE 2 DIABETES MELLITUS WITH DIABETIC POLYNEUROPATHY, WITH LONG-TERM CURRENT USE OF INSULIN (H): Primary | ICD-10-CM

## 2018-01-31 ENCOUNTER — OFFICE VISIT (OUTPATIENT)
Dept: INTERNAL MEDICINE | Facility: CLINIC | Age: 58
End: 2018-01-31
Payer: MEDICARE

## 2018-01-31 VITALS
HEART RATE: 88 BPM | WEIGHT: 167.3 LBS | DIASTOLIC BLOOD PRESSURE: 86 MMHG | TEMPERATURE: 98 F | BODY MASS INDEX: 27.88 KG/M2 | RESPIRATION RATE: 16 BRPM | SYSTOLIC BLOOD PRESSURE: 134 MMHG | HEIGHT: 65 IN

## 2018-01-31 DIAGNOSIS — E11.42 TYPE 2 DIABETES MELLITUS WITH DIABETIC POLYNEUROPATHY, WITH LONG-TERM CURRENT USE OF INSULIN (H): Primary | ICD-10-CM

## 2018-01-31 DIAGNOSIS — E03.9 HYPOTHYROIDISM, UNSPECIFIED TYPE: ICD-10-CM

## 2018-01-31 DIAGNOSIS — E78.5 HYPERLIPIDEMIA WITH TARGET LDL LESS THAN 100: ICD-10-CM

## 2018-01-31 DIAGNOSIS — Z79.4 TYPE 2 DIABETES MELLITUS WITH DIABETIC POLYNEUROPATHY, WITH LONG-TERM CURRENT USE OF INSULIN (H): Primary | ICD-10-CM

## 2018-01-31 DIAGNOSIS — G43.909 MIGRAINE WITHOUT STATUS MIGRAINOSUS, NOT INTRACTABLE, UNSPECIFIED MIGRAINE TYPE: ICD-10-CM

## 2018-01-31 DIAGNOSIS — Z12.31 VISIT FOR SCREENING MAMMOGRAM: ICD-10-CM

## 2018-01-31 DIAGNOSIS — I10 ESSENTIAL HYPERTENSION, BENIGN: ICD-10-CM

## 2018-01-31 PROCEDURE — 99214 OFFICE O/P EST MOD 30 MIN: CPT | Performed by: INTERNAL MEDICINE

## 2018-01-31 RX ORDER — LEVOTHYROXINE SODIUM 150 UG/1
150 TABLET ORAL DAILY
Qty: 90 TABLET | Refills: 1 | Status: SHIPPED | OUTPATIENT
Start: 2018-01-31 | End: 2018-01-31

## 2018-01-31 RX ORDER — VERAPAMIL HYDROCHLORIDE 240 MG/1
240 TABLET, FILM COATED, EXTENDED RELEASE ORAL AT BEDTIME
Qty: 90 TABLET | Refills: 1 | Status: SHIPPED | OUTPATIENT
Start: 2018-01-31 | End: 2018-11-13

## 2018-01-31 RX ORDER — PRAVASTATIN SODIUM 80 MG/1
80 TABLET ORAL DAILY
Qty: 90 TABLET | Refills: 1 | Status: SHIPPED | OUTPATIENT
Start: 2018-01-31 | End: 2018-12-13

## 2018-01-31 RX ORDER — LEVOTHYROXINE SODIUM 150 UG/1
150 TABLET ORAL DAILY
Qty: 90 TABLET | Refills: 1 | Status: SHIPPED | OUTPATIENT
Start: 2018-01-31 | End: 2018-12-13

## 2018-01-31 RX ORDER — LISINOPRIL AND HYDROCHLOROTHIAZIDE 20; 25 MG/1; MG/1
1 TABLET ORAL DAILY
Qty: 90 TABLET | Refills: 1 | Status: SHIPPED | OUTPATIENT
Start: 2018-01-31 | End: 2018-10-15

## 2018-01-31 NOTE — PATIENT INSTRUCTIONS
*  Continue all medications at the same doses.  Contact your usual pharmacy if you need refills.     *  Mammogram at your convenience.     *  Please get the continuous glucose monitoring performed by the diabetes educator before mid March.   Contact diabetes educator to arrange an appointment.     --[facility] Diabetes Education - All [facility] [phone]   [email or web address]      *  Return to see Dr. Ramirez in mid March sooner if needed.  Please get fasting labs done at the [facility] or any other [facility] lab 1-2 days before this appointment.  If you get the labs done at another clinic, make arrangements with them directly.  The orders will be in place.  Eat nothing for at least 8 hours prior to having these labs drawn.  Call [phone] to schedule appointments at [facility].

## 2018-01-31 NOTE — MR AVS SNAPSHOT
After Visit Summary   1/31/2018    Mary Lou Gil    MRN: [id number]           Patient Information     Date Of Birth          1960        Visit Information        Provider Department      1/31/2018 2:00 PM Xavi Garcia MD [facility]        Today's Diagnoses     Type 2 diabetes mellitus with diabetic polyneuropathy, with long-term current use of insulin (H)    -  1    Hyperlipidemia with target LDL less than 100        Essential hypertension, benign        Hypothyroidism, unspecified type        Migraine without status migrainosus, not intractable, unspecified migraine type        Visit for screening mammogram          Care Instructions    *  Continue all medications at the same doses.  Contact your usual pharmacy if you need refills.     *  Mammogram at your convenience.     *  Please get the continuous glucose monitoring performed by the diabetes educator before mid March.   Contact diabetes educator to arrange an appointment.     --[facility] - All [facility] [phone]   [email or web address]      *  Return to see Dr. Ramirez in mid March sooner if needed.  Please get fasting labs done at the [facility] or any other [facility] lab 1-2 days before this appointment.  If you get the labs done at another clinic, make arrangements with them directly.  The orders will be in place.  Eat nothing for at least 8 hours prior to having these labs drawn.  Call [phone] to schedule appointments at [facility].           Follow-ups after your visit        Future tests that were ordered for you today     Open Future Orders        Priority Expected Expires Ordered    Comprehensive metabolic panel (BMP + Alb, Alk Phos, ALT, AST, Total. Bili, TP) Routine 3/14/2018 7/31/2018 1/31/2018    Lipid panel reflex to direct LDL Fasting Routine 3/14/2018 7/31/2018 1/31/2018     "MA Screening Digital Bilateral Routine 3/14/2018 7/31/2018 1/31/2018            Who to contact     If you have questions or need follow up information about today's clinic visit or your schedule please contact [facility] directly at [phone].  Normal or non-critical lab and imaging results will be communicated to you by MyChart, letter or phone within 4 business days after the clinic has received the results. If you do not hear from us within 7 days, please contact the clinic through doubleTwisthart or phone. If you have a critical or abnormal lab result, we will notify you by phone as soon as possible.  Submit refill requests through [employer] or call your pharmacy and they will forward the refill request to us. Please allow 3 business days for your refill to be completed.          Additional Information About Your Visit        MyChart Information     [employer] gives you secure access to your electronic health record. If you see a primary care provider, you can also send messages to your care team and make appointments. If you have questions, please call your primary care clinic.  If you do not have a primary care provider, please call [phone] and they will assist you.        Care EveryWhere ID     This is your Care EveryWhere ID. This could be used by other organizations to access your [facility] medical records  [id number]        Your Vitals Were     Pulse Temperature Respirations Height Breastfeeding? BMI (Body Mass Index)    88 98  F (36.7  C) (Oral) 16 5' 4.75\" (1.645 m) No 28.06 kg/m2       Blood Pressure from Last 3 Encounters:   01/31/18 134/86   01/18/18 (!) 142/98   01/16/18 127/82    Weight from Last 3 Encounters:   01/31/18 167 lb 4.8 oz (75.9 kg)   01/18/18 168 lb (76.2 kg)   01/16/18 168 lb 6.4 oz (76.4 kg)                 Today's Medication Changes          These changes are accurate as of 1/31/18  2:57 PM.  If you have any questions, ask your nurse or doctor.             "   Start taking these medicines.        Dose/Directions    levothyroxine 150 MCG tablet   Commonly known as:  SYNTHROID/LEVOTHROID   Used for:  Hypothyroidism, unspecified type   Started by:  Xavi Garcia MD        Dose:  150 mcg   Take 1 tablet (150 mcg) by mouth daily TAKE FIRST THING IN THE MORNING ON AN EMPTY STOMACH,  NO FOOD FOR AN HOUR   Quantity:  90 tablet   Refills:  1            Where to get your medicines      These medications were sent to [address] [address]     Phone:  [phone]     levothyroxine 150 MCG tablet    lisinopril-hydrochlorothiazide 20-25 MG per tablet    pravastatin 80 MG tablet    verapamil 240 MG CR tablet                Primary Care Provider Office Phone # Fax #    Abilio Casiano [phone]       XXX RESIGNED XXX  [facility] [address]        Equal Access to Services     [facility] : Hadii zelalem fong hadasho Solisa, waaxda luqadaha, qaybta kaalmada adeegyada, waxay patriciain hayrosa moreno . So [facility] [phone].    ATENCIÓN: Si habla español, tiene a ybarra disposición servicios gratuitos de asistencia lingüística. [facility] [phone].    We comply with applicable federal civil rights laws and [place] laws. We do not discriminate on the basis of race, color, national origin, age, disability, sex, sexual orientation, or gender identity.            Thank you!     Thank you for choosing [facility]  for your care. Our goal is always to provide you with excellent care. Hearing back from our patients is one way we can continue to improve our services. Please take a few minutes to complete the written survey that you may receive in the mail after your visit with us. Thank you!             Your Updated Medication List - Protect others around you: Learn how to safely use, store and throw away your medicines at www.disposemymeds.org.           This list is accurate as of 1/31/18  2:57 PM.  Always use your most recent med list.                   Brand Name Dispense Instructions for use Diagnosis    albuterol 108 (90 BASE) MCG/ACT Inhaler    PROAIR HFA/PROVENTIL HFA/VENTOLIN HFA    3 Inhaler    Inhale 2 puffs into the lungs every 6 hours as needed for shortness of breath / dyspnea or wheezing    Tobacco use disorder       BENADRYL PO      Take 25-50 mg by mouth See Admin Instructions 25 mg during the day if needed and 50 mg at bedtime for sleep as needed        blood glucose monitoring meter device kit     1 kit    by In Vitro route as needed    Type 2 diabetes, HbA1c goal < 7% (H)       blood glucose monitoring test strip    no brand specified    200 strip    1 strip by In Vitro route 3 times daily Uses Freestyle Lite meter    Type 2 diabetes mellitus with hyperglycemia, with long-term current use of insulin (H)       cholecalciferol 5000 UNITS Caps     100 capsule    Take 1 capsule (5,000 Units) by mouth daily FOR VITAMIN D DEFICIENCY (LOW VITAMIN D),TAKE 2 CAPSULES DOROTHY FOR THE NEXT 4 WEEKS, THEN 1 CAPSULE DAILY, MUST TAKE WITH WITH FAT CONTAINING MEAL    Perimenopausal       citalopram 40 MG tablet    celeXA    90 tablet    Take 1 tablet (40 mg) by mouth daily TO CONTROL SYMPTOMS OF DEPRESSION    Major depressive disorder, recurrent episode, mild (H)       cyanocobalamin 1000 MCG/ML injection    VITAMIN B12    30 mL    Inject 1 mL (1,000 mcg) Subcutaneous every 14 days INDICATION: FOR VITAMIN B12 SUPPLEMENTATION    Encounter for long-term (current) use of medications       EPINEPHrine 0.3 MG/0.3ML injection 2-pack    EPIPEN/ADRENACLICK/or ANY BX GENERIC EQUIV    0.3 mL    Inject 0.3 mLs (0.3 mg) into the muscle once as needed    Anaphylactic reaction, subsequent encounter       fenofibrate 160 MG tablet     90 tablet    Take 1 tablet (160 mg) by mouth At Bedtime with food. INDICATION: TO LOWER CHOLESTEROL    Hyperlipidemia with target LDL less than  100       flunisolide HFA 80 MCG/ACT Aers oral inhaler    AEROSPAN    1 Inhaler    Inhale 2 puffs into the lungs 2 times daily INDICATION: ASTHMA CONTROLLER, ALWAYS RINSE MOUTH AFTER USE    Mild persistent asthma, uncomplicated       glipiZIDE 10 MG 24 hr tablet    glipiZIDE XL    30 tablet    Take 1 tablet (10 mg) by mouth daily INDICATION: TO TREAT DIABETES.    Type 2 diabetes mellitus with diabetic polyneuropathy, with long-term current use of insulin (H)       ibuprofen 800 MG tablet    ADVIL/MOTRIN    90 tablet    Take 1 tablet (800 mg) by mouth every 8 hours as needed for moderate pain or pain    Hip pain, left, Physical deconditioning, Other chronic pain, SI (sacroiliac) joint dysfunction       insulin detemir 100 UNIT/ML injection    LEVEMIR FLEXPEN/FLEXTOUCH    30 mL    Inject 30 Units Subcutaneous At Bedtime    Type 2 diabetes mellitus with diabetic polyneuropathy, with long-term current use of insulin (H)       levothyroxine 150 MCG tablet    SYNTHROID/LEVOTHROID    90 tablet    Take 1 tablet (150 mcg) by mouth daily TAKE FIRST THING IN THE MORNING ON AN EMPTY STOMACH,  NO FOOD FOR AN HOUR    Hypothyroidism, unspecified type       lidocaine 5 % ointment    XYLOCAINE    50 g    Apply a quarter size amount to painful areas up to 4 times per day as needed.    Bilateral carpal tunnel syndrome, Degeneration of lumbar intervertebral disc       lidocaine-prilocaine cream    EMLA    30 g    Apply dime to nickel size amount qid prn to low left back    Muscle spasm       lisinopril-hydrochlorothiazide 20-25 MG per tablet    PRINZIDE/ZESTORETIC    90 tablet    Take 1 tablet by mouth daily INDICATION:TO LOWER BLOOD PRESSURE AND TO PRESERVE KIDNEY FUNCTION    Essential hypertension, benign       magnesium oxide 400 MG tablet    MAG-OX    180 tablet    Take 1 tablet (400 mg) by mouth 2 times daily INDICATION: MAGNESIUM SUPPLEMENT    Cramp of both lower extremities       metFORMIN 500 MG 24 hr tablet    GLUCOPHAGE-XR     60 tablet    Take 2 tablets (1,000 mg) by mouth daily (with dinner)    Type 2 diabetes mellitus with diabetic polyneuropathy, with long-term current use of insulin (H)       NEEDLES, ANY SIZE     1 Box    Inject 1 each Subcutaneous At Bedtime    Type 2 diabetes, HbA1c goal < 7% (H)       omeprazole 20 MG CR capsule    priLOSEC    90 capsule    Take 1 capsule (20 mg) by mouth 2 times daily INDICATION: TO CONTROL REFLUX SYMPTOMS    Gastroesophageal reflux disease with esophagitis       order for DME     1 each    One pair diabetic shoes    Type 2 diabetes mellitus with diabetic polyneuropathy, with long-term current use of insulin (H)       pravastatin 80 MG tablet    PRAVACHOL    90 tablet    Take 1 tablet (80 mg) by mouth daily INDICATION: TO LOWER CHOLESTEROL AND TO HELP  PREVENT HEART DISEASE    Hyperlipidemia with target LDL less than 100       SUMAtriptan 100 MG tablet    IMITREX    18 tablet    Take 1 tablet (100 mg) by mouth every 8 hours as needed for migraine    Migraine without status migrainosus, not intractable, unspecified migraine type       thin lancets    NO BRAND SPECIFIED    3 Box    1 Device 2 times daily    Type 2 diabetes, HbA1c goal < 7% (H)       tiZANidine 2 MG tablet    ZANAFLEX    90 tablet    Take 1-2 tablets (2-4 mg) by mouth 3 times daily    Muscle spasm       traMADol 50 MG tablet    ULTRAM    165 tablet    Take 1tablet(s) every 6 hours as needed for pain. 5-6 per day. May dispense on/after 06/4/17 to start taking on/after 06/06/17. 30 day supply    Bilateral carpal tunnel syndrome, Degeneration of lumbar intervertebral disc       verapamil 240 MG CR tablet    CALAN-SR    90 tablet    Take 1 tablet (240 mg) by mouth At Bedtime INDICATION: TO LOWER BLOOD PRESSURE AND TO CONTROL MIGRAINE HEADACHES    Migraine without status migrainosus, not intractable, unspecified migraine type
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
## 2018-02-11 ENCOUNTER — HEALTH MAINTENANCE LETTER (OUTPATIENT)
Age: 58
End: 2018-02-11

## 2018-03-03 DIAGNOSIS — E11.65 TYPE 2 DIABETES MELLITUS WITH HYPERGLYCEMIA, WITH LONG-TERM CURRENT USE OF INSULIN (H): ICD-10-CM

## 2018-03-03 DIAGNOSIS — Z79.4 TYPE 2 DIABETES MELLITUS WITH HYPERGLYCEMIA, WITH LONG-TERM CURRENT USE OF INSULIN (H): ICD-10-CM

## 2018-03-04 RX ORDER — BLOOD-GLUCOSE METER
KIT MISCELLANEOUS
Qty: 200 EACH | Refills: 3 | Status: SHIPPED | OUTPATIENT
Start: 2018-03-04 | End: 2018-10-01

## 2018-03-14 ENCOUNTER — OFFICE VISIT (OUTPATIENT)
Dept: INTERNAL MEDICINE | Facility: CLINIC | Age: 58
End: 2018-03-14
Payer: MEDICARE

## 2018-03-14 VITALS
BODY MASS INDEX: 28.12 KG/M2 | SYSTOLIC BLOOD PRESSURE: 96 MMHG | WEIGHT: 167.7 LBS | TEMPERATURE: 97.9 F | RESPIRATION RATE: 16 BRPM | OXYGEN SATURATION: 97 % | HEART RATE: 81 BPM | DIASTOLIC BLOOD PRESSURE: 74 MMHG

## 2018-03-14 DIAGNOSIS — N95.1 PERIMENOPAUSAL: ICD-10-CM

## 2018-03-14 DIAGNOSIS — F51.01 PRIMARY INSOMNIA: Primary | ICD-10-CM

## 2018-03-14 DIAGNOSIS — E53.8 VITAMIN B12 DEFICIENCY WITHOUT ANEMIA: ICD-10-CM

## 2018-03-14 DIAGNOSIS — F33.0 MAJOR DEPRESSIVE DISORDER, RECURRENT EPISODE, MILD (H): ICD-10-CM

## 2018-03-14 DIAGNOSIS — J45.30 MILD PERSISTENT ASTHMA, UNCOMPLICATED: ICD-10-CM

## 2018-03-14 DIAGNOSIS — F17.200 TOBACCO USE DISORDER: ICD-10-CM

## 2018-03-14 DIAGNOSIS — M51.369 DEGENERATION OF LUMBAR INTERVERTEBRAL DISC: ICD-10-CM

## 2018-03-14 DIAGNOSIS — E55.9 VITAMIN D DEFICIENCY: ICD-10-CM

## 2018-03-14 DIAGNOSIS — G56.03 BILATERAL CARPAL TUNNEL SYNDROME: ICD-10-CM

## 2018-03-14 DIAGNOSIS — E08.41 DIABETIC MONONEUROPATHY ASSOCIATED WITH DIABETES MELLITUS DUE TO UNDERLYING CONDITION (H): ICD-10-CM

## 2018-03-14 PROCEDURE — 99214 OFFICE O/P EST MOD 30 MIN: CPT | Performed by: INTERNAL MEDICINE

## 2018-03-14 RX ORDER — CITALOPRAM HYDROBROMIDE 40 MG/1
40 TABLET ORAL DAILY
Qty: 90 TABLET | Refills: 3 | Status: SHIPPED | OUTPATIENT
Start: 2018-03-14 | End: 2018-06-28

## 2018-03-14 RX ORDER — TRAZODONE HYDROCHLORIDE 50 MG/1
50-100 TABLET, FILM COATED ORAL
Qty: 60 TABLET | Refills: 1 | Status: SHIPPED | OUTPATIENT
Start: 2018-03-14 | End: 2018-10-15

## 2018-03-14 RX ORDER — ALBUTEROL SULFATE 90 UG/1
2 AEROSOL, METERED RESPIRATORY (INHALATION) EVERY 6 HOURS PRN
Qty: 1 INHALER | Refills: 11 | Status: SHIPPED | OUTPATIENT
Start: 2018-03-14 | End: 2019-04-22

## 2018-03-14 ASSESSMENT — PATIENT HEALTH QUESTIONNAIRE - PHQ9
SUM OF ALL RESPONSES TO PHQ QUESTIONS 1-9: 11
10. IF YOU CHECKED OFF ANY PROBLEMS, HOW DIFFICULT HAVE THESE PROBLEMS MADE IT FOR YOU TO DO YOUR WORK, TAKE CARE OF THINGS AT HOME, OR GET ALONG WITH OTHER PEOPLE: SOMEWHAT DIFFICULT
SUM OF ALL RESPONSES TO PHQ QUESTIONS 1-9: 11

## 2018-03-14 NOTE — LETTER
[facility]  [address] [address]  [phone]        June 21, 2018    Mary Lou Gil  [address] [address]              Dear Mary Lou Gil    This is to remind you that your non-fasting labs is due.    You may call our office at [phone] to schedule an appointment.    Please disregard this notice if you have already had your labs drawn or made an appointment.        Sincerely,        Xavi Garcia MD

## 2018-03-14 NOTE — LETTER
My Depression Action Plan  Name: Mary Lou Gil   Date of Birth 1960  Date: 3/14/2018    My doctor: Abilio Casiano   My clinic: [address] [id number] [phone]          GREEN    ZONE   Good Control    What it looks like:     Things are going generally well. You have normal up s and down s. You may even feel depressed from time to time, but bad moods usually last less than a day.   What you need to do:  1. Continue to care for yourself (see self care plan)  2. Check your depression survival kit and update it as needed  3. Follow your physician s recommendations including any medication.  4. Do not stop taking medication unless you consult with your physician first.           YELLOW         ZONE Getting Worse    What it looks like:     Depression is starting to interfere with your life.     It may be hard to get out of bed; you may be starting to isolate yourself from others.    Symptoms of depression are starting to last most all day and this has happened for several days.     You may have suicidal thoughts but they are not constant.   What you need to do:     1. Call your care team, your response to treatment will improve if you keep your care team informed of your progress. Yellow periods are signs an adjustment may need to be made.     2. Continue your self-care, even if you have to fake it!    3. Talk to someone in your support network    4. Open up your depression survival kit           RED    ZONE Medical Alert - Get Help    What it looks like:     Depression is seriously interfering with your life.     You may experience these or other symptoms: You can t get out of bed most days, can t work or engage in other necessary activities, you have trouble taking care of basic hygiene, or basic responsibilities, thoughts of suicide or death that will not go away, self-injurious behavior.     What you need to do:  1. Call your  care team and request a same-day appointment. If they are not available (weekends or after hours) call your local crisis line, emergency room or 911.      Electronically signed by: Xavi Garcia, March 14, 2018    Depression Self Care Plan / Survival Kit    Self-Care for Depression  Here s the deal. Your body and mind are really not as separate as most people think.  What you do and think affects how you feel and how you feel influences what you do and think. This means if you do things that people who feel good do, it will help you feel better.  Sometimes this is all it takes.  There is also a place for medication and therapy depending on how severe your depression is, so be sure to consult with your medical provider and/ or Behavioral Health Consultant if your symptoms are worsening or not improving.     In order to better manage my stress, I will:    Exercise  Get some form of exercise, every day. This will help reduce pain and release endorphins, the  feel good  chemicals in your brain. This is almost as good as taking antidepressants!  This is not the same as joining a gym and then never going! (they count on that by the way ) It can be as simple as just going for a walk or doing some gardening, anything that will get you moving.      Hygiene   Maintain good hygiene (Get out of bed in the morning, Make your bed, Brush your teeth, Take a shower, and Get dressed like you were going to work, even if you are unemployed).  If your clothes don't fit try to get ones that do.    Diet  I will strive to eat foods that are good for me, drink plenty of water, and avoid excessive sugar, caffeine, alcohol, and other mood-altering substances.  Some foods that are helpful in depression are: complex carbohydrates, B vitamins, flaxseed, fish or fish oil, fresh fruits and vegetables.    Psychotherapy  I agree to participate in Individual Therapy (if recommended).    Medication  If prescribed medications, I agree to take  them.  Missing doses can result in serious side effects.  I understand that drinking alcohol, or other illicit drug use, may cause potential side effects.  I will not stop my medication abruptly without first discussing it with my provider.    Staying Connected With Others  I will stay in touch with my friends, family members, and my primary care provider/team.    Use your imagination  Be creative.  We all have a creative side; it doesn t matter if it s oil painting, sand castles, or mud pies! This will also kick up the endorphins.    Witness Beauty  (AKA stop and smell the roses) Take a look outside, even in mid-winter. Notice colors, textures. Watch the squirrels and birds.     Service to others  Be of service to others.  There is always someone else in need.  By helping others we can  get out of ourselves  and remember the really important things.  This also provides opportunities for practicing all the other parts of the program.    Humor  Laugh and be silly!  Adjust your TV habits for less news and crime-drama and more comedy.    Control your stress  Try breathing deep, massage therapy, biofeedback, and meditation. Find time to relax each day.     My support system    Clinic Contact:  Phone number:    Contact 1:  Phone number:    Contact 2:  Phone number:    Zoroastrian/:  Phone number:    Therapist:  Phone number:    Local crisis center:    Phone number:    Other community support:  Phone number:

## 2018-03-14 NOTE — LETTER
My Asthma Action Plan  Name: Mary Lou Gil   Date: 3/14/2018   My doctor: Abilio Casiano   My clinic: [address] [address] [phone] My Asthma Severity: mild persistent    My Control Medicine: Aerospan  My Rescue Medicine: Albuterol     Pharmacy:    [address]  Avoid these possible asthma triggers: smoke, upper respiratory infections, dust mites, pollens, animal dander, insects/rodents, mold, humidity, aspirin, strong odors and fumes, occupational exposure, exercise or sports, emotions, cold air and Gastric Reflux        GREEN ZONE   Good Control    I feel good    No cough or wheeze    Can work, sleep and play without asthma symptoms       Take your asthma control medicine every day.    1. If exercise triggers your asthma, take albuterol 2 puffs      15 minutes before exercise or sports, and    During exercise if you have asthma symptoms  2. Spacer to use with inhaler: no              YELLOW ZONE Getting Worse  I have ANY of these:    I do not feel good    Cough or wheeze    Chest feels tight    Wake up at night   1. Keep taking your Green Zone medications  2. Start taking your rescue medicine:    every 20 minutes for up to 1 hour. Then every 4 hours for 24-48 hours.  3. If you stay in the Yellow Zone for more than 12-24 hours, contact your doctor.  4. If you do not return to the Green Zone in 12-24 hours or you get worse, start taking your oral steroid medicine if prescribed by your provider.           RED ZONE Medical Alert - Get Help  I have ANY of these:    I feel awful    Medicine is not helping    Breathing getting harder    Trouble walking or talking    Nose opens wide to breathe       1. Take your rescue medicine NOW  2. If your provider has prescribed an oral steroid medicine, start taking it NOW  3. Call your doctor NOW  4. If you are still in the Red Zone  after 20 minutes and you have not reached your doctor:    Take your rescue medicine again and    Call 911 or go to the emergency room right away    See your regular doctor within 2 weeks of an Emergency Room or Urgent Care visit for follow-up treatment.        Asthma Health Reminders:    * Meet with Asthma Educator annually, if indicated  * Flu shot each year in the fall  * Pneumonia shot    Electronically signed March 14, 2018 by: Xavi Garcia                          Asthma Triggers  How To Control Things That Make Your Asthma Worse    Triggers are things that make your asthma worse.  Look at the list below to help you find your triggers and what you can do about them.  You can help prevent asthma flare-ups by staying away from your triggers.      Trigger                                                          What you can do   Cigarette Smoke  Tobacco smoke can make asthma worse. Do not allow smoking in your home, car or around you.  Be sure no one smokes at a child s day care or school.  If you smoke, ask your health care provider for ways to help you quick.  Ask family members to quit too.  Ask your health care provider for a referral to Quit plan to help you quit smoking, or call 2-611-104-PLAN.     Colds, Flu, Bronchitis  These are common triggers of asthma. Wash your hands often.  Don t touch your eyes, nose or mouth.  Get a flu shot every year.     Dust Mites  These are tiny bugs that live in cloth or carpet. They are too small to see. Wash sheets and blankets in hot water every week.   Encase pillows and mattress in dust mite proof covers.  Avoid having carpet if you can. If you have carpet, vacuum weekly.   Use a dust mask and HEPA vacuum.   Pollen and Outdoor Mold  Some people are allergic to trees, grass, or weed pollen, or molds. Try to keep your windows closed.  Limit time out doors when pollen count is high.   Ask you health care provider about taking medicine during allergy season.      Animal Dander  Some people are allergic to skin flakes, urine or saliva from pets with fur or feathers. Keep pets with fur or feathers out of your home.    If you can t keep the pet outdoors, then keep the pet out of your bedroom.  Keep the bedroom door closed.  Keep pets off cloth furniture and away from stuffed toys.     Mice, Rats, and Cockroaches  Some people are allergic to the waste from these pets.   Cover food and garbage.  Clean up spills and food crumbs.  Store grease in the refrigerator.   Keep food out of the bedroom.   Indoor Mold  This can be a trigger if your home has high moisture Fix leaking faucets, pipes, or other sources of water.   Clean moldy surfaces.  Dehumidify basement if it is damp and smelly.   Smoke, Strong Odors, and Sprays  These can reduce air quality. Stay away from strong odors and sprays, such as perfume, powder, hair spray, paints, smoke incense, paints, cleaning products, candles and new carpet.   Exercise or Sports  Some people with asthma have this trigger. Be active!  Ask you doctor about taking medicine before sports or exercise to prevent symptoms.    Warm up for 5-10 minutes before and after sports or exercise.     Other Triggers of Asthma  Cold air:  Cover your nose and mouth with a scarf.  Sometimes laughing or crying can be a trigger.  Some medicines and food can trigger asthma.

## 2018-03-14 NOTE — PATIENT INSTRUCTIONS
*    INSOMNIA:     *  Avoid caffeine within 6 hours of bed, avoid alcohol at night, avoid late meals and late exercise, avoid late physical activity.   *  Keep your sleep environment cool, dark and quiet with comfortable bedding, pillow, and mattress.    *  Try gentle background sounds with a sound machine, or small fan.  Try to keep a regular sleep-wake schedule.    *  For stimulus control, avoid being in bed for more than 20 minutes if unable to sleep.  If you do get out of bed, keep the lights dim, read a book that is not particularly entertaining, and return to sleep if you start feeling drowsy.   *  over the counter Melatonin is safe to try as a sleep aid, Follow the directions on the bottle.   *  If Melatonin does not help, try Tylenol PM or Unisom.   *  If none of these measures help relieve insomnia, then return to see me.   *  Most times, recurrent insomnia without an obvious cause is considered more of a symptom than a separate disease and many times can be a symptom of mood disorders such as depression and/or anxiety.         *  Trial of Trazodone 50 mg at bedtime to help facilitate sleep.  If the 50 mg dose is not helping much after 2 weeks, then go to 100 mg at bedtime.  The main side effect from this medication is drowsiness.  Do not operate motor vehicles or dangerous equipment after taking and do not take this after drinking alcohol.          *  Continue all medications at the same doses.  Contact your usual pharmacy if you need refills.     *  Follow up with Dr. Ramirez as planned in late March/early April    Lab Results   Component Value Date    A1C 12.5 01/16/2018    A1C 12.4 09/01/2017    A1C 11.1 03/21/2017    A1C 11.1 11/10/2016    A1C 11.5 07/05/2016    A1C 7.2 08/31/2015    A1C 8.8 05/14/2015    A1C 9.0 03/02/2015    A1C 7.0 11/14/2013    A1C 7.9 10/07/2013    A1C 8.1 08/13/2013    A1C 9.0 06/18/2013    A1C 7.7 02/28/2013    A1C 6.5 04/17/2012    A1C 5.9 08/02/2011    A1C 5.7 11/23/2010     A1C 5.6 07/06/2010    A1C 5.7 02/01/2010          Hemoglobin A1C (3 month average blood sugar) values:    5.0 97  5.5 111  6.0 126  ---------------  6.5 140  7.0 154  7.5 169  8.0 183  ---------------  8.5 197  9.0 215  9.5 226  10.0 240  10.5 255  11.0 269  11.5 283  12.0 298  12.5 312  13.0 326  13.5 341  14.0 355  >14 YIKES!

## 2018-03-14 NOTE — MR AVS SNAPSHOT
After Visit Summary   3/14/2018    Mary Lou Gil    MRN: [id number]           Patient Information     Date Of Birth          1960        Visit Information        Provider Department      3/14/2018 2:00 PM Xavi Garcia MD [facility]        Today's Diagnoses     Primary insomnia    -  1    Major depressive disorder, recurrent episode, mild (H)        Bilateral carpal tunnel syndrome        Degeneration of lumbar intervertebral disc        Diabetic mononeuropathy associated with diabetes mellitus due to underlying condition (H)        Perimenopausal        Tobacco use disorder        Mild persistent asthma, uncomplicated        Vitamin D deficiency        Vitamin B12 deficiency without anemia          Care Instructions    *    INSOMNIA:     *  Avoid caffeine within 6 hours of bed, avoid alcohol at night, avoid late meals and late exercise, avoid late physical activity.   *  Keep your sleep environment cool, dark and quiet with comfortable bedding, pillow, and mattress.    *  Try gentle background sounds with a sound machine, or small fan.  Try to keep a regular sleep-wake schedule.    *  For stimulus control, avoid being in bed for more than 20 minutes if unable to sleep.  If you do get out of bed, keep the lights dim, read a book that is not particularly entertaining, and return to sleep if you start feeling drowsy.   *  over the counter Melatonin is safe to try as a sleep aid, Follow the directions on the bottle.   *  If Melatonin does not help, try Tylenol PM or Unisom.   *  If none of these measures help relieve insomnia, then return to see me.   *  Most times, recurrent insomnia without an obvious cause is considered more of a symptom than a separate disease and many times can be a symptom of mood disorders such as depression and/or anxiety.         *  Trial of Trazodone 50 mg at bedtime to help facilitate sleep.  If the 50 mg dose is not helping much  after 2 weeks, then go to 100 mg at bedtime.  The main side effect from this medication is drowsiness.  Do not operate motor vehicles or dangerous equipment after taking and do not take this after drinking alcohol.          *  Continue all medications at the same doses.  Contact your usual pharmacy if you need refills.     *  Follow up with Dr. Ramirez as planned in late March/early April    Lab Results   Component Value Date    A1C 12.5 01/16/2018    A1C 12.4 09/01/2017    A1C 11.1 03/21/2017    A1C 11.1 11/10/2016    A1C 11.5 07/05/2016    A1C 7.2 08/31/2015    A1C 8.8 05/14/2015    A1C 9.0 03/02/2015    A1C 7.0 11/14/2013    A1C 7.9 10/07/2013    A1C 8.1 08/13/2013    A1C 9.0 06/18/2013    A1C 7.7 02/28/2013    A1C 6.5 04/17/2012    A1C 5.9 08/02/2011    A1C 5.7 11/23/2010    A1C 5.6 07/06/2010    A1C 5.7 02/01/2010          Hemoglobin A1C (3 month average blood sugar) values:    5.0 97  5.5 111  6.0 126  ---------------  6.5 140  7.0 154  7.5 169  8.0 183  ---------------  8.5 197  9.0 215  9.5 226  10.0 240  10.5 255  11.0 269  11.5 283  12.0 298  12.5 312  13.0 326  13.5 341  14.0 355  >14 YIKES!            Follow-ups after your visit        Future tests that were ordered for you today     Open Future Orders        Priority Expected Expires Ordered    Vitamin D Deficiency Routine 3/14/2018 6/14/2018 3/14/2018    Vitamin B12 Routine 3/14/2018 6/14/2018 3/14/2018            Who to contact     If you have questions or need follow up information about today's clinic visit or your schedule please contact [facility] directly at [phone].  Normal or non-critical lab and imaging results will be communicated to you by MyChart, letter or phone within 4 business days after the clinic has received the results. If you do not hear from us within 7 days, please contact the clinic through [employer] or phone. If you have a critical or abnormal lab result, we will notify you by phone as soon as  possible.  Submit refill requests through [employer] or call your pharmacy and they will forward the refill request to us. Please allow 3 business days for your refill to be completed.          Additional Information About Your Visit        [employer] Information     [employer] gives you secure access to your electronic health record. If you see a primary care provider, you can also send messages to your care team and make appointments. If you have questions, please call your primary care clinic.  If you do not have a primary care provider, please call [phone] and they will assist you.        Care EveryWhere ID     This is your Care EveryWhere ID. This could be used by other organizations to access your [facility] medical records  [id number]        Your Vitals Were     Pulse Temperature Respirations Pulse Oximetry BMI (Body Mass Index)       81 97.9  F (36.6  C) (Oral) 16 97% 28.12 kg/m2        Blood Pressure from Last 3 Encounters:   03/14/18 96/74   01/31/18 134/86   01/18/18 (!) 142/98    Weight from Last 3 Encounters:   03/14/18 167 lb 11.2 oz (76.1 kg)   01/31/18 167 lb 4.8 oz (75.9 kg)   01/18/18 168 lb (76.2 kg)              We Performed the Following     Asthma Action Plan (AAP)          Today's Medication Changes          These changes are accurate as of 3/14/18  3:07 PM.  If you have any questions, ask your nurse or doctor.               Start taking these medicines.        Dose/Directions    traZODone 50 MG tablet   Commonly known as:  DESYREL   Used for:  Major depressive disorder, recurrent episode, mild (H)   Started by:  Xavi Garcia MD        Dose:   mg   Take 1-2 tablets ( mg) by mouth nightly as needed for sleep   Quantity:  60 tablet   Refills:  1         These medicines have changed or have updated prescriptions.        Dose/Directions    cholecalciferol 5000 UNITS Caps   This may have changed:  additional instructions   Used for:  Perimenopausal   Changed by:  Jose  Xavi Maurer MD        Dose:  1 capsule   Take 1 capsule (5,000 Units) by mouth daily   Quantity:  90 capsule   Refills:  3       flunisolide HFA 80 MCG/ACT Aers oral inhaler   Commonly known as:  AEROSPAN   This may have changed:  additional instructions   Used for:  Mild persistent asthma, uncomplicated   Changed by:  Xavi Garcia MD        Dose:  2 puff   Inhale 2 puffs into the lungs 2 times daily Use twice per day during main allergy season(s)   Quantity:  1 Inhaler   Refills:  5            Where to get your medicines      These medications were sent to [address] [address]     Phone:  [phone]     albuterol 108 (90 BASE) MCG/ACT Inhaler    cholecalciferol 5000 UNITS Caps    citalopram 40 MG tablet    flunisolide HFA 80 MCG/ACT Aers oral inhaler    traZODone 50 MG tablet                Primary Care Provider Office Phone # Fax #    Abilio Casiano [phone]       XXX RESIGNED XXX  [facility] [address]        Equal Access to Services     [facility] : Hadii aad ku hadasho Sojosé miguelali, waaxda luqadaha, qaybta kaalmada adepiero, marley vizcaino. So [facility] [phone].    ATENCIÓN: Si habla español, tiene a ybarra disposición servicios gratuitos de asistencia lingüística. [facility] [phone].    We comply with applicable federal civil rights laws and [place] laws. We do not discriminate on the basis of race, color, national origin, age, disability, sex, sexual orientation, or gender identity.            Thank you!     Thank you for choosing [facility]  for your care. Our goal is always to provide you with excellent care. Hearing back from our patients is one way we can continue to improve our services. Please take a few minutes to complete the written survey that you may receive in the mail after your visit with us. Thank you!             Your  Updated Medication List - Protect others around you: Learn how to safely use, store and throw away your medicines at www.disposemymeds.org.          This list is accurate as of 3/14/18  3:07 PM.  Always use your most recent med list.                   Brand Name Dispense Instructions for use Diagnosis    albuterol 108 (90 BASE) MCG/ACT Inhaler    PROAIR HFA/PROVENTIL HFA/VENTOLIN HFA    1 Inhaler    Inhale 2 puffs into the lungs every 6 hours as needed for shortness of breath / dyspnea or wheezing    Tobacco use disorder       BENADRYL PO      Take 25-50 mg by mouth See Admin Instructions 25 mg during the day if needed and 50 mg at bedtime for sleep as needed        blood glucose monitoring meter device kit     1 kit    by In Vitro route as needed    Type 2 diabetes, HbA1c goal < 7% (H)       cholecalciferol 5000 UNITS Caps     90 capsule    Take 1 capsule (5,000 Units) by mouth daily    Perimenopausal       citalopram 40 MG tablet    celeXA    90 tablet    Take 1 tablet (40 mg) by mouth daily TO CONTROL SYMPTOMS OF DEPRESSION    Major depressive disorder, recurrent episode, mild (H)       cyanocobalamin 1000 MCG/ML injection    VITAMIN B12    30 mL    Inject 1 mL (1,000 mcg) Subcutaneous every 14 days INDICATION: FOR VITAMIN B12 SUPPLEMENTATION    Encounter for long-term (current) use of medications       EPINEPHrine 0.3 MG/0.3ML injection 2-pack    EPIPEN/ADRENACLICK/or ANY BX GENERIC EQUIV    0.3 mL    Inject 0.3 mLs (0.3 mg) into the muscle once as needed    Anaphylactic reaction, subsequent encounter       fenofibrate 160 MG tablet     90 tablet    Take 1 tablet (160 mg) by mouth At Bedtime with food. INDICATION: TO LOWER CHOLESTEROL    Hyperlipidemia with target LDL less than 100       flunisolide HFA 80 MCG/ACT Aers oral inhaler    AEROSPAN    1 Inhaler    Inhale 2 puffs into the lungs 2 times daily Use twice per day during main allergy season(s)    Mild persistent asthma, uncomplicated       FREESTYLE LITE  test strip   Generic drug:  blood glucose monitoring     200 each    USE TO TEST THREE TIMES A DAY    Type 2 diabetes mellitus with hyperglycemia, with long-term current use of insulin (H)       glipiZIDE 10 MG 24 hr tablet    glipiZIDE XL    30 tablet    Take 1 tablet (10 mg) by mouth daily INDICATION: TO TREAT DIABETES.    Type 2 diabetes mellitus with diabetic polyneuropathy, with long-term current use of insulin (H)       ibuprofen 800 MG tablet    ADVIL/MOTRIN    90 tablet    Take 1 tablet (800 mg) by mouth every 8 hours as needed for moderate pain or pain    Hip pain, left, Physical deconditioning, Other chronic pain, SI (sacroiliac) joint dysfunction       insulin detemir 100 UNIT/ML injection    LEVEMIR FLEXPEN/FLEXTOUCH    30 mL    Inject 30 Units Subcutaneous At Bedtime    Type 2 diabetes mellitus with diabetic polyneuropathy, with long-term current use of insulin (H)       levothyroxine 150 MCG tablet    SYNTHROID/LEVOTHROID    90 tablet    Take 1 tablet (150 mcg) by mouth daily TAKE FIRST THING IN THE MORNING ON AN EMPTY STOMACH,  NO FOOD FOR AN HOUR    Hypothyroidism, unspecified type       lidocaine 5 % ointment    XYLOCAINE    50 g    Apply a quarter size amount to painful areas up to 4 times per day as needed.    Bilateral carpal tunnel syndrome, Degeneration of lumbar intervertebral disc       lisinopril-hydrochlorothiazide 20-25 MG per tablet    PRINZIDE/ZESTORETIC    90 tablet    Take 1 tablet by mouth daily INDICATION:TO LOWER BLOOD PRESSURE AND TO PRESERVE KIDNEY FUNCTION    Essential hypertension, benign       metFORMIN 500 MG 24 hr tablet    GLUCOPHAGE-XR    60 tablet    Take 2 tablets (1,000 mg) by mouth daily (with dinner)    Type 2 diabetes mellitus with diabetic polyneuropathy, with long-term current use of insulin (H)       NEEDLES, ANY SIZE     1 Box    Inject 1 each Subcutaneous At Bedtime    Type 2 diabetes, HbA1c goal < 7% (H)       pravastatin 80 MG tablet    PRAVACHOL    90 tablet     Take 1 tablet (80 mg) by mouth daily INDICATION: TO LOWER CHOLESTEROL AND TO HELP  PREVENT HEART DISEASE    Hyperlipidemia with target LDL less than 100       SUMAtriptan 100 MG tablet    IMITREX    18 tablet    Take 1 tablet (100 mg) by mouth every 8 hours as needed for migraine    Migraine without status migrainosus, not intractable, unspecified migraine type       thin lancets    NO BRAND SPECIFIED    3 Box    1 Device 2 times daily    Type 2 diabetes, HbA1c goal < 7% (H)       tiZANidine 2 MG tablet    ZANAFLEX    90 tablet    Take 1-2 tablets (2-4 mg) by mouth 3 times daily    Muscle spasm       traMADol 50 MG tablet    ULTRAM    165 tablet    Take 1tablet(s) every 6 hours as needed for pain. 5-6 per day. May dispense on/after 06/4/17 to start taking on/after 06/06/17. 30 day supply    Bilateral carpal tunnel syndrome, Degeneration of lumbar intervertebral disc       traZODone 50 MG tablet    DESYREL    60 tablet    Take 1-2 tablets ( mg) by mouth nightly as needed for sleep    Major depressive disorder, recurrent episode, mild (H)       verapamil 240 MG CR tablet    CALAN-SR    90 tablet    Take 1 tablet (240 mg) by mouth At Bedtime INDICATION: TO LOWER BLOOD PRESSURE AND TO CONTROL MIGRAINE HEADACHES    Migraine without status migrainosus, not intractable, unspecified migraine type
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
## 2018-03-15 ASSESSMENT — ASTHMA QUESTIONNAIRES: ACT_TOTALSCORE: 23

## 2018-03-15 ASSESSMENT — PATIENT HEALTH QUESTIONNAIRE - PHQ9: SUM OF ALL RESPONSES TO PHQ QUESTIONS 1-9: 11

## 2018-04-25 DIAGNOSIS — E11.9 TYPE 2 DIABETES, HBA1C GOAL < 7% (H): ICD-10-CM

## 2018-04-25 DIAGNOSIS — E11.42 TYPE 2 DIABETES MELLITUS WITH DIABETIC POLYNEUROPATHY, WITH LONG-TERM CURRENT USE OF INSULIN (H): ICD-10-CM

## 2018-04-25 DIAGNOSIS — Z79.4 TYPE 2 DIABETES MELLITUS WITH DIABETIC POLYNEUROPATHY, WITH LONG-TERM CURRENT USE OF INSULIN (H): ICD-10-CM

## 2018-04-25 DIAGNOSIS — F33.0 MAJOR DEPRESSIVE DISORDER, RECURRENT EPISODE, MILD (H): ICD-10-CM

## 2018-04-25 RX ORDER — PEN NEEDLE, DIABETIC 31 GX5/16"
NEEDLE, DISPOSABLE MISCELLANEOUS
Qty: 100 EACH | Refills: 1 | Status: SHIPPED | OUTPATIENT
Start: 2018-04-25 | End: 2019-03-18

## 2018-04-25 RX ORDER — CITALOPRAM HYDROBROMIDE 40 MG/1
40 TABLET ORAL DAILY
Qty: 90 TABLET | Refills: 3 | Status: CANCELLED | OUTPATIENT
Start: 2018-04-25

## 2018-04-25 NOTE — TELEPHONE ENCOUNTER
CELEXA Routing refill request to provider for review/approval because:  PHQ-9 score an 11.    Routing insulin and glipizide refills to [facility].

## 2018-04-25 NOTE — TELEPHONE ENCOUNTER
Refills for Citalopram were placed on her file at the [facility] pharmacy on 3/14/18.  The pharmacy can dispense this medication for her.     Will send diabetes medication refills to Endocrinology since they are managing her diabetes.   She was told to follow up there 6-8 weeks after her last visit in January 2018.

## 2018-04-26 RX ORDER — GLIPIZIDE 10 MG/1
10 TABLET, FILM COATED, EXTENDED RELEASE ORAL DAILY
Qty: 30 TABLET | Refills: 1 | Status: SHIPPED | OUTPATIENT
Start: 2018-04-26 | End: 2018-07-11

## 2018-04-26 NOTE — TELEPHONE ENCOUNTER
Rx sent.    Glipizide.    Lab Results   Component Value Date    A1C 12.5 01/16/2018    A1C 12.4 09/01/2017    A1C 11.1 03/21/2017    A1C 11.1 11/10/2016    A1C 11.5 07/05/2016       Due for clinic visit.  Last clinic visit was 1/2018  Please send reminder letter.  Needs clinic visit before further refills.  Can see Madai Garcia at [facility] ([phone]) if she has earlier openings.

## 2018-05-07 ENCOUNTER — TELEPHONE (OUTPATIENT)
Dept: ENDOCRINOLOGY | Facility: CLINIC | Age: 58
End: 2018-05-07

## 2018-05-07 NOTE — LETTER
May 7, 2018      Mary Lou ADELINE Saleems  [address]   [place] [address]    Dear Mary Lou    I care about your health and have reviewed your health plan. I have reviewed your medical conditions, medication list, and lab results and am making recommendations based on this review, to better manage your health.    You are in particular need of attention regarding:  -Diabetes  -Breast Cancer Screening  -Colon Cancer Screening  -Cervical Cancer Screening    I am recommending that you:  -schedule a FOLLOWUP OFFICE APPOINTMENT with me.      -schedule a MAMMOGRAM which is due. Followed by PCP.     1 in 8 women will develop invasive breast cancer during her lifetime and it is the most common non-skin cancer in [place] women.  EARLY detection, new treatments, and a better understanding of the disease have increased survival rates - the 5 year survival rate in the 1960s was 63% and today it is close to 90%.    If you are under/uninsured, we recommend you contact the [facility]. They offer mammograms at no charge or on a sliding fee charge. You can schedule with them at [phone]. Please have them send us the results.      Please disregard this reminder if you have had this exam elsewhere within the last year.  It would be helpful for us to have a copy of your mammogram report in your file so that we can best coordinate your care - please contact us with when your test was done so we can update your record.             -schedule a COLONOSCOPY to look for colon cancer (due every 10 years or 5 years in higher risk situations.)   Followed by PCP     Colon cancer is now the second leading cause of cancer-related deaths in the [place] for both men and women and there are over 130,000 new cases and 50,000 deaths per year from colon cancer.  Colonoscopies can prevent 90-95% of these deaths.  Problem lesions can be removed before they ever become cancer.  This test is not only looking for cancer, but also getting rid  of precancerious lesions.    If you are under/uninsured, we recommend you contact the [employer] [facility]. [facility] is a free colorectal cancer screening program that provides colonoscopies for eligible under/uninsured [place] men and women. If you are interested in receiving a free colonoscopy, please call [facility] at [phone] (mention code [facility]) to see if you re eligible.      If you do not wish to do a colonoscopy or cannot afford to do one, at this time, there is another option. It is called a FIT test or Fecal Immunochemical Occult Blood Test (take home stool sample kit).  It does not replace the colonoscopy for colorectal cancer screening, but it can detect hidden bleeding in the lower colon.  It does need to be repeated every year and if a positive result is obtained, you would be referred for a colonoscopy.          If you have completed either one of these tests at another facility, please call with the details of when and where the tests were done and if they were normal or not. Or have the records sent to our clinic so that we can best coordinate your care.    -schedule a PAP SMEAR EXAM which is due. Followed by PCP  Please disregard this reminder if you have had this exam elsewhere within the last year.  It would be helpful for us to have a copy of your recent pap smear report in our file so that we can best coordinate your care.    If you are under/uninsured, we recommend you contact the [facility]. They offer pap smears at no charge or on a sliding fee charge. You can schedule with them at [phone]. Please have them send us the results.    Also, if you are smoking still and would like some help, then please contact us or make an appointment to discuss your options ([email or web address] has very good free information.)                                                            Health Maintenance Due   Topic Date Due     URINE DRUG SCREEN Q1 YR  06/27/1977     HEPATITIS C SCREENING   06/27/1980     TOBACCO CESSATION COUNSELING Q1 YR  02/28/2014     MAMMO SCREEN Q2 YR (SYSTEM ASSIGNED)  05/16/2016     PAP Q3 YR  05/12/2017     ADVANCE DIRECTIVE PLANNING Q5 YRS  06/27/2017     EYE EXAM Q1 YEAR  09/08/2017     COLONOSCOPY Q5 YR  12/11/2017     CREATININE Q1 YEAR  03/21/2018     LIPID MONITORING Q1 YEAR  03/21/2018     FOOT EXAM Q1 YEAR  06/01/2018         Please call us at [phone] ([facility]),  [phone] ([facility]) or use [employer] to address the above recommendations.     Thank you for trusting [facility] and we appreciate the opportunity to serve you.  We look forward to supporting your healthcare needs in the future.    Healthy Regards,    Susan Ramirez MD  Endocrinology  [facility]  May 7, 2018

## 2018-05-07 NOTE — TELEPHONE ENCOUNTER
Panel Management Review      Patient has the following on her problem list:     Diabetes    ASA: Not Required     Last A1C  Lab Results   Component Value Date    A1C 12.5 01/16/2018    A1C 12.4 09/01/2017    A1C 11.1 03/21/2017    A1C 11.1 11/10/2016    A1C 11.5 07/05/2016     A1C tested: FAILED    Last LDL:    Lab Results   Component Value Date    CHOL 221 11/10/2016     Lab Results   Component Value Date    HDL 44 11/10/2016     Lab Results   Component Value Date     03/21/2017     11/10/2016     Lab Results   Component Value Date    TRIG 225 11/10/2016     Lab Results   Component Value Date    CHOLHDLRATIO 5.0 08/31/2015     Lab Results   Component Value Date    NHDL 177 11/10/2016       Is the patient on a Statin? YES             Is the patient on Aspirin? NO    Medications     HMG CoA Reductase Inhibitors    pravastatin (PRAVACHOL) 80 MG tablet          Last three blood pressure readings:  BP Readings from Last 3 Encounters:   03/14/18 96/74   01/31/18 134/86   01/18/18 (!) 142/98       Date of last diabetes office visit: 1/16/18     Tobacco History:     History   Smoking Status     Current Every Day Smoker     Packs/day: 1.00     Years: 25.00     Types: Cigarettes, Other   Smokeless Tobacco     Never Used     Comment: started at age 19 and has quit for 10 years            Composite cancer screening  Chart review shows that this patient is due/due soon for the following Pap Smear, Mammogram and Colonoscopy  Summary:    Patient is due/failing the following:   COLONOSCOPY, FOLLOW UP, MAMMOGRAM and PAP    Action needed:   Patient needs office visit for dm.    Type of outreach:    Sent [employer] message. and Sent letter.    Questions for provider review:    None                                                                                                                                    Angela Alicia CMA (AAMA)       Chart routed to closed .

## 2018-05-21 ENCOUNTER — OFFICE VISIT (OUTPATIENT)
Dept: URGENT CARE | Facility: URGENT CARE | Age: 58
End: 2018-05-21
Payer: MEDICARE

## 2018-05-21 ENCOUNTER — HOSPITAL ENCOUNTER (EMERGENCY)
Facility: CLINIC | Age: 58
Discharge: HOME | End: 2018-05-21
Attending: EMERGENCY MEDICINE | Admitting: EMERGENCY MEDICINE
Payer: MEDICARE

## 2018-05-21 ENCOUNTER — APPOINTMENT (OUTPATIENT)
Dept: CT IMAGING | Facility: CLINIC | Age: 58
End: 2018-05-21
Attending: EMERGENCY MEDICINE
Payer: MEDICARE

## 2018-05-21 VITALS
OXYGEN SATURATION: 97 % | SYSTOLIC BLOOD PRESSURE: 125 MMHG | BODY MASS INDEX: 26.52 KG/M2 | TEMPERATURE: 98.7 F | HEIGHT: 66 IN | DIASTOLIC BLOOD PRESSURE: 87 MMHG | WEIGHT: 165 LBS | RESPIRATION RATE: 16 BRPM

## 2018-05-21 VITALS
DIASTOLIC BLOOD PRESSURE: 85 MMHG | HEART RATE: 91 BPM | SYSTOLIC BLOOD PRESSURE: 124 MMHG | OXYGEN SATURATION: 98 % | TEMPERATURE: 98.3 F

## 2018-05-21 DIAGNOSIS — R10.12 ABDOMINAL PAIN, LEFT UPPER QUADRANT: ICD-10-CM

## 2018-05-21 DIAGNOSIS — R10.11 ACUTE ABDOMINAL PAIN IN RIGHT UPPER QUADRANT: Primary | ICD-10-CM

## 2018-05-21 DIAGNOSIS — R10.11 ABDOMINAL PAIN, RIGHT UPPER QUADRANT: ICD-10-CM

## 2018-05-21 LAB
ALBUMIN SERPL-MCNC: 4.2 G/DL (ref 3.4–5)
ALBUMIN UR-MCNC: NEGATIVE MG/DL
ALP SERPL-CCNC: 63 U/L (ref 40–150)
ALT SERPL W P-5'-P-CCNC: 27 U/L (ref 0–50)
ANION GAP SERPL CALCULATED.3IONS-SCNC: 11 MMOL/L (ref 3–14)
APPEARANCE UR: CLEAR
AST SERPL W P-5'-P-CCNC: 22 U/L (ref 0–45)
BILIRUB SERPL-MCNC: 0.4 MG/DL (ref 0.2–1.3)
BILIRUB UR QL STRIP: NEGATIVE
BUN SERPL-MCNC: 18 MG/DL (ref 7–30)
CALCIUM SERPL-MCNC: 9.3 MG/DL (ref 8.5–10.1)
CHLORIDE SERPL-SCNC: 103 MMOL/L (ref 94–109)
CO2 SERPL-SCNC: 26 MMOL/L (ref 20–32)
COLOR UR AUTO: YELLOW
CREAT SERPL-MCNC: 0.66 MG/DL (ref 0.52–1.04)
ERYTHROCYTE [DISTWIDTH] IN BLOOD BY AUTOMATED COUNT: 14.7 % (ref 10–15)
GFR SERPL CREATININE-BSD FRML MDRD: >90 ML/MIN/1.7M2
GLUCOSE SERPL-MCNC: 169 MG/DL (ref 70–99)
GLUCOSE UR STRIP-MCNC: NEGATIVE MG/DL
HCT VFR BLD AUTO: 44.1 % (ref 35–47)
HGB BLD-MCNC: 15.2 G/DL (ref 11.7–15.7)
HGB UR QL STRIP: NEGATIVE
KETONES UR STRIP-MCNC: NEGATIVE MG/DL
LEUKOCYTE ESTERASE UR QL STRIP: NEGATIVE
LIPASE SERPL-CCNC: 110 U/L (ref 73–393)
MCH RBC QN AUTO: 30.3 PG (ref 26.5–33)
MCHC RBC AUTO-ENTMCNC: 34.5 G/DL (ref 31.5–36.5)
MCV RBC AUTO: 88 FL (ref 78–100)
NITRATE UR QL: NEGATIVE
PH UR STRIP: 6 PH (ref 5–7)
PLATELET # BLD AUTO: 301 10E9/L (ref 150–450)
POTASSIUM SERPL-SCNC: 3.4 MMOL/L (ref 3.4–5.3)
PROT SERPL-MCNC: 8.4 G/DL (ref 6.8–8.8)
RBC # BLD AUTO: 5.02 10E12/L (ref 3.8–5.2)
SODIUM SERPL-SCNC: 140 MMOL/L (ref 133–144)
SOURCE: NORMAL
SP GR UR STRIP: 1.01 (ref 1–1.03)
UROBILINOGEN UR STRIP-MCNC: 2 MG/DL (ref 0–2)
WBC # BLD AUTO: 10.6 10E9/L (ref 4–11)

## 2018-05-21 PROCEDURE — 81003 URINALYSIS AUTO W/O SCOPE: CPT | Performed by: EMERGENCY MEDICINE

## 2018-05-21 PROCEDURE — 25000125 ZZHC RX 250: Performed by: EMERGENCY MEDICINE

## 2018-05-21 PROCEDURE — 96374 THER/PROPH/DIAG INJ IV PUSH: CPT

## 2018-05-21 PROCEDURE — 99285 EMERGENCY DEPT VISIT HI MDM: CPT | Mod: 25

## 2018-05-21 PROCEDURE — 74177 CT ABD & PELVIS W/CONTRAST: CPT

## 2018-05-21 PROCEDURE — 80053 COMPREHEN METABOLIC PANEL: CPT | Performed by: EMERGENCY MEDICINE

## 2018-05-21 PROCEDURE — 25000128 H RX IP 250 OP 636: Performed by: EMERGENCY MEDICINE

## 2018-05-21 PROCEDURE — 83690 ASSAY OF LIPASE: CPT | Performed by: EMERGENCY MEDICINE

## 2018-05-21 PROCEDURE — 85027 COMPLETE CBC AUTOMATED: CPT | Performed by: EMERGENCY MEDICINE

## 2018-05-21 RX ORDER — IOPAMIDOL 755 MG/ML
83 INJECTION, SOLUTION INTRAVASCULAR ONCE
Status: COMPLETED | OUTPATIENT
Start: 2018-05-21 | End: 2018-05-21

## 2018-05-21 RX ORDER — MORPHINE SULFATE 4 MG/ML
4 INJECTION, SOLUTION INTRAMUSCULAR; INTRAVENOUS ONCE
Status: COMPLETED | OUTPATIENT
Start: 2018-05-21 | End: 2018-05-21

## 2018-05-21 RX ORDER — MORPHINE SULFATE 4 MG/ML
4 INJECTION, SOLUTION INTRAMUSCULAR; INTRAVENOUS
Status: DISCONTINUED | OUTPATIENT
Start: 2018-05-21 | End: 2018-05-22 | Stop reason: HOSPADM

## 2018-05-21 RX ADMIN — MORPHINE SULFATE 4 MG: 4 INJECTION INTRAVENOUS at 21:16

## 2018-05-21 RX ADMIN — IOPAMIDOL 83 ML: 755 INJECTION, SOLUTION INTRAVENOUS at 21:50

## 2018-05-21 RX ADMIN — SODIUM CHLORIDE 65 ML: 9 INJECTION, SOLUTION INTRAVENOUS at 21:51

## 2018-05-21 ASSESSMENT — ENCOUNTER SYMPTOMS
DYSURIA: 0
SHORTNESS OF BREATH: 0
FEVER: 0
HEMATURIA: 0
NAUSEA: 0
ABDOMINAL PAIN: 1
VOMITING: 0

## 2018-05-21 NOTE — ED AVS SNAPSHOT
Emergency Department    [address] [place] [id number]    Phone:  [phone]    Fax:  [phone]                                       Mary Lou Gil   MRN: [id number]    Department:   Emergency Department   Date of Visit:  5/21/2018           After Visit Summary Signature Page     I have received my discharge instructions, and my questions have been answered. I have discussed any challenges I see with this plan with the nurse or doctor.    ..........................................................................................................................................  Patient/Patient Representative Signature      ..........................................................................................................................................  Patient Representative Print Name and Relationship to Patient    ..................................................               ................................................  Date                                            Time    ..........................................................................................................................................  Reviewed by Signature/Title    ...................................................              ..............................................  Date                                                            Time

## 2018-05-21 NOTE — MR AVS SNAPSHOT
After Visit Summary   5/21/2018    Mary Lou Gil    MRN: [id number]           Patient Information     Date Of Birth          1960        Visit Information        Provider Department      5/21/2018 7:35 PM Provider, Cristela Hartmann MD [facility]        Today's Diagnoses     Acute abdominal pain in right upper quadrant    -  1       Follow-ups after your visit        Who to contact     If you have questions or need follow up information about today's clinic visit or your schedule please contact [facility] directly at [phone].  Normal or non-critical lab and imaging results will be communicated to you by Meaningohart, letter or phone within 4 business days after the clinic has received the results. If you do not hear from us within 7 days, please contact the clinic through Dissolvet or phone. If you have a critical or abnormal lab result, we will notify you by phone as soon as possible.  Submit refill requests through [employer] or call your pharmacy and they will forward the refill request to us. Please allow 3 business days for your refill to be completed.          Additional Information About Your Visit        MyChart Information     [employer] gives you secure access to your electronic health record. If you see a primary care provider, you can also send messages to your care team and make appointments. If you have questions, please call your primary care clinic.  If you do not have a primary care provider, please call [phone] and they will assist you.        Care EveryWhere ID     This is your Care EveryWhere ID. This could be used by other organizations to access your [facility] medical records  [id number]        Your Vitals Were     Pulse Temperature Pulse Oximetry             91 98.3  F (36.8  C) (Oral) 98%          Blood Pressure from Last 3 Encounters:   05/21/18 124/85   03/14/18 96/74   01/31/18 134/86    Weight from Last 3  Encounters:   03/14/18 167 lb 11.2 oz (76.1 kg)   01/31/18 167 lb 4.8 oz (75.9 kg)   01/18/18 168 lb (76.2 kg)              Today, you had the following     No orders found for display       Primary Care Provider Office Phone # Fax #    Xavi Erasto Garcia [phone]       [address] [facility] [address]        Equal Access to Services     RISHI MOJICA : Hadii aad ku hadasho Soomaali, waaxda luqadaha, qaybta kaalmada adeegyada, waxay idiin hayaan adeeg kharapete la'rosa . So [facility] [phone].    ATENCIÓN: Si habla español, tiene a ybarra disposición servicios gratuitos de asistencia lingüística. [facility] [phone].    We comply with applicable federal civil rights laws and [place] laws. We do not discriminate on the basis of race, color, national origin, age, disability, sex, sexual orientation, or gender identity.            Thank you!     Thank you for choosing [facility]  for your care. Our goal is always to provide you with excellent care. Hearing back from our patients is one way we can continue to improve our services. Please take a few minutes to complete the written survey that you may receive in the mail after your visit with us. Thank you!             Your Updated Medication List - Protect others around you: Learn how to safely use, store and throw away your medicines at www.disposemymeds.org.          This list is accurate as of 5/21/18  8:07 PM.  Always use your most recent med list.                   Brand Name Dispense Instructions for use Diagnosis    albuterol 108 (90 Base) MCG/ACT Inhaler    PROAIR HFA/PROVENTIL HFA/VENTOLIN HFA    1 Inhaler    Inhale 2 puffs into the lungs every 6 hours as needed for shortness of breath / dyspnea or wheezing    Tobacco use disorder       B-D U/F 31G X 8 MM   Generic drug:  insulin pen needle     100 each    USE AS DIRECTED AT BEDTIME    Type 2 diabetes, HbA1c goal < 7% (H)       BENADRYL PO      Take 25-50 mg  by mouth See Admin Instructions 25 mg during the day if needed and 50 mg at bedtime for sleep as needed        blood glucose monitoring meter device kit     1 kit    by In Vitro route as needed    Type 2 diabetes, HbA1c goal < 7% (H)       cholecalciferol 5000 units Caps     90 capsule    Take 1 capsule (5,000 Units) by mouth daily    Perimenopausal       citalopram 40 MG tablet    celeXA    90 tablet    Take 1 tablet (40 mg) by mouth daily TO CONTROL SYMPTOMS OF DEPRESSION    Major depressive disorder, recurrent episode, mild (H)       cyanocobalamin 1000 MCG/ML injection    VITAMIN B12    30 mL    Inject 1 mL (1,000 mcg) Subcutaneous every 14 days INDICATION: FOR VITAMIN B12 SUPPLEMENTATION    Encounter for long-term (current) use of medications       EPINEPHrine 0.3 MG/0.3ML injection 2-pack    EPIPEN/ADRENACLICK/or ANY BX GENERIC EQUIV    0.3 mL    Inject 0.3 mLs (0.3 mg) into the muscle once as needed    Anaphylactic reaction, subsequent encounter       fenofibrate 160 MG tablet     90 tablet    Take 1 tablet (160 mg) by mouth At Bedtime with food. INDICATION: TO LOWER CHOLESTEROL    Hyperlipidemia with target LDL less than 100       flunisolide HFA 80 MCG/ACT Aers oral inhaler    AEROSPAN    1 Inhaler    Inhale 2 puffs into the lungs 2 times daily Use twice per day during main allergy season(s)    Mild persistent asthma, uncomplicated       FREESTYLE LITE test strip   Generic drug:  blood glucose monitoring     200 each    USE TO TEST THREE TIMES A DAY    Type 2 diabetes mellitus with hyperglycemia, with long-term current use of insulin (H)       glipiZIDE 10 MG 24 hr tablet    glipiZIDE XL    30 tablet    Take 1 tablet (10 mg) by mouth daily INDICATION: TO TREAT DIABETES.    Type 2 diabetes mellitus with diabetic polyneuropathy, with long-term current use of insulin (H)       ibuprofen 800 MG tablet    ADVIL/MOTRIN    90 tablet    Take 1 tablet (800 mg) by mouth every 8 hours as needed for moderate pain or  pain    Hip pain, left, Physical deconditioning, Other chronic pain, SI (sacroiliac) joint dysfunction       insulin detemir 100 UNIT/ML injection    LEVEMIR FLEXPEN/FLEXTOUCH    30 mL    Inject 30 Units Subcutaneous At Bedtime    Type 2 diabetes mellitus with diabetic polyneuropathy, with long-term current use of insulin (H)       levothyroxine 150 MCG tablet    SYNTHROID/LEVOTHROID    90 tablet    Take 1 tablet (150 mcg) by mouth daily TAKE FIRST THING IN THE MORNING ON AN EMPTY STOMACH,  NO FOOD FOR AN HOUR    Hypothyroidism, unspecified type       lidocaine 5 % ointment    XYLOCAINE    50 g    Apply a quarter size amount to painful areas up to 4 times per day as needed.    Bilateral carpal tunnel syndrome, Degeneration of lumbar intervertebral disc       lisinopril-hydrochlorothiazide 20-25 MG per tablet    PRINZIDE/ZESTORETIC    90 tablet    Take 1 tablet by mouth daily INDICATION:TO LOWER BLOOD PRESSURE AND TO PRESERVE KIDNEY FUNCTION    Essential hypertension, benign       metFORMIN 500 MG 24 hr tablet    GLUCOPHAGE-XR    60 tablet    Take 2 tablets (1,000 mg) by mouth daily (with dinner)    Type 2 diabetes mellitus with diabetic polyneuropathy, with long-term current use of insulin (H)       pravastatin 80 MG tablet    PRAVACHOL    90 tablet    Take 1 tablet (80 mg) by mouth daily INDICATION: TO LOWER CHOLESTEROL AND TO HELP  PREVENT HEART DISEASE    Hyperlipidemia with target LDL less than 100       SUMAtriptan 100 MG tablet    IMITREX    18 tablet    Take 1 tablet (100 mg) by mouth every 8 hours as needed for migraine    Migraine without status migrainosus, not intractable, unspecified migraine type       thin lancets    NO BRAND SPECIFIED    3 Box    1 Device 2 times daily    Type 2 diabetes, HbA1c goal < 7% (H)       tiZANidine 2 MG tablet    ZANAFLEX    90 tablet    Take 1-2 tablets (2-4 mg) by mouth 3 times daily    Muscle spasm       traMADol 50 MG tablet    ULTRAM    165 tablet    Take 1tablet(s)  every 6 hours as needed for pain. 5-6 per day. May dispense on/after 06/4/17 to start taking on/after 06/06/17. 30 day supply    Bilateral carpal tunnel syndrome, Degeneration of lumbar intervertebral disc       traZODone 50 MG tablet    DESYREL    60 tablet    Take 1-2 tablets ( mg) by mouth nightly as needed for sleep    Major depressive disorder, recurrent episode, mild (H)       verapamil 240 MG CR tablet    CALAN-SR    90 tablet    Take 1 tablet (240 mg) by mouth At Bedtime INDICATION: TO LOWER BLOOD PRESSURE AND TO CONTROL MIGRAINE HEADACHES    Migraine without status migrainosus, not intractable, unspecified migraine type

## 2018-05-21 NOTE — ED AVS SNAPSHOT
Emergency Department    [address] [facility] [address]    Phone:  [phone]    Fax:  [phone]                                       Mary Lou Gil   MRN: [id number]    Department:   Emergency Department   Date of Visit:  5/21/2018           Patient Information     Date Of Birth          1960        Your diagnoses for this visit were:     Abdominal pain, left upper quadrant     Abdominal pain, right upper quadrant        You were seen by Starla Barnhart MD.      Follow-up Information     Schedule an appointment as soon as possible for a visit with Xavi Garcia MD.    Specialty:  Internal Medicine    Contact information:    [address] [facility] [address]  [phone]          Follow up with  Emergency Department.    Specialty:  EMERGENCY MEDICINE    Why:  If symptoms worsen    Contact information:    [address] [facility] [id number] [phone]        Discharge Instructions       Start miralax for the stool in colon  Can add ibuprofen for pain  Possible epiploic appendagitis on Ct abdomen which can cause pain.     Follow up with primary care provider    Discharge Instructions  Abdominal Pain    Abdominal pain (belly pain) can be caused by many things. Your evaluation today does not show the exact cause for your pain. Your provider today has decided that it is unlikely your pain is due to a life threatening problem, or a problem requiring surgery or hospital admission. Sometimes those problems cannot be found right away, so it is very important that you follow up as directed.  Sometimes only the changes which occur over time allow the cause of your pain to be found.    Generally, every Emergency Department visit should have a follow-up clinic visit with either a primary or a specialty clinic/provider. Please follow-up as instructed by your emergency provider today. With abdominal pain, we often recommend very close follow-up, such as  the following day.    ADULTS:  Return to the Emergency Department right away if:      You get an oral temperature above 102oF or as directed by your provider.    You have blood in your stools. This may be bright red or appear as black, tarry stools.      You keep vomiting (throwing up) or cannot drink liquids.    You see blood when you vomit.     You cannot have a bowel movement or you cannot pass gas.    Your stomach gets bloated or bigger.    Your skin or the whites of your eyes look yellow.    You faint.    You have bloody, frequent or painful urination (peeing).    You have new symptoms or anything that worries you.    CHILDREN:  Return to the Emergency Department right away if your child has any of the above-listed symptoms or the following:      Pushes your hand away or screams/cries when his/her belly is touched.    You notice your child is very fussy or weak.    Your child is very tired and is too tired to eat or drink.    Your child is dehydrated.  Signs of dehydration can be:  o Significant change in the amount of wet diapers/urine.  o Your infant or child starts to have dry mouth and lips, or no saliva (spit) or tears.    PREGNANT WOMEN:  Return to the Emergency Department right away if you have any of the above-listed symptoms or the following:      You have bleeding, leaking fluid or passing tissue from the vagina.    You have worse pain or cramping, or pain in your shoulder or back.    You have vomiting that will not stop.    You have a temperature of 100oF or more.    Your baby is not moving as much as usual.    You faint.    You get a bad headache with or without eye problems and abdominal pain.    You have a seizure.    You have unusual discharge from your vagina and abdominal pain.    Abdominal pain is pretty common during pregnancy.  Your pain may or may not be related to your pregnancy. You should follow-up closely with your OB provider so they can evaluate you and your baby.  Until you follow-up  "with your regular provider, do the following:       Avoid sex and do not put anything in your vagina.    Drink clear fluids.    Only take medications approved by your provider.    MORE INFORMATION:    Appendicitis:  A possible cause of abdominal pain in any person who still has their appendix is acute appendicitis. Appendicitis is often hard to diagnose.  Testing does not always rule out early appendicitis or other causes of abdominal pain. Close follow-up with your provider and re-evaluations may be needed to figure out the reason for your abdominal pain.    Follow-up:  It is very important that you make an appointment with your clinic and go to the appointment.  If you do not follow-up with your primary provider, it may result in missing an important development which could result in permanent injury or disability and/or lasting pain.  If there is any problem keeping your appointment, call your provider or return to the Emergency Department.    Medications:  Take your medications as directed by your provider today.  Before using over-the-counter medications, ask your provider and make sure to take the medications as directed.  If you have any questions about medications, ask your provider.    Diet:  Resume your normal diet as much as possible, but do not eat fried, fatty or spicy foods while you have pain.  Do not drink alcohol or have caffeine.  Do not smoke tobacco.    Probiotics: If you have been given an antibiotic, you may want to also take a probiotic pill or eat yogurt with live cultures. Probiotics have \"good bacteria\" to help your intestines stay healthy. Studies have shown that probiotics help prevent diarrhea (loose stools) and other intestine problems (including C. diff infection) when you take antibiotics. You can buy these without a prescription in the pharmacy section of the store.     If you were given a prescription for medicine here today, be sure to read all of the information (including the " package insert) that comes with your prescription.  This will include important information about the medicine, its side effects, and any warnings that you need to know about.  The pharmacist who fills the prescription can provide more information and answer questions you may have about the medicine.  If you have questions or concerns that the pharmacist cannot address, please call or return to the Emergency Department.       Remember that you can always come back to the Emergency Department if you are not able to see your regular provider in the amount of time listed above, if you get any new symptoms, or if there is anything that worries you.      24 Hour Appointment Hotline       To make an appointment at any [facility], call [phone] ([phone]). If you don't have a family doctor or clinic, we will help you find one. [facility] are conveniently located to serve the needs of you and your family.             Review of your medicines      Our records show that you are taking the medicines listed below. If these are incorrect, please call your family doctor or clinic.        Dose / Directions Last dose taken    albuterol 108 (90 Base) MCG/ACT Inhaler   Commonly known as:  PROAIR HFA/PROVENTIL HFA/VENTOLIN HFA   Dose:  2 puff   Quantity:  1 Inhaler        Inhale 2 puffs into the lungs every 6 hours as needed for shortness of breath / dyspnea or wheezing   Refills:  11        B-D U/F 31G X 8 MM   Quantity:  100 each   Generic drug:  insulin pen needle        USE AS DIRECTED AT BEDTIME   Refills:  1        BENADRYL PO   Dose:  25-50 mg        Take 25-50 mg by mouth See Admin Instructions 25 mg during the day if needed and 50 mg at bedtime for sleep as needed   Refills:  0        blood glucose monitoring meter device kit   Quantity:  1 kit        by In Vitro route as needed   Refills:  PRN        cholecalciferol 5000 units Caps   Dose:  1 capsule   Quantity:  90 capsule        Take 1 capsule (5,000  Units) by mouth daily   Refills:  3        citalopram 40 MG tablet   Commonly known as:  celeXA   Dose:  40 mg   Quantity:  90 tablet        Take 1 tablet (40 mg) by mouth daily TO CONTROL SYMPTOMS OF DEPRESSION   Refills:  3        cyanocobalamin 1000 MCG/ML injection   Commonly known as:  VITAMIN B12   Dose:  1 mL   Quantity:  30 mL        Inject 1 mL (1,000 mcg) Subcutaneous every 14 days INDICATION: FOR VITAMIN B12 SUPPLEMENTATION   Refills:  5        EPINEPHrine 0.3 MG/0.3ML injection 2-pack   Commonly known as:  EPIPEN/ADRENACLICK/or ANY BX GENERIC EQUIV   Dose:  0.3 mg   Quantity:  0.3 mL        Inject 0.3 mLs (0.3 mg) into the muscle once as needed   Refills:  prn        fenofibrate 160 MG tablet   Dose:  160 mg   Quantity:  90 tablet        Take 1 tablet (160 mg) by mouth At Bedtime with food. INDICATION: TO LOWER CHOLESTEROL   Refills:  3        flunisolide HFA 80 MCG/ACT Aers oral inhaler   Commonly known as:  AEROSPAN   Dose:  2 puff   Quantity:  1 Inhaler        Inhale 2 puffs into the lungs 2 times daily Use twice per day during main allergy season(s)   Refills:  5        FREESTYLE LITE test strip   Quantity:  200 each   Generic drug:  blood glucose monitoring        USE TO TEST THREE TIMES A DAY   Refills:  3        glipiZIDE 10 MG 24 hr tablet   Commonly known as:  glipiZIDE XL   Dose:  10 mg   Quantity:  30 tablet        Take 1 tablet (10 mg) by mouth daily INDICATION: TO TREAT DIABETES.   Refills:  1        ibuprofen 800 MG tablet   Commonly known as:  ADVIL/MOTRIN   Dose:  800 mg   Quantity:  90 tablet        Take 1 tablet (800 mg) by mouth every 8 hours as needed for moderate pain or pain   Refills:  2        insulin detemir 100 UNIT/ML injection   Commonly known as:  LEVEMIR FLEXPEN/FLEXTOUCH   Dose:  30 Units   Quantity:  30 mL        Inject 30 Units Subcutaneous At Bedtime   Refills:  1        levothyroxine 150 MCG tablet   Commonly known as:  SYNTHROID/LEVOTHROID   Dose:  150 mcg   Quantity:   90 tablet        Take 1 tablet (150 mcg) by mouth daily TAKE FIRST THING IN THE MORNING ON AN EMPTY STOMACH,  NO FOOD FOR AN HOUR   Refills:  1        lidocaine 5 % ointment   Commonly known as:  XYLOCAINE   Quantity:  50 g        Apply a quarter size amount to painful areas up to 4 times per day as needed.   Refills:  3        lisinopril-hydrochlorothiazide 20-25 MG per tablet   Commonly known as:  PRINZIDE/ZESTORETIC   Dose:  1 tablet   Quantity:  90 tablet        Take 1 tablet by mouth daily INDICATION:TO LOWER BLOOD PRESSURE AND TO PRESERVE KIDNEY FUNCTION   Refills:  1        metFORMIN 500 MG 24 hr tablet   Commonly known as:  GLUCOPHAGE-XR   Dose:  1000 mg   Quantity:  60 tablet        Take 2 tablets (1,000 mg) by mouth daily (with dinner)   Refills:  3        pravastatin 80 MG tablet   Commonly known as:  PRAVACHOL   Dose:  80 mg   Quantity:  90 tablet        Take 1 tablet (80 mg) by mouth daily INDICATION: TO LOWER CHOLESTEROL AND TO HELP  PREVENT HEART DISEASE   Refills:  1        SUMAtriptan 100 MG tablet   Commonly known as:  IMITREX   Dose:  100 mg   Quantity:  18 tablet        Take 1 tablet (100 mg) by mouth every 8 hours as needed for migraine   Refills:  11        thin lancets   Commonly known as:  NO BRAND SPECIFIED   Dose:  1 Device   Quantity:  3 Box        1 Device 2 times daily   Refills:  2        tiZANidine 2 MG tablet   Commonly known as:  ZANAFLEX   Dose:  2-4 mg   Quantity:  90 tablet        Take 1-2 tablets (2-4 mg) by mouth 3 times daily   Refills:  1        traMADol 50 MG tablet   Commonly known as:  ULTRAM   Quantity:  165 tablet        Take 1tablet(s) every 6 hours as needed for pain. 5-6 per day. May dispense on/after 06/4/17 to start taking on/after 06/06/17. 30 day supply   Refills:  0        traZODone 50 MG tablet   Commonly known as:  DESYREL   Dose:   mg   Quantity:  60 tablet        Take 1-2 tablets ( mg) by mouth nightly as needed for sleep   Refills:  1         verapamil 240 MG CR tablet   Commonly known as:  CALAN-SR   Dose:  240 mg   Quantity:  90 tablet        Take 1 tablet (240 mg) by mouth At Bedtime INDICATION: TO LOWER BLOOD PRESSURE AND TO CONTROL MIGRAINE HEADACHES   Refills:  1                Procedures and tests performed during your visit     CBC (+ platelets, no diff)    CT Abdomen Pelvis w Contrast    Comprehensive metabolic panel    Lipase    UA reflex to Microscopic      Orders Needing Specimen Collection     None      Pending Results     Date and Time Order Name Status Description    5/21/2018 2112 CT Abdomen Pelvis w Contrast Preliminary             Pending Culture Results     No orders found from 5/19/2018 to 5/22/2018.            Pending Results Instructions     If you had any lab results that were not finalized at the time of your Discharge, you can call the ED Lab Result RN at [phone]. You will be contacted by this team for any positive Lab results or changes in treatment. The nurses are available 7 days a week from 10A to 6:30P.  You can leave a message 24 hours per day and they will return your call.        Test Results From Your Hospital Stay        5/21/2018  9:38 PM      Component Results     Component Value Ref Range & Units Status    Sodium 140 133 - 144 mmol/L Final    Potassium 3.4 3.4 - 5.3 mmol/L Final    Chloride 103 94 - 109 mmol/L Final    Carbon Dioxide 26 20 - 32 mmol/L Final    Anion Gap 11 3 - 14 mmol/L Final    Glucose 169 (H) 70 - 99 mg/dL Final    Urea Nitrogen 18 7 - 30 mg/dL Final    Creatinine 0.66 0.52 - 1.04 mg/dL Final    GFR Estimate >90 >60 mL/min/1.7m2 Final    Non  GFR Calc    GFR Estimate If Black >90 >60 mL/min/1.7m2 Final    African American GFR Calc    Calcium 9.3 8.5 - 10.1 mg/dL Final    Bilirubin Total 0.4 0.2 - 1.3 mg/dL Final    Albumin 4.2 3.4 - 5.0 g/dL Final    Protein Total 8.4 6.8 - 8.8 g/dL Final    Alkaline Phosphatase 63 40 - 150 U/L Final    ALT 27 0 - 50 U/L Final    AST 22 0 - 45  U/L Final         5/21/2018  9:22 PM      Component Results     Component Value Ref Range & Units Status    WBC 10.6 4.0 - 11.0 10e9/L Final    RBC Count 5.02 3.8 - 5.2 10e12/L Final    Hemoglobin 15.2 11.7 - 15.7 g/dL Final    Hematocrit 44.1 35.0 - 47.0 % Final    MCV 88 78 - 100 fl Final    MCH 30.3 26.5 - 33.0 pg Final    MCHC 34.5 31.5 - 36.5 g/dL Final    RDW 14.7 10.0 - 15.0 % Final    Platelet Count 301 150 - 450 10e9/L Final         5/21/2018  9:36 PM      Component Results     Component Value Ref Range & Units Status    Lipase 110 73 - 393 U/L Final         5/21/2018  9:22 PM      Component Results     Component Value Ref Range & Units Status    Color Urine Yellow  Final    Appearance Urine Clear  Final    Glucose Urine Negative NEG^Negative mg/dL Final    Bilirubin Urine Negative NEG^Negative Final    Ketones Urine Negative NEG^Negative mg/dL Final    Specific Gravity Urine 1.013 1.003 - 1.035 Final    Blood Urine Negative NEG^Negative Final    pH Urine 6.0 5.0 - 7.0 pH Final    Protein Albumin Urine Negative NEG^Negative mg/dL Final    Urobilinogen mg/dL 2.0 0.0 - 2.0 mg/dL Final    Nitrite Urine Negative NEG^Negative Final    Leukocyte Esterase Urine Negative NEG^Negative Final    Source Midstream Urine  Final         5/21/2018 10:14 PM      Narrative     CT ABDOMEN AND PELVIS WITH CONTRAST   5/21/2018 9:58 PM     HISTORY: Right and left upper abdominal pain.    COMPARISON: 2/20/2018.    TECHNIQUE: Following the uneventful administration of 83 mL Isovue-370  intravenous contrast, helical sections were acquired from the top of  the diaphragm through the pubic symphysis. Coronal reconstructions  were generated. Radiation dose for this scan was reduced using  automated exposure control, adjustment of the mA and/or kV according  to the patient's size, or iterative reconstruction technique.    FINDINGS:     Abdomen: Slight diffuse fatty infiltration of the liver. The spleen,  pancreas, adrenal glands and  right kidney are unremarkable.  Subcentimeter low-attenuation lesion in the inferior pole of the left  kidney, too small to characterize. The gallbladder is present. No  enlarged lymph nodes or free fluid in the upper abdomen.    Scan through the lower chest is unremarkable.    Pelvis: The small and large bowel are normal in caliber. The appendix  is not visualized. A small focus of fat with adjacent curvilinear  opacities are present abutting the lateral aspect of the proximal  descending colon (for example, series 2 image 32). These findings are  equivocal for epiploic appendagitis. No bowel wall thickening,  pneumatosis or free intraperitoneal gas. A moderate amount of stool is  present throughout the colon. The uterus is present. No enlarged lymph  nodes or free fluid in the pelvis.        Impression     IMPRESSION:   1. Equivocal epiploic appendagitis of the proximal descending colon.  2. No other convincing cause of acute pain identified in the abdomen  or pelvis  3. A moderate amount of stool is present within the colon.                Clinical Quality Measure: Blood Pressure Screening     Your blood pressure was checked while you were in the emergency department today. The last reading we obtained was  BP:  (pt in CT) . Please read the guidelines below about what these numbers mean and what you should do about them.  If your systolic blood pressure (the top number) is less than 120 and your diastolic blood pressure (the bottom number) is less than 80, then your blood pressure is normal. There is nothing more that you need to do about it.  If your systolic blood pressure (the top number) is 120-139 or your diastolic blood pressure (the bottom number) is 80-89, your blood pressure may be higher than it should be. You should have your blood pressure rechecked within a year by a primary care provider.  If your systolic blood pressure (the top number) is 140 or greater or your diastolic blood pressure (the bottom  number) is 90 or greater, you may have high blood pressure. High blood pressure is treatable, but if left untreated over time it can put you at risk for heart attack, stroke, or kidney failure. You should have your blood pressure rechecked by a primary care provider within the next 4 weeks.  If your provider in the emergency department today gave you specific instructions to follow-up with your doctor or provider even sooner than that, you should follow that instruction and not wait for up to 4 weeks for your follow-up visit.        Thank you for choosing [facility]       Thank you for choosing [facility] for your care. Our goal is always to provide you with excellent care. Hearing back from our patients is one way we can continue to improve our services. Please take a few minutes to complete the written survey that you may receive in the mail after you visit with us. Thank you!        [facility] Information     [employer] gives you secure access to your electronic health record. If you see a primary care provider, you can also send messages to your care team and make appointments. If you have questions, please call your primary care clinic.  If you do not have a primary care provider, please call [phone] and they will assist you.        Care EveryWhere ID     This is your Care EveryWhere ID. This could be used by other organizations to access your [facility] medical records  [id number]        Equal Access to Services     RISHI MOJICA : Tigre Granda, waradhada easton, qaybta kaalmada arnold, marley vizcaino. So [facility] [phone].    ATENCIÓN: Si habla español, tiene a ybarra disposición servicios gratuitos de asistencia lingüística. Llame al [phone].    We comply with applicable federal civil rights laws and [place] laws. We do not discriminate on the basis of race, color, national origin, age, disability, sex, sexual orientation, or gender identity.            After Visit  Summary       This is your record. Keep this with you and show to your community pharmacist(s) and doctor(s) at your next visit.

## 2018-05-22 NOTE — DISCHARGE INSTRUCTIONS
Start miralax for the stool in colon  Can add ibuprofen for pain  Possible epiploic appendagitis on Ct abdomen which can cause pain.     Follow up with primary care provider    Discharge Instructions  Abdominal Pain    Abdominal pain (belly pain) can be caused by many things. Your evaluation today does not show the exact cause for your pain. Your provider today has decided that it is unlikely your pain is due to a life threatening problem, or a problem requiring surgery or hospital admission. Sometimes those problems cannot be found right away, so it is very important that you follow up as directed.  Sometimes only the changes which occur over time allow the cause of your pain to be found.    Generally, every Emergency Department visit should have a follow-up clinic visit with either a primary or a specialty clinic/provider. Please follow-up as instructed by your emergency provider today. With abdominal pain, we often recommend very close follow-up, such as the following day.    ADULTS:  Return to the Emergency Department right away if:      You get an oral temperature above 102oF or as directed by your provider.    You have blood in your stools. This may be bright red or appear as black, tarry stools.      You keep vomiting (throwing up) or cannot drink liquids.    You see blood when you vomit.     You cannot have a bowel movement or you cannot pass gas.    Your stomach gets bloated or bigger.    Your skin or the whites of your eyes look yellow.    You faint.    You have bloody, frequent or painful urination (peeing).    You have new symptoms or anything that worries you.    CHILDREN:  Return to the Emergency Department right away if your child has any of the above-listed symptoms or the following:      Pushes your hand away or screams/cries when his/her belly is touched.    You notice your child is very fussy or weak.    Your child is very tired and is too tired to eat or drink.    Your child is dehydrated.  Signs  of dehydration can be:  o Significant change in the amount of wet diapers/urine.  o Your infant or child starts to have dry mouth and lips, or no saliva (spit) or tears.    PREGNANT WOMEN:  Return to the Emergency Department right away if you have any of the above-listed symptoms or the following:      You have bleeding, leaking fluid or passing tissue from the vagina.    You have worse pain or cramping, or pain in your shoulder or back.    You have vomiting that will not stop.    You have a temperature of 100oF or more.    Your baby is not moving as much as usual.    You faint.    You get a bad headache with or without eye problems and abdominal pain.    You have a seizure.    You have unusual discharge from your vagina and abdominal pain.    Abdominal pain is pretty common during pregnancy.  Your pain may or may not be related to your pregnancy. You should follow-up closely with your OB provider so they can evaluate you and your baby.  Until you follow-up with your regular provider, do the following:       Avoid sex and do not put anything in your vagina.    Drink clear fluids.    Only take medications approved by your provider.    MORE INFORMATION:    Appendicitis:  A possible cause of abdominal pain in any person who still has their appendix is acute appendicitis. Appendicitis is often hard to diagnose.  Testing does not always rule out early appendicitis or other causes of abdominal pain. Close follow-up with your provider and re-evaluations may be needed to figure out the reason for your abdominal pain.    Follow-up:  It is very important that you make an appointment with your clinic and go to the appointment.  If you do not follow-up with your primary provider, it may result in missing an important development which could result in permanent injury or disability and/or lasting pain.  If there is any problem keeping your appointment, call your provider or return to the Emergency Department.    Medications:   "Take your medications as directed by your provider today.  Before using over-the-counter medications, ask your provider and make sure to take the medications as directed.  If you have any questions about medications, ask your provider.    Diet:  Resume your normal diet as much as possible, but do not eat fried, fatty or spicy foods while you have pain.  Do not drink alcohol or have caffeine.  Do not smoke tobacco.    Probiotics: If you have been given an antibiotic, you may want to also take a probiotic pill or eat yogurt with live cultures. Probiotics have \"good bacteria\" to help your intestines stay healthy. Studies have shown that probiotics help prevent diarrhea (loose stools) and other intestine problems (including C. diff infection) when you take antibiotics. You can buy these without a prescription in the pharmacy section of the store.     If you were given a prescription for medicine here today, be sure to read all of the information (including the package insert) that comes with your prescription.  This will include important information about the medicine, its side effects, and any warnings that you need to know about.  The pharmacist who fills the prescription can provide more information and answer questions you may have about the medicine.  If you have questions or concerns that the pharmacist cannot address, please call or return to the Emergency Department.       Remember that you can always come back to the Emergency Department if you are not able to see your regular provider in the amount of time listed above, if you get any new symptoms, or if there is anything that worries you.    "

## 2018-05-22 NOTE — PROGRESS NOTES
Pt with h/o diabetes presents to UC with acute RUQ abd pain and LUQ pain continuously for last 3hrs   Pain is stabbing in nature and is 9.5 out of 10   She has h/o haital hernia and also had an appendectomy too   ABD WAS SOFT BUT she was very tender in the RUQ area  Advised to go to ER for further evaluation  Pt's boyfriend will drive her to ER , possibly will need ultrasound or CT     Reyna Durbin MD

## 2018-05-22 NOTE — ED PROVIDER NOTES
"  History     Chief Complaint:  Abdominal Pain    The history is provided by the patient.      Mary Lou Gil is a 57 year old female with a history of spinal stenosis who presents for evaluation of right lower quadrant abdominal pain. The patient reports onset of intermittent left upper quadrant abdominal pain thee days ago which has since moved to the right upper quadrant and increased in intensity. The pain is described as mostly constant, with intermittent episodes of sharp pain described as a \"catch\", the most intense of which occurred while the patient was walking through [employer], prompting evaluation in the emergency room. There is still some mild pain on the left side. The patient's pain is not exacerbated with deep breathing, and eating does not seem to exacerbate her pain. Patient has not administered pain medication to treat her symptoms.    Patient had her appendix removed ~40 years ago, still has her gall bladder, and had kidney stones when she was 22. Patient did not drive here and would like to receive pain medication if she can. Patient denies nausea, vomiting, fever, trouble breathing, changes in bowel movements, urinary problems such as hematuria or dysuria, leg pain or swelling, chest pain, or other complaint.     Allergies:  Nicotine Polacrilex  Neurontin [Gabapentin]  Norvasc [Amlodipine Besylate]  Percocet [Oxycodone-Acetaminophen]  Vinyl Ether      Medications:    Albuterol inhaler  Insulin  Celexa  Vitamin D  Benadryl  Epinephrine  Aerospan  Glipizide  Levemir  Synthroid  Glucophage  Pravachol  Imitrex  Zanaflex  Ultram  Desyrel    Past Medical History:    Hypothyroidism  Bilateral carpal tunnel  Degermation of lumbar intervertebral disc  Palpitations  Mild persistent asthma  Type II diabetes w/ CKD  Peripheral neuropathy  Low back pain w/ sciatica  Migraine w/o status migrainosus  Intermittent claudication  GERD  Low iron stores  Constipation  Hyperlipidemia  Endometrial " "hyperplasia  Vitamin B 12 deficiency  [facility]  Scurvy  Dyspepsia  Moderate major depression  CAD  Atypical chest pain  Anaphylactic reaction  Perimenopausal  Anemia  Tobacco use disorder  Spinal stenosis    Past Surgical History:    Appendectomy    Tubal ligation  Hysteroscopy  Thyroidectomy    Family History:    CAD  Diabetes  Hypertension  Aneurysm  Breast cancer  Colorectal cancer    Social History:  Presents alone   Tobacco use: Current every day (1 ppd for 25 years)  Alcohol use: No  PCP: Xavi Garcia    Marital Status:       Review of Systems   Constitutional: Negative for fever.   Respiratory: Negative for shortness of breath.    Cardiovascular: Negative for chest pain and leg swelling.   Gastrointestinal: Positive for abdominal pain. Negative for nausea and vomiting.   Genitourinary: Negative for dysuria and hematuria.   All other systems reviewed and are negative.    Physical Exam     Patient Vitals for the past 24 hrs:   BP Temp Temp src Heart Rate Resp SpO2 Height Weight   18 2230 - - - - - 95 % - -   18 2200 - - - - 16 99 % - -   18 2130 - - - - - 98 % - -   18 2120 - - - - - 98 % - -   18 2046 143/88 98.7  F (37.1  C) Oral 92 18 99 % 1.676 m (5' 6\") 74.8 kg (165 lb)        Physical Exam  General: Resting comfortably on the gurney  Eyes:  The pupils are equal and round    Conjunctivae and sclerae are normal  ENT:    Moist mucous membranes  Neck:  Normal range of motion  CV:  Regular rate and rhythm    Skin warm and well perfused   Resp:  Lungs are clear    Non-labored    No rales    No wheezing   GI:  Abdomen is soft, there is no rigidity    No distension    No rebound tenderness     Mild tenderness to palpation on right and left upper quadrants    No flank tenderness  MS:  Normal muscular tone  Skin:  No rash or acute skin lesions noted  Neuro:   Awake, alert.      Speech is normal and fluent.    Face is symmetric.     Moves all " extremities  Psych: Normal affect.  Appropriate interactions.     Emergency Department Course     Imaging:  Radiographic findings were communicated with the patient who voiced understanding of the findings.    CT Abd/pelvis with contrast:  IMPRESSION:   1. Equivocal epiploic appendagitis of the proximal descending colon.  2. No other convincing cause of acute pain identified in the abdomen or pelvis  3. A moderate amount of stool is present within the colon.    Imaging independently reviewed and agree with radiologist interpretation.      Laboratory:  CBC: AWNL (WBC 10.6, HGB 15.2, )  CMP:  (H) o/w WNL (Creatinine 0.66)  Lipase: 110    UA with micro: All negative     Interventions:  2116: Morphine 4 mg IV    The patient's symptoms were improved with parenteral narcotics.    Emergency Department Course:  Past medical records, nursing notes, and vitals reviewed.  2105: I performed an exam of the patient and obtained history, as documented above.    Above interventions provided.  Blood drawn. This was sent to the lab for further testing, results above.  The patient provided a urine sample here in the emergency department. This was sent for laboratory testing, findings above.    2245: I rechecked the patient. Findings and plan explained to the Patient. Patient discharged home with instructions regarding supportive care, medications, and reasons to return. The importance of close follow-up was reviewed.      Impression & Plan      Medical Decision Making:  Mary Lou Gil is a 57 year old female who presents to the emergency department with abdominal pain. Pain is in bilateral upper quadrants, though primarily on the right upper quadrant. Is low risk for PE. Pain is not pleuritic, she is not hypoxic and seems to be more in her abdomen as she is tender to palpation, and I do not think further workup for this is indicated. There is no chest pain to suggest ACS. Laboratory values obtained and they are  unremarkable. Urinalysis does not show any evidence of infection. CT abdomen performed and shows possible epiploic appendagitis. The appendix was not visualized but she reports she had her appendix removed when she was much younger so CT findings consistent with prior appendectomy. No other cause of pain identified on CT. Considered gall bladder pathology, though the gall bladder did not appear abnormal on CT and she had normal LFT's and so the likelihood of cholecystis and cholangitis are very low and I do not think ultrasound needs to be done at this point. She is feeling better here at the emergency department. Discussed symptomatic treatment at home and recommended also to start a stool softener since there was moderate stool in colon and especially since she is on tramadol at home. This also could be musculoskeletal related pain as she reports having to walk up stairs recently which normally she doesn't do and has also been gardening which she normally doesn't do and her pain worsens with movement. Discussed follow up with primary care provider and reasons to return to the emergency department.     Diagnosis:    ICD-10-CM   1. Abdominal pain, left upper quadrant R10.12   2. Abdominal pain, right upper quadrant R10.11       Disposition:  Discharged to home with plan as outlined.     I, Stewart Blake, am serving as a scribe at 10:42 PM on 5/21/2018 to document services personally performed by Starla Barnhart MD based on my observations and the provider's statements to me.   5/21/2018    EMERGENCY DEPARTMENT     Starla Barnhart MD  05/22/18 0001

## 2018-06-21 ENCOUNTER — TELEPHONE (OUTPATIENT)
Dept: INTERNAL MEDICINE | Facility: CLINIC | Age: 58
End: 2018-06-21

## 2018-06-21 DIAGNOSIS — M51.369 DEGENERATION OF LUMBAR INTERVERTEBRAL DISC: ICD-10-CM

## 2018-06-21 NOTE — TELEPHONE ENCOUNTER
Reason for Call:  Other call back    Detailed comments: Pt is requesting a referral to a pain clinic for her spine.    Phone Number Patient can be reached at: Home number on file [phone] (home)    Best Time: after 10 am    Can we leave a detailed message on this number? YES    Call taken on 6/21/2018 at 1:10 PM by MOHAN RUIZ

## 2018-06-21 NOTE — TELEPHONE ENCOUNTER
Ultram       Last Written Prescription Date:  8/29/17  Last Fill Quantity: 165,   # refills: 0  Last Office Visit: 3/14/18  Future Office visit:    Next 5 appointments (look out 90 days)     Jun 28, 2018 11:20 AM CDT   Office Visit with Xavi Garcia MD   [facility] ([facility])    [address] [address]   [phone]            Jul 11, 2018  4:00 PM CDT   Return Visit with Susan Ramirez MD   [facility] ([facility]    [address]  [facility] [id number] [phone]                   Routing refill request to provider for review/approval because:  Drug not on the [facility], P or  Health refill protocol or controlled substance      Controlled Substance Refill Request for Ultram   Problem List Complete:  No     PROVIDER TO CONSIDER COMPLETION OF PROBLEM LIST AND OVERVIEW/CONTROLLED SUBSTANCE AGREEMENT    Last Written Prescription Date:  8/29/17  Last Fill Quantity: 165,   # refills: 0    Last Office Visit with [facility] primary care provider: 3/14/18    Future Office visit:   Next 5 appointments (look out 90 days)     Jun 28, 2018 11:20 AM CDT   Office Visit with Xavi Garcia MD   [facility] ([facility])    [address] [address]   [phone]            Jul 11, 2018  4:00 PM CDT   Return Visit with Susan Ramirez MD   [facility] ([facility])    [address]  [facility] [id number] [phone]                  Controlled substance agreement on file: No.        checked in past 6 months?  No, route to RN unable to access for PCP

## 2018-06-25 RX ORDER — TRAMADOL HYDROCHLORIDE 50 MG/1
50 TABLET ORAL 2 TIMES DAILY PRN
Qty: 30 TABLET | Refills: 0 | Status: SHIPPED | OUTPATIENT
Start: 2018-06-25 | End: 2018-07-13

## 2018-06-25 NOTE — TELEPHONE ENCOUNTER
Dr. Garcia hand delivered Ultram script to [facility] himself.

## 2018-06-25 NOTE — TELEPHONE ENCOUNTER
[place].    Only activity over past 12 months as #165 tramadol tablets in August 2017 and Robitussin AC in January 2018.     She is clearly not taking them 5 times per day.  Does not need more than occ use apparently.     I will given limited supply, needs follow up appointment to evaluate further.     Hand signed RX brought to [facility] to be brought downstairs to [facility].

## 2018-06-26 ENCOUNTER — RADIANT APPOINTMENT (OUTPATIENT)
Dept: MAMMOGRAPHY | Facility: CLINIC | Age: 58
End: 2018-06-26
Attending: INTERNAL MEDICINE
Payer: MEDICARE

## 2018-06-26 ENCOUNTER — NURSE TRIAGE (OUTPATIENT)
Dept: NURSING | Facility: CLINIC | Age: 58
End: 2018-06-26

## 2018-06-26 ENCOUNTER — TELEPHONE (OUTPATIENT)
Dept: INTERNAL MEDICINE | Facility: CLINIC | Age: 58
End: 2018-06-26

## 2018-06-26 DIAGNOSIS — E55.9 VITAMIN D DEFICIENCY: ICD-10-CM

## 2018-06-26 DIAGNOSIS — E78.5 HYPERLIPIDEMIA WITH TARGET LDL LESS THAN 100: ICD-10-CM

## 2018-06-26 DIAGNOSIS — Z79.4 TYPE 2 DIABETES MELLITUS WITH DIABETIC POLYNEUROPATHY, WITH LONG-TERM CURRENT USE OF INSULIN (H): ICD-10-CM

## 2018-06-26 DIAGNOSIS — E03.9 HYPOTHYROIDISM, UNSPECIFIED TYPE: ICD-10-CM

## 2018-06-26 DIAGNOSIS — I10 ESSENTIAL HYPERTENSION, BENIGN: ICD-10-CM

## 2018-06-26 DIAGNOSIS — E11.42 TYPE 2 DIABETES MELLITUS WITH DIABETIC POLYNEUROPATHY, WITH LONG-TERM CURRENT USE OF INSULIN (H): ICD-10-CM

## 2018-06-26 DIAGNOSIS — E53.8 VITAMIN B12 DEFICIENCY WITHOUT ANEMIA: ICD-10-CM

## 2018-06-26 DIAGNOSIS — Z12.31 VISIT FOR SCREENING MAMMOGRAM: ICD-10-CM

## 2018-06-26 LAB
ALBUMIN SERPL-MCNC: 4 G/DL (ref 3.4–5)
ALP SERPL-CCNC: 51 U/L (ref 40–150)
ALT SERPL W P-5'-P-CCNC: 35 U/L (ref 0–50)
ANION GAP SERPL CALCULATED.3IONS-SCNC: 8 MMOL/L (ref 3–14)
AST SERPL W P-5'-P-CCNC: 25 U/L (ref 0–45)
BILIRUB SERPL-MCNC: 0.5 MG/DL (ref 0.2–1.3)
BUN SERPL-MCNC: 23 MG/DL (ref 7–30)
CALCIUM SERPL-MCNC: 9.3 MG/DL (ref 8.5–10.1)
CHLORIDE SERPL-SCNC: 106 MMOL/L (ref 94–109)
CHOLEST SERPL-MCNC: 138 MG/DL
CO2 SERPL-SCNC: 28 MMOL/L (ref 20–32)
CREAT SERPL-MCNC: 0.82 MG/DL (ref 0.52–1.04)
GFR SERPL CREATININE-BSD FRML MDRD: 72 ML/MIN/1.7M2
GLUCOSE SERPL-MCNC: 173 MG/DL (ref 70–99)
HBA1C MFR BLD: 8.9 % (ref 0–5.6)
HDLC SERPL-MCNC: 43 MG/DL
LDLC SERPL CALC-MCNC: 69 MG/DL
NONHDLC SERPL-MCNC: 95 MG/DL
POTASSIUM SERPL-SCNC: 3.8 MMOL/L (ref 3.4–5.3)
PROT SERPL-MCNC: 8 G/DL (ref 6.8–8.8)
SODIUM SERPL-SCNC: 142 MMOL/L (ref 133–144)
T4 FREE SERPL-MCNC: 1.68 NG/DL (ref 0.76–1.46)
TRIGL SERPL-MCNC: 129 MG/DL
TSH SERPL DL<=0.005 MIU/L-ACNC: 1.21 MU/L (ref 0.4–4)
VIT B12 SERPL-MCNC: 562 PG/ML (ref 193–986)

## 2018-06-26 PROCEDURE — 99000 SPECIMEN HANDLING OFFICE-LAB: CPT | Performed by: INTERNAL MEDICINE

## 2018-06-26 PROCEDURE — 82306 VITAMIN D 25 HYDROXY: CPT | Performed by: INTERNAL MEDICINE

## 2018-06-26 PROCEDURE — 36415 COLL VENOUS BLD VENIPUNCTURE: CPT | Performed by: INTERNAL MEDICINE

## 2018-06-26 PROCEDURE — 84443 ASSAY THYROID STIM HORMONE: CPT | Performed by: INTERNAL MEDICINE

## 2018-06-26 PROCEDURE — 83036 HEMOGLOBIN GLYCOSYLATED A1C: CPT | Performed by: INTERNAL MEDICINE

## 2018-06-26 PROCEDURE — 84439 ASSAY OF FREE THYROXINE: CPT | Performed by: INTERNAL MEDICINE

## 2018-06-26 PROCEDURE — 80053 COMPREHEN METABOLIC PANEL: CPT | Performed by: INTERNAL MEDICINE

## 2018-06-26 PROCEDURE — 80061 LIPID PANEL: CPT | Performed by: INTERNAL MEDICINE

## 2018-06-26 PROCEDURE — 82607 VITAMIN B-12: CPT | Performed by: INTERNAL MEDICINE

## 2018-06-26 PROCEDURE — 77067 SCR MAMMO BI INCL CAD: CPT | Mod: TC

## 2018-06-26 NOTE — TELEPHONE ENCOUNTER
[facility] requesting information regarding prescription for lidocaine ointment and fluocinonide cream.  FNA unable to find information regarding fluocinonide cream in chart.  Message routed to PCP Pool to contact pharmacy.

## 2018-06-26 NOTE — TELEPHONE ENCOUNTER
Clinic Action Needed:  Please contact Juliann with [facility] at [phone], extension #[phone].  Reason for Call:  [facility] requesting information regarding prescriptions for lidocaine ointment and fluocinonide cream.  FNA unable to find information regarding fluocinonide cream in chart.  Routed to:  PCP Pool

## 2018-06-27 LAB — DEPRECATED CALCIDIOL+CALCIFEROL SERPL-MC: 65 UG/L (ref 20–75)

## 2018-06-27 NOTE — TELEPHONE ENCOUNTER
Pt states she has been trying to get a refill on her tramadol Rx since 6/21. Says she is in a lot of pain. Rates pain 10/10 now. Says she has tried to reach clinic twice but was on hold for 30 minutes so she hung up. Says she is thinking about going to the ED tonight. Pt declined triage. Wants to know if ED will give her pain medication.. EHR shows Rx for tramadol was hand carried to [facility] on 6/25 by Dr. Garcia at [facility]. Advised pt of this and she said she will  Rx tomorrow and had no other questions. Jessica Corral RN/FNA

## 2018-06-27 NOTE — TELEPHONE ENCOUNTER
"  Additional Information    Negative: Drug overdose and nurse unable to answer question    Negative: Caller requesting information not related to medicine    Negative: Caller requesting a prescription for Strep throat and has a positive culture result    Negative: Rash while taking a medication or within 3 days of stopping it    Negative: Immunization reaction suspected    Negative: [1] Asthma and [2] having symptoms of asthma (cough, wheezing, etc)    Negative: MORE THAN A DOUBLE DOSE of a prescription or over-the-counter (OTC) drug    Negative: [1] DOUBLE DOSE (an extra dose or lesser amount) of over-the-counter (OTC) drug AND [2] any symptoms (e.g., dizziness, nausea, pain, sleepiness)    Negative: [1] DOUBLE DOSE (an extra dose or lesser amount) of prescription drug AND [2] any symptoms (e.g., dizziness, nausea, pain, sleepiness)    Negative: Took another person's prescription drug    Negative: [1] DOUBLE DOSE (an extra dose or lesser amount) of prescription drug AND [2] NO symptoms (Exception: a double dose of antibiotics)    Negative: Diabetes drug error or overdose (e.g., insulin or extra dose)    Negative: [1] Request for URGENT new prescription or refill of \"essential\" medication (i.e., likelihood of harm to patient if not taken) AND [2] triager unable to fill per unit policy    Negative: [1] Prescription not at pharmacy AND [2] was prescribed today by PCP    Negative: Pharmacy calling with prescription questions and triager unable to answer question    Negative: Caller has URGENT medication question about med that PCP prescribed and triager unable to answer question    Negative: Caller has NON-URGENT medication question about med that PCP prescribed and triager unable to answer question    Negative: Caller requesting a NON-URGENT new prescription or refill and triager unable to refill per unit policy    Negative: Caller has medication question about med not prescribed by PCP and triager unable to answer " question (e.g., compatibility with other med, storage)    Negative: [1] DOUBLE DOSE (an extra dose or lesser amount) of over-the-counter (OTC) drug AND [2] NO symptoms (all triage questions negative)    Negative: [1] DOUBLE DOSE (an extra dose or lesser amount) of antibiotic drug AND [2] NO symptoms (all triage questions negative)    Caller has medication question only, adult not sick, and triager answers question    Protocols used: [facility]ST[facility]

## 2018-06-28 ENCOUNTER — OFFICE VISIT (OUTPATIENT)
Dept: INTERNAL MEDICINE | Facility: CLINIC | Age: 58
End: 2018-06-28
Payer: MEDICARE

## 2018-06-28 ENCOUNTER — TELEPHONE (OUTPATIENT)
Dept: INTERNAL MEDICINE | Facility: CLINIC | Age: 58
End: 2018-06-28

## 2018-06-28 VITALS
BODY MASS INDEX: 25.76 KG/M2 | DIASTOLIC BLOOD PRESSURE: 62 MMHG | OXYGEN SATURATION: 98 % | SYSTOLIC BLOOD PRESSURE: 102 MMHG | WEIGHT: 159.6 LBS | RESPIRATION RATE: 16 BRPM | TEMPERATURE: 98.4 F | HEART RATE: 103 BPM

## 2018-06-28 DIAGNOSIS — Z79.4 TYPE 2 DIABETES MELLITUS WITH DIABETIC POLYNEUROPATHY, WITH LONG-TERM CURRENT USE OF INSULIN (H): Primary | ICD-10-CM

## 2018-06-28 DIAGNOSIS — M25.552 HIP PAIN, LEFT: ICD-10-CM

## 2018-06-28 DIAGNOSIS — E11.42 TYPE 2 DIABETES MELLITUS WITH DIABETIC POLYNEUROPATHY, WITH LONG-TERM CURRENT USE OF INSULIN (H): Primary | ICD-10-CM

## 2018-06-28 DIAGNOSIS — I10 ESSENTIAL HYPERTENSION, BENIGN: ICD-10-CM

## 2018-06-28 DIAGNOSIS — G89.29 OTHER CHRONIC PAIN: ICD-10-CM

## 2018-06-28 DIAGNOSIS — E11.42 DIABETIC POLYNEUROPATHY ASSOCIATED WITH TYPE 2 DIABETES MELLITUS (H): ICD-10-CM

## 2018-06-28 DIAGNOSIS — M53.3 SI (SACROILIAC) JOINT DYSFUNCTION: ICD-10-CM

## 2018-06-28 DIAGNOSIS — F33.0 MAJOR DEPRESSIVE DISORDER, RECURRENT EPISODE, MILD (H): ICD-10-CM

## 2018-06-28 DIAGNOSIS — M51.369 DEGENERATION OF LUMBAR INTERVERTEBRAL DISC: ICD-10-CM

## 2018-06-28 DIAGNOSIS — R53.81 PHYSICAL DECONDITIONING: ICD-10-CM

## 2018-06-28 DIAGNOSIS — J45.30 MILD PERSISTENT ASTHMA, UNCOMPLICATED: ICD-10-CM

## 2018-06-28 DIAGNOSIS — G56.03 BILATERAL CARPAL TUNNEL SYNDROME: ICD-10-CM

## 2018-06-28 DIAGNOSIS — K63.89 EPIPLOIC APPENDAGITIS: ICD-10-CM

## 2018-06-28 PROCEDURE — 99214 OFFICE O/P EST MOD 30 MIN: CPT | Performed by: INTERNAL MEDICINE

## 2018-06-28 RX ORDER — IBUPROFEN 800 MG/1
800 TABLET, FILM COATED ORAL EVERY 8 HOURS PRN
Qty: 90 TABLET | Refills: 2 | Status: SHIPPED | OUTPATIENT
Start: 2018-06-28 | End: 2019-07-25

## 2018-06-28 RX ORDER — LIDOCAINE 50 MG/G
OINTMENT TOPICAL
Qty: 50 G | Refills: 3 | Status: SHIPPED | OUTPATIENT
Start: 2018-06-28 | End: 2018-07-02

## 2018-06-28 RX ORDER — CITALOPRAM HYDROBROMIDE 40 MG/1
40 TABLET ORAL DAILY
Qty: 90 TABLET | Refills: 1 | Status: SHIPPED | OUTPATIENT
Start: 2018-06-28 | End: 2019-03-18
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
## 2018-06-28 NOTE — LETTER
[facility]  [address] [address]  [phone]        March 6, 2019    Mary Lou Gil  [address]  [facility] [address]              Dear Mary Lou Gil    This is to remind you that your non-fasting labs is due.    You may call our office at [phone] to schedule an appointment.    Please disregard this notice if you have already had your labs drawn or made an appointment.        Sincerely,        Xavi Garcia MD

## 2018-06-28 NOTE — PATIENT INSTRUCTIONS
*   Miralax powder once per day, titrate to constipation.     *  Follow up with the pain clinic to consider another injection, they should contact you.     *  Keep working on your diet, these diet change that you have made have made a HUGE improvement for you.      *  You very well need to reduce some of the medicaiton for diabetes that you are taking due to your diet changes.     *  Follow up with Dr. Ramirez as planned.     *  Return to see me in approximately 3 months, sooner if needed.    Please get nonfasting labs done in the [facility] lab 1-2 days before this appointment.  Call [phone] to schedule both appointments.

## 2018-06-28 NOTE — MR AVS SNAPSHOT
After Visit Summary   6/28/2018    Mary Lou Gil    MRN: [id number]           Patient Information     Date Of Birth          1960        Visit Information        Provider Department      6/28/2018 11:20 AM Xavi Garcia MD [facility]        Today's Diagnoses     Type 2 diabetes mellitus with diabetic polyneuropathy, with long-term current use of insulin (H)    -  1    Hip pain, left        Physical deconditioning        Other chronic pain        SI (sacroiliac) joint dysfunction        Bilateral carpal tunnel syndrome        Degeneration of lumbar intervertebral disc        Essential hypertension, benign        Major depressive disorder, recurrent episode, mild (H)        Mild persistent asthma, uncomplicated        Epiploic appendagitis          Care Instructions    *   Miralax powder once per day, titrate to constipation.     *  Follow up with the pain clinic to consider another injection, they should contact you.     *  Keep working on your diet, these diet change that you have made have made a HUGE improvement for you.      *  You very well need to reduce some of the medicaiton for diabetes that you are taking due to your diet changes.     *  Follow up with Dr. Ramirez as planned.     *  Return to see me in approximately 3 months, sooner if needed.    Please get nonfasting labs done in the [facility] lab 1-2 days before this appointment.  Call [phone] to schedule both appointments.           Follow-ups after your visit        Additional Services     PAIN MANAGEMENT REFERRAL       Your provider has referred you to: [facility]: [facility] -    Reason for Referral: Evaluation for procedure- clinic evaluation to determine if procedure is appropriate.  Procedure would be done at later date.     Please complete the following questions:    Do you have any specific questions for the pain specialist? Yes:     Please order any imaging that you feel  needed    Are there any red flags that may impact the assessment or management of the patient?  Anxiety, present, but stable.  Unstable living situation      What is your diagnosis for the patient's pain? Lumbar degenerative disc disease, sacroiliac dysfunction      For any questions, contact the [facility] at [phone].     **ANY DIAGNOSTIC TESTS THAT ARE NOT IN EPIC SHOULD BE SENT TO THE PAIN CENTER**    REGARDING OPIOID MEDICATIONS:  The discussion of opioids management, appropriateness of therapy, and dosing will be discussed in patients being seen for evaluation.  The pain management clinics are not long-term prescribing clinics, with transition of prescribing of medications ultimately going back to the referring provider/PCP.  If prescribing is taken over at the pain clinic, it is in actively involved patients whom are appropriate for opioids, urine drug screening is completed, and long-term prescribing plan has been determined.  Therefore, we will not be automatically taking over prescribing at the patient's first visit.  Is this agreeable to you? agrees.     Please be aware that coverage of these services is subject to the terms and limitations of your health insurance plan.  Call member services at your health plan with any benefit or coverage questions.      Please bring the following with you to your appointment:    (1) Any X-Rays, CTs or MRIs which have been performed.  Contact the facility where they were done to arrange for  prior to your scheduled appointment.    (2) List of current medications   (3) This referral request   (4) Any documents/labs given to you for this referral                  Your next 10 appointments already scheduled     Jul 11, 2018  4:00 PM CDT   [facility] Susan Ramirez MD   [facility]    [address]  [place] [address]   [phone]            Jul 24, 2018  1:30  PM CDT   PROCEDURE with Chino Miller MD   [facility] ([facility])    [address]  [address]  [place] [address]   [phone]              Who to contact     If you have questions or need follow up information about today's clinic visit or your schedule please contact [facility] directly at [phone].  Normal or non-critical lab and imaging results will be communicated to you by [employer], letter or phone within 4 business days after the clinic has received the results. If you do not hear from us within 7 days, please contact the clinic through [employer] or phone. If you have a critical or abnormal lab result, we will notify you by phone as soon as possible.  Submit refill requests through [employer] or call your pharmacy and they will forward the refill request to us. Please allow 3 business days for your refill to be completed.          Additional Information About Your Visit        [facility] [employer] Information     [employer] gives you secure access to your electronic health record. If you see a primary care provider, you can also send messages to your care team and make appointments. If you have questions, please call your primary care clinic.  If you do not have a primary care provider, please call [phone] and they will assist you.        Care EveryWhere ID     This is your Care EveryWhere ID. This could be used by other organizations to access your [facility] medical records  [id number]        Your Vitals Were     Pulse Temperature Respirations Pulse Oximetry BMI (Body Mass Index)       103 98.4  F (36.9  C) (Oral) 16 98% 25.76 kg/m2        Blood Pressure from Last 3 Encounters:   06/28/18 102/62   05/21/18 125/87   05/21/18 124/85    Weight from Last 3 Encounters:   06/28/18 159 lb 9.6 oz (72.4 kg)   05/21/18 165 lb (74.8 kg)   03/14/18 167 lb 11.2 oz (76.1 kg)              We Performed the Following     PAIN MANAGEMENT REFERRAL           Today's Medication Changes          These changes are accurate as of 6/28/18 11:59 PM.  If you have any questions, ask your nurse or doctor.               Start taking these medicines.        Dose/Directions    lidocaine 5 % ointment   Commonly known as:  XYLOCAINE   Used for:  Type 2 diabetes mellitus with diabetic polyneuropathy, with long-term current use of insulin (H), Diabetic polyneuropathy associated with type 2 diabetes mellitus (H)   Started by:  Xavi Garcia MD        Apply a quarter size amount to painful areas up to 4 times per day as needed.   Quantity:  50 g   Refills:  3            Where to get your medicines      These medications were sent to [facility] [address]  [address] [place], [place] [address]     Phone:  [phone]     citalopram 40 MG tablet    ibuprofen 800 MG tablet    lidocaine 5 % ointment                Primary Care Provider Office Phone # Fax #    Xavi Garcia [phone]       [address] [place] [address]        Equal Access to Services     [facility] : Hadii aad ku hadasho Soomaali, waaxda luqadaha, qaybta kaalmada adeegyada, waxay idiin haykaleighn marcus moreno . So [facility] [phone].    ATENCIÓN: Si habla español, tiene a ybarra disposición servicios gratuitos de asistencia lingüística. Llame al [phone].    We comply with applicable federal civil rights laws and [place] laws. We do not discriminate on the basis of race, color, national origin, age, disability, sex, sexual orientation, or gender identity.            Thank you!     Thank you for choosing [facility]  for your care. Our goal is always to provide you with excellent care. Hearing back from our patients is one way we can continue to improve our services. Please take a few minutes to complete the written survey that you may receive in the mail after your visit with us. Thank you!              Your Updated Medication List - Protect others around you: Learn how to safely use, store and throw away your medicines at www.disposemymeds.org.          This list is accurate as of 6/28/18 11:59 PM.  Always use your most recent med list.                   Brand Name Dispense Instructions for use Diagnosis    albuterol 108 (90 Base) MCG/ACT Inhaler    PROAIR HFA/PROVENTIL HFA/VENTOLIN HFA    1 Inhaler    Inhale 2 puffs into the lungs every 6 hours as needed for shortness of breath / dyspnea or wheezing    Tobacco use disorder       B-D U/F 31G X 8 MM   Generic drug:  insulin pen needle     100 each    USE AS DIRECTED AT BEDTIME    Type 2 diabetes, HbA1c goal < 7% (H)       BENADRYL PO      Take 25-50 mg by mouth See Admin Instructions 25 mg during the day if needed and 50 mg at bedtime for sleep as needed        blood glucose monitoring meter device kit     1 kit    by In Vitro route as needed    Type 2 diabetes, HbA1c goal < 7% (H)       cholecalciferol 5000 units Caps     90 capsule    Take 1 capsule (5,000 Units) by mouth daily    Perimenopausal       citalopram 40 MG tablet    celeXA    90 tablet    Take 1 tablet (40 mg) by mouth daily TO CONTROL SYMPTOMS OF DEPRESSION    Major depressive disorder, recurrent episode, mild (H)       cyanocobalamin 1000 MCG/ML injection    VITAMIN B12    30 mL    Inject 1 mL (1,000 mcg) Subcutaneous every 14 days INDICATION: FOR VITAMIN B12 SUPPLEMENTATION    Encounter for long-term (current) use of medications       EPINEPHrine 0.3 MG/0.3ML injection 2-pack    EPIPEN/ADRENACLICK/or ANY BX GENERIC EQUIV    0.3 mL    Inject 0.3 mLs (0.3 mg) into the muscle once as needed    Anaphylactic reaction, subsequent encounter       fenofibrate 160 MG tablet     90 tablet    Take 1 tablet (160 mg) by mouth At Bedtime with food. INDICATION: TO LOWER CHOLESTEROL    Hyperlipidemia with target LDL less than 100       flunisolide HFA 80 MCG/ACT Aers oral inhaler    AEROSPAN    1 Inhaler     Inhale 2 puffs into the lungs 2 times daily Use twice per day during main allergy season(s)    Mild persistent asthma, uncomplicated       FREESTYLE LITE test strip   Generic drug:  blood glucose monitoring     200 each    USE TO TEST THREE TIMES A DAY    Type 2 diabetes mellitus with hyperglycemia, with long-term current use of insulin (H)       glipiZIDE 10 MG 24 hr tablet    glipiZIDE XL    30 tablet    Take 1 tablet (10 mg) by mouth daily INDICATION: TO TREAT DIABETES.    Type 2 diabetes mellitus with diabetic polyneuropathy, with long-term current use of insulin (H)       ibuprofen 800 MG tablet    ADVIL/MOTRIN    90 tablet    Take 1 tablet (800 mg) by mouth every 8 hours as needed for moderate pain or pain    Hip pain, left, Physical deconditioning, Other chronic pain, SI (sacroiliac) joint dysfunction       insulin detemir 100 UNIT/ML injection    LEVEMIR FLEXPEN/FLEXTOUCH    30 mL    Inject 30 Units Subcutaneous At Bedtime    Type 2 diabetes mellitus with diabetic polyneuropathy, with long-term current use of insulin (H)       levothyroxine 150 MCG tablet    SYNTHROID/LEVOTHROID    90 tablet    Take 1 tablet (150 mcg) by mouth daily TAKE FIRST THING IN THE MORNING ON AN EMPTY STOMACH,  NO FOOD FOR AN HOUR    Hypothyroidism, unspecified type       lidocaine 5 % ointment    XYLOCAINE    50 g    Apply a quarter size amount to painful areas up to 4 times per day as needed.    Type 2 diabetes mellitus with diabetic polyneuropathy, with long-term current use of insulin (H), Diabetic polyneuropathy associated with type 2 diabetes mellitus (H)       lisinopril-hydrochlorothiazide 20-25 MG per tablet    PRINZIDE/ZESTORETIC    90 tablet    Take 1 tablet by mouth daily INDICATION:TO LOWER BLOOD PRESSURE AND TO PRESERVE KIDNEY FUNCTION    Essential hypertension, benign       metFORMIN 500 MG 24 hr tablet    GLUCOPHAGE-XR    60 tablet    Take 2 tablets (1,000 mg) by mouth daily (with dinner)    Type 2 diabetes mellitus  with diabetic polyneuropathy, with long-term current use of insulin (H)       pravastatin 80 MG tablet    PRAVACHOL    90 tablet    Take 1 tablet (80 mg) by mouth daily INDICATION: TO LOWER CHOLESTEROL AND TO HELP  PREVENT HEART DISEASE    Hyperlipidemia with target LDL less than 100       SUMAtriptan 100 MG tablet    IMITREX    18 tablet    Take 1 tablet (100 mg) by mouth every 8 hours as needed for migraine    Migraine without status migrainosus, not intractable, unspecified migraine type       thin lancets    NO BRAND SPECIFIED    3 Box    1 Device 2 times daily    Type 2 diabetes, HbA1c goal < 7% (H)       tiZANidine 2 MG tablet    ZANAFLEX    90 tablet    Take 1-2 tablets (2-4 mg) by mouth 3 times daily    Muscle spasm       traMADol 50 MG tablet    ULTRAM    30 tablet    Take 1 tablet (50 mg) by mouth 2 times daily as needed for severe pain    Degeneration of lumbar intervertebral disc       traZODone 50 MG tablet    DESYREL    60 tablet    Take 1-2 tablets ( mg) by mouth nightly as needed for sleep    Major depressive disorder, recurrent episode, mild (H)       verapamil 240 MG CR tablet    CALAN-SR    90 tablet    Take 1 tablet (240 mg) by mouth At Bedtime INDICATION: TO LOWER BLOOD PRESSURE AND TO CONTROL MIGRAINE HEADACHES    Migraine without status migrainosus, not intractable, unspecified migraine type

## 2018-06-29 LAB — T4 FREE SERPL DIALY-MCNC: 2.9 NG/DL (ref 1.1–2.4)

## 2018-07-02 RX ORDER — LIDOCAINE 50 MG/G
OINTMENT TOPICAL
Qty: 50 G | Refills: 3 | Status: SHIPPED | OUTPATIENT
Start: 2018-07-02 | End: 2019-01-15

## 2018-07-10 ENCOUNTER — TELEPHONE (OUTPATIENT)
Dept: INTERNAL MEDICINE | Facility: CLINIC | Age: 58
End: 2018-07-10

## 2018-07-10 NOTE — TELEPHONE ENCOUNTER
Prior Authorization Approval    Authorization Effective Date: 7/10/2018  Authorization Expiration Date: 12/31/2018  Medication: lidocaine patch 5%  approved   Approved Dose/Quantity:   Reference #:     Insurance Company: [employer] - Phone [phone] Fax [phone]  Expected CoPay:       CoPay Card Available:      Foundation Assistance Needed:    Which Pharmacy is filling the prescription (Not needed for infusion/clinic administered): [facility] [address]  Pharmacy Notified: Yes  Patient Notified: Yes

## 2018-07-10 NOTE — TELEPHONE ENCOUNTER
PA Initiation    Medication: lidocaine patch 5%   Insurance Company: [employer] - Phone [phone] Fax [phone]  Pharmacy Filling the Rx: [address]  Filling Pharmacy Phone: [phone]  Filling Pharmacy Fax:    Start Date: 7/10/2018    THIS HAS BEEN SUBMITTED BY THE PRIOR-AUTHORIZATION TEAM. ANY QUESTIONS PLEASE CALL [phone]. THANK YOU

## 2018-07-11 ENCOUNTER — TELEPHONE (OUTPATIENT)
Dept: ENDOCRINOLOGY | Facility: CLINIC | Age: 58
End: 2018-07-11

## 2018-07-11 ENCOUNTER — OFFICE VISIT (OUTPATIENT)
Dept: ENDOCRINOLOGY | Facility: CLINIC | Age: 58
End: 2018-07-11
Payer: MEDICARE

## 2018-07-11 VITALS
HEIGHT: 66 IN | WEIGHT: 157 LBS | TEMPERATURE: 98.5 F | HEART RATE: 89 BPM | DIASTOLIC BLOOD PRESSURE: 70 MMHG | BODY MASS INDEX: 25.23 KG/M2 | SYSTOLIC BLOOD PRESSURE: 116 MMHG | OXYGEN SATURATION: 97 %

## 2018-07-11 DIAGNOSIS — Z79.4 TYPE 2 DIABETES MELLITUS WITH DIABETIC POLYNEUROPATHY, WITH LONG-TERM CURRENT USE OF INSULIN (H): Primary | ICD-10-CM

## 2018-07-11 DIAGNOSIS — E11.42 TYPE 2 DIABETES MELLITUS WITH DIABETIC POLYNEUROPATHY, WITH LONG-TERM CURRENT USE OF INSULIN (H): Primary | ICD-10-CM

## 2018-07-11 PROCEDURE — 99213 OFFICE O/P EST LOW 20 MIN: CPT | Performed by: INTERNAL MEDICINE

## 2018-07-11 RX ORDER — GLIPIZIDE 10 MG/1
10 TABLET, FILM COATED, EXTENDED RELEASE ORAL DAILY
Qty: 30 TABLET | Refills: 6 | Status: SHIPPED | OUTPATIENT
Start: 2018-07-11 | End: 2019-05-24

## 2018-07-11 RX ORDER — METFORMIN HCL 500 MG
1000 TABLET, EXTENDED RELEASE 24 HR ORAL
Qty: 60 TABLET | Refills: 6 | Status: SHIPPED | OUTPATIENT
Start: 2018-07-11 | End: 2019-05-03

## 2018-07-11 NOTE — PATIENT INSTRUCTIONS
[facility]   Dr Ramirez, Endocrinology Department      [facility]   [address] [address]  Appointment [phone]  Fax: [phone]   Monday and Tuesday         [facility]   [address] [place] [address]  Appointment [phone]  Fax: [phone]  Wednesday and Thursday           Continue current regimen.  Labs and follow up in 3 months.    Check Blood glucose fasting every day and before lunch/dinner/bedtime on alternate days.  If Blood glucose are low more often-> 2-3 times/week- give us a call.  The patient is advised to Make better food choices: reduce carbs, Reduce portion size, weight loss and exercise 3-4 times a week.  Discussed hypoglycemia signs and symptoms as well as management in detail.
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
## 2018-07-11 NOTE — MR AVS SNAPSHOT
After Visit Summary   2018    Mary Lou Gil    MRN: [id number]           Patient Information     Date Of Birth          1960        Visit Information        Provider Department      2018 1:30 PM Susan Ramirez MD [facility]        Today's Diagnoses     Type 2 diabetes mellitus with diabetic polyneuropathy, with long-term current use of insulin (H)    -  1      Care Instructions    [facility] [place] [facility] [place] locations   Dr Ramirez, Endocrinology Department      [facility]   [address] [address]  Appointment [phone]  Fax: [phone]   Monday and Tuesday         [place] [address] [place] [address]  Appointment [phone]  Fax: [phone]  Wednesday and Thursday           Continue current regimen.  Labs and follow up in 3 months.    Check Blood glucose fasting every day and before lunch/dinner/bedtime on alternate days.  If Blood glucose are low more often-> 2-3 times/week- give us a call.  The patient is advised to Make better food choices: reduce carbs, Reduce portion size, weight loss and exercise 3-4 times a week.  Discussed hypoglycemia signs and symptoms as well as management in detail.                    Follow-ups after your visit        Your next 10 appointments already scheduled     2018  1:30 PM CDT   PROCEDURE with Chino Miller MD   [facility] ([facility])    [address]  [address]  [facility] [address] [phone]              Future tests that were ordered for you today     Open Future Orders        Priority Expected Expires Ordered    Hemoglobin A1c ASAP 2018            Who to contact     If you have questions or need follow up information about today's clinic visit or your schedule please contact [facility]  "LESLIE directly at [phone].  Normal or non-critical lab and imaging results will be communicated to you by MyChart, letter or phone within 4 business days after the clinic has received the results. If you do not hear from us within 7 days, please contact the clinic through [employer] or phone. If you have a critical or abnormal lab result, we will notify you by phone as soon as possible.  Submit refill requests through [employer] or call your pharmacy and they will forward the refill request to us. Please allow 3 business days for your refill to be completed.          Additional Information About Your Visit        [employer] Information     [employer] gives you secure access to your electronic health record. If you see a primary care provider, you can also send messages to your care team and make appointments. If you have questions, please call your primary care clinic.  If you do not have a primary care provider, please call [phone] and they will assist you.        Care EveryWhere ID     This is your Care EveryWhere ID. This could be used by other organizations to access your [facility] medical records  [id number]        Your Vitals Were     Pulse Temperature Height Pulse Oximetry BMI (Body Mass Index)       89 98.5  F (36.9  C) (Oral) 1.676 m (5' 6\") 97% 25.34 kg/m2        Blood Pressure from Last 3 Encounters:   07/11/18 116/70   06/28/18 102/62   05/21/18 125/87    Weight from Last 3 Encounters:   07/11/18 71.2 kg (157 lb)   06/28/18 72.4 kg (159 lb 9.6 oz)   05/21/18 74.8 kg (165 lb)                 Where to get your medicines      These medications were sent to [facility] [address]  [address] [address]     Phone:  [phone]     glipiZIDE 10 MG 24 hr tablet    insulin detemir 100 UNIT/ML injection    metFORMIN 500 MG 24 hr tablet          Primary Care Provider Office Phone # Fax #    Xavi Garcia [phone] [facility] [address]        Equal Access to Services     RISHI MOJICA : Hadii zelalem fong kalyanjack Zekeali, waaxda luqadaha, qaybta kajosuebianka barrett, marley brownmiriampete vizcaino. So [facility] [phone].    ATENCIÓN: Si habla español, tiene a ybarra disposición servicios gratuitos de asistencia lingüística. [facility] [phone].    We comply with applicable federal civil rights laws and [place] laws. We do not discriminate on the basis of race, color, national origin, age, disability, sex, sexual orientation, or gender identity.            Thank you!     Thank you for choosing [facility]  for your care. Our goal is always to provide you with excellent care. Hearing back from our patients is one way we can continue to improve our services. Please take a few minutes to complete the written survey that you may receive in the mail after your visit with us. Thank you!             Your Updated Medication List - Protect others around you: Learn how to safely use, store and throw away your medicines at www.disposemymeds.org.          This list is accurate as of 7/11/18  2:04 PM.  Always use your most recent med list.                   Brand Name Dispense Instructions for use Diagnosis    albuterol 108 (90 Base) MCG/ACT Inhaler    PROAIR HFA/PROVENTIL HFA/VENTOLIN HFA    1 Inhaler    Inhale 2 puffs into the lungs every 6 hours as needed for shortness of breath / dyspnea or wheezing    Tobacco use disorder       B-D U/F 31G X 8 MM   Generic drug:  insulin pen needle     100 each    USE AS DIRECTED AT BEDTIME    Type 2 diabetes, HbA1c goal < 7% (H)       BENADRYL PO      Take 25-50 mg by mouth See Admin Instructions 25 mg during the day if needed and 50 mg at bedtime for sleep as needed        cholecalciferol 5000 units Caps     90 capsule    Take 1 capsule (5,000 Units) by mouth daily    Perimenopausal       citalopram 40 MG tablet    celeXA    90 tablet    Take 1 tablet (40 mg) by mouth  daily TO CONTROL SYMPTOMS OF DEPRESSION    Major depressive disorder, recurrent episode, mild (H)       EPINEPHrine 0.3 MG/0.3ML injection 2-pack    EPIPEN/ADRENACLICK/or ANY BX GENERIC EQUIV    0.3 mL    Inject 0.3 mLs (0.3 mg) into the muscle once as needed    Anaphylactic reaction, subsequent encounter       fenofibrate 160 MG tablet     90 tablet    Take 1 tablet (160 mg) by mouth At Bedtime with food. INDICATION: TO LOWER CHOLESTEROL    Hyperlipidemia with target LDL less than 100       flunisolide HFA 80 MCG/ACT Aers oral inhaler    AEROSPAN    1 Inhaler    Inhale 2 puffs into the lungs 2 times daily Use twice per day during main allergy season(s)    Mild persistent asthma, uncomplicated       FREESTYLE LITE test strip   Generic drug:  blood glucose monitoring     200 each    USE TO TEST THREE TIMES A DAY    Type 2 diabetes mellitus with hyperglycemia, with long-term current use of insulin (H)       glipiZIDE 10 MG 24 hr tablet    glipiZIDE XL    30 tablet    Take 1 tablet (10 mg) by mouth daily INDICATION: TO TREAT DIABETES.    Type 2 diabetes mellitus with diabetic polyneuropathy, with long-term current use of insulin (H)       ibuprofen 800 MG tablet    ADVIL/MOTRIN    90 tablet    Take 1 tablet (800 mg) by mouth every 8 hours as needed for moderate pain or pain    Hip pain, left, Physical deconditioning, Other chronic pain, SI (sacroiliac) joint dysfunction       insulin detemir 100 UNIT/ML injection    LEVEMIR FLEXPEN/FLEXTOUCH    30 mL    Inject 30 Units Subcutaneous At Bedtime    Type 2 diabetes mellitus with diabetic polyneuropathy, with long-term current use of insulin (H)       levothyroxine 150 MCG tablet    SYNTHROID/LEVOTHROID    90 tablet    Take 1 tablet (150 mcg) by mouth daily TAKE FIRST THING IN THE MORNING ON AN EMPTY STOMACH,  NO FOOD FOR AN HOUR    Hypothyroidism, unspecified type       lidocaine 5 % ointment    XYLOCAINE    50 g    Apply a quarter size amount to painful areas up to 4  times per day as needed.    Type 2 diabetes mellitus with diabetic polyneuropathy, with long-term current use of insulin (H), Diabetic polyneuropathy associated with type 2 diabetes mellitus (H)       lisinopril-hydrochlorothiazide 20-25 MG per tablet    PRINZIDE/ZESTORETIC    90 tablet    Take 1 tablet by mouth daily INDICATION:TO LOWER BLOOD PRESSURE AND TO PRESERVE KIDNEY FUNCTION    Essential hypertension, benign       metFORMIN 500 MG 24 hr tablet    GLUCOPHAGE-XR    60 tablet    Take 2 tablets (1,000 mg) by mouth daily (with dinner)    Type 2 diabetes mellitus with diabetic polyneuropathy, with long-term current use of insulin (H)       pravastatin 80 MG tablet    PRAVACHOL    90 tablet    Take 1 tablet (80 mg) by mouth daily INDICATION: TO LOWER CHOLESTEROL AND TO HELP  PREVENT HEART DISEASE    Hyperlipidemia with target LDL less than 100       SUMAtriptan 100 MG tablet    IMITREX    18 tablet    Take 1 tablet (100 mg) by mouth every 8 hours as needed for migraine    Migraine without status migrainosus, not intractable, unspecified migraine type       thin lancets    NO BRAND SPECIFIED    3 Box    1 Device 2 times daily    Type 2 diabetes, HbA1c goal < 7% (H)       tiZANidine 2 MG tablet    ZANAFLEX    90 tablet    Take 1-2 tablets (2-4 mg) by mouth 3 times daily    Muscle spasm       traMADol 50 MG tablet    ULTRAM    30 tablet    Take 1 tablet (50 mg) by mouth 2 times daily as needed for severe pain    Degeneration of lumbar intervertebral disc       traZODone 50 MG tablet    DESYREL    60 tablet    Take 1-2 tablets ( mg) by mouth nightly as needed for sleep    Major depressive disorder, recurrent episode, mild (H)       verapamil 240 MG CR tablet    CALAN-SR    90 tablet    Take 1 tablet (240 mg) by mouth At Bedtime INDICATION: TO LOWER BLOOD PRESSURE AND TO CONTROL MIGRAINE HEADACHES    Migraine without status migrainosus, not intractable, unspecified migraine type

## 2018-07-13 ENCOUNTER — TELEPHONE (OUTPATIENT)
Dept: INTERNAL MEDICINE | Facility: CLINIC | Age: 58
End: 2018-07-13

## 2018-07-13 DIAGNOSIS — M51.369 DEGENERATION OF LUMBAR INTERVERTEBRAL DISC: ICD-10-CM

## 2018-07-13 DIAGNOSIS — G43.909 MIGRAINE WITHOUT STATUS MIGRAINOSUS, NOT INTRACTABLE, UNSPECIFIED MIGRAINE TYPE: ICD-10-CM

## 2018-07-13 RX ORDER — TRAMADOL HYDROCHLORIDE 50 MG/1
50 TABLET ORAL 2 TIMES DAILY PRN
Qty: 30 TABLET | Refills: 0 | Status: SHIPPED | OUTPATIENT
Start: 2018-07-13 | End: 2018-08-30

## 2018-07-13 RX ORDER — SUMATRIPTAN 100 MG/1
100 TABLET, FILM COATED ORAL
Qty: 18 TABLET | Refills: 11 | Status: SHIPPED | OUTPATIENT
Start: 2018-07-13 | End: 2021-10-05

## 2018-07-13 NOTE — TELEPHONE ENCOUNTER
Reason for Call:  Medication or medication refill:    Do you use a [facility]?  Name of the pharmacy and phone number for the current request:  [facility] [address] - [phone]    Name of the medication requested: Tramadol    Other request: Patient is leaving on Sunday, she needs it before that. Any question please call her.    Can we leave a detailed message on this number? YES    Phone number patient can be reached at: Home number on file [phone] (home)    Best Time: Anytime    Call taken on 7/13/2018 at 12:13 PM by BRITTANY BEGUM

## 2018-07-13 NOTE — TELEPHONE ENCOUNTER
Spoke with patient and informed that Rx was ready and will be down at pharmacy later today. Patient states she needs a refill for the Imitrex also since she is pretty much out and is going out of town.

## 2018-07-13 NOTE — TELEPHONE ENCOUNTER
Hand signed RX brought to [facility] to be brought downstairs to [facility].

## 2018-07-16 ENCOUNTER — NURSE TRIAGE (OUTPATIENT)
Dept: NURSING | Facility: CLINIC | Age: 58
End: 2018-07-16

## 2018-07-16 ENCOUNTER — TELEPHONE (OUTPATIENT)
Dept: INTERNAL MEDICINE | Facility: CLINIC | Age: 58
End: 2018-07-16

## 2018-07-16 DIAGNOSIS — G43.909 MIGRAINE WITHOUT STATUS MIGRAINOSUS, NOT INTRACTABLE, UNSPECIFIED MIGRAINE TYPE: ICD-10-CM

## 2018-07-16 DIAGNOSIS — E78.5 HYPERLIPIDEMIA WITH TARGET LDL LESS THAN 100: ICD-10-CM

## 2018-07-16 DIAGNOSIS — N95.1 PERIMENOPAUSAL: ICD-10-CM

## 2018-07-16 NOTE — TELEPHONE ENCOUNTER
Clinic Action Needed:Yes, Monae is calling from [facility] at [phone] ext [phone]  Reason for Call:Requesting response to fax on lidocaine prescription.  Stating they had faxed request x 2    Yanna Benavides RN  [facility]

## 2018-07-16 NOTE — TELEPHONE ENCOUNTER
Clinic Action Needed:Yes, Monae is calling from [facility] at [phone] ext [phone]  Reason for Call:Requesting response to fax on lidocaine prescription.  Stating they had faxed request x 2    Yanna Benavides RN  [facility]

## 2018-07-17 RX ORDER — SUMATRIPTAN 100 MG/1
100 TABLET, FILM COATED ORAL
Qty: 18 TABLET | Refills: 0 | OUTPATIENT
Start: 2018-07-17

## 2018-07-17 RX ORDER — FENOFIBRATE 160 MG/1
160 TABLET ORAL AT BEDTIME
Qty: 90 TABLET | Refills: 3 | Status: SHIPPED | OUTPATIENT
Start: 2018-07-17 | End: 2019-09-10

## 2018-07-17 NOTE — TELEPHONE ENCOUNTER
"Requested Prescriptions   Pending Prescriptions Disp Refills     SUMAtriptan (IMITREX) 100 MG tablet 18 tablet 11     Sig: Take 1 tablet (100 mg) by mouth at onset of headache for migraine (TAKE AT ONSET OF ACUTE MIGRAINE HEADACHE) MAY REPEAT ONE EXTRA DOSE IF NO RELIEF AFTER 2 HOURS; max dose 200 mg per day    Serotonin Agonists Failed    7/16/2018  2:03 PM       Failed - Serotonin Agonist request needs review.    Please review patient's record. If patient has had 8 or more treatments in the past month, please forward to provider.         Passed - Blood pressure under 140/90 in past 12 months    BP Readings from Last 3 Encounters:   07/11/18 116/70   06/28/18 102/62   05/21/18 125/87                Passed - Recent (12 mo) or future (30 days) visit within the authorizing provider's specialty    Patient had office visit in the last 12 months or has a visit in the next 30 days with authorizing provider or within the authorizing provider's specialty.  See \"Patient Info\" tab in inbasket, or \"Choose Columns\" in Meds & Orders section of the refill encounter.           Passed - Patient is age 18 or older       Passed - No active pregnancy on record       Passed - No positive pregnancy test in past 12 months        Last Written Prescription Date:  07/13/2018  Last Fill Quantity: 18,  # refills: 11   Last office visit: 6/28/2018 with prescribing provider:  06/28/2018   Future Office Visit:   Next 5 appointments (look out 90 days)     Sep 12, 2018 11:30 AM CDT   [facility]    [address] [facility] [address]  [facility] [address] [phone]                   "

## 2018-07-17 NOTE — TELEPHONE ENCOUNTER
"Requested Prescriptions   Pending Prescriptions Disp Refills     fenofibrate 160 MG tablet 90 tablet 3     Sig: Take 1 tablet (160 mg) by mouth At Bedtime with food. INDICATION: TO LOWER CHOLESTEROL    Fibrates Passed    7/16/2018  2:04 PM       Passed - Lipid panel on file in past 12 months    Recent Labs   Lab Test  06/26/18   1101   08/31/15   1349   CHOL  138   < >  243*   TRIG  129   < >  164*   HDL  43*   < >  49*   LDL  69   < >  161*   NHDL  95   < >   --    VLDL   --    --   33*   CHOLHDLRATIO   --    --   5.0    < > = values in this interval not displayed.              Passed - No abnormal creatine kinase in past 12 months    No lab results found.            Passed - Recent (12 mo) or future (30 days) visit within the authorizing provider's specialty    Patient had office visit in the last 12 months or has a visit in the next 30 days with authorizing provider or within the authorizing provider's specialty.  See \"Patient Info\" tab in inbasket, or \"Choose Columns\" in Meds & Orders section of the refill encounter.           Passed - Patient is age 18 or older       Passed - No active pregnancy on record       Passed - No positive pregnancy test in past 12 months        cholecalciferol 5000 units CAPS 90 capsule 3     Sig: Take 1 capsule (5,000 Units) by mouth daily    There is no refill protocol information for this order        Last Written Prescription Date:  3/21/17  Last Fill Quantity: 90,  # refills: 3   Last office visit: 6/28/2018 with prescribing provider:  6/28/18   Future Office Visit:   Next 5 appointments (look out 90 days)     Sep 12, 2018 11:30 AM CDT   [facility] with [facility] MD   [facility]    [address] [facility] [address]  [facility] [address] [phone]                   "

## 2018-07-24 ENCOUNTER — OFFICE VISIT (OUTPATIENT)
Dept: PALLIATIVE MEDICINE | Facility: CLINIC | Age: 58
End: 2018-07-24
Payer: MEDICARE

## 2018-07-24 VITALS — SYSTOLIC BLOOD PRESSURE: 116 MMHG | OXYGEN SATURATION: 97 % | HEART RATE: 81 BPM | DIASTOLIC BLOOD PRESSURE: 75 MMHG

## 2018-07-24 DIAGNOSIS — M47.817 LUMBOSACRAL SPONDYLOSIS WITHOUT MYELOPATHY: Primary | ICD-10-CM

## 2018-07-24 DIAGNOSIS — M79.2 THORACIC NEURALGIA: ICD-10-CM

## 2018-07-24 DIAGNOSIS — M79.18 MYOFASCIAL MUSCLE PAIN: ICD-10-CM

## 2018-07-24 PROCEDURE — 99215 OFFICE O/P EST HI 40 MIN: CPT | Performed by: PAIN MEDICINE

## 2018-07-24 ASSESSMENT — PAIN SCALES - GENERAL: PAINLEVEL: MODERATE PAIN (4)

## 2018-07-24 NOTE — MR AVS SNAPSHOT
After Visit Summary   7/24/2018    Mary Lou Gil    MRN: [id number]           Patient Information     Date Of Birth          1960        Visit Information        Provider Department      7/24/2018 1:30 PM Chino Miller MD [facility]        Care Instructions    - Further procedures recommended:    - Consider repeat Lumbar Facet joint injection    - Consider thoracic trigger point injection would need ultrasound   - Would consider thoracic epidural if sharp shooting and burning pain persists(would need MRI prior)   - Follow up: Pending decision on how to proceed   - Reasonable to be seen by a chiropractor       ----------------------------------------------------------------  Nurse Triage line:  [phone]   Call this number with any questions or concerns. You may leave a detailed message anytime. Calls are typically returned Monday through Friday between 8 AM and 4:30 PM. We usually get back to you within 2 business days depending on the issue/request.      Scheduling number: [phone].  Call this number to schedule or change appointments.    We believe regular attendance is key to your success in our program.    Any time you are unable to keep your appointment we ask that you call us at least 24 hours in advance to let us know. This will allow us to offer the appointment time to another patient.               Follow-ups after your visit        Your next 10 appointments already scheduled     Sep 12, 2018 11:30 AM CDT   [facility]    [address] [facility] [address]  [facility] [address] [phone]              Who to contact     If you have questions or need follow up information about today's clinic visit or your schedule please contact [facility] directly at [phone].  Normal or non-critical lab and imaging results will be communicated to you by  MyChart, letter or phone within 4 business days after the clinic has received the results. If you do not hear from us within 7 days, please contact the clinic through [employer] or phone. If you have a critical or abnormal lab result, we will notify you by phone as soon as possible.  Submit refill requests through [employer] or call your pharmacy and they will forward the refill request to us. Please allow 3 business days for your refill to be completed.          Additional Information About Your Visit        [facility] Information     Synapse gives you secure access to your electronic health record. If you see a primary care provider, you can also send messages to your care team and make appointments. If you have questions, please call your primary care clinic.  If you do not have a primary care provider, please call [phone] and they will assist you.        Care EveryWhere ID     This is your Care EveryWhere ID. This could be used by other organizations to access your [facility] medical records  [id number]        Your Vitals Were     Pulse Pulse Oximetry                81 97%           Blood Pressure from Last 3 Encounters:   07/24/18 116/75   07/11/18 116/70   06/28/18 102/62    Weight from Last 3 Encounters:   07/11/18 71.2 kg (157 lb)   06/28/18 72.4 kg (159 lb 9.6 oz)   05/21/18 74.8 kg (165 lb)              Today, you had the following     No orders found for display       Primary Care Provider Office Phone # Fax #    Xavi Garcia [phone]       [address] [facility] [address]        Equal Access to Services     [facility]: Hadii aad ku hadasho Soomaali, waaxda luqadaha, qaybta kaalmada adeegyada, marley moreno . So [facility] [phone].    ATENCIÓN: Si habla español, tiene a ybarra disposición servicios gratuitos de asistencia lingüística. Llame al [phone].    We comply with applicable federal civil rights laws and [place] laws. We do not  discriminate on the basis of race, color, national origin, age, disability, sex, sexual orientation, or gender identity.            Thank you!     Thank you for choosing [facility]  for your care. Our goal is always to provide you with excellent care. Hearing back from our patients is one way we can continue to improve our services. Please take a few minutes to complete the written survey that you may receive in the mail after your visit with us. Thank you!             Your Updated Medication List - Protect others around you: Learn how to safely use, store and throw away your medicines at www.disposemymeds.org.          This list is accurate as of 7/24/18  1:54 PM.  Always use your most recent med list.                   Brand Name Dispense Instructions for use Diagnosis    albuterol 108 (90 Base) MCG/ACT Inhaler    PROAIR HFA/PROVENTIL HFA/VENTOLIN HFA    1 Inhaler    Inhale 2 puffs into the lungs every 6 hours as needed for shortness of breath / dyspnea or wheezing    Tobacco use disorder       B-D U/F 31G X 8 MM   Generic drug:  insulin pen needle     100 each    USE AS DIRECTED AT BEDTIME    Type 2 diabetes, HbA1c goal < 7% (H)       BENADRYL PO      Take 25-50 mg by mouth See Admin Instructions 25 mg during the day if needed and 50 mg at bedtime for sleep as needed        cholecalciferol 5000 units Caps     90 capsule    Take 1 capsule (5,000 Units) by mouth daily    Perimenopausal       citalopram 40 MG tablet    celeXA    90 tablet    Take 1 tablet (40 mg) by mouth daily TO CONTROL SYMPTOMS OF DEPRESSION    Major depressive disorder, recurrent episode, mild (H)       EPINEPHrine 0.3 MG/0.3ML injection 2-pack    EPIPEN/ADRENACLICK/or ANY BX GENERIC EQUIV    0.3 mL    Inject 0.3 mLs (0.3 mg) into the muscle once as needed    Anaphylactic reaction, subsequent encounter       fenofibrate 160 MG tablet     90 tablet    Take 1 tablet (160 mg) by mouth At Bedtime with food. INDICATION: TO LOWER  CHOLESTEROL    Hyperlipidemia with target LDL less than 100       flunisolide HFA 80 MCG/ACT Aers oral inhaler    AEROSPAN    1 Inhaler    Inhale 2 puffs into the lungs 2 times daily Use twice per day during main allergy season(s)    Mild persistent asthma, uncomplicated       FREESTYLE LITE test strip   Generic drug:  blood glucose monitoring     200 each    USE TO TEST THREE TIMES A DAY    Type 2 diabetes mellitus with hyperglycemia, with long-term current use of insulin (H)       glipiZIDE 10 MG 24 hr tablet    glipiZIDE XL    30 tablet    Take 1 tablet (10 mg) by mouth daily INDICATION: TO TREAT DIABETES.    Type 2 diabetes mellitus with diabetic polyneuropathy, with long-term current use of insulin (H)       ibuprofen 800 MG tablet    ADVIL/MOTRIN    90 tablet    Take 1 tablet (800 mg) by mouth every 8 hours as needed for moderate pain or pain    Hip pain, left, Physical deconditioning, Other chronic pain, SI (sacroiliac) joint dysfunction       insulin detemir 100 UNIT/ML injection    LEVEMIR FLEXPEN/FLEXTOUCH    30 mL    Inject 30 Units Subcutaneous At Bedtime    Type 2 diabetes mellitus with diabetic polyneuropathy, with long-term current use of insulin (H)       levothyroxine 150 MCG tablet    SYNTHROID/LEVOTHROID    90 tablet    Take 1 tablet (150 mcg) by mouth daily TAKE FIRST THING IN THE MORNING ON AN EMPTY STOMACH,  NO FOOD FOR AN HOUR    Hypothyroidism, unspecified type       lidocaine 5 % ointment    XYLOCAINE    50 g    Apply a quarter size amount to painful areas up to 4 times per day as needed.    Type 2 diabetes mellitus with diabetic polyneuropathy, with long-term current use of insulin (H), Diabetic polyneuropathy associated with type 2 diabetes mellitus (H)       lisinopril-hydrochlorothiazide 20-25 MG per tablet    PRINZIDE/ZESTORETIC    90 tablet    Take 1 tablet by mouth daily INDICATION:TO LOWER BLOOD PRESSURE AND TO PRESERVE KIDNEY FUNCTION    Essential hypertension, benign        metFORMIN 500 MG 24 hr tablet    GLUCOPHAGE-XR    60 tablet    Take 2 tablets (1,000 mg) by mouth daily (with dinner)    Type 2 diabetes mellitus with diabetic polyneuropathy, with long-term current use of insulin (H)       pravastatin 80 MG tablet    PRAVACHOL    90 tablet    Take 1 tablet (80 mg) by mouth daily INDICATION: TO LOWER CHOLESTEROL AND TO HELP  PREVENT HEART DISEASE    Hyperlipidemia with target LDL less than 100       SUMAtriptan 100 MG tablet    IMITREX    18 tablet    Take 1 tablet (100 mg) by mouth at onset of headache for migraine (TAKE AT ONSET OF ACUTE MIGRAINE HEADACHE) MAY REPEAT ONE EXTRA DOSE IF NO RELIEF AFTER 2 HOURS; max dose 200 mg per day    Migraine without status migrainosus, not intractable, unspecified migraine type       thin lancets    NO BRAND SPECIFIED    3 Box    1 Device 2 times daily    Type 2 diabetes, HbA1c goal < 7% (H)       tiZANidine 2 MG tablet    ZANAFLEX    90 tablet    Take 1-2 tablets (2-4 mg) by mouth 3 times daily    Muscle spasm       traMADol 50 MG tablet    ULTRAM    30 tablet    Take 1 tablet (50 mg) by mouth 2 times daily as needed for severe pain    Degeneration of lumbar intervertebral disc       traZODone 50 MG tablet    DESYREL    60 tablet    Take 1-2 tablets ( mg) by mouth nightly as needed for sleep    Major depressive disorder, recurrent episode, mild (H)       verapamil 240 MG CR tablet    CALAN-SR    90 tablet    Take 1 tablet (240 mg) by mouth At Bedtime INDICATION: TO LOWER BLOOD PRESSURE AND TO CONTROL MIGRAINE HEADACHES    Migraine without status migrainosus, not intractable, unspecified migraine type

## 2018-07-24 NOTE — Clinical Note
Provided patient with multiple options she will decide how she would like to proceed.  Thank you for the referral

## 2018-07-24 NOTE — PATIENT INSTRUCTIONS
- Further procedures recommended:    - Consider repeat Lumbar Facet joint injection    - Consider thoracic trigger point injection would need ultrasound   - Would consider thoracic epidural if sharp shooting and burning pain persists(would need MRI prior)   - Follow up: Pending decision on how to proceed   - Reasonable to be seen by a chiropractor       ----------------------------------------------------------------  Nurse Triage line:  [phone]   Call this number with any questions or concerns. You may leave a detailed message anytime. Calls are typically returned Monday through Friday between 8 AM and 4:30 PM. We usually get back to you within 2 business days depending on the issue/request.      Scheduling number: [phone].  Call this number to schedule or change appointments.    We believe regular attendance is key to your success in our program.    Any time you are unable to keep your appointment we ask that you call us at least 24 hours in advance to let us know. This will allow us to offer the appointment time to another patient.

## 2018-07-24 NOTE — PROGRESS NOTES
[facility] Consultation    Date of visit: 7/24/2018    Reason for consultation:    Primary Care Provider is Xavi Garcia.  Pain medications are being prescribed by pcp.    Please see the [facility] health questionnaire which the patient completed and reviewed with me in detail.    Chief Complaint:    Chief Complaint   Patient presents with     Pain     Interventional evaluation       Pain history:  Mary Lou Gil is a 58 year old female who first started having problems with pain The pt reports chronic axial LBP. Additionally, she is having mid thoracic pain(started 4 months ago. The pain is constant deep aching pain(1-2/10). The pain is intermittently sharp/shooting burning. The pain radiates across her back. She denies any radiation to the ant. Of note pt has been doing more activity around the time this pain started 2/2 to her elevator breaking. The pain is worse with any activity. The pain is slightly better with rest. She notes some benefit with lidocaine patches. She denies any progressive weakness. She denies any incontinence  Of note her axial LBP is back to baseline. The pain is a constant deep aching pain. The pain is constant. The pain is worse with ext and flexion. The pain is worse with standing. Of note pt got sig relief with lumbar facet injections in the past.    Pain rating: intensity  Averages 5/10 on a 0-10 scale.      Current treatments include:  Tramadol- intermittently         Other treatments have included:  Mary Lou Gil has been seen at a pain clinic in the past.      Injections:   - s/p lumbar facet injection today bilateral l4-5 and L5-S1 .  Significant relief  - SI joint 2/16 unsure of benefit  - Bilateral L4-5 TRANSFORAMINAL EPIDURAL STEROID INJECTION  9/15 unsure of benefit  Past Medical History:  Past Medical History:   Diagnosis Date     Anemia, unspecified 2/1/2010     Degeneration of lumbar intervertebral disc 7/11/2016      Depressive disorder      Diabetes mellitus (H) 2010    Dx in      Essential hypertension, benign 2010     Hyperlipidaemia      Hyperlipidemia LDL goal < 100      Migraine      Other chronic pain     back, legs and feet     Spinal stenosis      Tobacco use disorder 2010     Uncomplicated asthma     ? with allergic reactions?     Unspecified hypothyroidism 2010     Past Surgical History:  Past Surgical History:   Procedure Laterality Date     ABDOMEN SURGERY       BIOPSY       C APPENDECTOMY  1974    open     C  DELIVERY ONLY      , Low Cervical     C LIGATE FALLOPIAN TUBE,POSTPARTUM      Tubal Ligation     HC HYSTEROSCOPY, SURGICAL; W/ ENDOMETRIAL ABLATION, ANY METHOD      Novasure     HC REMOVE TONSILS/ADENOIDS,<13 Y/O      T & A <14y.o.     OPERATIVE HYSTEROSCOPY WITH MORCELLATOR N/A 2014    Procedure: OPERATIVE HYSTEROSCOPY WITH MORCELLATOR;  Surgeon: Ketan Edwards MD;  Location: [facility] OR     THYROIDECTOMY        Medications:  Current Outpatient Prescriptions   Medication Sig Dispense Refill     albuterol (PROAIR HFA/PROVENTIL HFA/VENTOLIN HFA) 108 (90 BASE) MCG/ACT Inhaler Inhale 2 puffs into the lungs every 6 hours as needed for shortness of breath / dyspnea or wheezing (Patient not taking: Reported on 2018) 1 Inhaler 11     B-D U/F 31G X 8 MM insulin pen needle USE AS DIRECTED AT BEDTIME 100 each 1     cholecalciferol 5000 UNITS CAPS Take 1 capsule (5,000 Units) by mouth daily 90 capsule 3     citalopram (CELEXA) 40 MG tablet Take 1 tablet (40 mg) by mouth daily TO CONTROL SYMPTOMS OF DEPRESSION 90 tablet 1     DiphenhydrAMINE HCl (BENADRYL PO) Take 25-50 mg by mouth See Admin Instructions 25 mg during the day if needed and 50 mg at bedtime for sleep as needed       EPINEPHrine 0.3 MG/0.3ML injection Inject 0.3 mLs (0.3 mg) into the muscle once as needed (Patient not taking: Reported on 2018) 0.3 mL prn     fenofibrate 160 MG tablet Take  1 tablet (160 mg) by mouth At Bedtime with food. INDICATION: TO LOWER CHOLESTEROL 90 tablet 3     flunisolide HFA (AEROSPAN) 80 MCG/ACT AERS oral inhaler Inhale 2 puffs into the lungs 2 times daily Use twice per day during main allergy season(s) (Patient not taking: Reported on 7/11/2018) 1 Inhaler 5     FREESTYLE LITE test strip USE TO TEST THREE TIMES A  each 3     glipiZIDE (GLIPIZIDE XL) 10 MG 24 hr tablet Take 1 tablet (10 mg) by mouth daily INDICATION: TO TREAT DIABETES. 30 tablet 6     ibuprofen (ADVIL/MOTRIN) 800 MG tablet Take 1 tablet (800 mg) by mouth every 8 hours as needed for moderate pain or pain (Patient not taking: Reported on 7/11/2018) 90 tablet 2     insulin detemir (LEVEMIR FLEXPEN/FLEXTOUCH) 100 UNIT/ML injection Inject 30 Units Subcutaneous At Bedtime 30 mL 1     levothyroxine (SYNTHROID/LEVOTHROID) 150 MCG tablet Take 1 tablet (150 mcg) by mouth daily TAKE FIRST THING IN THE MORNING ON AN EMPTY STOMACH,  NO FOOD FOR AN HOUR 90 tablet 1     lidocaine (XYLOCAINE) 5 % ointment Apply a quarter size amount to painful areas up to 4 times per day as needed. 50 g 3     lisinopril-hydrochlorothiazide (PRINZIDE/ZESTORETIC) 20-25 MG per tablet Take 1 tablet by mouth daily INDICATION:TO LOWER BLOOD PRESSURE AND TO PRESERVE KIDNEY FUNCTION 90 tablet 1     metFORMIN (GLUCOPHAGE-XR) 500 MG 24 hr tablet Take 2 tablets (1,000 mg) by mouth daily (with dinner) 60 tablet 6     pravastatin (PRAVACHOL) 80 MG tablet Take 1 tablet (80 mg) by mouth daily INDICATION: TO LOWER CHOLESTEROL AND TO HELP  PREVENT HEART DISEASE 90 tablet 1     SUMAtriptan (IMITREX) 100 MG tablet Take 1 tablet (100 mg) by mouth at onset of headache for migraine (TAKE AT ONSET OF ACUTE MIGRAINE HEADACHE) MAY REPEAT ONE EXTRA DOSE IF NO RELIEF AFTER 2 HOURS; max dose 200 mg per day 18 tablet 11     thin (NO BRAND SPECIFIED) lancets 1 Device 2 times daily 3 Box 2     tiZANidine (ZANAFLEX) 2 MG tablet Take 1-2 tablets (2-4 mg) by mouth 3  times daily 90 tablet 1     traMADol (ULTRAM) 50 MG tablet Take 1 tablet (50 mg) by mouth 2 times daily as needed for severe pain 30 tablet 0     traZODone (DESYREL) 50 MG tablet Take 1-2 tablets ( mg) by mouth nightly as needed for sleep 60 tablet 1     verapamil (CALAN-SR) 240 MG CR tablet Take 1 tablet (240 mg) by mouth At Bedtime INDICATION: TO LOWER BLOOD PRESSURE AND TO CONTROL MIGRAINE HEADACHES 90 tablet 1     [DISCONTINUED] Ascorbic Acid Buffered (VITAMIN C EFFERVESCENT) PDEF Take 1 packet by mouth daily. EMERGEN- C, INDICATION: VITAMIN C SUPPLEMENTATION 90 g PRN     [DISCONTINUED] ezetimibe (ZETIA) 10 MG tablet Take 1 tablet (10 mg) by mouth daily INDICATION: TO LOWER CHOLESTEROL 90 tablet 2     Allergies:     Allergies   Allergen Reactions     Nicotine Polacrilex Anaphylaxis     PROBABLY REACTION TO VINYL IN NICOTINE PATCH     Neurontin [Gabapentin] Rash     Norvasc [Amlodipine Besylate]      constipation     Percocet [Oxycodone-Acetaminophen] Itching     Vinyl Ether Swelling and Cough     Vinyl causes throat and lip swelling, cough and headache     Social History:    Tobacco use: no  Alcohol use: no  Drug use: no  History of chemical dependency treatment: no    Family history:  Family History   Problem Relation Age of Onset     C.A.D. Mother      Diabetes Mother      Hypertension Mother      Aneurysm Mother      Unknown/Adopted Brother      Unknown/Adopted Brother      C.A.D. Sister      Diabetes Sister      Diabetes Sister      Hypertension Sister      Diabetes Sister      Hypertension Sister      Hypertension Sister      Breast Cancer No family hx of      Cancer - colorectal No family hx of      Family history of headaches: no    Review of Systems:  Skin: negative  Eyes: negative  Ears/Nose/Throat: negative  Respiratory: No shortness of breath, dyspnea on exertion, cough, or hemoptysis  Cardiovascular: negative  Gastrointestinal: negative  Genitourinary: negative  Musculoskeletal:  negative  Neurologic: negative  Psychiatric: negative  Hematologic/Lymphatic/Immunologic: negative  Endocrine: negative    Physical Exam:  Vitals:    07/24/18 1318   BP: 116/75   Pulse: 81   SpO2: 97%     Exam:  Constitutional: healthy, alert and no distress  Head: normocephalic. Atraumatic.   Eyes: no redness or jaundice noted   ENT: oropharnx normal.  MMM.  Neck supple.    Cardiovascular: Negative JVD  Respiratory: Speaking in full sentences no accessory muscles use   gastrointestinal: soft, non-tender, significant relief  Skin: no suspicious lesions or rashes  Psychiatric: mentation appears normal and affect normal/bright    Musculoskeletal exam:  Gait/Station/Posture: wnl  Cervical spine: ROMwnl       Thoracic spine: Diffuse tenderness to palpation    Lumbar spine:     ROM: Decreased extension   Myofascial tenderness: Positive   Jernigan's: Positive bilaterally               Straight leg exam: Negative     Neurologic exam:  CN:  Cranial nerves 2-12 are normal  Motor:  5/5 UE and LE strength  Reflexes:     Biceps:     +2   Brachioradialis   +2     Triceps:  =2   Patella:  +2   Achilles:  +2    Sensory:  (upper and lower extremities):   Light touch: normal    Allodynia: absent    Dysethesia: absent    Hyperalgesia: absent     Diagnostic tests:     L1-L2, L2-L3, L3-L4: No disc bulge or herniation. No central or  foraminal stenosis.     L4-L5: Prominent ligamentum flavum thickening and mild annular bulge  resulting in relatively severe central stenosis at the disc level.  Moderate apophyseal joint degenerative changes are noted bilaterally.  The neural foramen appear to be patent bilaterally.     L5-S1: Advanced apophyseal joint degenerative arthrosis and minimal  degenerative spondylolisthesis. No disc bulge or herniation. No  central or foraminal stenosis.     IMPRESSION  IMPRESSION:  1. L4-L5 prominent ligamentum flavum thickening and relatively severe  central stenosis. There may be a superimposed 1-2 mm synovial  cyst to  the right of midline.  2. 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 There is a  mild grade 1 degenerative spondylolisthesis noted at L5-S1.      Assessment/Plan:  Mary Lou Gil is a 58 year old female who presents with the complaints of axial LBP and thoracic back pain .   There are no diagnoses linked to this encounter.     - Further procedures recommended:    - Consider repeat Lumbar Facet joint injection    - Consider thoracic trigger point injection would need ultrasound   - Would consider thoracic epidural if sharp shooting and burning persists(would need MRI prior)   - Follow up: Pending decision on how to proceed   - Reasonable to be seen by a chiropractor               Total time spent was 30 minutes, and more than 50% of face to face time was spent counseling and/or coordination of care regarding principles of multidisciplinary care and medication management axial LBP.    Chino Miller MD  [facility]

## 2018-07-26 NOTE — TELEPHONE ENCOUNTER
Dr Garcia, please advise.  LAMAR North LPN    
PA Initiation    Medication: lidocaine oint  Insurance Company: [employer] - Phone [phone] Fax [phone]  Pharmacy Filling the Rx: [place], [place] - [address] [place] [address]  Filling Pharmacy Phone: [phone]  Filling Pharmacy Fax:    Start Date: 6/28/2018    Central Prior Authorization Team   Phone: [phone]              
PRIOR AUTHORIZATION DENIED    Medication: lidocaine oint    Denial Date: 6/29/2018    Denial Rational: Denied as patient does not have an FDA approved indication for this medication such as- pain associated with PHN (shingles), pain associated with diabetic neuropathy, or pain associated with cancer related neuropathy:         Appeal Information:             
Prior Authorization Retail Medication Request    Medication/Dose: lidocaine oint  ICD code (if different than what is on RX):    Previously Tried and Failed:    Rationale:      Insurance Name:  [employer]  Insurance ID:  [id number]      Pharmacy Information (if different than what is on RX)  Name:  [facility]  Phone:  [phone]  
The prior diagnosis codes were not applicable for this medication.   Re-sent the RX with new diagnosis codes.   If it does not go through, then try PA.   
(4) patient too young to ambulate or walks frequently

## 2018-08-14 ENCOUNTER — NURSE TRIAGE (OUTPATIENT)
Dept: NURSING | Facility: CLINIC | Age: 58
End: 2018-08-14

## 2018-08-14 NOTE — TELEPHONE ENCOUNTER
Reason for Disposition    [1] Evening (after bedtime snack) blood glucose < 100 mg/dl (5.6 mmol/l) AND [2] more than once in past week    Additional Information    Negative: Unconscious or difficult to awaken    Negative: Seizure occurs    Negative: Acting confused (e.g., disoriented, slurred speech)    Negative: Very weak (e.g., can't stand)    Negative: Sounds like a life-threatening emergency to the triager    Negative: [1] Vomiting AND [2] signs of dehydration (e.g., no urine > 12 hours, very dry mouth, dark urine, etc.)    Negative: [1] Low blood sugar symptoms persist > 15 minutes AND [2] using low blood sugar Care Advice    Negative: [1] Low blood glucose (< 70 mg/dl  or 3.9 mmol/l) persists > 15 minutes AND [2] using low blood sugar Care Advice    Negative: Patient sounds very sick or weak to the triager    Negative: [1] Low blood sugar symptoms with no other adult present AND [2] hasn't tried Care Advice    Negative: [1] Low blood glucose (< 70 mg/dl  or 3.9 mmol/l) with no other adult present AND [2] hasn't tried Care Advice    Negative: Diabetes drug error or overdose (e.g., insulin error or extra dose)    Negative: Caller has URGENT medication or insulin pump question and triager unable to answer question    Negative: [1] Blood glucose < 70  mg/dl (3.9 mmol/l) or symptomatic with other adult present AND [2] cause unknown food, strenuous exercise.)    Negative: [1] Morning (before breakfast) blood glucose < 80 mg/dl (4.5 mmol/L) AND [2] more than once in past week    Protocols used: DIABETES - LOW BLOOD SUGAR-ADULT-  Caller states her bedtime blood glucose level was 63. Now after taking snack blood glucose level is 93. Caller denies any symptoms of hypoglycemia noted. States she did early but the symptoms have cleared. Caller wants to know if she should continue with her oral glucose medication and her bedtime insulin. triager advised patient to take diabetic medications as ordered, but to recheck  blood glucose levels in 3 hours. Caller states she has glucose tablets if need them.

## 2018-08-30 DIAGNOSIS — M51.369 DEGENERATION OF LUMBAR INTERVERTEBRAL DISC: ICD-10-CM

## 2018-08-30 NOTE — TELEPHONE ENCOUNTER
Tramadol       Last Written Prescription Date:  7/13/18  Last Fill Quantity: 30,   # refills: 0  Last Office Visit: 6/28/18  Future Office visit:       Routing refill request to provider for review/approval because:  Drug not on the [facility], P or [facility] refill protocol or controlled substance

## 2018-08-31 RX ORDER — TRAMADOL HYDROCHLORIDE 50 MG/1
50 TABLET ORAL 2 TIMES DAILY PRN
Qty: 30 TABLET | Refills: 0 | Status: SHIPPED | OUTPATIENT
Start: 2018-08-31 | End: 2018-10-17

## 2018-09-27 ENCOUNTER — OFFICE VISIT (OUTPATIENT)
Dept: URGENT CARE | Facility: URGENT CARE | Age: 58
End: 2018-09-27
Payer: MEDICARE

## 2018-09-27 VITALS
TEMPERATURE: 98.1 F | HEART RATE: 82 BPM | RESPIRATION RATE: 16 BRPM | BODY MASS INDEX: 26.28 KG/M2 | OXYGEN SATURATION: 97 % | SYSTOLIC BLOOD PRESSURE: 102 MMHG | DIASTOLIC BLOOD PRESSURE: 76 MMHG | WEIGHT: 162.8 LBS

## 2018-09-27 DIAGNOSIS — B34.9 VIRAL SYNDROME: Primary | ICD-10-CM

## 2018-09-27 DIAGNOSIS — R07.0 THROAT PAIN: ICD-10-CM

## 2018-09-27 LAB
DEPRECATED S PYO AG THROAT QL EIA: NORMAL
SPECIMEN SOURCE: NORMAL

## 2018-09-27 PROCEDURE — 87880 STREP A ASSAY W/OPTIC: CPT | Performed by: PHYSICIAN ASSISTANT

## 2018-09-27 PROCEDURE — 87081 CULTURE SCREEN ONLY: CPT | Performed by: PHYSICIAN ASSISTANT

## 2018-09-27 PROCEDURE — 99213 OFFICE O/P EST LOW 20 MIN: CPT | Performed by: PHYSICIAN ASSISTANT

## 2018-09-27 NOTE — PROGRESS NOTES
SUBJECTIVE:   Mary Lou Gil is a 58 year old female presenting with a chief complaint of   1) sore throat  2) body aches  3) some sneezing  4) dry cough    Onset of symptoms was 1 day(s) ago.  Course of illness is worsening.    Severity moderate  Current and Associated symptoms: as above  Treatment measures tried include chloraseptic.  Predisposing factors include ill exposure.    Past Medical History:   Diagnosis Date     Anemia, unspecified 2/1/2010     Degeneration of lumbar intervertebral disc 7/11/2016     Depressive disorder      Diabetes mellitus (H) 2/1/2010    Dx in 2006     Essential hypertension, benign 2/1/2010     Hyperlipidaemia      Hyperlipidemia LDL goal < 100      Migraine      Other chronic pain     back, legs and feet     Spinal stenosis      Tobacco use disorder 2/1/2010     Uncomplicated asthma     ? with allergic reactions?     Unspecified hypothyroidism 2/1/2010     Patient Active Problem List   Diagnosis     Essential hypertension, benign     Hypothyroidism     Anemia     Tobacco use disorder     Mild major depression (H)     Hyperlipidemia LDL goal <100     Type 2 diabetes mellitus with diabetic polyneuropathy, with long-term current use of insulin (H)     Migraine headache     Vitamin D deficiency     Vitamin B12 deficiency without anemia     Perimenopausal     Hyperlipidemia LDL goal <130     Neuropathy, diabetic (H)     Encounter for long-term (current) use of medications     Pain in shoulder     Iron deficiency     Hypothyroidism     Dyspepsia     Moderate major depression (H)     FH: CAD (coronary artery disease)     Atypical chest pain     Anaphylactic reaction     [facility]     Endometrial hyperplasia     Vitamin B 12 deficiency     Low back pain     Abdominal pain     Lower abdominal pain     Constipation     Hyperlipidemia with target LDL less than 100     Mild persistent asthma     Gastroesophageal reflux disease with esophagitis     Cramp of both lower  extremities     Low iron stores     Insulin-requiring or dependent type II diabetes mellitus (H)     Migraine without status migrainosus, not intractable, unspecified migraine type     Chronic pain     Intermittent claudication (H)     Bilateral low back pain with sciatica, sciatica laterality unspecified     Arm pain     Carpal tunnel syndrome on both sides     Polyneuropathy associated with underlying disease (H)     Type 2 diabetes mellitus with diabetic chronic kidney disease (H)     Hypothyroidism, unspecified hypothyroidism type     Palpitations     Mild persistent asthma, uncomplicated     Hypothyroidism, unspecified type     Bilateral carpal tunnel syndrome     Degeneration of lumbar intervertebral disc     Social History   Substance Use Topics     Smoking status: Current Every Day Smoker     Packs/day: 1.00     Years: 25.00     Types: Cigarettes, Other     Smokeless tobacco: Never Used      Comment: started at age 19 and has quit for 10 years      Alcohol use No       ROS:  CONSTITUTIONAL:NEGATIVE for fever, chills, change in weight  INTEGUMENTARY/SKIN: NEGATIVE for worrisome rashes, moles or lesions  EYES: NEGATIVE for vision changes or irritation  ENT/MOUTH: as per HPI  RESP:as per HPI  CV: NEGATIVE for chest pain, palpitations or peripheral edema  GI: NEGATIVE for nausea, abdominal pain, heartburn, or change in bowel habits  MUSCULOSKELETAL: as per HPI  Review of systems negative except as stated above.    OBJECTIVE  :/76  Pulse 82  Temp 98.1  F (36.7  C) (Oral)  Resp 16  Wt 162 lb 12.8 oz (73.8 kg)  SpO2 97%  BMI 26.28 kg/m2  GENERAL APPEARANCE: healthy, alert and no distress  EYES: EOMI,  PERRL, conjunctiva clear  HENT: ear canals and TM's normal.  Nose and mouth without ulcers, erythema or lesions  HENT: nasal turbinates boggy with bluish hue and rhinorrhea clear  NECK: supple, nontender, no lymphadenopathy  RESP: lungs clear to auscultation - no rales, rhonchi or wheezes  CV: regular  rates and rhythm, normal S1 S2, no murmur noted  ABDOMEN:  soft, nontender, no HSM or masses and bowel sounds normal  NEURO: Normal strength and tone, sensory exam grossly normal,  normal speech and mentation  SKIN: no suspicious lesions or rashes    (B34.9) Viral syndrome  (primary encounter diagnosis)  Comment:   Plan: rest.  Ibuprofen as needed.    Follow up with primary clinic should symptoms persist or worsen.      (R07.0) Throat pain  Comment: possibly secondary to post nasal drip  Plan: Strep, Rapid Screen, Beta strep group A culture        Salt water gargles.  You may try throat lozenges and or benadryl 25 mg at bedtime.      Follow up with primary clinic    Patient expresses understanding and agreement with the assessment and plan as above.

## 2018-09-27 NOTE — MR AVS SNAPSHOT
After Visit Summary   9/27/2018    Mary Lou Gil    MRN: [id number]           Patient Information     Date Of Birth          1960        Visit Information        Provider Department      9/27/2018 6:00 PM Clary Casas PA-C [facility]        Today's Diagnoses     Viral syndrome    -  1    Throat pain          Care Instructions    (B34.9) Viral syndrome  (primary encounter diagnosis)  Comment:   Plan: rest.  Ibuprofen as needed.    Follow up with primary clinic should symptoms persist or worsen.      (R07.0) Throat pain  Comment: possibly secondary to post nasal drip  Plan: Strep, Rapid Screen, Beta strep group A culture        Salt water gargles.  You may try throat lozenges and or benadryl 25 mg at bedtime.      Follow up with primary clinic              Follow-ups after your visit        Who to contact     If you have questions or need follow up information about today's clinic visit or your schedule please contact [facility] directly at [phone].  Normal or non-critical lab and imaging results will be communicated to you by [employer]itt[employer]art, letter or phone within 4 business days after the clinic has received the results. If you do not hear from us within 7 days, please contact the clinic through [employer] or phone. If you have a critical or abnormal lab result, we will notify you by phone as soon as possible.  Submit refill requests through [employer] or call your pharmacy and they will forward the refill request to us. Please allow 3 business days for your refill to be completed.          Additional Information About Your Visit        C[employer]art Information     [employer] gives you secure access to your electronic health record. If you see a primary care provider, you can also send messages to your care team and make appointments. If you have questions, please call your primary care clinic.  If you do not have a primary care provider,  please call [phone] and they will assist you.        Care EveryWhere ID     This is your Care EveryWhere ID. This could be used by other organizations to access your [place] medical records  [id number]        Your Vitals Were     Pulse Temperature Respirations Pulse Oximetry BMI (Body Mass Index)       82 98.1  F (36.7  C) (Oral) 16 97% 26.28 kg/m2        Blood Pressure from Last 3 Encounters:   09/27/18 102/76   07/24/18 116/75   07/11/18 116/70    Weight from Last 3 Encounters:   09/27/18 162 lb 12.8 oz (73.8 kg)   07/11/18 157 lb (71.2 kg)   06/28/18 159 lb 9.6 oz (72.4 kg)              We Performed the Following     Beta strep group A culture     Strep, Rapid Screen        Primary Care Provider Office Phone # Fax #    Xavi Erasto Garcia [phone]       [address] [place] [id number]        Equal Access to Services     RISHI MOJICA : Hadii aad ku hadasho Soomaali, waaxda luqadaha, qaybta kaalmada adeegyada, waxay idiin hayaan marcus moreno . So [facility] [phone].    ATENCIÓN: Si habla español, tiene a ybarra disposición servicios gratuitos de asistencia lingüística. Llame al [phone].    We comply with applicable federal civil rights laws and [place] laws. We do not discriminate on the basis of race, color, national origin, age, disability, sex, sexual orientation, or gender identity.            Thank you!     Thank you for choosing [facility]  for your care. Our goal is always to provide you with excellent care. Hearing back from our patients is one way we can continue to improve our services. Please take a few minutes to complete the written survey that you may receive in the mail after your visit with us. Thank you!             Your Updated Medication List - Protect others around you: Learn how to safely use, store and throw away your medicines at www.disposemymeds.org.          This list is accurate as of 9/27/18  7:07 PM.  Always use  your most recent med list.                   Brand Name Dispense Instructions for use Diagnosis    albuterol 108 (90 Base) MCG/ACT inhaler    PROAIR HFA/PROVENTIL HFA/VENTOLIN HFA    1 Inhaler    Inhale 2 puffs into the lungs every 6 hours as needed for shortness of breath / dyspnea or wheezing    Tobacco use disorder       B-D U/F 31G X 8 MM   Generic drug:  insulin pen needle     100 each    USE AS DIRECTED AT BEDTIME    Type 2 diabetes, HbA1c goal < 7% (H)       BENADRYL PO      Take 25-50 mg by mouth See Admin Instructions 25 mg during the day if needed and 50 mg at bedtime for sleep as needed        cholecalciferol 5000 units Caps     90 capsule    Take 1 capsule (5,000 Units) by mouth daily    Perimenopausal       citalopram 40 MG tablet    celeXA    90 tablet    Take 1 tablet (40 mg) by mouth daily TO CONTROL SYMPTOMS OF DEPRESSION    Major depressive disorder, recurrent episode, mild (H)       EPINEPHrine 0.3 MG/0.3ML injection 2-pack    EPIPEN/ADRENACLICK/or ANY BX GENERIC EQUIV    0.3 mL    Inject 0.3 mLs (0.3 mg) into the muscle once as needed    Anaphylactic reaction, subsequent encounter       fenofibrate 160 MG tablet     90 tablet    Take 1 tablet (160 mg) by mouth At Bedtime with food. INDICATION: TO LOWER CHOLESTEROL    Hyperlipidemia with target LDL less than 100       flunisolide HFA 80 MCG/ACT Aers oral inhaler    AEROSPAN    1 Inhaler    Inhale 2 puffs into the lungs 2 times daily Use twice per day during main allergy season(s)    Mild persistent asthma, uncomplicated       FREESTYLE LITE test strip   Generic drug:  blood glucose monitoring     200 each    USE TO TEST THREE TIMES A DAY    Type 2 diabetes mellitus with hyperglycemia, with long-term current use of insulin (H)       glipiZIDE 10 MG 24 hr tablet    glipiZIDE XL    30 tablet    Take 1 tablet (10 mg) by mouth daily INDICATION: TO TREAT DIABETES.    Type 2 diabetes mellitus with diabetic polyneuropathy, with long-term current use of  insulin (H)       ibuprofen 800 MG tablet    ADVIL/MOTRIN    90 tablet    Take 1 tablet (800 mg) by mouth every 8 hours as needed for moderate pain or pain    Hip pain, left, Physical deconditioning, Other chronic pain, SI (sacroiliac) joint dysfunction       insulin detemir 100 UNIT/ML injection    LEVEMIR FLEXPEN/FLEXTOUCH    30 mL    Inject 30 Units Subcutaneous At Bedtime    Type 2 diabetes mellitus with diabetic polyneuropathy, with long-term current use of insulin (H)       levothyroxine 150 MCG tablet    SYNTHROID/LEVOTHROID    90 tablet    Take 1 tablet (150 mcg) by mouth daily TAKE FIRST THING IN THE MORNING ON AN EMPTY STOMACH,  NO FOOD FOR AN HOUR    Hypothyroidism, unspecified type       lidocaine 5 % ointment    XYLOCAINE    50 g    Apply a quarter size amount to painful areas up to 4 times per day as needed.    Type 2 diabetes mellitus with diabetic polyneuropathy, with long-term current use of insulin (H), Diabetic polyneuropathy associated with type 2 diabetes mellitus (H)       lisinopril-hydrochlorothiazide 20-25 MG per tablet    PRINZIDE/ZESTORETIC    90 tablet    Take 1 tablet by mouth daily INDICATION:TO LOWER BLOOD PRESSURE AND TO PRESERVE KIDNEY FUNCTION    Essential hypertension, benign       metFORMIN 500 MG 24 hr tablet    GLUCOPHAGE-XR    60 tablet    Take 2 tablets (1,000 mg) by mouth daily (with dinner)    Type 2 diabetes mellitus with diabetic polyneuropathy, with long-term current use of insulin (H)       pravastatin 80 MG tablet    PRAVACHOL    90 tablet    Take 1 tablet (80 mg) by mouth daily INDICATION: TO LOWER CHOLESTEROL AND TO HELP  PREVENT HEART DISEASE    Hyperlipidemia with target LDL less than 100       SUMAtriptan 100 MG tablet    IMITREX    18 tablet    Take 1 tablet (100 mg) by mouth at onset of headache for migraine (TAKE AT ONSET OF ACUTE MIGRAINE HEADACHE) MAY REPEAT ONE EXTRA DOSE IF NO RELIEF AFTER 2 HOURS; max dose 200 mg per day    Migraine without status  migrainosus, not intractable, unspecified migraine type       thin lancets    NO BRAND SPECIFIED    3 Box    1 Device 2 times daily    Type 2 diabetes, HbA1c goal < 7% (H)       tiZANidine 2 MG tablet    ZANAFLEX    90 tablet    Take 1-2 tablets (2-4 mg) by mouth 3 times daily    Muscle spasm       traMADol 50 MG tablet    ULTRAM    30 tablet    Take 1 tablet (50 mg) by mouth 2 times daily as needed for severe pain    Degeneration of lumbar intervertebral disc       traZODone 50 MG tablet    DESYREL    60 tablet    Take 1-2 tablets ( mg) by mouth nightly as needed for sleep    Major depressive disorder, recurrent episode, mild (H)       verapamil 240 MG CR tablet    CALAN-SR    90 tablet    Take 1 tablet (240 mg) by mouth At Bedtime INDICATION: TO LOWER BLOOD PRESSURE AND TO CONTROL MIGRAINE HEADACHES    Migraine without status migrainosus, not intractable, unspecified migraine type

## 2018-09-28 LAB
BACTERIA SPEC CULT: NORMAL
SPECIMEN SOURCE: NORMAL

## 2018-09-28 NOTE — PATIENT INSTRUCTIONS
(B34.9) Viral syndrome  (primary encounter diagnosis)  Comment:   Plan: rest.  Ibuprofen as needed.    Follow up with primary clinic should symptoms persist or worsen.      (R07.0) Throat pain  Comment: possibly secondary to post nasal drip  Plan: Strep, Rapid Screen, Beta strep group A culture        Salt water gargles.  You may try throat lozenges and or benadryl 25 mg at bedtime.      Follow up with primary clinic

## 2018-10-01 DIAGNOSIS — Z79.4 TYPE 2 DIABETES MELLITUS WITH HYPERGLYCEMIA, WITH LONG-TERM CURRENT USE OF INSULIN (H): ICD-10-CM

## 2018-10-01 DIAGNOSIS — E11.65 TYPE 2 DIABETES MELLITUS WITH HYPERGLYCEMIA, WITH LONG-TERM CURRENT USE OF INSULIN (H): ICD-10-CM

## 2018-10-01 NOTE — TELEPHONE ENCOUNTER
"Requested Prescriptions   Pending Prescriptions Disp Refills     FREESTYLE LITE test strip [Pharmacy Med Name: FREESTYLE LITE TEST  STRP]  Last Written Prescription Date:  03/04/2018  Last Fill Quantity: 200,  # refills: 03   Last Office Visit: 7/11/2018   Future Office Visit:      200 each 3     Sig: USE AS DIRECTED TO TEST THREE TIMES DAILY.    Diabetic Supplies Protocol Passed    10/1/2018 12:15 PM       Passed - Patient is 18 years of age or older       Passed - Recent (6 mo) or future (30 days) visit within the authorizing provider's specialty    Patient had office visit in the last 6 months or has a visit in the next 30 days with authorizing provider.  See \"Patient Info\" tab in inbasket, or \"Choose Columns\" in Meds & Orders section of the refill encounter.              "

## 2018-10-02 RX ORDER — BLOOD-GLUCOSE METER
KIT MISCELLANEOUS
Qty: 200 EACH | Refills: 3 | Status: SHIPPED | OUTPATIENT
Start: 2018-10-02 | End: 2019-11-07

## 2018-10-15 DIAGNOSIS — F33.0 MAJOR DEPRESSIVE DISORDER, RECURRENT EPISODE, MILD (H): ICD-10-CM

## 2018-10-15 DIAGNOSIS — I10 ESSENTIAL HYPERTENSION, BENIGN: ICD-10-CM

## 2018-10-15 NOTE — TELEPHONE ENCOUNTER
"Requested Prescriptions   Pending Prescriptions Disp Refills     lisinopril-hydrochlorothiazide (PRINZIDE/ZESTORETIC) 20-25 MG per tablet [Pharmacy Med Name: LISINOPRIL-HYDROCHLOROTH 20-25 TABS]  Last Written Prescription Date:  01/31/2018  Last Fill Quantity: 90,  # refills: 1   Last office visit: 6/28/2018 with prescribing provider:  DG   Future Office Visit:     90 tablet 1     Sig: TAKE ONE TABLET BY MOUTH EVERY DAY    Diuretics (Including Combos) Protocol Passed    10/15/2018  4:05 PM       Passed - Blood pressure under 140/90 in past 12 months    BP Readings from Last 3 Encounters:   09/27/18 102/76   07/24/18 116/75   07/11/18 116/70                Passed - Recent (12 mo) or future (30 days) visit within the authorizing provider's specialty    Patient had office visit in the last 12 months or has a visit in the next 30 days with authorizing provider or within the authorizing provider's specialty.  See \"Patient Info\" tab in inbasket, or \"Choose Columns\" in Meds & Orders section of the refill encounter.           Passed - Patient is age 18 or older       Passed - No active pregancy on record       Passed - Normal serum creatinine on file in past 12 months    Recent Labs   Lab Test  06/26/18   1101   CR  0.82             Passed - Normal serum potassium on file in past 12 months    Recent Labs   Lab Test  06/26/18   1101   POTASSIUM  3.8                   Passed - Normal serum sodium on file in past 12 months    Recent Labs   Lab Test  06/26/18   1101   NA  142             Passed - No positive pregnancy test in past 12 months        traZODone (DESYREL) 50 MG tablet [Pharmacy Med Name: TRAZODONE HCL 50MG TABS]  Last Written Prescription Date:  03/14/2018  Last Fill Quantity: 60,  # refills: 1   Last office visit: 6/28/2018 with prescribing provider:  DG   Future Office Visit:     60 tablet 1     Sig: TAKE ONE TO TWO TABLETS BY MOUTH AT BEDTIME AS NEEDED FOR SLEEP    Serotonin Modulators Passed    10/15/2018  " "4:05 PM       Passed - Recent (12 mo) or future (30 days) visit within the authorizing provider's specialty    Patient had office visit in the last 12 months or has a visit in the next 30 days with authorizing provider or within the authorizing provider's specialty.  See \"Patient Info\" tab in inbasket, or \"Choose Columns\" in Meds & Orders section of the refill encounter.           Passed - Patient is age 18 or older       Passed - No active pregnancy on record       Passed - No positive pregnancy test in past 12 months          "

## 2018-10-16 RX ORDER — TRAZODONE HYDROCHLORIDE 50 MG/1
TABLET, FILM COATED ORAL
Qty: 180 TABLET | Refills: 2 | Status: SHIPPED | OUTPATIENT
Start: 2018-10-16 | End: 2019-11-25

## 2018-10-16 RX ORDER — LISINOPRIL AND HYDROCHLOROTHIAZIDE 20; 25 MG/1; MG/1
TABLET ORAL
Qty: 90 TABLET | Refills: 2 | Status: SHIPPED | OUTPATIENT
Start: 2018-10-16 | End: 2019-09-03

## 2018-10-17 ENCOUNTER — TELEPHONE (OUTPATIENT)
Dept: INTERNAL MEDICINE | Facility: CLINIC | Age: 58
End: 2018-10-17

## 2018-10-17 ENCOUNTER — TELEPHONE (OUTPATIENT)
Dept: PALLIATIVE MEDICINE | Facility: CLINIC | Age: 58
End: 2018-10-17

## 2018-10-17 DIAGNOSIS — M47.817 LUMBOSACRAL SPONDYLOSIS WITHOUT MYELOPATHY: Primary | ICD-10-CM

## 2018-10-17 DIAGNOSIS — M51.369 DEGENERATION OF LUMBAR INTERVERTEBRAL DISC: ICD-10-CM

## 2018-10-17 RX ORDER — TRAMADOL HYDROCHLORIDE 50 MG/1
50 TABLET ORAL 2 TIMES DAILY PRN
Qty: 30 TABLET | Refills: 0 | Status: SHIPPED | OUTPATIENT
Start: 2018-10-17 | End: 2019-03-25

## 2018-10-17 NOTE — LETTER
October 26, 2018      Mary Lou Gil  [address]  [place] [address]        To whom it may concern,    I am writing in behalf ofBipinnakita LR Louise.  She suffers from chronic anxiety and depression and is actively managing these issues with medications and counseling.  Her psychologist recommended she consider a therapy dog to help her deal more effectively with her anxiety and depression.  I would agree with this recommendation.  I would appreciate any assistance you can provide in this matter.    If you have any questions or concerns, please call the clinic at the number listed above.       Sincerely,        Xavi Garcia MD

## 2018-10-17 NOTE — TELEPHONE ENCOUNTER
Pt advised pain referral already done.  She will call to make appt.  Holding for Dr. Garcia re: letter.

## 2018-10-17 NOTE — TELEPHONE ENCOUNTER
Patient calling to schedule an injection, she states an order was put in by Dr. Miller and it is good for a year.       No order is in chart, please review and place appropriate order.         Berta GARCÍA    [facility]

## 2018-10-17 NOTE — TELEPHONE ENCOUNTER
tramadol      Last Written Prescription Date:  8/31/18  Last Fill Quantity: 30,   # refills: 0  Last Office Visit: 6/28/18  Future Office visit:       Routing refill request to provider for review/approval because:  Drug not on the [facility], [facility] or [facility] refill protocol or controlled substance

## 2018-10-17 NOTE — TELEPHONE ENCOUNTER
Reason for Call:  Other call back    Detailed comments: pt wants dr palacios to write her a note or letter for therapy dog. Pt said that her therapist said she should get a therapy dog to help pt with her anxiety and depression. Pt wants the letter to be mailed to her home address on file. Pt also wants a referral to pain clinic to get injections for pain in her lower back. Please call pt when completed thanks.    Phone Number Patient can be reached at: Home number on file [phone] (home)    Best Time: anytime    Can we leave a detailed message on this number? YES    Call taken on 10/17/2018 at 2:41 PM by THUAN MORA

## 2018-10-18 ENCOUNTER — TELEPHONE (OUTPATIENT)
Dept: PEDIATRICS | Facility: CLINIC | Age: 58
End: 2018-10-18

## 2018-10-18 NOTE — TELEPHONE ENCOUNTER
Pre-screening Questions for Radiology Injections:    Injection to be done at which interventional clinic site? [facility]    Instruct patient to arrive as directed prior to the scheduled appointment time:    [place]: 30 minutes before      Procedure ordered by JS    Procedure ordered? Lumbar Facet Joint Injection    What insurance would patient like us to bill for this procedure? MEDICARE      Worker's comp or MVA (motor vehicle accident) -Any injection DO NOT SCHEDULE and route to Berta Hayden.      [employer] insurance - For SI joint injections, DO NOT SCHEDULE and route Berta Hayden. [employer] F[employer]EDOM NO PA REQUIRED EFFECTIVE 11/1/2017      [employer]- MBB's must be scheduled at LEAST two weeks apart      [employer] - Any injection besides hip/shoulder/knee joint DO NOT SCHEDULE and route to Berta Hayden. She will obtain PA and call pt back to schedule procedure or notify pt of denial.        [employer]-Route to Berta for review    Any chance of pregnancy? NO   If YES, do NOT schedule and route to RN pool    Is an  needed? No     Patient has a drive home? (mandatory) YES:     Is patient taking any blood thinners (plavix, coumadin, jantoven, warfarin, heparin, pradaxa or dabigatran )? No   If hold needed, do NOT schedule, route to RN pool     Is patient taking any aspirin products (includes Excedrin and Fiorinal)? No     If more than 325mg/day do NOT schedule; route to RN pool     For CERVICAL procedures, hold all aspirin products for 6 days.     Tell pt that if aspirin product is not held for 6 days, the procedure WILL BE cancelled.      Does the patient have a bleeding or clotting disorder? No     If YES, okay to schedule AND route to RN nurse pool    For any patients with platelet count <100, must be forwarded to provider    Is patient diabetic?  No  If YES, have them bring their glucometer.    Does patient have an active infection or treated for one within the past week?  No     Is patient currently taking any antibiotics?  No     For patients on chronic, preventative, or prophylactic antibiotics, procedures may be scheduled.     For patients on antibiotics for active or recent infection:    Lorena De Dios Burton, [place]-antibiotic course must have been completed for 4 days    Is patient currently taking any steroid medications? (i.e. Prednisone, Medrol)  No     For patients on steroid medications:    Lorena De Dios Burton, [place]-steroid course must have been completed for 4 days    Reviewed with patient:  If you are started on any steroids or antibiotics between now and your appointment, you must contact us because the procedure may need to be cancelled.  Yes    Is patient actively being treated for cancer or immunocompromised? No  If YES, do NOT schedule and route to RN pool     Are you able to get on and off an exam table with minimal or no assistance? Yes  If NO, do NOT schedule and route to RN pool    Are you able to roll over and lay on your stomach with minimal or no assistance? Yes  If NO, do NOT schedule and route to RN pool     Any allergies to contrast dye, iodine, shellfish, or numbing and steroid medications? No  If YES, route to RN pool AND add allergy information to appointment notes    Allergies: Nicotine polacrilex; Neurontin [gabapentin]; Norvasc [amlodipine besylate]; Percocet [oxycodone-acetaminophen]; and Vinyl ether      Has the patient had a flu shot or any other vaccinations within 7 days before or after the procedure.  No     Does patient have an MRI/CT?  Not Applicable  (SI joint, hip injections, lumbar sympathetic blocks, and stellate ganglion blocks do not require an MRI)    Was the MRI done w/in the last 3 years?  NA    Was MRI done at [place]? No      If not, where was it done? N/A       If MRI was not done at [place], [facility] or [facility] do NOT schedule and route to nursing.  If pt has an imaging disc, the injection may be  scheduled but pt has to bring disc to appt. If they show up w/out disc the injection cannot be done    Reminders (please tell patient if applicable):       Instructed pt to arrive 30 minutes early for IV start if this is for a cervical procedure, ALL sympathetic (stellate ganglion, hypogastric, or lumbar sympathetic block) and all sedation procedures (RFA, spinal cord stimulation trials).  Not Applicable   -IVs are not routinely placed for Dr. Wood cervical cases   -Dr. Miller: IVs for cervical ESIs and cervical TBDs (not CMBBs/facet inj)      If NPO for sedation, informed patient that it is okay to take medications with sips of water (except if they are to hold blood thinners).  Not Applicable   *DO take blood pressure medication if it is prescribed*      If this is for a cervical SULEMAN, informed patient that aspirin needs to be held for 6 days.   Not Applicable      For all patients not having spinal cord stimulator (SCS) trials or radiofrequency ablations (RFAs), informed patient:    IV sedation is not provided for this procedure.  If you feel that an oral anti-anxiety medication is needed, you can discuss this further with your referring provider or primary care provider.  The Pain Clinic provider will discuss specifics of what the procedure includes at your appointment.  Most procedures last 10-20 minutes.  We use numbing medications to help with any discomfort during the procedure.  Not Applicable      Do not schedule procedures requiring IV placement in the first appointment of the day or first appointment after lunch. Do NOT schedule at 0745, 0815 or 1245.       For patients 85 or older we recommend having an adult stay w/ them for the remainder of the day.       Does the patient have any questions?    Berta Hayden  [facility]

## 2018-10-18 NOTE — TELEPHONE ENCOUNTER
Prior Authorization Retail Medication Request    Medication/Dose: Lidocaine   ICD code (if different than what is on RX):    Previously Tried and Failed:    Rationale:      Insurance Name:  [employer] MEDICARE  Insurance ID:  [id number]      PRECERT REQ BY [phone]  DRUG REQUIRES PRIOR AUTHORIZATION

## 2018-10-18 NOTE — TELEPHONE ENCOUNTER
MINE for patient to schedule bilateral lumbar facet joint injections.       Rochelle REAL    [facility]

## 2018-10-19 ENCOUNTER — RADIANT APPOINTMENT (OUTPATIENT)
Dept: GENERAL RADIOLOGY | Facility: CLINIC | Age: 58
End: 2018-10-19
Attending: ANESTHESIOLOGY
Payer: MEDICARE

## 2018-10-19 ENCOUNTER — RADIOLOGY INJECTION OFFICE VISIT (OUTPATIENT)
Dept: PALLIATIVE MEDICINE | Facility: CLINIC | Age: 58
End: 2018-10-19
Payer: MEDICARE

## 2018-10-19 VITALS — DIASTOLIC BLOOD PRESSURE: 82 MMHG | OXYGEN SATURATION: 98 % | SYSTOLIC BLOOD PRESSURE: 136 MMHG | HEART RATE: 85 BPM

## 2018-10-19 DIAGNOSIS — M47.817 FACET ARTHROPATHY, LUMBOSACRAL: Primary | ICD-10-CM

## 2018-10-19 DIAGNOSIS — M47.816 FACET ARTHROPATHY, LUMBAR: ICD-10-CM

## 2018-10-19 PROCEDURE — 64494 INJ PARAVERT F JNT L/S 2 LEV: CPT | Mod: 50 | Performed by: ANESTHESIOLOGY

## 2018-10-19 PROCEDURE — 64493 INJ PARAVERT F JNT L/S 1 LEV: CPT | Mod: 50 | Performed by: ANESTHESIOLOGY

## 2018-10-19 NOTE — PATIENT INSTRUCTIONS
[facility] Procedure Discharge Instructions    Today you saw:   Dr. Angelica Wood    Your procedure:     Facet joint injection      Medications used:  Lidocaine (anesthetic)  Bupivacaine (anesthetic)   Kenalog (steroid)  Omnipaque (contrast)              Be cautious when walking as numbness and/or weakness in the legs may occur up to 6-8 hours after the procedure due to effect of the local anesthetic     The effect of the local anesthetic could slow your reflexes.     Avoid strenuous activity for the first 24 hours. You may resume your regular activities after that.     You may shower, however avoid swimming, tub baths or hot tubs for 24 hours following your procedure    You may have a mild to moderate increase in pain for several days following the injection.      You may use ice packs for 10-15 minutes, 3 to 4 times a day at the injection site for comfort    Do not use heat to painful areas for 6 to 8 hours. This will give the local anesthetic time to wear off and prevent you from accidentally burning your skin.    You may use anti-inflammatory medications (such as Ibuprofen/Advil or Aleve) or Tylenol for pain control if necessary    With diabetes, check your blood sugar more frequently than usual as your blood sugar may be higher than normal for 10-14 days following a steroid injection. Contact your doctor who manages your diabetes if your blood sugar is higher than usual    It may take up to 14 days for the steroid medication to start working although you may feel the effect as early as a few days after the procedure.     Follow up with your referring provider in 2-3 weeks      If you experience any of the following, call the pain center line during work hours at [phone] or on-call physician after hours at [phone]:  -Fever over 100 degree F  -Swelling, bleeding, redness, drainage, warmth at the injection site  -Progressive weakness or numbness in your legs  -Unusual new onset of pain that is  not improving

## 2018-10-19 NOTE — MR AVS SNAPSHOT
After Visit Summary   10/19/2018    Mary Lou Gil    MRN: [id number]           Patient Information     Date Of Birth          1960        Visit Information        Provider Department      10/19/2018 2:15 PM Angelica Wood MD [facility]        Care Instructions    [facility] Procedure Discharge Instructions    Today you saw:   Dr. Angelica Wood    Your procedure:     Facet joint injection      Medications used:  Lidocaine (anesthetic)  Bupivacaine (anesthetic)   Kenalog (steroid)  Omnipaque (contrast)              Be cautious when walking as numbness and/or weakness in the legs may occur up to 6-8 hours after the procedure due to effect of the local anesthetic     The effect of the local anesthetic could slow your reflexes.     Avoid strenuous activity for the first 24 hours. You may resume your regular activities after that.     You may shower, however avoid swimming, tub baths or hot tubs for 24 hours following your procedure    You may have a mild to moderate increase in pain for several days following the injection.      You may use ice packs for 10-15 minutes, 3 to 4 times a day at the injection site for comfort    Do not use heat to painful areas for 6 to 8 hours. This will give the local anesthetic time to wear off and prevent you from accidentally burning your skin.    You may use anti-inflammatory medications (such as Ibuprofen/Advil or Aleve) or Tylenol for pain control if necessary    With diabetes, check your blood sugar more frequently than usual as your blood sugar may be higher than normal for 10-14 days following a steroid injection. Contact your doctor who manages your diabetes if your blood sugar is higher than usual    It may take up to 14 days for the steroid medication to start working although you may feel the effect as early as a few days after the procedure.     Follow up with your referring provider in 2-3 weeks      If you experience any of  the following, call the pain center line during work hours at [phone] or on-call physician after hours at [phone]:  -Fever over 100 degree F  -Swelling, bleeding, redness, drainage, warmth at the injection site  -Progressive weakness or numbness in your legs  -Unusual new onset of pain that is not improving            Follow-ups after your visit        Your next 10 appointments already scheduled     Oct 19, 2018  2:15 PM CDT   Radiology Injections with Angelica Wood MD   [facility]    [address]  [address]  [facility] [address]   [phone]            Oct 19, 2018  2:15 PM CDT   XR LUMBAR SACRAL FACET INJ BILATERAL with [email or web address]   [facility] ([facility]    [email or web address] [address]  [address]  [facility] [address]   [phone]           For nerve root injection, please send or bring copies of any MRIs or other scans you have had.  Bring a list of your current medicines to your exam. (Include vitamins, minerals and over-the-counter medicines.) Leave your valuables at home.  Plan to have someone drive you home afterward.  Stop taking the following medicines (but talk to your doctor first):   If you take blood thinners, you may need to stop taking them a few days before treatment. Talk to your doctor before stopping these medicines.Stop taking Coumadin (warfarin) 3 days before treatment. Restart the day after treatment.   If you take Plavix, Ticlid, Pletal or Persantine, please ask your doctor if you should stop these medicines. You may need extra tests on the morning of your scan. You may take your other medicines as normal.  Stop all food and drink (including water) 3 hours before your test or treatment.  Please tell the doctor:   If you are allergic to X-ray dye (contrast fluid).   If you may be pregnant.  Injections take about 30 to 45 minutes. Most people spend up to 2 hours in the clinic or  [facility].  Please call the Imaging Department at your exam site with any questions              Who to contact     If you have questions or need follow up information about today's clinic visit or your schedule please contact [facility] directly at [phone].  Normal or non-critical lab and imaging results will be communicated to you by MyChart, letter or phone within 4 business days after the clinic has received the results. If you do not hear from us within 7 days, please contact the clinic through [employer] or phone. If you have a critical or abnormal lab result, we will notify you by phone as soon as possible.  Submit refill requests through [employer] or call your pharmacy and they will forward the refill request to us. Please allow 3 business days for your refill to be completed.          Additional Information About Your Visit        [employer] Information     [employer] gives you secure access to your electronic health record. If you see a primary care provider, you can also send messages to your care team and make appointments. If you have questions, please call your primary care clinic.  If you do not have a primary care provider, please call [phone] and they will assist you.        Care EveryWhere ID     This is your Care EveryWhere ID. This could be used by other organizations to access your [facility] medical records  [id number]        Your Vitals Were     Pulse Pulse Oximetry                71 98%           Blood Pressure from Last 3 Encounters:   10/19/18 145/88   09/27/18 102/76   07/24/18 116/75    Weight from Last 3 Encounters:   09/27/18 73.8 kg (162 lb 12.8 oz)   07/11/18 71.2 kg (157 lb)   06/28/18 72.4 kg (159 lb 9.6 oz)              Today, you had the following     No orders found for display       Primary Care Provider Office Phone # Fax #    Xavi Garcia [phone]       [address] [place] [address]        Equal Access to Services     RISHI  GAAR : Hadii aad ku mya Granda, waaxda luqadaha, qaybta kaalmada arnold, marley patriciain hayaaelmer trejoethel price josh . So [facility] [phone].    ATENCIÓN: Si habla richar, tiene a ybarra disposición servicios gratuitos de asistencia lingüística. Llame al [phone].    We comply with applicable federal civil rights laws and [place] laws. We do not discriminate on the basis of race, color, national origin, age, disability, sex, sexual orientation, or gender identity.            Thank you!     Thank you for choosing [facility]  for your care. Our goal is always to provide you with excellent care. Hearing back from our patients is one way we can continue to improve our services. Please take a few minutes to complete the written survey that you may receive in the mail after your visit with us. Thank you!             Your Updated Medication List - Protect others around you: Learn how to safely use, store and throw away your medicines at www.disposemymeds.org.          This list is accurate as of 10/19/18  2:14 PM.  Always use your most recent med list.                   Brand Name Dispense Instructions for use Diagnosis    albuterol 108 (90 Base) MCG/ACT inhaler    PROAIR HFA/PROVENTIL HFA/VENTOLIN HFA    1 Inhaler    Inhale 2 puffs into the lungs every 6 hours as needed for shortness of breath / dyspnea or wheezing    Tobacco use disorder       B-D U/F 31G X 8 MM   Generic drug:  insulin pen needle     100 each    USE AS DIRECTED AT BEDTIME    Type 2 diabetes, HbA1c goal < 7% (H)       BENADRYL PO      Take 25-50 mg by mouth See Admin Instructions 25 mg during the day if needed and 50 mg at bedtime for sleep as needed        cholecalciferol 5000 units Caps     90 capsule    Take 1 capsule (5,000 Units) by mouth daily    Perimenopausal       citalopram 40 MG tablet    celeXA    90 tablet    Take 1 tablet (40 mg) by mouth daily TO CONTROL SYMPTOMS OF DEPRESSION    Major depressive disorder,  recurrent episode, mild (H)       EPINEPHrine 0.3 MG/0.3ML injection 2-pack    EPIPEN/ADRENACLICK/or ANY BX GENERIC EQUIV    0.3 mL    Inject 0.3 mLs (0.3 mg) into the muscle once as needed    Anaphylactic reaction, subsequent encounter       fenofibrate 160 MG tablet     90 tablet    Take 1 tablet (160 mg) by mouth At Bedtime with food. INDICATION: TO LOWER CHOLESTEROL    Hyperlipidemia with target LDL less than 100       flunisolide HFA 80 MCG/ACT Aers oral inhaler    AEROSPAN    1 Inhaler    Inhale 2 puffs into the lungs 2 times daily Use twice per day during main allergy season(s)    Mild persistent asthma, uncomplicated       FREESTYLE LITE test strip   Generic drug:  blood glucose monitoring     200 each    USE AS DIRECTED TO TEST THREE TIMES DAILY.    Type 2 diabetes mellitus with hyperglycemia, with long-term current use of insulin (H)       glipiZIDE 10 MG 24 hr tablet    glipiZIDE XL    30 tablet    Take 1 tablet (10 mg) by mouth daily INDICATION: TO TREAT DIABETES.    Type 2 diabetes mellitus with diabetic polyneuropathy, with long-term current use of insulin (H)       ibuprofen 800 MG tablet    ADVIL/MOTRIN    90 tablet    Take 1 tablet (800 mg) by mouth every 8 hours as needed for moderate pain or pain    Hip pain, left, Physical deconditioning, Other chronic pain, SI (sacroiliac) joint dysfunction       insulin detemir 100 UNIT/ML injection    LEVEMIR FLEXPEN/FLEXTOUCH    30 mL    Inject 30 Units Subcutaneous At Bedtime    Type 2 diabetes mellitus with diabetic polyneuropathy, with long-term current use of insulin (H)       levothyroxine 150 MCG tablet    SYNTHROID/LEVOTHROID    90 tablet    Take 1 tablet (150 mcg) by mouth daily TAKE FIRST THING IN THE MORNING ON AN EMPTY STOMACH,  NO FOOD FOR AN HOUR    Hypothyroidism, unspecified type       lidocaine 5 % ointment    XYLOCAINE    50 g    Apply a quarter size amount to painful areas up to 4 times per day as needed.    Type 2 diabetes mellitus with  diabetic polyneuropathy, with long-term current use of insulin (H), Diabetic polyneuropathy associated with type 2 diabetes mellitus (H)       lisinopril-hydrochlorothiazide 20-25 MG per tablet    PRINZIDE/ZESTORETIC    90 tablet    TAKE ONE TABLET BY MOUTH EVERY DAY    Essential hypertension, benign       metFORMIN 500 MG 24 hr tablet    GLUCOPHAGE-XR    60 tablet    Take 2 tablets (1,000 mg) by mouth daily (with dinner)    Type 2 diabetes mellitus with diabetic polyneuropathy, with long-term current use of insulin (H)       pravastatin 80 MG tablet    PRAVACHOL    90 tablet    Take 1 tablet (80 mg) by mouth daily INDICATION: TO LOWER CHOLESTEROL AND TO HELP  PREVENT HEART DISEASE    Hyperlipidemia with target LDL less than 100       SUMAtriptan 100 MG tablet    IMITREX    18 tablet    Take 1 tablet (100 mg) by mouth at onset of headache for migraine (TAKE AT ONSET OF ACUTE MIGRAINE HEADACHE) MAY REPEAT ONE EXTRA DOSE IF NO RELIEF AFTER 2 HOURS; max dose 200 mg per day    Migraine without status migrainosus, not intractable, unspecified migraine type       thin lancets    NO BRAND SPECIFIED    3 Box    1 Device 2 times daily    Type 2 diabetes, HbA1c goal < 7% (H)       tiZANidine 2 MG tablet    ZANAFLEX    90 tablet    Take 1-2 tablets (2-4 mg) by mouth 3 times daily    Muscle spasm       traMADol 50 MG tablet    ULTRAM    30 tablet    Take 1 tablet (50 mg) by mouth 2 times daily as needed for severe pain    Degeneration of lumbar intervertebral disc       traZODone 50 MG tablet    DESYREL    180 tablet    TAKE ONE TO TWO TABLETS BY MOUTH AT BEDTIME AS NEEDED FOR SLEEP    Major depressive disorder, recurrent episode, mild (H)       verapamil 240 MG CR tablet    CALAN-SR    90 tablet    Take 1 tablet (240 mg) by mouth At Bedtime INDICATION: TO LOWER BLOOD PRESSURE AND TO CONTROL MIGRAINE HEADACHES    Migraine without status migrainosus, not intractable, unspecified migraine type

## 2018-10-19 NOTE — NURSING NOTE
Pre-procedure Intake    Have you been fasting? NA    If yes, for how long?     Are you taking a prescribed blood thinner such as coumadin, Plavix, Xarelto?    No    If yes, when did you take your last dose?     Do you take aspirin?  No    If cervical procedure, have you held aspirin for 6 days?   NA    Do you have any allergies to contrast dye, iodine, steroid and/or numbing medications?  NO    Are you currently taking antibiotics or have an active infection?  NO    Have you had a fever/elevated temperature within the past week? NO    Are you currently taking oral steroids? No    Do you have a ? Yes       Are you pregnant or breastfeeding?  Not Applicable    Are the vital signs normal?  Yes

## 2018-10-19 NOTE — PROGRESS NOTES
[facility] - Procedure Note    Date of Visit: 10/19/2018    Pre procedure Diagnosis: facet arthropathy   Post procedure Diagnosis: Same  Procedure performed: Bilateral L4-5, L5-S1 facet joint injections  Anesthesia: none  Complications: none  Operators: Angelica Wood MD     Indications:   Mary Lou Gil is a 58 year old female was sent by Dr. Miller for lumbar facet joint injections.  They have a history of central low back pain.  Exam shows pain with extension/rotation bilaterally and they have tried conservative treatment including PT, previous SI joint injections, previous bilateral L4-5 transforaminal injections and medications.    This is a repeat injection.  Previous bilateral L4-5, L5-S1 facet joint injections done by myself on 5/31/2017 provided good pain relief for a period of 4 months.       Options/alternatives, benefits and risks were discussed with the patient including bleeding, infection, flared pain, tissue trauma, exposure to radiation, reaction to medications including seizure, spinal cord injury, paralysis, weakness, numbness and headache.    Questions were answered to her satisfaction and she agrees to proceed. Voluntary informed consent was obtained and signed.     Vitals were reviewed: Yes  Allergies were reviewed:  Yes   Medications were reviewed:  Yes   Pre-procedure pain score: 9/10       Imaging:  MRI lumbar spine 11/2/2017     IMPRESSION:  1. L4-L5 prominent ligamentum flavum thickening and relatively severe  central stenosis. There may be a superimposed 1-2 mm synovial cyst to  the right of midline.  2. 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 There is a  mild grade 1 degenerative spondylolisthesis noted at L5-S1.     Procedure:  After getting informed consent, patient was brought into the procedure suite and was placed in a prone position on the procedure table.   A Pause for the Cause was performed.  Patient was prepped and draped in sterile  fashion.     Under AP fluoroscopic guidance the L4-5 & L5-S1 facet joints were identified bilaterally, and the C-arm was rotated obliquely to the affected side to open the joint space. A total of 2 ml of 1% lidocaine was injected at the needle entry point and needle tract. Then a 22 gauge 3.5 inch quincke type spinal needle was inserted and advanced under fluoroscopic guidance targeting the superior articular pillar of each joint. Once the needle made a contact with SAP, it was rotated and was then advanced into the joint.    AP fluoroscopic views were obtained to confirm the needle placement. Then,  Omnipaque 300 contrast dye was injected after negative aspiration for heme and CSF in each joint, confirming appropriate placement.  A total of 1mL of Omnipaque was used and 9mL was wasted.    The injection was then accomplished using a solution containing 2ml of 1% Lidocaine and 80mg of kenolog, divided between the 4 joints. The needles were removed..     Hemostasis was achieved, the area was cleaned, and bandaids were placed when appropriate.  The patient tolerated the procedure well, and was taken to the recovery room.    Images were saved to PACS.    Post-procedure pain score: 5/10  Follow-up includes:   -f/u phone call in one week  -f/u with the referring provider      Angelica Wood MD   [facility]

## 2018-10-19 NOTE — NURSING NOTE
Discharge Information    IV Discontiued Time:  NA    Amount of Fluid Infused:  NA    Discharge Criteria = When patient returns to baseline or as per MD order    Consciousness:  Pt is fully awake    Circulation:  BP +/- 20% of pre-procedure level    Respiration:  Patient is able to breathe deeply    O2 Sat:  Patient is able to maintain O2 Sat >92% on room air    Activity:  Moves 4 extremities on command    Ambulation:  Patient is able to stand and walk or stand and pivot into wheelchair    Dressing:  Clean/dry or No Dressing    Notes:   Discharge instructions and AVS given to patient    Patient meets criteria for discharge?  YES    Admitted to PCU?  No    Responsible adult present to accompany patient home?  Yes    Signature/Title:    Aleida Bhatia RN Care Coordinator  [facility]

## 2018-10-19 NOTE — TELEPHONE ENCOUNTER
Central Prior Authorization Team   Phone: [phone]    PA Initiation    Medication: Lidocaine   Insurance Company: [employer] - Phone [phone] Fax [phone]  Pharmacy Filling the Rx: [place], [place] - [address] [place] [address]  Filling Pharmacy Phone: [phone]  Filling Pharmacy Fax: [phone]  Start Date: 10/19/2018

## 2018-10-22 NOTE — TELEPHONE ENCOUNTER
PRIOR AUTHORIZATION DENIED    Medication: Lidocaine-DENIED    Denial Date: 10/19/2018    Denial Rational: DX IS NOT AN FDA APPROVED INDICATION FOR USE OF THIS MEDICATION.        Appeal Information:  IF YOU WOULD LIKE TO APPEAL PLEASE SUPPLY PA TEAM WITH A LETTER OF MEDICAL NECESSITY WITH CLINICAL REASON.

## 2018-11-01 ENCOUNTER — OFFICE VISIT (OUTPATIENT)
Dept: INTERNAL MEDICINE | Facility: CLINIC | Age: 58
End: 2018-11-01
Payer: MEDICARE

## 2018-11-01 VITALS
SYSTOLIC BLOOD PRESSURE: 112 MMHG | DIASTOLIC BLOOD PRESSURE: 78 MMHG | HEART RATE: 90 BPM | WEIGHT: 163.2 LBS | OXYGEN SATURATION: 98 % | BODY MASS INDEX: 26.34 KG/M2 | TEMPERATURE: 98.2 F

## 2018-11-01 DIAGNOSIS — J04.0 VIRAL LARYNGITIS: ICD-10-CM

## 2018-11-01 DIAGNOSIS — H61.21 IMPACTED CERUMEN OF RIGHT EAR: ICD-10-CM

## 2018-11-01 DIAGNOSIS — B97.89 VIRAL LARYNGITIS: ICD-10-CM

## 2018-11-01 DIAGNOSIS — J06.9 VIRAL URI: Primary | ICD-10-CM

## 2018-11-01 PROCEDURE — 99214 OFFICE O/P EST MOD 30 MIN: CPT | Mod: 25 | Performed by: INTERNAL MEDICINE

## 2018-11-01 PROCEDURE — 69210 REMOVE IMPACTED EAR WAX UNI: CPT | Mod: RT | Performed by: INTERNAL MEDICINE

## 2018-11-01 RX ORDER — AMOXICILLIN 875 MG
875 TABLET ORAL 2 TIMES DAILY
Qty: 20 TABLET | Refills: 0 | Status: SHIPPED | OUTPATIENT
Start: 2018-11-01 | End: 2018-11-11

## 2018-11-01 NOTE — MR AVS SNAPSHOT
"              After Visit Summary   11/1/2018    Mary Lou Gil    MRN: [id number]           Patient Information     Date Of Birth          1960        Visit Information        Provider Department      11/1/2018 5:40 PM Xavi Garcia MD [facility]        Today's Diagnoses     Viral URI    -  1    Viral laryngitis        Impacted cerumen of right ear          Care Instructions      Viral upper respiratory infection:     *  No evidence for bacterial infection.     *  No antibiotics indicated based on examination today.  No fevers, normal throat, ears, and normal lung sounds.      *  If you develop any  Worsening symptoms such as fevers, more obvious larger amounts of thick yellow sputum, then call [facility] for a prescription for an antibiotic: Amoxicillin 875, mg twice per day for 10 days.      *  Tylenol for fevers or headaches;     *  Motrin/Advil for any fevers, body/muscle aches.     *  Cough suppressant dextromethorphan, i.e. Delsym (or any cough medication with a \"DM\"), follow directions on bottle    *  Mucinex extended release, one or two tablets twice per day for the next 5-7 days.  This helps loosen the secretions to they can be passed more easily out of the body.     *  Be sure to drink lots of fluids.  If the appetite and intake of food is way down, then at leat try to eat soup.  Dehydration is the thing that causes people to end up in the hospital with viral infections and the flu.     *  For sore throat:  Try lozenges (Cepostat, Cepacol, hard candies, Zinc lozenges, etc)    Chloraseptic spray or lozenges for topical pain relief.             *  If you have thick secretions:  Mucinex extended release twice per day on a regular basis for the next few days, then twice per day as needed.  OK to take the regular Mucinex, you may just have to take it 2-3 times per day.      *  If you have dry nasal passages or frequent bloody noses during the winter " "time:  Saline nasal spray as often as needed for dry nose.     *  For thick sinus secretions, consider using a \"Sinus Rinse Kit\" or Netti Pot For help rinsing out sinus cavities if there is a lot of debris from the sinuses    *  If you have a lot of congestion and/or runny noses:  OK to try decongestants as needed (e.g. pseudoephedrine or phenylephrine).  Be sure to take the lowest dose needed.  Take decongestants from only ONE source.  It is possible to inadvertantly take more than the recommended amounts if you take decongestants from multiple products (i.e. Do NOT take Mucinex-D and Claritin-D together)    *  If you have been told to NOT take decongestants or if you cannot tolerate decongestants due to effect on blood pressure, sleep or heart rate:  Try Coricidin HBP or Chlortrimeton (chlorpheniramine).  These can have a similar effect as some decongestants but will not affect your heart rate or blood pressure.      *  If you have SEVERE nasal congestion:  Affrin nasal spray as needed for severe nasal congestion (especially before the airplane trip), but do not use for more than one week.      *  For nighttime congestion, consider taking your favorite nighttime multi symptoms cold reliever such as Nyquil, Vicks Formula 44, Tylenol multi symptoms nighttime cold reliever, etc.      *  Call the clinic if any changes in the symptoms such as worsening fevers, or the amount and quality of the sputum changes, or if you have any major difficulties breathing, or the symptoms fail to clear for a few weeks.    *  Most upper respiratory infection will clear 1-2 weeks, but it is not uncommon for post viral coughs to last for several weeks, even rarely the entire winter.                     Follow-ups after your visit        Who to contact     If you have questions or need follow up information about today's clinic visit or your schedule please contact [facility] directly at [phone].  Normal or " non-critical lab and imaging results will be communicated to you by TubeMogulhart, letter or phone within 4 business days after the clinic has received the results. If you do not hear from us within 7 days, please contact the clinic through Adu[employer] or phone. If you have a critical or abnormal lab result, we will notify you by phone as soon as possible.  Submit refill requests through Aductions or call your pharmacy and they will forward the refill request to us. Please allow 3 business days for your refill to be completed.          Additional Information About Your Visit        Aductions Information     [employer]ctions gives you secure access to your electronic health record. If you see a primary care provider, you can also send messages to your care team and make appointments. If you have questions, please call your primary care clinic.  If you do not have a primary care provider, please call [phone] and they will assist you.        Care EveryWhere ID     This is your Care EveryWhere ID. This could be used by other organizations to access your [place] medical records  [id number]        Your Vitals Were     Pulse Temperature Pulse Oximetry BMI (Body Mass Index)          90 98.2  F (36.8  C) (Oral) 98% 26.34 kg/m2         Blood Pressure from Last 3 Encounters:   11/01/18 112/78   10/19/18 136/82   09/27/18 102/76    Weight from Last 3 Encounters:   11/01/18 163 lb 3.2 oz (74 kg)   09/27/18 162 lb 12.8 oz (73.8 kg)   07/11/18 157 lb (71.2 kg)              We Performed the Following     REMOVE IMPACTED CERUMEN          Today's Medication Changes          These changes are accurate as of 11/1/18  6:54 PM.  If you have any questions, ask your nurse or doctor.               Start taking these medicines.        Dose/Directions    amoxicillin 875 MG tablet   Commonly known as:  AMOXIL   Used for:  Viral URI   Started by:  Xavi Garcia MD        Dose:  875 mg   Take 1 tablet (875 mg) by mouth 2 times daily for 10 days    Quantity:  20 tablet   Refills:  0            Where to get your medicines      These medications were sent to [place], [place] - [address]  [address] [facility] [address]     Phone:  [phone]     amoxicillin 875 MG tablet                Primary Care Provider Office Phone # Fax #    Xavi Garcia [phone]       [address]  [facility] [address]        Equal Access to Services     RISHI MOJICA : Hadii aad ku hadasho Soomaali, waaxda luqadaha, qaybta kaalmada adeegyada, waxay idiin hayaan adeeg kharash la'kaleighn ah. So [facility] [phone].    ATENCIÓN: Si habla español, tiene a ybarra disposición servicios gratuitos de asistencia lingüística. [facility] [phone].    We comply with applicable federal civil rights laws and [place] laws. We do not discriminate on the basis of race, color, national origin, age, disability, sex, sexual orientation, or gender identity.            Thank you!     Thank you for choosing [facility]  for your care. Our goal is always to provide you with excellent care. Hearing back from our patients is one way we can continue to improve our services. Please take a few minutes to complete the written survey that you may receive in the mail after your visit with us. Thank you!             Your Updated Medication List - Protect others around you: Learn how to safely use, store and throw away your medicines at www.disposemymeds.org.          This list is accurate as of 11/1/18  6:54 PM.  Always use your most recent med list.                   Brand Name Dispense Instructions for use Diagnosis    albuterol 108 (90 Base) MCG/ACT inhaler    PROAIR HFA/PROVENTIL HFA/VENTOLIN HFA    1 Inhaler    Inhale 2 puffs into the lungs every 6 hours as needed for shortness of breath / dyspnea or wheezing    Tobacco use disorder       amoxicillin 875 MG tablet    AMOXIL    20 tablet    Take 1 tablet (875 mg) by mouth 2  times daily for 10 days    Viral URI       B-D U/F 31G X 8 MM   Generic drug:  insulin pen needle     100 each    USE AS DIRECTED AT BEDTIME    Type 2 diabetes, HbA1c goal < 7% (H)       BENADRYL PO      Take 25-50 mg by mouth See Admin Instructions 25 mg during the day if needed and 50 mg at bedtime for sleep as needed        cholecalciferol 5000 units Caps     90 capsule    Take 1 capsule (5,000 Units) by mouth daily    Perimenopausal       citalopram 40 MG tablet    celeXA    90 tablet    Take 1 tablet (40 mg) by mouth daily TO CONTROL SYMPTOMS OF DEPRESSION    Major depressive disorder, recurrent episode, mild (H)       EPINEPHrine 0.3 MG/0.3ML injection 2-pack    EPIPEN/ADRENACLICK/or ANY BX GENERIC EQUIV    0.3 mL    Inject 0.3 mLs (0.3 mg) into the muscle once as needed    Anaphylactic reaction, subsequent encounter       fenofibrate 160 MG tablet     90 tablet    Take 1 tablet (160 mg) by mouth At Bedtime with food. INDICATION: TO LOWER CHOLESTEROL    Hyperlipidemia with target LDL less than 100       flunisolide HFA 80 MCG/ACT Aers oral inhaler    AEROSPAN    1 Inhaler    Inhale 2 puffs into the lungs 2 times daily Use twice per day during main allergy season(s)    Mild persistent asthma, uncomplicated       FREESTYLE LITE test strip   Generic drug:  blood glucose monitoring     200 each    USE AS DIRECTED TO TEST THREE TIMES DAILY.    Type 2 diabetes mellitus with hyperglycemia, with long-term current use of insulin (H)       glipiZIDE 10 MG 24 hr tablet    glipiZIDE XL    30 tablet    Take 1 tablet (10 mg) by mouth daily INDICATION: TO TREAT DIABETES.    Type 2 diabetes mellitus with diabetic polyneuropathy, with long-term current use of insulin (H)       ibuprofen 800 MG tablet    ADVIL/MOTRIN    90 tablet    Take 1 tablet (800 mg) by mouth every 8 hours as needed for moderate pain or pain    Hip pain, left, Physical deconditioning, Other chronic pain, SI (sacroiliac) joint dysfunction       insulin  detemir 100 UNIT/ML injection    LEVEMIR FLEXPEN/FLEXTOUCH    30 mL    Inject 30 Units Subcutaneous At Bedtime    Type 2 diabetes mellitus with diabetic polyneuropathy, with long-term current use of insulin (H)       levothyroxine 150 MCG tablet    SYNTHROID/LEVOTHROID    90 tablet    Take 1 tablet (150 mcg) by mouth daily TAKE FIRST THING IN THE MORNING ON AN EMPTY STOMACH,  NO FOOD FOR AN HOUR    Hypothyroidism, unspecified type       lidocaine 5 % ointment    XYLOCAINE    50 g    Apply a quarter size amount to painful areas up to 4 times per day as needed.    Type 2 diabetes mellitus with diabetic polyneuropathy, with long-term current use of insulin (H), Diabetic polyneuropathy associated with type 2 diabetes mellitus (H)       lisinopril-hydrochlorothiazide 20-25 MG per tablet    PRINZIDE/ZESTORETIC    90 tablet    TAKE ONE TABLET BY MOUTH EVERY DAY    Essential hypertension, benign       metFORMIN 500 MG 24 hr tablet    GLUCOPHAGE-XR    60 tablet    Take 2 tablets (1,000 mg) by mouth daily (with dinner)    Type 2 diabetes mellitus with diabetic polyneuropathy, with long-term current use of insulin (H)       pravastatin 80 MG tablet    PRAVACHOL    90 tablet    Take 1 tablet (80 mg) by mouth daily INDICATION: TO LOWER CHOLESTEROL AND TO HELP  PREVENT HEART DISEASE    Hyperlipidemia with target LDL less than 100       SUMAtriptan 100 MG tablet    IMITREX    18 tablet    Take 1 tablet (100 mg) by mouth at onset of headache for migraine (TAKE AT ONSET OF ACUTE MIGRAINE HEADACHE) MAY REPEAT ONE EXTRA DOSE IF NO RELIEF AFTER 2 HOURS; max dose 200 mg per day    Migraine without status migrainosus, not intractable, unspecified migraine type       thin lancets    NO BRAND SPECIFIED    3 Box    1 Device 2 times daily    Type 2 diabetes, HbA1c goal < 7% (H)       tiZANidine 2 MG tablet    ZANAFLEX    90 tablet    Take 1-2 tablets (2-4 mg) by mouth 3 times daily    Muscle spasm       traMADol 50 MG tablet    ULTRAM    30  tablet    Take 1 tablet (50 mg) by mouth 2 times daily as needed for severe pain    Degeneration of lumbar intervertebral disc       traZODone 50 MG tablet    DESYREL    180 tablet    TAKE ONE TO TWO TABLETS BY MOUTH AT BEDTIME AS NEEDED FOR SLEEP    Major depressive disorder, recurrent episode, mild (H)       verapamil 240 MG CR tablet    CALAN-SR    90 tablet    Take 1 tablet (240 mg) by mouth At Bedtime INDICATION: TO LOWER BLOOD PRESSURE AND TO CONTROL MIGRAINE HEADACHES    Migraine without status migrainosus, not intractable, unspecified migraine type

## 2018-11-01 NOTE — PATIENT INSTRUCTIONS
"  Viral upper respiratory infection:     *  No evidence for bacterial infection.     *  No antibiotics indicated based on examination today.  No fevers, normal throat, ears, and normal lung sounds.      *  If you develop any  Worsening symptoms such as fevers, more obvious larger amounts of thick yellow sputum, then call [facility] for a prescription for an antibiotic: Amoxicillin 875, mg twice per day for 10 days.      *  Tylenol for fevers or headaches;     *  Motrin/Advil for any fevers, body/muscle aches.     *  Cough suppressant dextromethorphan, i.e. Delsym (or any cough medication with a \"DM\"), follow directions on bottle    *  Mucinex extended release, one or two tablets twice per day for the next 5-7 days.  This helps loosen the secretions to they can be passed more easily out of the body.     *  Be sure to drink lots of fluids.  If the appetite and intake of food is way down, then at leat try to eat soup.  Dehydration is the thing that causes people to end up in the hospital with viral infections and the flu.     *  For sore throat:  Try lozenges (Cepostat, Cepacol, hard candies, Zinc lozenges, etc)    Chloraseptic spray or lozenges for topical pain relief.             *  If you have thick secretions:  Mucinex extended release twice per day on a regular basis for the next few days, then twice per day as needed.  OK to take the regular Mucinex, you may just have to take it 2-3 times per day.      *  If you have dry nasal passages or frequent bloody noses during the winter time:  Saline nasal spray as often as needed for dry nose.     *  For thick sinus secretions, consider using a \"Sinus Rinse Kit\" or Netti Pot For help rinsing out sinus cavities if there is a lot of debris from the sinuses    *  If you have a lot of congestion and/or runny noses:  OK to try decongestants as needed (e.g. pseudoephedrine or phenylephrine).  Be sure to take the lowest dose needed.  Take decongestants from only " ONE source.  It is possible to inadvertantly take more than the recommended amounts if you take decongestants from multiple products (i.e. Do NOT take Mucinex-D and Claritin-D together)    *  If you have been told to NOT take decongestants or if you cannot tolerate decongestants due to effect on blood pressure, sleep or heart rate:  Try Coricidin HBP or Chlortrimeton (chlorpheniramine).  These can have a similar effect as some decongestants but will not affect your heart rate or blood pressure.      *  If you have SEVERE nasal congestion:  Affrin nasal spray as needed for severe nasal congestion (especially before the airplane trip), but do not use for more than one week.      *  For nighttime congestion, consider taking your favorite nighttime multi symptoms cold reliever such as Nyquil, Vicks Formula 44, Tylenol multi symptoms nighttime cold reliever, etc.      *  Call the clinic if any changes in the symptoms such as worsening fevers, or the amount and quality of the sputum changes, or if you have any major difficulties breathing, or the symptoms fail to clear for a few weeks.    *  Most upper respiratory infection will clear 1-2 weeks, but it is not uncommon for post viral coughs to last for several weeks, even rarely the entire winter.
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
## 2018-11-13 ENCOUNTER — TELEPHONE (OUTPATIENT)
Dept: INTERNAL MEDICINE | Facility: CLINIC | Age: 58
End: 2018-11-13

## 2018-11-13 DIAGNOSIS — G43.909 MIGRAINE WITHOUT STATUS MIGRAINOSUS, NOT INTRACTABLE, UNSPECIFIED MIGRAINE TYPE: ICD-10-CM

## 2018-11-13 DIAGNOSIS — J45.30 MILD PERSISTENT ASTHMA, UNCOMPLICATED: Primary | ICD-10-CM

## 2018-11-13 RX ORDER — FLUTICASONE PROPIONATE 44 UG/1
2 AEROSOL, METERED RESPIRATORY (INHALATION) 2 TIMES DAILY
Qty: 1 INHALER | Refills: 5 | Status: SHIPPED | OUTPATIENT
Start: 2018-11-13 | End: 2020-06-01

## 2018-11-13 RX ORDER — VERAPAMIL HYDROCHLORIDE 240 MG/1
TABLET, FILM COATED, EXTENDED RELEASE ORAL
Qty: 90 TABLET | Refills: 1 | Status: SHIPPED | OUTPATIENT
Start: 2018-11-13 | End: 2019-06-13

## 2018-11-13 NOTE — TELEPHONE ENCOUNTER
Per refill authorization protocol - therapeutic substitution    Medication: Aerospan is no longer available on the market. Reviewed pharmacy records, and discussed therapeutic option with patient.    Sent new prescription for Flovent HFA 44 mcg    EPIC Message or fax sent to provider to inform of change.  Updated medication list in EPIC.    Marcellus Dalton, PharmD  [facility]  [phone]

## 2018-11-13 NOTE — TELEPHONE ENCOUNTER
"Requested Prescriptions   Pending Prescriptions Disp Refills     verapamil (CALAN-SR) 240 MG CR tablet [Pharmacy Med Name: VERAPAMIL HCL ER 240MG TBCR] 90 tablet 1     Sig: TAKE ONE TABLET BY MOUTH EVERY NIGHT AT BEDTIME    Calcium Channel Blockers Protocol  Passed    11/13/2018 11:23 AM       Passed - Blood pressure under 140/90 in past 12 months    BP Readings from Last 3 Encounters:   11/01/18 112/78   10/19/18 136/82   09/27/18 102/76                Passed - Normal ALT in past 12 months    Recent Labs   Lab Test  06/26/18   1101   ALT  35            Passed - Recent (12 mo) or future (30 days) visit within the authorizing provider's specialty    Patient had office visit in the last 12 months or has a visit in the next 30 days with authorizing provider or within the authorizing provider's specialty.  See \"Patient Info\" tab in inbasket, or \"Choose Columns\" in Meds & Orders section of the refill encounter.             Passed - Patient is age 18 or older       Passed - No active pregnancy on record       Passed - Normal serum creatinine on file in past 12 months    Recent Labs   Lab Test  06/26/18   1101   08/26/16   1652   CR  0.82   < >   --    CREAT   --    --   0.8    < > = values in this interval not displayed.            Passed - No positive pregnancy test in past 12 months        Last Written Prescription Date:  1/31/18  Last Fill Quantity: 90,  # refills: 1   Last office visit: No previous visit found with prescribing provider:  1/31/18   Future Office Visit:      "

## 2018-11-19 ENCOUNTER — OFFICE VISIT (OUTPATIENT)
Dept: URGENT CARE | Facility: URGENT CARE | Age: 58
End: 2018-11-19
Payer: MEDICARE

## 2018-11-19 VITALS
DIASTOLIC BLOOD PRESSURE: 62 MMHG | TEMPERATURE: 98.3 F | WEIGHT: 166 LBS | SYSTOLIC BLOOD PRESSURE: 106 MMHG | RESPIRATION RATE: 16 BRPM | OXYGEN SATURATION: 99 % | HEART RATE: 95 BPM | BODY MASS INDEX: 26.79 KG/M2

## 2018-11-19 DIAGNOSIS — R05.9 COUGH: ICD-10-CM

## 2018-11-19 DIAGNOSIS — J01.00 ACUTE MAXILLARY SINUSITIS, RECURRENCE NOT SPECIFIED: ICD-10-CM

## 2018-11-19 DIAGNOSIS — Z87.01 HX OF BACTERIAL PNEUMONIA: ICD-10-CM

## 2018-11-19 DIAGNOSIS — Z79.4 TYPE 2 DIABETES MELLITUS WITH DIABETIC POLYNEUROPATHY, WITH LONG-TERM CURRENT USE OF INSULIN (H): Primary | ICD-10-CM

## 2018-11-19 DIAGNOSIS — E11.42 TYPE 2 DIABETES MELLITUS WITH DIABETIC POLYNEUROPATHY, WITH LONG-TERM CURRENT USE OF INSULIN (H): Primary | ICD-10-CM

## 2018-11-19 DIAGNOSIS — R09.89 CHEST CONGESTION: ICD-10-CM

## 2018-11-19 DIAGNOSIS — R05.8 PRODUCTIVE COUGH: ICD-10-CM

## 2018-11-19 PROCEDURE — 99214 OFFICE O/P EST MOD 30 MIN: CPT | Performed by: PHYSICIAN ASSISTANT

## 2018-11-19 RX ORDER — PREDNISONE 20 MG/1
20 TABLET ORAL 2 TIMES DAILY
Qty: 10 TABLET | Refills: 0 | Status: SHIPPED | OUTPATIENT
Start: 2018-11-19 | End: 2018-11-29

## 2018-11-19 RX ORDER — CODEINE PHOSPHATE AND GUAIFENESIN 10; 100 MG/5ML; MG/5ML
5-10 SOLUTION ORAL
Qty: 120 ML | Refills: 0 | Status: SHIPPED | OUTPATIENT
Start: 2018-11-19 | End: 2019-01-15

## 2018-11-19 NOTE — PROGRESS NOTES
SUBJECTIVE:   Mary Lou Gil is a 58 year old female presenting with a chief complaint of chest congestion, wheezing and sinus congestion, drainage.  Onset of symptoms was 2 week(s) ago.  Course of illness is worsening.    Severity moderate  Current and Associated symptoms: sinus pain  Treatment measures tried include Tylenol/Ibuprofen.  Predisposing factors include recent illness .    Past Medical History:   Diagnosis Date     Anemia, unspecified 2/1/2010     Degeneration of lumbar intervertebral disc 7/11/2016     Depressive disorder      Diabetes mellitus (H) 2/1/2010    Dx in 2006     Essential hypertension, benign 2/1/2010     Hyperlipidaemia      Hyperlipidemia LDL goal < 100      Migraine      Other chronic pain     back, legs and feet     Spinal stenosis      Tobacco use disorder 2/1/2010     Uncomplicated asthma     ? with allergic reactions?     Unspecified hypothyroidism 2/1/2010        Allergies   Allergen Reactions     Nicotine Polacrilex Anaphylaxis     PROBABLY REACTION TO VINYL IN NICOTINE PATCH     Neurontin [Gabapentin] Rash     Norvasc [Amlodipine Besylate]      constipation     Percocet [Oxycodone-Acetaminophen] Itching     Vinyl Ether Swelling and Cough     Vinyl causes throat and lip swelling, cough and headache         Social History   Substance Use Topics     Smoking status: Current Every Day Smoker     Packs/day: 1.00     Years: 25.00     Types: Cigarettes, Other     Smokeless tobacco: Never Used      Comment: started at age 19 and has quit for 10 years      Alcohol use No       ROS:  CONSTITUTIONAL:POSITIVE  for chills  INTEGUMENTARY/SKIN: NEGATIVE for worrisome rashes, moles or lesions  EYES: NEGATIVE for vision changes or irritation  ENT/MOUTH: POSITIVE for sinus congestion, drainage  RESP:POSITIVE for cough-productive and wheezing  CV: NEGATIVE for chest pain, palpitations or peripheral edema  GI: NEGATIVE for nausea, abdominal pain, heartburn, or change in bowel habits  : normal  menstrual cycles  MUSCULOSKELETAL: NEGATIVE for significant arthralgias or myalgia  NEURO: NEGATIVE for weakness, dizziness or paresthesias    OBJECTIVE  :/62 (BP Location: Left arm, Patient Position: Sitting, Cuff Size: Adult Regular)  Pulse 95  Temp 98.3  F (36.8  C) (Oral)  Resp 16  Wt 166 lb (75.3 kg)  SpO2 99%  BMI 26.79 kg/m2  GENERAL APPEARANCE: healthy, alert and no distress  EYES: EOMI,  PERRL, conjunctiva clear  HENT: TM's normal bilaterally, nasal turbinates erythematous, swollen, nasal turbinates boggy with bluish hue and maxillary sinus tenderness   NECK: supple, nontender, no lymphadenopathy  RESP: expiratory wheezes generalized  CV: regular rates and rhythm, normal S1 S2, no murmur noted  ABDOMEN:  soft, nontender, no HSM or masses and bowel sounds normal  NEURO: Normal strength and tone, sensory exam grossly normal,  normal speech and mentation  SKIN: no suspicious lesions or rashes    ASSESSMENT/PLAN      ICD-10-CM    1. Type 2 diabetes mellitus with diabetic polyneuropathy, with long-term current use of insulin (H) E11.42     Z79.4    2. Acute maxillary sinusitis, recurrence not specified J01.00 predniSONE (DELTASONE) 20 MG tablet     amoxicillin-clavulanate (AUGMENTIN) 875-125 MG per tablet   3. Productive cough R05 predniSONE (DELTASONE) 20 MG tablet     amoxicillin-clavulanate (AUGMENTIN) 875-125 MG per tablet   4. Chest congestion R09.89    5. Hx of bacterial pneumonia Z87.01 amoxicillin-clavulanate (AUGMENTIN) 875-125 MG per tablet   6. Cough R05 guaiFENesin-codeine (ROBITUSSIN AC) 100-10 MG/5ML SOLN solution       Orders Placed This Encounter     predniSONE (DELTASONE) 20 MG tablet     amoxicillin-clavulanate (AUGMENTIN) 875-125 MG per tablet     guaiFENesin-codeine (ROBITUSSIN AC) 100-10 MG/5ML SOLN solution       Continue using albuterol  Tylenol for body aches  Fluids, rest  Follow up with PCP as needed  See orders in Epic

## 2018-11-19 NOTE — MR AVS SNAPSHOT
After Visit Summary   11/19/2018    Mary Lou Gil    MRN: [id number]           Patient Information     Date Of Birth          1960        Visit Information        Provider Department      11/19/2018 11:35 AM Pablo Chau PA-C [facility]        Today's Diagnoses     Type 2 diabetes mellitus with diabetic polyneuropathy, with long-term current use of insulin (H)    -  1    Acute maxillary sinusitis, recurrence not specified        Productive cough        Chest congestion        Hx of bacterial pneumonia        Cough           Follow-ups after your visit        Who to contact     If you have questions or need follow up information about today's clinic visit or your schedule please contact [facility] directly at [phone].  Normal or non-critical lab and imaging results will be communicated to you by [employer], letter or phone within 4 business days after the clinic has received the results. If you do not hear from us within 7 days, please contact the clinic through [employer] or phone. If you have a critical or abnormal lab result, we will notify you by phone as soon as possible.  Submit refill requests through [employer] or call your pharmacy and they will forward the refill request to us. Please allow 3 business days for your refill to be completed.          Additional Information About Your Visit        MyChart Information     [employer] gives you secure access to your electronic health record. If you see a primary care provider, you can also send messages to your care team and make appointments. If you have questions, please call your primary care clinic.  If you do not have a primary care provider, please call [phone] and they will assist you.        Care EveryWhere ID     This is your Care EveryWhere ID. This could be used by other organizations to access your [place] medical records  [id number]        Your Vitals Were     Pulse  Temperature Respirations Pulse Oximetry BMI (Body Mass Index)       95 98.3  F (36.8  C) (Oral) 16 99% 26.79 kg/m2        Blood Pressure from Last 3 Encounters:   11/19/18 106/62   11/01/18 112/78   10/19/18 136/82    Weight from Last 3 Encounters:   11/19/18 166 lb (75.3 kg)   11/01/18 163 lb 3.2 oz (74 kg)   09/27/18 162 lb 12.8 oz (73.8 kg)              Today, you had the following     No orders found for display         Today's Medication Changes          These changes are accurate as of 11/19/18 12:08 PM.  If you have any questions, ask your nurse or doctor.               Start taking these medicines.        Dose/Directions    amoxicillin-clavulanate 875-125 MG per tablet   Commonly known as:  AUGMENTIN   Used for:  Acute maxillary sinusitis, recurrence not specified, Productive cough, Hx of bacterial pneumonia   Started by:  Pablo Chau PA-C        Dose:  1 tablet   Take 1 tablet by mouth 2 times daily   Quantity:  20 tablet   Refills:  0       guaiFENesin-codeine 100-10 MG/5ML Soln solution   Commonly known as:  ROBITUSSIN AC   Used for:  Cough   Started by:  Pablo Chau PA-C        Dose:  5-10 mL   Take 5-10 mLs by mouth nightly as needed for cough   Quantity:  120 mL   Refills:  0       predniSONE 20 MG tablet   Commonly known as:  DELTASONE   Used for:  Acute maxillary sinusitis, recurrence not specified, Productive cough   Started by:  Pablo Chau PA-C        Dose:  20 mg   Take 1 tablet (20 mg) by mouth 2 times daily   Quantity:  10 tablet   Refills:  0            Where to get your medicines      These medications were sent to [facility] [address] [address]     Phone:  [phone]     amoxicillin-clavulanate 875-125 MG per tablet    predniSONE 20 MG tablet         Some of these will need a paper prescription and others can be bought over the counter.  Ask your nurse if you have questions.     Bring a paper  prescription for each of these medications     guaiFENesin-codeine 100-10 MG/5ML Soln solution                Primary Care Provider Office Phone # Fax #    Xavi Garcia [phone]       [address] [place] [address]        Equal Access to Services     RISHI MOJICA : Hadii aad ku hadasho Soomaali, waaxda luqadaha, qaybta kaalmada adeegyada, waxay idiin hayaan adeeg albert lanoah . So [facility] [phone].    ATENCIÓN: Si habla español, tiene a ybarra disposición servicios gratuitos de asistencia lingüística. [facility] [phone].    We comply with applicable federal civil rights laws and [place] laws. We do not discriminate on the basis of race, color, national origin, age, disability, sex, sexual orientation, or gender identity.            Thank you!     Thank you for choosing [facility]  for your care. Our goal is always to provide you with excellent care. Hearing back from our patients is one way we can continue to improve our services. Please take a few minutes to complete the written survey that you may receive in the mail after your visit with us. Thank you!             Your Updated Medication List - Protect others around you: Learn how to safely use, store and throw away your medicines at www.disposemymeds.org.          This list is accurate as of 11/19/18 12:08 PM.  Always use your most recent med list.                   Brand Name Dispense Instructions for use Diagnosis    albuterol 108 (90 Base) MCG/ACT inhaler    PROAIR HFA/PROVENTIL HFA/VENTOLIN HFA    1 Inhaler    Inhale 2 puffs into the lungs every 6 hours as needed for shortness of breath / dyspnea or wheezing    Tobacco use disorder       amoxicillin-clavulanate 875-125 MG per tablet    AUGMENTIN    20 tablet    Take 1 tablet by mouth 2 times daily    Acute maxillary sinusitis, recurrence not specified, Productive cough, Hx of bacterial pneumonia       B-D U/F 31G X 8 MM miscellaneous    Generic drug:  insulin pen needle     100 each    USE AS DIRECTED AT BEDTIME    Type 2 diabetes, HbA1c goal < 7% (H)       BENADRYL PO      Take 25-50 mg by mouth See Admin Instructions 25 mg during the day if needed and 50 mg at bedtime for sleep as needed        cholecalciferol 5000 units Caps     90 capsule    Take 1 capsule (5,000 Units) by mouth daily    Perimenopausal       citalopram 40 MG tablet    celeXA    90 tablet    Take 1 tablet (40 mg) by mouth daily TO CONTROL SYMPTOMS OF DEPRESSION    Major depressive disorder, recurrent episode, mild (H)       EPINEPHrine 0.3 MG/0.3ML injection 2-pack    EPIPEN/ADRENACLICK/or ANY BX GENERIC EQUIV    0.3 mL    Inject 0.3 mLs (0.3 mg) into the muscle once as needed    Anaphylactic reaction, subsequent encounter       fenofibrate 160 MG tablet     90 tablet    Take 1 tablet (160 mg) by mouth At Bedtime with food. INDICATION: TO LOWER CHOLESTEROL    Hyperlipidemia with target LDL less than 100       fluticasone 44 MCG/ACT Inhaler    FLOVENT HFA    1 Inhaler    Inhale 2 puffs into the lungs 2 times daily    Mild persistent asthma, uncomplicated       FREESTYLE LITE test strip   Generic drug:  blood glucose monitoring     200 each    USE AS DIRECTED TO TEST THREE TIMES DAILY.    Type 2 diabetes mellitus with hyperglycemia, with long-term current use of insulin (H)       glipiZIDE 10 MG 24 hr tablet    glipiZIDE XL    30 tablet    Take 1 tablet (10 mg) by mouth daily INDICATION: TO TREAT DIABETES.    Type 2 diabetes mellitus with diabetic polyneuropathy, with long-term current use of insulin (H)       guaiFENesin-codeine 100-10 MG/5ML Soln solution    ROBITUSSIN AC    120 mL    Take 5-10 mLs by mouth nightly as needed for cough    Cough       ibuprofen 800 MG tablet    ADVIL/MOTRIN    90 tablet    Take 1 tablet (800 mg) by mouth every 8 hours as needed for moderate pain or pain    Hip pain, left, Physical deconditioning, Other chronic pain, SI (sacroiliac) joint  dysfunction       insulin detemir 100 UNIT/ML injection    LEVEMIR FLEXPEN/FLEXTOUCH    30 mL    Inject 30 Units Subcutaneous At Bedtime    Type 2 diabetes mellitus with diabetic polyneuropathy, with long-term current use of insulin (H)       levothyroxine 150 MCG tablet    SYNTHROID/LEVOTHROID    90 tablet    Take 1 tablet (150 mcg) by mouth daily TAKE FIRST THING IN THE MORNING ON AN EMPTY STOMACH,  NO FOOD FOR AN HOUR    Hypothyroidism, unspecified type       lidocaine 5 % ointment    XYLOCAINE    50 g    Apply a quarter size amount to painful areas up to 4 times per day as needed.    Type 2 diabetes mellitus with diabetic polyneuropathy, with long-term current use of insulin (H), Diabetic polyneuropathy associated with type 2 diabetes mellitus (H)       lisinopril-hydrochlorothiazide 20-25 MG per tablet    PRINZIDE/ZESTORETIC    90 tablet    TAKE ONE TABLET BY MOUTH EVERY DAY    Essential hypertension, benign       metFORMIN 500 MG 24 hr tablet    GLUCOPHAGE-XR    60 tablet    Take 2 tablets (1,000 mg) by mouth daily (with dinner)    Type 2 diabetes mellitus with diabetic polyneuropathy, with long-term current use of insulin (H)       pravastatin 80 MG tablet    PRAVACHOL    90 tablet    Take 1 tablet (80 mg) by mouth daily INDICATION: TO LOWER CHOLESTEROL AND TO HELP  PREVENT HEART DISEASE    Hyperlipidemia with target LDL less than 100       predniSONE 20 MG tablet    DELTASONE    10 tablet    Take 1 tablet (20 mg) by mouth 2 times daily    Acute maxillary sinusitis, recurrence not specified, Productive cough       SUMAtriptan 100 MG tablet    IMITREX    18 tablet    Take 1 tablet (100 mg) by mouth at onset of headache for migraine (TAKE AT ONSET OF ACUTE MIGRAINE HEADACHE) MAY REPEAT ONE EXTRA DOSE IF NO RELIEF AFTER 2 HOURS; max dose 200 mg per day    Migraine without status migrainosus, not intractable, unspecified migraine type       thin lancets    NO BRAND SPECIFIED    3 Box    1 Device 2 times daily     Type 2 diabetes, HbA1c goal < 7% (H)       tiZANidine 2 MG tablet    ZANAFLEX    90 tablet    Take 1-2 tablets (2-4 mg) by mouth 3 times daily    Muscle spasm       traMADol 50 MG tablet    ULTRAM    30 tablet    Take 1 tablet (50 mg) by mouth 2 times daily as needed for severe pain    Degeneration of lumbar intervertebral disc       traZODone 50 MG tablet    DESYREL    180 tablet    TAKE ONE TO TWO TABLETS BY MOUTH AT BEDTIME AS NEEDED FOR SLEEP    Major depressive disorder, recurrent episode, mild (H)       verapamil 240 MG CR tablet    CALAN-SR    90 tablet    TAKE ONE TABLET BY MOUTH EVERY NIGHT AT BEDTIME    Migraine without status migrainosus, not intractable, unspecified migraine type

## 2018-11-29 ENCOUNTER — TELEPHONE (OUTPATIENT)
Dept: ENDOCRINOLOGY | Facility: CLINIC | Age: 58
End: 2018-11-29

## 2018-11-29 NOTE — LETTER
[facility]  [address], [address]  [place], [place]  [address]                                            TEL:[phone]  FAX:[phone]      Mary Lou Saleems  [address]  [facility] [address]      November 29, 2018    Dear Mary Lou       This letter is being sent on behalf of Dr. Ramirez. It is to remind you that your provider expected you to return for a follow up clinic visit. Please make a lab only appointment, and then follow up with a clinic visit one week afterwards to review those results. If this is not done, it may result in your provider not being able to refill your medications.     You may call our office at [phone] ([place]) or [phone] ([place]) to schedule an appointment. You may also see Madai Garcia in [place] at [phone]    If you have already scheduled these appointments, please disregard this notice.      Sincerely,    [facility],  Dr. Susan Ramirez

## 2018-11-29 NOTE — TELEPHONE ENCOUNTER
Panel Management Review      Patient has the following on her problem list:     Diabetes    ASA: Failed    Last A1C  Lab Results   Component Value Date    A1C 8.9 06/26/2018    A1C 12.5 01/16/2018    A1C 12.4 09/01/2017    A1C 11.1 03/21/2017    A1C 11.1 11/10/2016     A1C tested: FAILED    Last LDL:    Lab Results   Component Value Date    CHOL 138 06/26/2018     Lab Results   Component Value Date    HDL 43 06/26/2018     Lab Results   Component Value Date    LDL 69 06/26/2018     Lab Results   Component Value Date    TRIG 129 06/26/2018     Lab Results   Component Value Date    CHOLHDLRATIO 5.0 08/31/2015     Lab Results   Component Value Date    NHDL 95 06/26/2018       Is the patient on a Statin? YES             Is the patient on Aspirin? NO    Medications     HMG CoA Reductase Inhibitors    pravastatin (PRAVACHOL) 80 MG tablet          Last three blood pressure readings:  BP Readings from Last 3 Encounters:   11/19/18 106/62   11/01/18 112/78   10/19/18 136/82       Date of last diabetes office visit: 07/11/18     Tobacco History:     History   Smoking Status     Current Every Day Smoker     Packs/day: 1.00     Years: 25.00     Types: Cigarettes, Other   Smokeless Tobacco     Never Used     Comment: started at age 19 and has quit for 10 years            Composite cancer screening  Chart review shows that this patient is due/due soon for the following Pap Smear and Colonoscopy  Summary:    Patient is due/failing the following:   FOLLOW UP    Action needed:   Patient needs office visit for dm with endo.    Type of outreach:    Sent letter.    Questions for provider review:    None                                                                                                                                    Yuliet Braden CMA       Chart routed to no one .

## 2018-12-12 ENCOUNTER — OFFICE VISIT (OUTPATIENT)
Dept: URGENT CARE | Facility: URGENT CARE | Age: 58
End: 2018-12-12
Payer: MEDICARE

## 2018-12-12 VITALS
BODY MASS INDEX: 26.68 KG/M2 | HEART RATE: 86 BPM | SYSTOLIC BLOOD PRESSURE: 110 MMHG | DIASTOLIC BLOOD PRESSURE: 75 MMHG | OXYGEN SATURATION: 96 % | WEIGHT: 165.31 LBS | TEMPERATURE: 97.9 F

## 2018-12-12 DIAGNOSIS — Z79.4 TYPE 2 DIABETES MELLITUS WITH DIABETIC POLYNEUROPATHY, WITH LONG-TERM CURRENT USE OF INSULIN (H): ICD-10-CM

## 2018-12-12 DIAGNOSIS — J45.30 MILD PERSISTENT ASTHMA WITHOUT COMPLICATION: ICD-10-CM

## 2018-12-12 DIAGNOSIS — H69.93 DYSFUNCTION OF BOTH EUSTACHIAN TUBES: ICD-10-CM

## 2018-12-12 DIAGNOSIS — F17.200 TOBACCO USE DISORDER: ICD-10-CM

## 2018-12-12 DIAGNOSIS — E11.42 TYPE 2 DIABETES MELLITUS WITH DIABETIC POLYNEUROPATHY, WITH LONG-TERM CURRENT USE OF INSULIN (H): ICD-10-CM

## 2018-12-12 DIAGNOSIS — R06.2 WHEEZING: ICD-10-CM

## 2018-12-12 DIAGNOSIS — R09.89 CHEST CONGESTION: Primary | ICD-10-CM

## 2018-12-12 DIAGNOSIS — J34.89 FRONTAL SINUS PAIN: ICD-10-CM

## 2018-12-12 PROCEDURE — 99214 OFFICE O/P EST MOD 30 MIN: CPT | Performed by: PHYSICIAN ASSISTANT

## 2018-12-12 RX ORDER — DOXYCYCLINE 100 MG/1
100 CAPSULE ORAL 2 TIMES DAILY
Qty: 20 CAPSULE | Refills: 0 | Status: SHIPPED | OUTPATIENT
Start: 2018-12-12 | End: 2019-01-15

## 2018-12-12 RX ORDER — FLUTICASONE PROPIONATE 50 MCG
1-2 SPRAY, SUSPENSION (ML) NASAL DAILY
Qty: 16 G | Refills: 0 | Status: SHIPPED | OUTPATIENT
Start: 2018-12-12 | End: 2019-03-19

## 2018-12-12 RX ORDER — PREDNISONE 20 MG/1
TABLET ORAL
Qty: 13 TABLET | Refills: 0 | Status: SHIPPED | OUTPATIENT
Start: 2018-12-12 | End: 2019-01-15

## 2018-12-12 NOTE — PROGRESS NOTES
SUBJECTIVE:   Mary Lou Gil is a 58 year old female presenting with a chief complaint of coughing, chest congestion, wheezing and nasal drainage.  Onset of symptoms was 6 week(s) ago.  Course of illness is waxing and waning.    Severity moderate  Current and Associated symptoms: coughing, chest tightness  Treatment measures tried include OTC medications.  Predisposing factors include tobacco abuse and recent illness.    Past Medical History:   Diagnosis Date     Anemia, unspecified 2/1/2010     Degeneration of lumbar intervertebral disc 7/11/2016     Depressive disorder      Diabetes mellitus (H) 2/1/2010    Dx in 2006     Essential hypertension, benign 2/1/2010     Hyperlipidaemia      Hyperlipidemia LDL goal < 100      Migraine      Other chronic pain     back, legs and feet     Spinal stenosis      Tobacco use disorder 2/1/2010     Uncomplicated asthma     ? with allergic reactions?     Unspecified hypothyroidism 2/1/2010        Allergies   Allergen Reactions     Nicotine Polacrilex Anaphylaxis     PROBABLY REACTION TO VINYL IN NICOTINE PATCH     Neurontin [Gabapentin] Rash     Norvasc [Amlodipine Besylate]      constipation     Percocet [Oxycodone-Acetaminophen] Itching     Vinyl Ether Swelling and Cough     Vinyl causes throat and lip swelling, cough and headache     Family History   Problem Relation Age of Onset     C.A.D. Mother      Diabetes Mother      Hypertension Mother      Aneurysm Mother      Unknown/Adopted Brother      Unknown/Adopted Brother      C.A.D. Sister      Diabetes Sister      Diabetes Sister      Hypertension Sister      Diabetes Sister      Hypertension Sister      Hypertension Sister      Breast Cancer No family hx of      Cancer - colorectal No family hx of          Social History     Tobacco Use     Smoking status: Current Every Day Smoker     Packs/day: 1.00     Years: 25.00     Pack years: 25.00     Types: Cigarettes, Other     Smokeless tobacco: Never Used     Tobacco comment:  started at age 19 and has quit for 10 years    Substance Use Topics     Alcohol use: No     Alcohol/week: 0.0 oz       ROS:  CONSTITUTIONAL:NEGATIVE for fever, chills, change in weight  INTEGUMENTARY/SKIN: NEGATIVE for worrisome rashes, moles or lesions  EYES: NEGATIVE for vision changes or irritation  ENT/MOUTH: POSITIVE for nasal congestion, drainage  RESP:POSITIVE for cough-productive, SOB/dyspnea, wheezing and tobacco abuse  CV: NEGATIVE for chest pain, palpitations or peripheral edema  GI: NEGATIVE for nausea, abdominal pain, heartburn, or change in bowel habits  : negative for and dysuria  MUSCULOSKELETAL: NEGATIVE for significant arthralgias or myalgia  NEURO: NEGATIVE for weakness, dizziness or paresthesias    OBJECTIVE  :/75   Pulse 86   Temp 97.9  F (36.6  C) (Oral)   Wt 75 kg (165 lb 5 oz)   SpO2 96%   BMI 26.68 kg/m    GENERAL APPEARANCE: healthy, alert and no distress  EYES: EOMI,  PERRL, conjunctiva clear  HENT: TM's normal bilaterally, frontal sinus tenderness  and maxillary sinus tenderness   NECK: supple, nontender, no lymphadenopathy  RESP: expiratory wheezes  and bronchial spasms  CV: regular rates and rhythm, normal S1 S2, no murmur noted  ABDOMEN:  soft, nontender, no HSM or masses and bowel sounds normal  NEURO: Normal strength and tone, sensory exam grossly normal,  normal speech and mentation  SKIN: no suspicious lesions or rashes    ASSESSMENT/PLAN    ICD-10-CM    1. Chest congestion R09.89 doxycycline monohydrate (MONODOX) 100 MG capsule   2. Wheezing R06.2 predniSONE (DELTASONE) 20 MG tablet   3. Frontal sinus pain J34.89 doxycycline monohydrate (MONODOX) 100 MG capsule   4. Tobacco use disorder F17.200    5. Type 2 diabetes mellitus with diabetic polyneuropathy, with long-term current use of insulin (H) E11.42     Z79.4    6. Mild persistent asthma without complication J45.30 predniSONE (DELTASONE) 20 MG tablet   7. Dysfunction of both eustachian tubes H69.83 fluticasone  (FLONASE) 50 MCG/ACT nasal spray     Orders Placed This Encounter     predniSONE (DELTASONE) 20 MG tablet     doxycycline monohydrate (MONODOX) 100 MG capsule     fluticasone (FLONASE) 50 MCG/ACT nasal spray     New course of prednisone with new antibiotics  Advised to try flonase for nasal drainage  Fluids, rest  Tobacco abuse discussed  Follow up with PCP as needed

## 2019-01-15 ENCOUNTER — ANCILLARY PROCEDURE (OUTPATIENT)
Dept: GENERAL RADIOLOGY | Facility: CLINIC | Age: 59
End: 2019-01-15
Payer: MEDICARE

## 2019-01-15 ENCOUNTER — OFFICE VISIT (OUTPATIENT)
Dept: URGENT CARE | Facility: URGENT CARE | Age: 59
End: 2019-01-15
Payer: MEDICARE

## 2019-01-15 VITALS
DIASTOLIC BLOOD PRESSURE: 84 MMHG | SYSTOLIC BLOOD PRESSURE: 125 MMHG | WEIGHT: 168.19 LBS | BODY MASS INDEX: 27.15 KG/M2 | HEART RATE: 106 BPM | TEMPERATURE: 97.8 F

## 2019-01-15 DIAGNOSIS — J01.90 ACUTE SINUSITIS WITH SYMPTOMS > 10 DAYS: Primary | ICD-10-CM

## 2019-01-15 DIAGNOSIS — R09.81 SINUS CONGESTION: ICD-10-CM

## 2019-01-15 DIAGNOSIS — R05.8 PRODUCTIVE COUGH: ICD-10-CM

## 2019-01-15 PROCEDURE — 99214 OFFICE O/P EST MOD 30 MIN: CPT | Performed by: PHYSICIAN ASSISTANT

## 2019-01-15 PROCEDURE — 70220 X-RAY EXAM OF SINUSES: CPT

## 2019-01-15 PROCEDURE — 71046 X-RAY EXAM CHEST 2 VIEWS: CPT

## 2019-01-15 RX ORDER — SULFAMETHOXAZOLE AND TRIMETHOPRIM 400; 80 MG/1; MG/1
1 TABLET ORAL 2 TIMES DAILY
Qty: 28 TABLET | Refills: 0 | Status: SHIPPED | OUTPATIENT
Start: 2019-01-15 | End: 2019-01-29

## 2019-01-15 RX ORDER — LIDOCAINE 4 G/G
1-2 PATCH TOPICAL EVERY 24 HOURS
COMMUNITY
End: 2019-11-07

## 2019-01-15 NOTE — PROGRESS NOTES
SUBJECTIVE:   Mary Lou Gil is a 58 year old female presenting with a chief complaint of   1) sinus congestion for 3 months.    2) sinus pain for 2 days.    Was treated with an antibiotic in December and symptoms improved but never cleared.      Onset of symptoms was as above.  No fevers  3) Intermittently productive cough for 3 months, worsening    Course of illness is worsening.    Severity moderate  Current and Associated symptoms: as above  Treatment measures tried include as above.  Flonase  Predisposing factors include asthma.  Tobacco use    Past Medical History:   Diagnosis Date     Anemia, unspecified 2/1/2010     Degeneration of lumbar intervertebral disc 7/11/2016     Depressive disorder      Diabetes mellitus (H) 2/1/2010    Dx in 2006     Essential hypertension, benign 2/1/2010     Hyperlipidaemia      Hyperlipidemia LDL goal < 100      Migraine      Other chronic pain     back, legs and feet     Spinal stenosis      Tobacco use disorder 2/1/2010     Uncomplicated asthma     ? with allergic reactions?     Unspecified hypothyroidism 2/1/2010     Patient Active Problem List   Diagnosis     Essential hypertension, benign     Hypothyroidism     Anemia     Tobacco use disorder     Mild major depression (H)     Hyperlipidemia LDL goal <100     Type 2 diabetes mellitus with diabetic polyneuropathy, with long-term current use of insulin (H)     Migraine headache     Vitamin D deficiency     Vitamin B12 deficiency without anemia     Perimenopausal     Hyperlipidemia LDL goal <130     Neuropathy, diabetic (H)     Encounter for long-term (current) use of medications     Pain in shoulder     Iron deficiency     Hypothyroidism     Dyspepsia     Moderate major depression (H)     FH: CAD (coronary artery disease)     Atypical chest pain     Anaphylactic reaction     [facility] [place]     Endometrial hyperplasia     Vitamin B 12 deficiency     Low back pain     Abdominal pain     Lower abdominal pain      Constipation     Hyperlipidemia with target LDL less than 100     Mild persistent asthma     Gastroesophageal reflux disease with esophagitis     Cramp of both lower extremities     Low iron stores     Insulin-requiring or dependent type II diabetes mellitus (H)     Migraine without status migrainosus, not intractable, unspecified migraine type     Chronic pain     Intermittent claudication (H)     Bilateral low back pain with sciatica, sciatica laterality unspecified     Arm pain     Carpal tunnel syndrome on both sides     Polyneuropathy associated with underlying disease (H)     Type 2 diabetes mellitus with diabetic chronic kidney disease (H)     Hypothyroidism, unspecified hypothyroidism type     Palpitations     Mild persistent asthma, uncomplicated     Hypothyroidism, unspecified type     Bilateral carpal tunnel syndrome     Degeneration of lumbar intervertebral disc     Social History     Tobacco Use     Smoking status: Current Every Day Smoker     Packs/day: 1.00     Years: 25.00     Pack years: 25.00     Types: Cigarettes, Other     Smokeless tobacco: Never Used     Tobacco comment: started at age 19 and has quit for 10 years    Substance Use Topics     Alcohol use: No     Alcohol/week: 0.0 oz       ROS:  CONSTITUTIONAL:NEGATIVE for fever, chills, change in weight  INTEGUMENTARY/SKIN: NEGATIVE for worrisome rashes, moles or lesions  EYES: NEGATIVE for vision changes or irritation  ENT/MOUTH: as per HPI  RESP:Neel per HPI  CV: NEGATIVE for chest pain, palpitations or peripheral edema  GI: NEGATIVE for nausea, abdominal pain, heartburn, or change in bowel habits  : negative  MUSCULOSKELETAL: NEGATIVE for significant arthralgias or myalgia  NEURO: NEGATIVE for weakness, dizziness or paresthesias  Review of systems negative except as stated above.    OBJECTIVE  :/84   Pulse 106   Temp 97.8  F (36.6  C) (Oral)   Wt 76.3 kg (168 lb 3 oz)   BMI 27.15 kg/m    GENERAL APPEARANCE: healthy, alert and no  distress  EYES: EOMI,  PERRL, conjunctiva clear  HENT: ear canals and TM's normal.  Nose and mouth without ulcers, erythema or lesions  HENT: nasal turbinates erythematous, swollen and rhinorrhea yellow  NECK: supple, nontender, no lymphadenopathy  RESP:a few scattered rhonchi which clear with cough  CV: regular rates and rhythm, normal S1 S2, no murmur noted  ABDOMEN:  soft, nontender, no HSM or masses and bowel sounds normal  NEURO: Normal strength and tone, sensory exam grossly normal,  normal speech and mentation  SKIN: no suspicious lesions or rashes    J01.90) Acute sinusitis with symptoms > 10 days  (primary encounter diagnosis)  Comment:   Plan: sulfamethoxazole-trimethoprim (BACTRIM/SEPTRA)         400-80 MG tablet  Increase Flonase to twice a day for 3 days, then use consistently every evening.            (R05) Productive cough  Comment: in tobacco user  Plan: XR Chest 2 Views          Encouraged tobacco cessation    (R09.81) Sinus congestion  Comment:   Plan: XR Sinus Complete G/E 3 Views            Follow up with primary clinic for re-check in 2-3 weeks, sooner should symptoms persist or worsen.    Patient expresses understanding and agreement with the assessment and plan as above.

## 2019-01-16 NOTE — PATIENT INSTRUCTIONS
(J01.90) Acute sinusitis with symptoms > 10 days  (primary encounter diagnosis)  Comment:   Plan: sulfamethoxazole-trimethoprim (BACTRIM/SEPTRA)         400-80 MG tablet  Increase Flonase to twice a day for 3 days, then use consistently every evening.            (R05) Productive cough  Comment:   Plan: XR Chest 2 Views            (R09.81) Sinus congestion  Comment:   Plan: XR Sinus Complete G/E 3 Views            Follow up with primary clinic for re-check in 2-3 weeks, sooner should symptoms persist or worsen.

## 2019-02-09 DIAGNOSIS — E78.5 HYPERLIPIDEMIA WITH TARGET LDL LESS THAN 100: ICD-10-CM

## 2019-02-09 DIAGNOSIS — E03.9 HYPOTHYROIDISM, UNSPECIFIED TYPE: ICD-10-CM

## 2019-02-09 NOTE — TELEPHONE ENCOUNTER
"Requested Prescriptions   Pending Prescriptions Disp Refills     levothyroxine (SYNTHROID/LEVOTHROID) 150 MCG tablet [Pharmacy Med Name: LEVOTHYROXINE SODIUM 150MCG TABS] 30 tablet 0    Last Written Prescription Date:  12/13/2018  Last Fill Quantity: 30,  # refills: 0   Last office visit: 11/1/2018 with prescribing provider:  11/1/2018   Future Office Visit:     Sig: TAKE ONE TABLET BY MOUTH EVERY MORNING . TAKE FIRST THING IN THE MORNING ON AN EMPTY STOMACH, NO FOOD FOR AN HOUR    Thyroid Protocol Passed - 2/9/2019 11:54 AM       Passed - Patient is 12 years or older       Passed - Recent (12 mo) or future (30 days) visit within the authorizing provider's specialty    Patient had office visit in the last 12 months or has a visit in the next 30 days with authorizing provider or within the authorizing provider's specialty.  See \"Patient Info\" tab in inbasket, or \"Choose Columns\" in Meds & Orders section of the refill encounter.             Passed - Medication is active on med list       Passed - Normal TSH on file in past 12 months    Recent Labs   Lab Test 06/26/18  1101   TSH 1.21             Passed - No active pregnancy on record    If patient is pregnant or has had a positive pregnancy test, please check TSH.         Passed - No positive pregnancy test in past 12 months    If patient is pregnant or has had a positive pregnancy test, please check TSH.          pravastatin (PRAVACHOL) 80 MG tablet [Pharmacy Med Name: PRAVASTATIN SODIUM 80MG TABS] 30 tablet 0    Last Written Prescription Date:  12/13/2018  Last Fill Quantity: 30,  # refills: 0   Last office visit: 11/1/2018 with prescribing provider:  11/1/2018   Future Office Visit:     Sig: TAKE ONE TABLET BY MOUTH ONCE DAILY TO LOWER CHOLESTEROL AND TO PREVENT HEART DISEASE    Statins Protocol Passed - 2/9/2019 11:54 AM       Passed - LDL on file in past 12 months    Recent Labs   Lab Test 06/26/18  1101   LDL 69            Passed - No abnormal creatine kinase in " "past 12 months    No lab results found.            Passed - Recent (12 mo) or future (30 days) visit within the authorizing provider's specialty    Patient had office visit in the last 12 months or has a visit in the next 30 days with authorizing provider or within the authorizing provider's specialty.  See \"Patient Info\" tab in inbasket, or \"Choose Columns\" in Meds & Orders section of the refill encounter.             Passed - Medication is active on med list       Passed - Patient is age 18 or older       Passed - No active pregnancy on record       Passed - No positive pregnancy test in past 12 months          "

## 2019-02-11 RX ORDER — PRAVASTATIN SODIUM 80 MG/1
TABLET ORAL
Qty: 30 TABLET | Refills: 4 | Status: SHIPPED | OUTPATIENT
Start: 2019-02-11 | End: 2019-06-13

## 2019-02-11 RX ORDER — LEVOTHYROXINE SODIUM 150 UG/1
TABLET ORAL
Qty: 30 TABLET | Refills: 4 | Status: SHIPPED | OUTPATIENT
Start: 2019-02-11 | End: 2019-06-13

## 2019-02-11 NOTE — TELEPHONE ENCOUNTER
Prescription approved per [facility] Refill Protocol.    Trish REAL RN, BSN, PHN

## 2019-03-19 DIAGNOSIS — H69.93 DYSFUNCTION OF BOTH EUSTACHIAN TUBES: ICD-10-CM

## 2019-03-19 DIAGNOSIS — M62.838 MUSCLE SPASM: ICD-10-CM

## 2019-03-19 RX ORDER — FLUTICASONE PROPIONATE 50 MCG
1-2 SPRAY, SUSPENSION (ML) NASAL DAILY
Qty: 16 G | Refills: 7 | Status: SHIPPED | OUTPATIENT
Start: 2019-03-19 | End: 2022-09-02

## 2019-03-19 RX ORDER — TIZANIDINE 2 MG/1
2 TABLET ORAL 3 TIMES DAILY PRN
Qty: 60 TABLET | Refills: 0 | Status: SHIPPED | OUTPATIENT
Start: 2019-03-19 | End: 2021-10-05

## 2019-03-19 NOTE — TELEPHONE ENCOUNTER
Requested Prescriptions   Pending Prescriptions Disp Refills     tiZANidine (ZANAFLEX) 2 MG tablet 90 tablet 1     Sig: Take 1-2 tablets (2-4 mg) by mouth 3 times daily    There is no refill protocol information for this order      Last Written Prescription Date:  08/29/17  Last Fill Quantity: 90,  # refills: 1   Last office visit: 11/1/2018 with prescribing provider:  11/01/18   Future Office Visit:  0

## 2019-03-19 NOTE — TELEPHONE ENCOUNTER
"Requested Prescriptions   Pending Prescriptions Disp Refills     fluticasone (FLONASE) 50 MCG/ACT nasal spray 16 g 0     Sig: Spray 1-2 sprays into both nostrils daily    Inhaled Steroids Protocol Failed - 3/19/2019  8:24 AM       Failed - Asthma control assessment score within normal limits in last 6 months    Please review ACT score.          Passed - Patient is age 12 or older       Passed - Medication is active on med list       Passed - Recent (6 mo) or future (30 days) visit within the authorizing provider's specialty    Patient had office visit in the last 6 months or has a visit in the next 30 days with authorizing provider or within the authorizing provider's specialty.  See \"Patient Info\" tab in inbasket, or \"Choose Columns\" in Meds & Orders section of the refill encounter.            Last Written Prescription Date:  12/12/2018  Last Fill Quantity: 16g,  # refills: 0   Last office visit: 11/1/2018 with prescribing provider:  Dr. Garcia   Future Office Visit:      "

## 2019-03-22 DIAGNOSIS — M51.369 DEGENERATION OF LUMBAR INTERVERTEBRAL DISC: ICD-10-CM

## 2019-03-22 NOTE — TELEPHONE ENCOUNTER
Requested Prescriptions   Pending Prescriptions Disp Refills     traMADol (ULTRAM) 50 MG tablet 30 tablet 0     Sig: Take 1 tablet (50 mg) by mouth 2 times daily as needed for severe pain    There is no refill protocol information for this order        Controlled Substance Refill Request for tramadol  Problem List Complete:  No     PROVIDER TO CONSIDER COMPLETION OF PROBLEM LIST AND OVERVIEW/CONTROLLED SUBSTANCE AGREEMENT    Last Written Prescription Date:  10/17/2018  Last Fill Quantity: 30,   # refills: 0    THE MOST RECENT OFFICE VISIT MUST BE WITHIN THE PAST 3 MONTHS. AT LEAST ONE FACE TO FACE VISIT MUST OCCUR EVERY 6 MONTHS. ADDITIONAL VISITS CAN BE VIRTUAL.  (THIS STATEMENT SHOULD BE DELETED.)    [place] [facility] [place]1/2018    Future Office visit:     Controlled substance agreement:   Encounter-Level CSA:    There are no encounter-level csa.     Patient-Level CSA:    There are no patient-level csa.         Last Urine Drug Screen: No results found for: CDAUT, No results found for: COMDAT, No results found for: THC13, PCP13, COC13, MAMP13, OPI13, AMP13, BZO13, TCA13, MTD13, BAR13, OXY13, PPX13, BUP13     Processing:  Patient will  in clinic     [email or web address]       checked in past 3 months?  No, route to RN

## 2019-03-25 RX ORDER — TRAMADOL HYDROCHLORIDE 50 MG/1
50 TABLET ORAL 2 TIMES DAILY PRN
Qty: 30 TABLET | Refills: 0 | Status: SHIPPED | OUTPATIENT
Start: 2019-03-25 | End: 2019-07-29

## 2019-03-25 NOTE — TELEPHONE ENCOUNTER
Refill signed printed at [facility] in Rx folder   Refill requests, last filled in 10/2018    PCP does need to do Chronic Pain care package

## 2019-03-26 ENCOUNTER — TELEPHONE (OUTPATIENT)
Dept: INTERNAL MEDICINE | Facility: CLINIC | Age: 59
End: 2019-03-26

## 2019-03-26 NOTE — TELEPHONE ENCOUNTER
Reason for Call: Request for an order or referral:    Order or referral being requested: MRI--lower lumbar    Date needed: as soon as possible    Has the patient been seen by the PCP for this problem? YES    Additional comments: Pt needs a MRI before she can be scheduled at [facility]    Phone number Patient can be reached at:  Home number on file [phone] (home)    Best Time:  anytime    Can we leave a detailed message on this number?  YES    Call taken on 3/26/2019 at 2:21 PM by MOHAN RUIZ

## 2019-03-27 ENCOUNTER — TELEPHONE (OUTPATIENT)
Dept: INTERNAL MEDICINE | Facility: CLINIC | Age: 59
End: 2019-03-27

## 2019-03-27 NOTE — TELEPHONE ENCOUNTER
Prior Authorization Retail Medication Request    Medication/Dose: Tramadol 50mg      Insurance Name:  SKIP  Insurance ID:  [id number]      - Medication is requiring a PA in order to fill more than 7 days at a time (patient can only get 7 days at a time per new opioid drug law)    Thanks,    [facility]  [address] [address]  Tel. [phone]  Fax [phone]

## 2019-03-27 NOTE — TELEPHONE ENCOUNTER
Pt called to see if an MRI has been ordered for her.  She knows Dr Garcia is out of town and hopes another doctor will cover for him and order the MRI.  Please call pt back to discuss.

## 2019-03-28 NOTE — TELEPHONE ENCOUNTER
Appointment made for 4/3 with Dr. Garcia. Will route to PCP to review/advise if appt is needed.

## 2019-03-28 NOTE — TELEPHONE ENCOUNTER
It appears that this patient has not been seen by Dr. Garcia since November 2018 at which time she was seen for a viral URI.  The message does not include why the patient is requesting an MRI nor does it appear to me that there is been any discussion with Dr. Garcia about an indication for an MRI.  Without having this information or knowing this patient I am unable to order an MRI at this point.  Less this is emergent I would suggest the patient make a follow-up appointment with Dr. Garcia upon his return to determine the indication for such.

## 2019-04-01 NOTE — TELEPHONE ENCOUNTER
Patient needs to be seen before I can order any advanced imaging.   She has appt later this week.   She has been seen by the [facility] mostly for her chronic back pain issues.

## 2019-04-01 NOTE — TELEPHONE ENCOUNTER
This PA request was submitted to the insurance by the Central Prior Authorization Team.  If you have any questions in regards to this request, we can be reached at [phone].     Thanks    PA Initiation    Medication: Tramadol 50mg  Insurance Company: [employer] - Phone [phone] Fax [phone]  Pharmacy Filling the Rx: [facility] [place], [place] - [address] [place] [address]  Filling Pharmacy Phone: [phone]  Filling Pharmacy Fax:    Start Date: 4/1/2019

## 2019-04-01 NOTE — TELEPHONE ENCOUNTER
Prior Authorization Approval    Authorization Effective Date: 12/30/2018  Authorization Expiration Date: 12/31/2019  Medication: Tramadol 50mg - APPROVED   Approved Dose/Quantity:   Reference #:     Insurance Company: [employer] [phone] Fax [phone]  Expected CoPay:       CoPay Card Available:      Foundation Assistance Needed:    Which Pharmacy is filling the prescription (Not needed for infusion/clinic administered): [facility] [place], [place] - [address] [place] [address]  Pharmacy Notified: Yes  Patient Notified: Yes

## 2019-04-03 ENCOUNTER — OFFICE VISIT (OUTPATIENT)
Dept: INTERNAL MEDICINE | Facility: CLINIC | Age: 59
End: 2019-04-03
Payer: MEDICARE

## 2019-04-03 ENCOUNTER — TELEPHONE (OUTPATIENT)
Dept: ENDOCRINOLOGY | Facility: CLINIC | Age: 59
End: 2019-04-03

## 2019-04-03 VITALS
WEIGHT: 167 LBS | DIASTOLIC BLOOD PRESSURE: 70 MMHG | OXYGEN SATURATION: 97 % | BODY MASS INDEX: 26.84 KG/M2 | HEIGHT: 66 IN | TEMPERATURE: 98 F | HEART RATE: 88 BPM | SYSTOLIC BLOOD PRESSURE: 110 MMHG

## 2019-04-03 DIAGNOSIS — Z79.4 LONG TERM (CURRENT) USE OF INSULIN (H): ICD-10-CM

## 2019-04-03 DIAGNOSIS — M43.16 SPONDYLOLISTHESIS OF LUMBAR REGION: ICD-10-CM

## 2019-04-03 DIAGNOSIS — M77.11 RIGHT LATERAL EPICONDYLITIS: ICD-10-CM

## 2019-04-03 DIAGNOSIS — E08.41 DIABETIC MONONEUROPATHY ASSOCIATED WITH DIABETES MELLITUS DUE TO UNDERLYING CONDITION (H): ICD-10-CM

## 2019-04-03 DIAGNOSIS — E78.5 HYPERLIPIDEMIA WITH TARGET LDL LESS THAN 100: ICD-10-CM

## 2019-04-03 DIAGNOSIS — I10 ESSENTIAL HYPERTENSION, BENIGN: ICD-10-CM

## 2019-04-03 DIAGNOSIS — E11.42 TYPE 2 DIABETES MELLITUS WITH DIABETIC POLYNEUROPATHY, WITH LONG-TERM CURRENT USE OF INSULIN (H): Primary | ICD-10-CM

## 2019-04-03 DIAGNOSIS — F17.200 TOBACCO USE DISORDER: ICD-10-CM

## 2019-04-03 DIAGNOSIS — G43.909 MIGRAINE WITHOUT STATUS MIGRAINOSUS, NOT INTRACTABLE, UNSPECIFIED MIGRAINE TYPE: ICD-10-CM

## 2019-04-03 DIAGNOSIS — Z13.89 SCREENING FOR DIABETIC PERIPHERAL NEUROPATHY: ICD-10-CM

## 2019-04-03 DIAGNOSIS — M47.817 FACET ARTHROPATHY, LUMBOSACRAL: ICD-10-CM

## 2019-04-03 DIAGNOSIS — Z11.59 NEED FOR HEPATITIS C SCREENING TEST: ICD-10-CM

## 2019-04-03 DIAGNOSIS — M54.50 CHRONIC BILATERAL LOW BACK PAIN WITHOUT SCIATICA: ICD-10-CM

## 2019-04-03 DIAGNOSIS — E03.9 HYPOTHYROIDISM, UNSPECIFIED TYPE: ICD-10-CM

## 2019-04-03 DIAGNOSIS — Z79.4 TYPE 2 DIABETES MELLITUS WITH DIABETIC POLYNEUROPATHY, WITH LONG-TERM CURRENT USE OF INSULIN (H): Primary | ICD-10-CM

## 2019-04-03 DIAGNOSIS — G89.29 CHRONIC BILATERAL LOW BACK PAIN WITHOUT SCIATICA: ICD-10-CM

## 2019-04-03 LAB
ANION GAP SERPL CALCULATED.3IONS-SCNC: 6 MMOL/L (ref 3–14)
BUN SERPL-MCNC: 18 MG/DL (ref 7–30)
CALCIUM SERPL-MCNC: 9.1 MG/DL (ref 8.5–10.1)
CHLORIDE SERPL-SCNC: 101 MMOL/L (ref 94–109)
CO2 SERPL-SCNC: 29 MMOL/L (ref 20–32)
CREAT SERPL-MCNC: 0.67 MG/DL (ref 0.52–1.04)
CREAT UR-MCNC: 54 MG/DL
GFR SERPL CREATININE-BSD FRML MDRD: >90 ML/MIN/{1.73_M2}
GLUCOSE SERPL-MCNC: 355 MG/DL (ref 70–99)
HBA1C MFR BLD: 12.6 % (ref 0–5.6)
MICROALBUMIN UR-MCNC: 10 MG/L
MICROALBUMIN/CREAT UR: 17.63 MG/G CR (ref 0–25)
POTASSIUM SERPL-SCNC: 4 MMOL/L (ref 3.4–5.3)
SODIUM SERPL-SCNC: 136 MMOL/L (ref 133–144)
TSH SERPL DL<=0.005 MIU/L-ACNC: 2.75 MU/L (ref 0.4–4)

## 2019-04-03 PROCEDURE — 82043 UR ALBUMIN QUANTITATIVE: CPT | Performed by: INTERNAL MEDICINE

## 2019-04-03 PROCEDURE — 99214 OFFICE O/P EST MOD 30 MIN: CPT | Performed by: INTERNAL MEDICINE

## 2019-04-03 PROCEDURE — 83036 HEMOGLOBIN GLYCOSYLATED A1C: CPT | Performed by: INTERNAL MEDICINE

## 2019-04-03 PROCEDURE — 84443 ASSAY THYROID STIM HORMONE: CPT | Performed by: INTERNAL MEDICINE

## 2019-04-03 PROCEDURE — G0472 HEP C SCREEN HIGH RISK/OTHER: HCPCS | Performed by: INTERNAL MEDICINE

## 2019-04-03 PROCEDURE — 36415 COLL VENOUS BLD VENIPUNCTURE: CPT | Performed by: INTERNAL MEDICINE

## 2019-04-03 PROCEDURE — 99207 C FOOT EXAM  NO CHARGE: CPT | Mod: 25 | Performed by: INTERNAL MEDICINE

## 2019-04-03 PROCEDURE — 80048 BASIC METABOLIC PNL TOTAL CA: CPT | Performed by: INTERNAL MEDICINE

## 2019-04-03 RX ORDER — GLUCOSAMINE HCL/CHONDROITIN SU 500-400 MG
CAPSULE ORAL
Qty: 100 EACH | Refills: 3 | Status: SHIPPED | OUTPATIENT
Start: 2019-04-03 | End: 2020-08-13

## 2019-04-03 RX ORDER — LANCETS
EACH MISCELLANEOUS
Qty: 200 EACH | Refills: 11 | Status: SHIPPED | OUTPATIENT
Start: 2019-04-03 | End: 2024-04-03 | Stop reason: ALTCHOICE

## 2019-04-03 RX ORDER — FLASH GLUCOSE SCANNING READER
1 EACH MISCELLANEOUS SEE ADMIN INSTRUCTIONS
Qty: 1 DEVICE | Refills: 0 | Status: SHIPPED | OUTPATIENT
Start: 2019-04-03 | End: 2019-11-07

## 2019-04-03 RX ORDER — FLASH GLUCOSE SENSOR
1 KIT MISCELLANEOUS
Qty: 6 EACH | Refills: 3 | Status: SHIPPED | OUTPATIENT
Start: 2019-04-03 | End: 2019-11-07

## 2019-04-03 ASSESSMENT — PATIENT HEALTH QUESTIONNAIRE - PHQ9: SUM OF ALL RESPONSES TO PHQ QUESTIONS 1-9: 4

## 2019-04-03 ASSESSMENT — ASTHMA QUESTIONNAIRES
QUESTION_3 LAST FOUR WEEKS HOW OFTEN DID YOUR ASTHMA SYMPTOMS (WHEEZING, COUGHING, SHORTNESS OF BREATH, CHEST TIGHTNESS OR PAIN) WAKE YOU UP AT NIGHT OR EARLIER THAN USUAL IN THE MORNING: NOT AT ALL
QUESTION_4 LAST FOUR WEEKS HOW OFTEN HAVE YOU USED YOUR RESCUE INHALER OR NEBULIZER MEDICATION (SUCH AS ALBUTEROL): NOT AT ALL
ACT_TOTALSCORE: 24
QUESTION_5 LAST FOUR WEEKS HOW WOULD YOU RATE YOUR ASTHMA CONTROL: WELL CONTROLLED
QUESTION_1 LAST FOUR WEEKS HOW MUCH OF THE TIME DID YOUR ASTHMA KEEP YOU FROM GETTING AS MUCH DONE AT WORK, SCHOOL OR AT HOME: NONE OF THE TIME
QUESTION_2 LAST FOUR WEEKS HOW OFTEN HAVE YOU HAD SHORTNESS OF BREATH: NOT AT ALL

## 2019-04-03 ASSESSMENT — MIFFLIN-ST. JEOR: SCORE: 1364.26
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
## 2019-04-03 NOTE — LETTER
[facility]  [address], [address]  [place] [address]  [phone]        April 4, 2019    Mary Lou Saleems  [address]  [facility] [address]            Dear Ms. Mary Lou Gil:      The results of your recent labs are enclosed.  Please make a office visit to go over the results.  If you have any further questions or problems, please contact our office.    Sincerely,        Dr. Susan Ramirez/lester    Results for orders placed or performed in visit on 04/03/19   Albumin Random Urine Quantitative with Creat Ratio   Result Value Ref Range    Creatinine Urine 54 mg/dL    Albumin Urine mg/L 10 mg/L    Albumin Urine mg/g Cr 17.63 0 - 25 mg/g Cr   Hemoglobin A1c   Result Value Ref Range    Hemoglobin A1C 12.6 (H) 0 - 5.6 %   **TSH with free T4 reflex FUTURE 2mo   Result Value Ref Range    TSH 2.75 0.40 - 4.00 mU/L   **Basic metabolic panel FUTURE 2mo   Result Value Ref Range    Sodium 136 133 - 144 mmol/L    Potassium 4.0 3.4 - 5.3 mmol/L    Chloride 101 94 - 109 mmol/L    Carbon Dioxide 29 20 - 32 mmol/L    Anion Gap 6 3 - 14 mmol/L    Glucose 355 (H) 70 - 99 mg/dL    Urea Nitrogen 18 7 - 30 mg/dL    Creatinine 0.67 0.52 - 1.04 mg/dL    GFR Estimate >90 >60 mL/min/[1.73_m2]    GFR Estimate If Black >90 >60 mL/min/[1.73_m2]    Calcium 9.1 8.5 - 10.1 mg/dL

## 2019-04-03 NOTE — TELEPHONE ENCOUNTER
Lab Results   Component Value Date    A1C 12.6 04/03/2019    A1C 8.9 06/26/2018    A1C 12.5 01/16/2018    A1C 12.4 09/01/2017    A1C 11.1 03/21/2017     Rising A1c  Last visit July 2018  Please asked patient to schedule clinic visit and bring glucometer to next visit

## 2019-04-03 NOTE — PATIENT INSTRUCTIONS
"*  MRI at [facility] , be sure that they give you a copy of the images on a disc to the specialists    *  Work hard on your diabetes. Reduce the intake of \"simple carbohydrates\" (e.g. White bread, white rice, pasta, noodles, potatoes, snack foods, regular soda, juices (except fresh squeezed), cakes, cookies, candy, etc.)     *  Rechecking labs today to see where the diabetes is at.     *  Please make an appointment to see Dr. Ramriez to follow up on your diabetes regardless of the lab results from today.      *  Please schedule PAP/Pelvic exam in the GYN clinic at your convenience.     *  Return to see me in 3 months.  Call [phone] to schedule this appointment.       LATERAL EPICONDYLITIS (TENNIS ELBOW):     * Identify and change any mechanical features that lead to the condition.  Avoid strain or overuse of the forearm muscles on the affected arm, this will lead to more pain and inflammation.     *  Forearm brace to upper forearm.  Available at most drug stores and sporting good stores.  Wear whenever active with your arms.  Apply tight enough  so it is snug, but not so tight that it restricts movement of the forearm or causes pain.          *  Ice to area as needed 10-15 minutes up to 4-5 times per day as needed, especially consider this after lots of use of the forearm muscles    *  Over the counter Ibuprofen, Motrin, or Aleve according to directions on the bottle.    take with food and beware of stomach and intestinal side effects.    *  If no better with these conservative therapies, then let me know, you may need to return for a steroid injection.       "

## 2019-04-04 LAB — HCV AB SERPL QL IA: NONREACTIVE

## 2019-04-04 ASSESSMENT — ASTHMA QUESTIONNAIRES: ACT_TOTALSCORE: 24

## 2019-04-17 ENCOUNTER — HOSPITAL ENCOUNTER (OUTPATIENT)
Dept: MRI IMAGING | Facility: CLINIC | Age: 59
Discharge: HOME | End: 2019-04-17
Attending: INTERNAL MEDICINE | Admitting: INTERNAL MEDICINE
Payer: MEDICARE

## 2019-04-17 DIAGNOSIS — M47.817 FACET ARTHROPATHY, LUMBOSACRAL: ICD-10-CM

## 2019-04-17 DIAGNOSIS — M43.16 SPONDYLOLISTHESIS OF LUMBAR REGION: ICD-10-CM

## 2019-04-17 DIAGNOSIS — M54.50 CHRONIC BILATERAL LOW BACK PAIN WITHOUT SCIATICA: ICD-10-CM

## 2019-04-17 DIAGNOSIS — G89.29 CHRONIC BILATERAL LOW BACK PAIN WITHOUT SCIATICA: ICD-10-CM

## 2019-04-17 PROCEDURE — 72148 MRI LUMBAR SPINE W/O DYE: CPT

## 2019-04-22 ENCOUNTER — TELEPHONE (OUTPATIENT)
Dept: INTERNAL MEDICINE | Facility: CLINIC | Age: 59
End: 2019-04-22

## 2019-04-22 DIAGNOSIS — T78.2XXD ANAPHYLACTIC REACTION, SUBSEQUENT ENCOUNTER: ICD-10-CM

## 2019-04-22 DIAGNOSIS — F17.200 TOBACCO USE DISORDER: ICD-10-CM

## 2019-04-22 DIAGNOSIS — E11.42 TYPE 2 DIABETES MELLITUS WITH DIABETIC POLYNEUROPATHY, WITH LONG-TERM CURRENT USE OF INSULIN (H): ICD-10-CM

## 2019-04-22 DIAGNOSIS — Z79.4 TYPE 2 DIABETES MELLITUS WITH DIABETIC POLYNEUROPATHY, WITH LONG-TERM CURRENT USE OF INSULIN (H): ICD-10-CM

## 2019-04-23 RX ORDER — EPINEPHRINE 0.3 MG/.3ML
0.3 INJECTION SUBCUTANEOUS
Qty: 0.6 ML | Refills: 3 | Status: SHIPPED | OUTPATIENT
Start: 2019-04-23 | End: 2021-05-11

## 2019-04-23 NOTE — TELEPHONE ENCOUNTER
"Prescription approved per [facility] Refill Protocol.      Requested Prescriptions   Pending Prescriptions Disp Refills     EPINEPHrine (EPIPEN/ADRENACLICK/OR ANY BX GENERIC EQUIV) 0.3 MG/0.3ML injection 2-pack 0.3 mL prn     Sig: Inject 0.3 mLs (0.3 mg) into the muscle once as needed       Anaphylaxis Kits Protocol Passed - 4/22/2019  1:31 PM        Passed - Recent (12 mo) or future (30 days) visit witin the authorizing provider's specialty     Patient had office visit in the last 12 months or has a visit in the next 30 days with authorizing provider or within the authorizing provider's specialty.  See \"Patient Info\" tab in inbasket, or \"Choose Columns\" in Meds & Orders section of the refill encounter.              Passed - Patient is age 5 or older        Passed - Medication is active on med list          "

## 2019-04-23 NOTE — TELEPHONE ENCOUNTER
"Dr. Garcia,     Pt is allergic to vinyl, yard spray, fertalizers.  Pt says that Dr. Casiano had prescribed her prednisone in the past If she had an Allergic reaction. She says that prednisone was considered part of her emergency kit for possible anaphylactic reaction.   For example: \"predniSONE (DELTASONE) 20 MG tablet--Take 1 tablet (20 mg) by mouth once as needed If has an Allergic reaction, dispense 10 tablets.\"    Informed pt that if she is having an allergic/Anaphylactic reaction, then she needs to be using her EpiPen right away. And taking oral dose of benadryl. Okay to take a 2nd dose of EpiPen 10-15mins later if still no improvement. Otherwise, at that point she should be calling 911, or going in to the ER  for evaluation of allergic reaction symptoms.   Informed her that prednisone is more for asthma symptoms, wheezing, and coughing.   Patient stated understanding, and agreed to plan of care. She is ok with not filling the prednisone, if that is what Dr. Garcia recommends.   Please advise, and call pt back at [phone].  "

## 2019-04-23 NOTE — TELEPHONE ENCOUNTER
"Requested Prescriptions   Pending Prescriptions Disp Refills     VENTOLIN  (90 Base) MCG/ACT inhaler [Pharmacy Med Name: VENTOLIN HFA 108MCG/ACT AERS] 18 g 11     Sig: INHALE 2 PUFFS INTO THE LUNGS EVERY 6 HOURS AS NEEDED FOR SHORTNESS OF BREATH, DIFFICULTY BREATHING OR WHEEZING.  Last Written Prescription Date:  03/14/18  Last Fill Quantity: 1inh,  # refills: 11   Last office visit: 4/3/2019 with prescribing provider:  04/03/19   Future Office Visit:  0  ACT Total Scores 3/22/2017 3/14/2018 4/3/2019   ACT TOTAL SCORE (Goal Greater than or Equal to 20) 24 23 24   In the past 12 months, how many times did you visit the emergency room for your asthma without being admitted to the hospital? - 0 0   In the past 12 months, how many times were you hospitalized overnight because of your asthma? - 0 0          Asthma Maintenance Inhalers - Anticholinergics Passed - 4/22/2019  1:26 PM        Passed - Patient is age 12 years or older        Passed - Asthma control assessment score within normal limits in last 6 months     Please review ACT score.           Passed - Medication is active on med list        Passed - Recent (6 mo) or future (30 days) visit within the authorizing provider's specialty     Patient had office visit in the last 6 months or has a visit in the next 30 days with authorizing provider or within the authorizing provider's specialty.  See \"Patient Info\" tab in inbasket, or \"Choose Columns\" in Meds & Orders section of the refill encounter.            "

## 2019-04-24 RX ORDER — ALBUTEROL SULFATE 90 UG/1
AEROSOL, METERED RESPIRATORY (INHALATION)
Qty: 18 G | Refills: 5 | Status: SHIPPED | OUTPATIENT
Start: 2019-04-24 | End: 2020-06-01

## 2019-04-24 NOTE — TELEPHONE ENCOUNTER
"Requested Prescriptions   Pending Prescriptions Disp Refills     insulin detemir (LEVEMIR FLEXPEN/FLEXTOUCH) 100 UNIT/ML pen 30 mL 1     Sig: Inject 30 Units Subcutaneous At Bedtime       Long Acting Insulin Protocol Passed - 4/22/2019  6:34 PM        Passed - Blood pressure less than 140/90 in past 6 months     BP Readings from Last 3 Encounters:   04/03/19 110/70   01/15/19 125/84   12/12/18 110/75                 Passed - LDL on file in past 12 months     Recent Labs   Lab Test 06/26/18  1101   LDL 69             Passed - Microalbumin on file in past 12 months     Recent Labs   Lab Test 04/03/19  1533   MICROL 10   UMALCR 17.63             Passed - Serum creatinine on file in past 12 months     Recent Labs   Lab Test 04/03/19  1532  08/26/16  1652   CR 0.67   < >  --    CREAT  --   --  0.8    < > = values in this interval not displayed.             Passed - HgbA1C in past 3 or 6 months     If HgbA1C is 8 or greater, it needs to be on file within the past 3 months.  If less than 8, must be on file within the past 6 months.     Recent Labs   Lab Test 04/03/19  1532   A1C 12.6*             Passed - Medication is active on med list        Passed - Patient is age 18 or older        Passed - Recent (6 mo) or future (30 days) visit within the authorizing provider's specialty     Patient had office visit in the last 6 months or has a visit in the next 30 days with authorizing provider or within the authorizing provider's specialty.  See \"Patient Info\" tab in inbasket, or \"Choose Columns\" in Meds & Orders section of the refill encounter.            Last Written Prescription Date:  7/11/2018  Last Fill Quantity: 30,  # refills: 1   Last office visit: 4/3/2019 with prescribing provider:  Xavi Garcia     Future Office Visit:      Prescription approved per [facility] Refill Protocol.    Trish REAL RN, BSN, PHN      "

## 2019-04-25 NOTE — TELEPHONE ENCOUNTER
Agreed.   She should use the epi pen for any acute anaphylactic allergic reaction.   Benadryl for mild allergic reaction.  I would not just have prednisone for unmonitored routine use.     Close encounter when done.

## 2019-04-26 ENCOUNTER — TELEPHONE (OUTPATIENT)
Dept: INTERNAL MEDICINE | Facility: CLINIC | Age: 59
End: 2019-04-26

## 2019-04-26 NOTE — TELEPHONE ENCOUNTER
Reason for Call:  Other call back    Detailed comments: pt call to request dr. Garcia call medicare provider line at [phone] and give the following codes: 30598 and 61078 for the appointments that pt has on 5/1/19 at [facility] in order for medicare to cover the visit for pt. Please call pt back when completed and if you have any questions. Thanks.    Phone Number Patient can be reached at: Home number on file [phone] (home)    Best Time: anytime    Can we leave a detailed message on this number? YES    Call taken on 4/26/2019 at 3:03 PM by THUAN MORA

## 2019-04-26 NOTE — TELEPHONE ENCOUNTER
Patient called to let us know that her visits are not covered and she does not need Dr. Garcia to do anything or make any phone calls. She says thank you and to disregard this encounter.

## 2019-04-26 NOTE — TELEPHONE ENCOUNTER
Please call that number on my behalf and give them the codes.   I have no idea what these codes mean.     Have [facility] help out if needed.     I am out of the clinic, not back until 5/6/19

## 2019-05-03 DIAGNOSIS — E11.42 TYPE 2 DIABETES MELLITUS WITH DIABETIC POLYNEUROPATHY, WITH LONG-TERM CURRENT USE OF INSULIN (H): ICD-10-CM

## 2019-05-03 DIAGNOSIS — Z79.4 TYPE 2 DIABETES MELLITUS WITH DIABETIC POLYNEUROPATHY, WITH LONG-TERM CURRENT USE OF INSULIN (H): ICD-10-CM

## 2019-05-03 NOTE — TELEPHONE ENCOUNTER
"Requested Prescriptions   Pending Prescriptions Disp Refills     metFORMIN (GLUCOPHAGE-XR) 500 MG 24 hr tablet [Pharmacy Med Name: METFORMIN HCL ER 500MG TB24] 60 tablet 0     Sig: TAKE 2 TABLETS BY MOUTH WITH DINNER   Last Written Prescription Date:  07/11/18  Last Fill Quantity: 60,  # refills: 0   Last office visit: 7/11/2018 with prescribing provider:  yes   Future Office Visit:   Next 5 appointments (look out 90 days)    Jul 01, 2019  2:30 PM CDT  Return Visit with Susan Ramirez MD  [facility] ([facility]) [address] [place] [address]  [address]  [facility] [address]  [phone]             Biguanide Agents Failed - 5/3/2019  2:29 PM        Failed - Recent (6 mo) or future (30 days) visit within the authorizing provider's specialty     Patient had office visit in the last 6 months or has a visit in the next 30 days with authorizing provider or within the authorizing provider's specialty.  See \"Patient Info\" tab in inbasket, or \"Choose Columns\" in Meds & Orders section of the refill encounter.            Passed - Blood pressure less than 140/90 in past 6 months     BP Readings from Last 3 Encounters:   04/03/19 110/70   01/15/19 125/84   12/12/18 110/75                 Passed - Patient has documented LDL within the past 12 mos.     Recent Labs   Lab Test 06/26/18  1101   LDL 69             Passed - Patient has had a Microalbumin in the past 15 mos.     Recent Labs   Lab Test 04/03/19  1533   MICROL 10   UMALCR 17.63             Passed - Patient is age 10 or older        Passed - Patient has documented A1c within the specified period of time.     If HgbA1C is 8 or greater, it needs to be on file within the past 3 months.  If less than 8, must be on file within the past 6 months.     Recent Labs   Lab Test 04/03/19  1532   A1C 12.6*             Passed - Patient's CR is NOT>1.4 OR Patient's EGFR is NOT<45 within past 12 mos.     Recent Labs   Lab Test 04/03/19  1532   GFRESTIMATED " >90   GFRESTBLACK >90       Recent Labs   Lab Test 04/03/19  1532   CR 0.67             Passed - Patient does NOT have a diagnosis of CHF.        Passed - Medication is active on med list        Passed - Patient is not pregnant        Passed - Patient has not had a positive pregnancy test within the past 12 mos.

## 2019-05-06 RX ORDER — METFORMIN HCL 500 MG
TABLET, EXTENDED RELEASE 24 HR ORAL
Qty: 60 TABLET | Refills: 2 | Status: SHIPPED | OUTPATIENT
Start: 2019-05-06 | End: 2019-08-03

## 2019-05-09 ENCOUNTER — TRANSFERRED RECORDS (OUTPATIENT)
Dept: HEALTH INFORMATION MANAGEMENT | Facility: CLINIC | Age: 59
End: 2019-05-09

## 2019-05-24 DIAGNOSIS — Z79.4 TYPE 2 DIABETES MELLITUS WITH DIABETIC POLYNEUROPATHY, WITH LONG-TERM CURRENT USE OF INSULIN (H): ICD-10-CM

## 2019-05-24 DIAGNOSIS — E11.42 TYPE 2 DIABETES MELLITUS WITH DIABETIC POLYNEUROPATHY, WITH LONG-TERM CURRENT USE OF INSULIN (H): ICD-10-CM

## 2019-05-28 RX ORDER — GLIPIZIDE 10 MG/1
TABLET, FILM COATED, EXTENDED RELEASE ORAL
Qty: 30 TABLET | Refills: 0 | Status: SHIPPED | OUTPATIENT
Start: 2019-05-28 | End: 2019-07-07

## 2019-05-28 NOTE — TELEPHONE ENCOUNTER
Next 5 appointments (look out 90 days)    Jul 01, 2019  2:30 PM CDT  Return Visit with Susan Ramirez MD  [facility] [place] ([facility]) [address] [place] [id number] [phone]        Medication is being filled for 1 time refill only due to:  Patient needs to be seen because due for appt. Future appt scheduled..

## 2019-05-28 NOTE — TELEPHONE ENCOUNTER
"Requested Prescriptions   Pending Prescriptions Disp Refills     glipiZIDE (GLUCOTROL XL) 10 MG 24 hr tablet [Pharmacy Med Name: GLIPIZIDE ER 10MG TB24] 30 tablet 0     Sig: TAKE ONE TABLET BY MOUTH ONCE DAILY   Last Written Prescription Date:  07/11/18  Last Fill Quantity: 30,  # refills: 6   Last office visit: 7/11/2018 with prescribing provider:  yes   Future Office Visit:   Next 5 appointments (look out 90 days)    Jul 01, 2019  2:30 PM CDT  [facility]  [facility] [address]  [address]  [facility] [address]  [phone]             Sulfonylurea Agents Failed - 5/24/2019  2:05 PM        Failed - Recent (6 mo) or future (30 days) visit within the authorizing provider's specialty     Patient had office visit in the last 6 months or has a visit in the next 30 days with authorizing provider or within the authorizing provider's specialty.  See \"Patient Info\" tab in inbasket, or \"Choose Columns\" in Meds & Orders section of the refill encounter.            Passed - Blood pressure less than 140/90 in past 6 months     BP Readings from Last 3 Encounters:   04/03/19 110/70   01/15/19 125/84   12/12/18 110/75                 Passed - Patient has documented LDL within the past 12 mos.     Recent Labs   Lab Test 06/26/18  1101   LDL 69             Passed - Patient has had a Microalbumin in the past 15 mos.     Recent Labs   Lab Test 04/03/19  1533   MICROL 10   UMALCR 17.63             Passed - Patient has documented A1c within the specified period of time.     If HgbA1C is 8 or greater, it needs to be on file within the past 3 months.  If less than 8, must be on file within the past 6 months.     Recent Labs   Lab Test 04/03/19  1532   A1C 12.6*             Passed - Medication is active on med list        Passed - Patient is age 18 or older        Passed - No active pregnancy on record        Passed - Patient has a recent creatinine " (normal) within the past 12 mos.     Recent Labs   Lab Test 04/03/19  1532  08/26/16  1652   CR 0.67   < >  --    CREAT  --   --  0.8    < > = values in this interval not displayed.             Passed - Patient has not had a positive pregnancy test within the past 12 mos.

## 2019-06-13 DIAGNOSIS — G43.909 MIGRAINE WITHOUT STATUS MIGRAINOSUS, NOT INTRACTABLE, UNSPECIFIED MIGRAINE TYPE: ICD-10-CM

## 2019-06-13 DIAGNOSIS — E03.9 HYPOTHYROIDISM, UNSPECIFIED TYPE: ICD-10-CM

## 2019-06-13 DIAGNOSIS — E78.5 HYPERLIPIDEMIA WITH TARGET LDL LESS THAN 100: ICD-10-CM

## 2019-06-13 DIAGNOSIS — F33.0 MAJOR DEPRESSIVE DISORDER, RECURRENT EPISODE, MILD (H): ICD-10-CM

## 2019-06-15 NOTE — TELEPHONE ENCOUNTER
"Requested Prescriptions   Pending Prescriptions Disp Refills     levothyroxine (SYNTHROID/LEVOTHROID) 150 MCG tablet [Pharmacy Med Name: LEVOTHYROXINE SODIUM 150MCG TABS]  Last Written Prescription Date:  02/11/2019  Last Fill Quantity: 30,  # refills: 04   Last Office Visit: 4/3/2019   Future Office Visit:    Next 5 appointments (look out 90 days)    Jul 01, 2019  2:30 PM CDT  Return Visit with Susan Ramirez MD  [facility] ([facility]) [address] [place] [address]  [address]  [facility] [address]  [phone]          90 tablet 0     Sig: TAKE ONE TABLET BY MOUTH EVERY MORNING FIRST THING IN THE MORNING ON AN EMPTY STOMACH. NO FOOD FOR 1 HOUR       Thyroid Protocol Passed - 6/13/2019  3:11 PM        Passed - Patient is 12 years or older        Passed - Recent (12 mo) or future (30 days) visit within the authorizing provider's specialty     Patient had office visit in the last 12 months or has a visit in the next 30 days with authorizing provider or within the authorizing provider's specialty.  See \"Patient Info\" tab in inbasket, or \"Choose Columns\" in Meds & Orders section of the refill encounter.              Passed - Medication is active on med list        Passed - Normal TSH on file in past 12 months     Recent Labs   Lab Test 04/03/19  1532   TSH 2.75              Passed - No active pregnancy on record     If patient is pregnant or has had a positive pregnancy test, please check TSH.          Passed - No positive pregnancy test in past 12 months     If patient is pregnant or has had a positive pregnancy test, please check TSH.          citalopram (CELEXA) 40 MG tablet [Pharmacy Med Name: CITALOPRAM HYDROBROMIDE 40MG TABS]  Last Written Prescription Date:  03/20/2019  Last Fill Quantity: 90,  # refills: 0   Last Office Visit: 4/3/2019   Future Office Visit:    Next 5 appointments (look out 90 days)    Jul 01, 2019  2:30 PM CDT  Return Visit with Susan Ramirez MD  [facility] " "[facility] ([facility]) [address] [place] [address]  [address]  [place] [address]  [phone]          90 tablet 0     Sig: TAKE ONE TABLET BY MOUTH EVERY DAY TO CONTROL SYMPTOMS OF DEPRESSION       SSRIs Protocol Passed - 6/13/2019  3:11 PM        Passed - PHQ-9 score less than 5 in past 6 months     Please review last PHQ-9 score.           Passed - Medication is active on med list        Passed - Patient is age 18 or older        Passed - No active pregnancy on record        Passed - No positive pregnancy test in last 12 months        Passed - Recent (6 mo) or future (30 days) visit within the authorizing provider's specialty     Patient had office visit in the last 6 months or has a visit in the next 30 days with authorizing provider or within the authorizing provider's specialty.  See \"Patient Info\" tab in inbasket, or \"Choose Columns\" in Meds & Orders section of the refill encounter.            verapamil ER (CALAN-SR) 240 MG CR tablet [Pharmacy Med Name: VERAPAMIL HCL ER 240MG TBCR]  Last Written Prescription Date:  11/13/2018  Last Fill Quantity: 90,  # refills: 01   Last Office Visit: 4/3/2019   Future Office Visit:    Next 5 appointments (look out 90 days)    Jul 01, 2019  2:30 PM CDT  Return Visit with Susan Ramirez MD  [facility] ([facility]) [address] [place] [address]  [address]  [facility] [address]  [phone]          90 tablet 1     Sig: TAKE ONE TABLET BY MOUTH EVERY NIGHT AT BEDTIME       Calcium Channel Blockers Protocol  Passed - 6/13/2019  3:11 PM        Passed - Blood pressure under 140/90 in past 12 months     BP Readings from Last 3 Encounters:   04/03/19 110/70   01/15/19 125/84   12/12/18 110/75                 Passed - Normal ALT in past 12 months     Recent Labs   Lab Test 06/26/18  1101   ALT 35             Passed - Recent (12 mo) or future (30 days) visit within the authorizing provider's specialty     Patient had office visit " "in the last 12 months or has a visit in the next 30 days with authorizing provider or within the authorizing provider's specialty.  See \"Patient Info\" tab in inbasket, or \"Choose Columns\" in Meds & Orders section of the refill encounter.              Passed - Medication is active on med list        Passed - Patient is age 18 or older        Passed - No active pregnancy on record        Passed - Normal serum creatinine on file in past 12 months     Recent Labs   Lab Test 04/03/19  1532  08/26/16  1652   CR 0.67   < >  --    CREAT  --   --  0.8    < > = values in this interval not displayed.             Passed - No positive pregnancy test in past 12 months        pravastatin (PRAVACHOL) 80 MG tablet [Pharmacy Med Name: PRAVASTATIN SODIUM 80MG TABS]  Last Written Prescription Date:  02/11/2019  Last Fill Quantity: 30,  # refills: 04   Last Office Visit: 4/3/2019   Future Office Visit:    Next 5 appointments (look out 90 days)    Jul 01, 2019  2:30 PM CDT  Return Visit with Susan Ramirez MD  [facility] ([facility]) [address] [place] [address] [address]  [facility] [address]  [phone]          90 tablet 0     Sig: TAKE ONE TABLET BY MOUTH ONCE DAILY TO LOWER CHOLESTEROL AND PREVENT HEART DISEASE       Statins Protocol Passed - 6/13/2019  3:11 PM        Passed - LDL on file in past 12 months     Recent Labs   Lab Test 06/26/18  1101   LDL 69             Passed - No abnormal creatine kinase in past 12 months     No lab results found.             Passed - Recent (12 mo) or future (30 days) visit within the authorizing provider's specialty     Patient had office visit in the last 12 months or has a visit in the next 30 days with authorizing provider or within the authorizing provider's specialty.  See \"Patient Info\" tab in inbasket, or \"Choose Columns\" in Meds & Orders section of the refill encounter.              Passed - Medication is active on med list        Passed - Patient is age " 18 or older        Passed - No active pregnancy on record        Passed - No positive pregnancy test in past 12 months

## 2019-06-17 RX ORDER — PRAVASTATIN SODIUM 80 MG/1
TABLET ORAL
Qty: 90 TABLET | Refills: 0 | Status: SHIPPED | OUTPATIENT
Start: 2019-06-17 | End: 2019-09-10

## 2019-06-17 RX ORDER — LEVOTHYROXINE SODIUM 150 UG/1
TABLET ORAL
Qty: 90 TABLET | Refills: 0 | Status: SHIPPED | OUTPATIENT
Start: 2019-06-17 | End: 2019-09-10

## 2019-06-17 RX ORDER — VERAPAMIL HYDROCHLORIDE 240 MG/1
TABLET, FILM COATED, EXTENDED RELEASE ORAL
Qty: 90 TABLET | Refills: 1 | Status: SHIPPED | OUTPATIENT
Start: 2019-06-17 | End: 2019-12-09

## 2019-06-17 RX ORDER — CITALOPRAM HYDROBROMIDE 40 MG/1
TABLET ORAL
Qty: 90 TABLET | Refills: 0 | Status: SHIPPED | OUTPATIENT
Start: 2019-06-17 | End: 2019-09-10

## 2019-06-17 NOTE — TELEPHONE ENCOUNTER
Prescription approved per [facility] Refill Protocol.    Trish REAL RN, BSN, PHN

## 2019-07-07 DIAGNOSIS — Z79.4 TYPE 2 DIABETES MELLITUS WITH DIABETIC POLYNEUROPATHY, WITH LONG-TERM CURRENT USE OF INSULIN (H): ICD-10-CM

## 2019-07-07 DIAGNOSIS — E11.42 TYPE 2 DIABETES MELLITUS WITH DIABETIC POLYNEUROPATHY, WITH LONG-TERM CURRENT USE OF INSULIN (H): ICD-10-CM

## 2019-07-08 RX ORDER — GLIPIZIDE 10 MG/1
TABLET, FILM COATED, EXTENDED RELEASE ORAL
Qty: 30 TABLET | Refills: 1 | Status: SHIPPED | OUTPATIENT
Start: 2019-07-08 | End: 2019-08-27

## 2019-07-08 NOTE — TELEPHONE ENCOUNTER
"Requested Prescriptions   Pending Prescriptions Disp Refills     glipiZIDE (GLUCOTROL XL) 10 MG 24 hr tablet [Pharmacy Med Name: GLIPIZIDE ER 10MG TB24] 30 tablet 0     Sig: TAKE ONE TABLET BY MOUTH ONCE DAILY   Last Written Prescription Date:  05/28/19  Last Fill Quantity: 30,  # refills: 0   Last office visit: 7/11/2018 with prescribing provider:     Future Office Visit:   Next 5 appointments (look out 90 days)    Aug 06, 2019  3:00 PM CDT  Return Visit with Susan Ramirez MD  [facility] ([facility]) [address]  [address]  [facility] [address]  [phone]             Sulfonylurea Agents Failed - 7/7/2019  5:02 AM        Failed - Patient has documented LDL within the past 12 mos.     Recent Labs   Lab Test 06/26/18  1101   LDL 69             Failed - Patient has documented A1c within the specified period of time.     If HgbA1C is 8 or greater, it needs to be on file within the past 3 months.  If less than 8, must be on file within the past 6 months.     Recent Labs   Lab Test 04/03/19  1532   A1C 12.6*             Failed - Recent (6 mo) or future (30 days) visit within the authorizing provider's specialty     Patient had office visit in the last 6 months or has a visit in the next 30 days with authorizing provider or within the authorizing provider's specialty.  See \"Patient Info\" tab in inbasket, or \"Choose Columns\" in Meds & Orders section of the refill encounter.            Passed - Blood pressure less than 140/90 in past 6 months     BP Readings from Last 3 Encounters:   04/03/19 110/70   01/15/19 125/84   12/12/18 110/75                 Passed - Patient has had a Microalbumin in the past 15 mos.     Recent Labs   Lab Test 04/03/19  1533   MICROL 10   UMALCR 17.63             Passed - Medication is active on med list        Passed - Patient is age 18 or older        Passed - No active pregnancy on record        Passed - Patient has a recent creatinine (normal) " within the past 12 mos.     Recent Labs   Lab Test 04/03/19  1532  08/26/16  1652   CR 0.67   < >  --    CREAT  --   --  0.8    < > = values in this interval not displayed.             Passed - Patient has not had a positive pregnancy test within the past 12 mos.

## 2019-07-08 NOTE — TELEPHONE ENCOUNTER
Limited Rx sent.    Lab Results   Component Value Date    A1C 12.6 04/03/2019    A1C 8.9 06/26/2018    A1C 12.5 01/16/2018    A1C 12.4 09/01/2017    A1C 11.1 03/21/2017     Last clinic visit 2018?  Due for labs and follow-up  Needs  clinic visit before further refills  Please inform patient.

## 2019-07-25 DIAGNOSIS — M25.552 HIP PAIN, LEFT: ICD-10-CM

## 2019-07-25 DIAGNOSIS — M51.369 DEGENERATION OF LUMBAR INTERVERTEBRAL DISC: ICD-10-CM

## 2019-07-25 DIAGNOSIS — R53.81 PHYSICAL DECONDITIONING: ICD-10-CM

## 2019-07-25 DIAGNOSIS — G89.29 OTHER CHRONIC PAIN: ICD-10-CM

## 2019-07-25 DIAGNOSIS — M53.3 SI (SACROILIAC) JOINT DYSFUNCTION: ICD-10-CM

## 2019-07-25 NOTE — TELEPHONE ENCOUNTER
"Requested Prescriptions   Pending Prescriptions Disp Refills     ibuprofen (ADVIL/MOTRIN) 800 MG tablet [Pharmacy Med Name: IBUPROFEN 800MG TABS] 90 tablet 2     Sig: TAKE ONE TABLET BY MOUTH EVERY 8 HOURS AS NEEDED FOR MODERATE PAIN       NSAID Medications Failed - 7/25/2019 12:17 PM        Failed - Normal ALT on file in past 12 months     Recent Labs   Lab Test 06/26/18  1101   ALT 35             Failed - Normal AST on file in past 12 months     Recent Labs   Lab Test 06/26/18  1101   AST 25             Failed - Normal CBC on file in past 12 months     Recent Labs   Lab Test 05/21/18  2107   WBC 10.6   RBC 5.02   HGB 15.2   HCT 44.1                    Passed - Blood pressure under 140/90 in past 12 months     BP Readings from Last 3 Encounters:   04/03/19 110/70   01/15/19 125/84   12/12/18 110/75                 Passed - Recent (12 mo) or future (30 days) visit within the authorizing provider's specialty     Patient had office visit in the last 12 months or has a visit in the next 30 days with authorizing provider or within the authorizing provider's specialty.  See \"Patient Info\" tab in inbasket, or \"Choose Columns\" in Meds & Orders section of the refill encounter.              Passed - Patient is age 6-64 years        Passed - Medication is active on med list        Passed - No active pregnancy on record        Passed - Normal serum creatinine on file in past 12 months     Recent Labs   Lab Test 04/03/19  1532  08/26/16  1652   CR 0.67   < >  --    CREAT  --   --  0.8    < > = values in this interval not displayed.             Passed - No positive pregnancy test in past 12 months        Last Written Prescription Date:  6/28/18  Last Fill Quantity: 90,  # refills: 2   Last office visit: 4/3/2019 with prescribing provider:  4/3/2019   Future Office Visit:   Next 5 appointments (look out 90 days)    Aug 06, 2019  3:00 PM CDT  Return Visit with Susan Ramirez MD  [facility] [place] ([facility] [place]) [address]  [address]  [place] [address] [phone]

## 2019-07-26 DIAGNOSIS — Z79.4 TYPE 2 DIABETES MELLITUS WITH DIABETIC POLYNEUROPATHY, WITH LONG-TERM CURRENT USE OF INSULIN (H): ICD-10-CM

## 2019-07-26 DIAGNOSIS — E11.42 TYPE 2 DIABETES MELLITUS WITH DIABETIC POLYNEUROPATHY, WITH LONG-TERM CURRENT USE OF INSULIN (H): ICD-10-CM

## 2019-07-26 RX ORDER — IBUPROFEN 800 MG/1
TABLET, FILM COATED ORAL
Qty: 90 TABLET | Refills: 2 | Status: SHIPPED | OUTPATIENT
Start: 2019-07-26 | End: 2019-12-09

## 2019-07-26 NOTE — TELEPHONE ENCOUNTER
Tramadol      Last Written Prescription Date:  3/25/2019  Last Fill Quantity: 30,   # refills: 0  Last Office Visit: 4/03/2019  Future Office visit:    Next 5 appointments (look out 90 days)    Aug 06, 2019  3:00 PM CDT  Return Visit with Susan Ramirez MD  [facility] ([facility]) [address] [place] [address]  [address] [place] [id number] [phone]           Routing refill request to provider for review/approval because:  Drug not on the FM[facility], UMP or  Health refill protocol or controlled substance      RX [place]) reviewed:  reviewed- no concerns    [place]:  [email or web address]    Last Filled:  4/3/2019, #[id number]-svihel  3/27/2019, #14-svihel  10/17/2018, #30- PCP    Trish REAL RN, BSN, PHN

## 2019-07-27 NOTE — TELEPHONE ENCOUNTER
"Requested Prescriptions   Pending Prescriptions Disp Refills     insulin detemir (LEVEMIR FLEXPEN/FLEXTOUCH) 100 UNIT/ML pen  Last Written Prescription Date:  04/24/2019  Last Fill Quantity: 30 mL,  # refills: 0   Last Office Visit: 11/01/2018 Xavi Garcia MD  [facility]  Future Office Visit:    Next 5 appointments (look out 90 days)    Aug 06, 2019  3:00 PM CDT  Return Visit with Susan Ramirez MD  [facility] ([facility]) [address] [place] [address]  [address]  [facility] [address]  [phone]          30 mL 0     Sig: Inject 30 Units Subcutaneous At Bedtime       Long Acting Insulin Protocol Failed - 7/26/2019  7:36 PM        Failed - LDL on file in past 12 months     Recent Labs   Lab Test 06/26/18  1101   LDL 69             Failed - HgbA1C in past 3 or 6 months     If HgbA1C is 8 or greater, it needs to be on file within the past 3 months.  If less than 8, must be on file within the past 6 months.     Recent Labs   Lab Test 04/03/19  1532   A1C 12.6*             Passed - Blood pressure less than 140/90 in past 6 months     BP Readings from Last 3 Encounters:   04/03/19 110/70   01/15/19 125/84   12/12/18 110/75                 Passed - Microalbumin on file in past 12 months     Recent Labs   Lab Test 04/03/19  1533   MICROL 10   UMALCR 17.63             Passed - Serum creatinine on file in past 12 months     Recent Labs   Lab Test 04/03/19  1532  08/26/16  1652   CR 0.67   < >  --    CREAT  --   --  0.8    < > = values in this interval not displayed.             Passed - Medication is active on med list        Passed - Patient is age 18 or older        Passed - Recent (6 mo) or future (30 days) visit within the authorizing provider's specialty     Patient had office visit in the last 6 months or has a visit in the next 30 days with authorizing provider or within the authorizing provider's specialty.  See \"Patient Info\" tab in inbasket, or \"Choose Columns\" in Meds " & Orders section of the refill encounter.

## 2019-07-29 RX ORDER — TRAMADOL HYDROCHLORIDE 50 MG/1
50 TABLET ORAL 2 TIMES DAILY PRN
Qty: 20 TABLET | Refills: 0 | Status: SHIPPED | OUTPATIENT
Start: 2019-07-29 | End: 2021-10-05

## 2019-07-29 NOTE — TELEPHONE ENCOUNTER
Refill 1 month until seen at appointment below.  Thank you. Lou Greene R.N.    Next 5 appointments (look out 90 days)    Aug 06, 2019  3:00 PM CDT  Return Visit with Susan Ramirez MD  [facility] ([facility]) [address] [place] [address] [phone]

## 2019-07-29 NOTE — TELEPHONE ENCOUNTER
Hand signed RX faxed to the specific pharmacy by myself.     LAST REFILL WITHOUT AN APPOINTMENT     Her chronic neuropathic pain is made worse by her refusal to aggressively manage her diabetes.

## 2019-08-03 DIAGNOSIS — Z79.4 TYPE 2 DIABETES MELLITUS WITH DIABETIC POLYNEUROPATHY, WITH LONG-TERM CURRENT USE OF INSULIN (H): ICD-10-CM

## 2019-08-03 DIAGNOSIS — E11.42 TYPE 2 DIABETES MELLITUS WITH DIABETIC POLYNEUROPATHY, WITH LONG-TERM CURRENT USE OF INSULIN (H): ICD-10-CM

## 2019-08-05 NOTE — TELEPHONE ENCOUNTER
Requested Prescriptions   Pending Prescriptions Disp Refills     metFORMIN (GLUCOPHAGE-XR) 500 MG 24 hr tablet [Pharmacy Med Name: METFORMIN HCL ER 500MG TB24] 60 tablet 2     Sig: TAKE 2 TABLETS BY MOUTH WITH DINNER   Last Written Prescription Date:  05/06/19  Last Fill Quantity: 60,  # refills: 2   Last office visit: 7/11/2018 with prescribing provider:  yes   Future Office Visit:   Next 5 appointments (look out 90 days)    Aug 06, 2019  3:00 PM CDT  Return Visit with Susan Ramirez MD  [facility] ([facility]) [address] [place] [address]  [address]  [facility] [address]  [phone]             Biguanide Agents Failed - 8/3/2019  5:03 AM        Failed - Patient has documented LDL within the past 12 mos.     Recent Labs   Lab Test 06/26/18  1101   LDL 69             Failed - Patient has documented A1c within the specified period of time.     If HgbA1C is 8 or greater, it needs to be on file within the past 3 months.  If less than 8, must be on file within the past 6 months.     Recent Labs   Lab Test 04/03/19  1532   A1C 12.6*             Passed - Blood pressure less than 140/90 in past 6 months     BP Readings from Last 3 Encounters:   04/03/19 110/70   01/15/19 125/84   12/12/18 110/75                 Passed - Patient has had a Microalbumin in the past 15 mos.     Recent Labs   Lab Test 04/03/19  1533   MICROL 10   UMALCR 17.63             Passed - Patient is age 10 or older        Passed - Patient's CR is NOT>1.4 OR Patient's EGFR is NOT<45 within past 12 mos.     Recent Labs   Lab Test 04/03/19  1532   GFRESTIMATED >90   GFRESTBLACK >90       Recent Labs   Lab Test 04/03/19  1532   CR 0.67             Passed - Patient does NOT have a diagnosis of CHF.        Passed - Medication is active on med list        Passed - Patient is not pregnant        Passed - Patient has not had a positive pregnancy test within the past 12 mos.         Passed - Recent (6 mo) or future (30 days)  "visit within the authorizing provider's specialty     Patient had office visit in the last 6 months or has a visit in the next 30 days with authorizing provider or within the authorizing provider's specialty.  See \"Patient Info\" tab in inbasket, or \"Choose Columns\" in Meds & Orders section of the refill encounter.              "

## 2019-08-06 DIAGNOSIS — E11.22 TYPE 2 DIABETES MELLITUS WITH DIABETIC CHRONIC KIDNEY DISEASE (H): Primary | ICD-10-CM

## 2019-08-06 DIAGNOSIS — E11.22 TYPE 2 DIABETES MELLITUS WITH DIABETIC CHRONIC KIDNEY DISEASE (H): ICD-10-CM

## 2019-08-06 LAB — HBA1C MFR BLD: 11 % (ref 0–5.6)

## 2019-08-06 PROCEDURE — 83036 HEMOGLOBIN GLYCOSYLATED A1C: CPT | Performed by: INTERNAL MEDICINE

## 2019-08-06 PROCEDURE — 36415 COLL VENOUS BLD VENIPUNCTURE: CPT | Performed by: INTERNAL MEDICINE

## 2019-08-06 NOTE — TELEPHONE ENCOUNTER
Routing refill request to provider for review/approval because:  Blood pressures out of range  Next 5 appointments (look out 90 days)    Aug 06, 2019  3:00 PM CDT  Return Visit with Susan Ramirez MD  [facility] ([facility]) [address] [place] [address] [phone]

## 2019-08-08 RX ORDER — METFORMIN HCL 500 MG
TABLET, EXTENDED RELEASE 24 HR ORAL
Qty: 60 TABLET | Refills: 2 | Status: SHIPPED | OUTPATIENT
Start: 2019-08-08 | End: 2019-11-01

## 2019-08-27 DIAGNOSIS — Z79.4 TYPE 2 DIABETES MELLITUS WITH DIABETIC POLYNEUROPATHY, WITH LONG-TERM CURRENT USE OF INSULIN (H): ICD-10-CM

## 2019-08-27 DIAGNOSIS — E11.42 TYPE 2 DIABETES MELLITUS WITH DIABETIC POLYNEUROPATHY, WITH LONG-TERM CURRENT USE OF INSULIN (H): ICD-10-CM

## 2019-08-27 NOTE — TELEPHONE ENCOUNTER
"Requested Prescriptions   Pending Prescriptions Disp Refills     glipiZIDE (GLUCOTROL XL) 10 MG 24 hr tablet [Pharmacy Med Name: GLIPIZIDE ER 10MG TB24] 30 tablet 1     Sig: TAKE ONE TABLET BY MOUTH ONCE DAILY   Last Written Prescription Date:  07/08/19  Last Fill Quantity: 30,  # refills: 1   Last office visit: 7/11/2018 with prescribing provider:  yes   Future Office Visit:   Next 5 appointments (look out 90 days)    Nov 07, 2019  1:30 PM CST  Return Visit with Susan Ramirez MD  [facility]) [address]  [facility] [address]  [phone]             Sulfonylurea Agents Failed - 8/27/2019  5:02 AM        Failed - Patient has documented LDL within the past 12 mos.     Recent Labs   Lab Test 06/26/18  1101   LDL 69             Failed - Recent (6 mo) or future (30 days) visit within the authorizing provider's specialty     Patient had office visit in the last 6 months or has a visit in the next 30 days with authorizing provider or within the authorizing provider's specialty.  See \"Patient Info\" tab in inbasket, or \"Choose Columns\" in Meds & Orders section of the refill encounter.            Passed - Blood pressure less than 140/90 in past 6 months     BP Readings from Last 3 Encounters:   04/03/19 110/70   01/15/19 125/84   12/12/18 110/75                 Passed - Patient has had a Microalbumin in the past 15 mos.     Recent Labs   Lab Test 04/03/19  1533   MICROL 10   UMALCR 17.63             Passed - Patient has documented A1c within the specified period of time.     If HgbA1C is 8 or greater, it needs to be on file within the past 3 months.  If less than 8, must be on file within the past 6 months.     Recent Labs   Lab Test 08/06/19  1513   A1C 11.0*             Passed - Medication is active on med list        Passed - Patient is age 18 or older        Passed - No active pregnancy on record        Passed - Patient has a recent creatinine " (normal) within the past 12 mos.     Recent Labs   Lab Test 04/03/19  1532  08/26/16  1652   CR 0.67   < >  --    CREAT  --   --  0.8    < > = values in this interval not displayed.             Passed - Patient has not had a positive pregnancy test within the past 12 mos.

## 2019-08-28 RX ORDER — GLIPIZIDE 10 MG/1
TABLET, FILM COATED, EXTENDED RELEASE ORAL
Qty: 30 TABLET | Refills: 1 | Status: SHIPPED | OUTPATIENT
Start: 2019-08-28 | End: 2019-11-01

## 2019-08-28 NOTE — TELEPHONE ENCOUNTER
Routing refill request to provider for review/approval because:  Audelia given x1 and patient no showed to appointment 8/6/19 but rescheduled 11/7/19

## 2019-08-28 NOTE — TELEPHONE ENCOUNTER
Last visit:    Limited amount of Rx sent. No refills.    I am sorry but I will not be able to refill further prescription at this time until Mary Lou makes clinic visit appointment.  Please inform patient.    Please help schedule labs and clinic appointment if needed.    Let me know if you have any questions.    Thank you.      Susan Ramirez MD        Lab Results   Component Value Date    A1C 11.0 08/06/2019    A1C 12.6 04/03/2019    A1C 8.9 06/26/2018    A1C 12.5 01/16/2018    A1C 12.4 09/01/2017         TSH   Date Value Ref Range Status   04/03/2019 2.75 0.40 - 4.00 mU/L Final

## 2019-08-28 NOTE — TELEPHONE ENCOUNTER
Patient has appointment scheduled for 11/7/19. Please place future orders for requesting labs.     Thank you.

## 2019-08-29 DIAGNOSIS — E11.42 TYPE 2 DIABETES MELLITUS WITH DIABETIC POLYNEUROPATHY, WITH LONG-TERM CURRENT USE OF INSULIN (H): ICD-10-CM

## 2019-08-29 DIAGNOSIS — Z79.4 TYPE 2 DIABETES MELLITUS WITH DIABETIC POLYNEUROPATHY, WITH LONG-TERM CURRENT USE OF INSULIN (H): ICD-10-CM

## 2019-08-29 NOTE — TELEPHONE ENCOUNTER
"Requested Prescriptions   Pending Prescriptions Disp Refills     insulin detemir (LEVEMIR FLEXPEN/FLEXTOUCH) 100 UNIT/ML pen    Last Written Prescription Date:  7/29/2019  Last Fill Quantity: 9 ml,  # refills: 0   Last office visit: 4/3/2019 with prescribing provider:  Xavi Garica     Future Office Visit:   Next 5 appointments (look out 90 days)    Nov 07, 2019  1:30 PM CST  Return Visit with Susan Ramirez MD  [facility] [address]  [facility] [address] [phone]          9 mL 0     Sig: Inject 30 Units Subcutaneous At Bedtime       Long Acting Insulin Protocol Failed - 8/29/2019  9:19 AM        Failed - LDL on file in past 12 months     Recent Labs   Lab Test 06/26/18  1101   LDL 69             Passed - Blood pressure less than 140/90 in past 6 months     BP Readings from Last 3 Encounters:   04/03/19 110/70   01/15/19 125/84   12/12/18 110/75                 Passed - Microalbumin on file in past 12 months     Recent Labs   Lab Test 04/03/19  1533   MICROL 10   UMALCR 17.63             Passed - Serum creatinine on file in past 12 months     Recent Labs   Lab Test 04/03/19  1532  08/26/16  1652   CR 0.67   < >  --    CREAT  --   --  0.8    < > = values in this interval not displayed.             Passed - HgbA1C in past 3 or 6 months     If HgbA1C is 8 or greater, it needs to be on file within the past 3 months.  If less than 8, must be on file within the past 6 months.     Recent Labs   Lab Test 08/06/19  1513   A1C 11.0*             Passed - Medication is active on med list        Passed - Patient is age 18 or older        Passed - Recent (6 mo) or future (30 days) visit within the authorizing provider's specialty     Patient had office visit in the last 6 months or has a visit in the next 30 days with authorizing provider or within the authorizing provider's specialty.  See \"Patient Info\" tab in inbasket, or \"Choose Columns\" " in Meds & Orders section of the refill encounter.

## 2019-09-03 DIAGNOSIS — I10 ESSENTIAL HYPERTENSION, BENIGN: ICD-10-CM

## 2019-09-03 RX ORDER — LISINOPRIL AND HYDROCHLOROTHIAZIDE 20; 25 MG/1; MG/1
TABLET ORAL
Qty: 90 TABLET | Refills: 1 | Status: SHIPPED | OUTPATIENT
Start: 2019-09-03 | End: 2020-04-23

## 2019-09-03 NOTE — TELEPHONE ENCOUNTER
Next 5 appointments (look out 90 days)    Nov 07, 2019  1:30 PM CST  Return Visit with Susan Ramirez MD  [facility] ([facility]) [address] [facility] [address]  [facility] [id number] [phone]

## 2019-09-03 NOTE — TELEPHONE ENCOUNTER
"Requested Prescriptions   Pending Prescriptions Disp Refills     lisinopril-hydrochlorothiazide (PRINZIDE/ZESTORETIC) 20-25 MG tablet [Pharmacy Med Name: LISINOPRIL-HYDROCHLOROTH 20-25 TABS]  Last Written Prescription Date:  10/16/2018  Last Fill Quantity: 90,  # refills: 2   Last office visit: 4/3/2019 with prescribing provider:  DG   Future Office Visit:   Next 5 appointments (look out 90 days)    Nov 07, 2019  1:30 PM CST  [facility] Visit with Susan [facility] MD  [facility] [address] [facility] [address]  [facility] [address]  [phone]          90 tablet 0     Sig: TAKE ONE TABLET BY MOUTH ONCE DAILY       Diuretics (Including Combos) Protocol Passed - 9/3/2019  5:02 AM        Passed - Blood pressure under 140/90 in past 12 months     BP Readings from Last 3 Encounters:   04/03/19 110/70   01/15/19 125/84   12/12/18 110/75                 Passed - Recent (12 mo) or future (30 days) visit within the authorizing provider's specialty     Patient had office visit in the last 12 months or has a visit in the next 30 days with authorizing provider or within the authorizing provider's specialty.  See \"Patient Info\" tab in inbasket, or \"Choose Columns\" in Meds & Orders section of the refill encounter.              Passed - Medication is active on med list        Passed - Patient is age 18 or older        Passed - No active pregancy on record        Passed - Normal serum creatinine on file in past 12 months     Recent Labs   Lab Test 04/03/19  1532   CR 0.67              Passed - Normal serum potassium on file in past 12 months     Recent Labs   Lab Test 04/03/19  1532   POTASSIUM 4.0                    Passed - Normal serum sodium on file in past 12 months     Recent Labs   Lab Test 04/03/19  1532                 Passed - No positive pregnancy test in past 12 months          "

## 2019-09-03 NOTE — TELEPHONE ENCOUNTER
Last visit: 7/2018     Limited amount of Rx sent. No refills.    Follow-up in endocrinology Clinic as scheduled/discussed. We will review these studies/results in further detail at that visit, but feel free to contact us with any other questions in the interim.    Let me know if you have any questions.    Thank you.      Susan Ramirez MD        Lab Results   Component Value Date    A1C 11.0 08/06/2019    A1C 12.6 04/03/2019    A1C 8.9 06/26/2018    A1C 12.5 01/16/2018    A1C 12.4 09/01/2017

## 2019-09-10 DIAGNOSIS — F33.0 MAJOR DEPRESSIVE DISORDER, RECURRENT EPISODE, MILD (H): ICD-10-CM

## 2019-09-10 DIAGNOSIS — E03.9 HYPOTHYROIDISM, UNSPECIFIED TYPE: ICD-10-CM

## 2019-09-10 DIAGNOSIS — E78.5 HYPERLIPIDEMIA WITH TARGET LDL LESS THAN 100: ICD-10-CM

## 2019-09-10 RX ORDER — LEVOTHYROXINE SODIUM 150 UG/1
TABLET ORAL
Qty: 90 TABLET | Refills: 1 | Status: SHIPPED | OUTPATIENT
Start: 2019-09-10 | End: 2020-02-28

## 2019-09-10 RX ORDER — CITALOPRAM HYDROBROMIDE 40 MG/1
TABLET ORAL
Qty: 90 TABLET | Refills: 0 | Status: SHIPPED | OUTPATIENT
Start: 2019-09-10 | End: 2019-12-09

## 2019-09-10 RX ORDER — FENOFIBRATE 160 MG/1
TABLET ORAL
Qty: 30 TABLET | Refills: 0 | Status: SHIPPED | OUTPATIENT
Start: 2019-09-10 | End: 2019-10-07

## 2019-09-10 RX ORDER — PRAVASTATIN SODIUM 80 MG/1
TABLET ORAL
Qty: 30 TABLET | Refills: 0 | Status: SHIPPED | OUTPATIENT
Start: 2019-09-10 | End: 2019-10-07

## 2019-09-10 NOTE — TELEPHONE ENCOUNTER
"Requested Prescriptions   Pending Prescriptions Disp Refills     levothyroxine (SYNTHROID/LEVOTHROID) 150 MCG tablet [Pharmacy Med Name: LEVOTHYROXINE SODIUM 150MCG TABS] 90 tablet 0     Sig: TAKE ONE TABLET BY MOUTH EVERY MORNING FIRST THING IN THE MORNING ON AN EMPTY STOMACH. NO FOOD FOR 1 HOUR       Thyroid Protocol Passed - 9/10/2019  5:02 AM        Passed - Patient is 12 years or older        Passed - Recent (12 mo) or future (30 days) visit within the authorizing provider's specialty     Patient had office visit in the last 12 months or has a visit in the next 30 days with authorizing provider or within the authorizing provider's specialty.  See \"Patient Info\" tab in inbasket, or \"Choose Columns\" in Meds & Orders section of the refill encounter.              Passed - Medication is active on med list        Passed - Normal TSH on file in past 12 months     Recent Labs   Lab Test 04/03/19  1532   TSH 2.75              Passed - No active pregnancy on record     If patient is pregnant or has had a positive pregnancy test, please check TSH.          Passed - No positive pregnancy test in past 12 months     If patient is pregnant or has had a positive pregnancy test, please check TSH.          pravastatin (PRAVACHOL) 80 MG tablet [Pharmacy Med Name: PRAVASTATIN SODIUM 80MG TABS] 90 tablet 0     Sig: TAKE ONE TABLET BY MOUTH ONCE DAILY TO LOWER CHOLESTEROL AND PREVENT HEART DISEASE       Statins Protocol Failed - 9/10/2019  5:02 AM        Failed - LDL on file in past 12 months     Recent Labs   Lab Test 06/26/18  1101   LDL 69             Passed - No abnormal creatine kinase in past 12 months     No lab results found.             Passed - Recent (12 mo) or future (30 days) visit within the authorizing provider's specialty     Patient had office visit in the last 12 months or has a visit in the next 30 days with authorizing provider or within the authorizing provider's specialty.  See \"Patient Info\" tab in inbasket, " "or \"Choose Columns\" in Meds & Orders section of the refill encounter.              Passed - Medication is active on med list        Passed - Patient is age 18 or older        Passed - No active pregnancy on record        Passed - No positive pregnancy test in past 12 months        citalopram (CELEXA) 40 MG tablet [Pharmacy Med Name: CITALOPRAM HYDROBROMIDE 40MG TABS] 90 tablet 0     Sig: TAKE ONE TABLET BY MOUTH EVERY DAY TO CONTROL SYMPTOMS OF DEPRESSION       SSRIs Protocol Passed - 9/10/2019  5:02 AM        Passed - PHQ-9 score less than 5 in past 6 months     Please review last PHQ-9 score.           Passed - Medication is active on med list        Passed - Patient is age 18 or older        Passed - No active pregnancy on record        Passed - No positive pregnancy test in last 12 months        Passed - Recent (6 mo) or future (30 days) visit within the authorizing provider's specialty     Patient had office visit in the last 6 months or has a visit in the next 30 days with authorizing provider or within the authorizing provider's specialty.  See \"Patient Info\" tab in inbasket, or \"Choose Columns\" in Meds & Orders section of the refill encounter.            fenofibrate (TRIGLIDE/LOFIBRA) 160 MG tablet [Pharmacy Med Name: FENOFIBRATE 160MG TABS] 90 tablet 3     Sig: TAKE ONE TABLET BY MOUTH EVERY NIGHT AT BEDTIME WITH FOOD       Fibrates Failed - 9/10/2019  5:02 AM        Failed - Lipid panel on file in past 12 months     Recent Labs   Lab Test 06/26/18  1101  08/31/15  1349   CHOL 138   < > 243*   TRIG 129   < > 164*   HDL 43*   < > 49*   LDL 69   < > 161*   NHDL 95   < >  --    VLDL  --   --  33*   CHOLHDLRATIO  --   --  5.0    < > = values in this interval not displayed.               Passed - No abnormal creatine kinase in past 12 months     No lab results found.             Passed - Recent (12 mo) or future (30 days) visit within the authorizing provider's specialty     Patient had office visit in the last " "12 months or has a visit in the next 30 days with authorizing provider or within the authorizing provider's specialty.  See \"Patient Info\" tab in inbasket, or \"Choose Columns\" in Meds & Orders section of the refill encounter.              Passed - Medication is active on med list        Passed - Patient is age 18 or older        Passed - No active pregnancy on record        Passed - No positive pregnancy test in past 12 months        Medication is being filled for 1 time refill only due to:  Patient needs labs lipids.    "

## 2019-09-10 NOTE — LETTER
[facility]  [address] [id number] [phone]            Mary Lou S Pluss  [address]  [facility] [address]        September 10, 2019    Dear Mary Lou,    While refilling your prescription today, we noticed that you are due to have labs drawn.  We will refill your prescription for 30 days, but a follow-up appointment must be made before any additional refills can be approved.     Taking care of your health is important to us and we look forward to seeing you in the near future.  Please call us at [phone] or [phone] (or use [employer]) to schedule an appointment.     Please disregard this notice if you have already made an appointment.    Sincerely,        [facility]

## 2019-09-16 ENCOUNTER — TELEPHONE (OUTPATIENT)
Dept: ENDOCRINOLOGY | Facility: CLINIC | Age: 59
End: 2019-09-16

## 2019-09-16 NOTE — TELEPHONE ENCOUNTER
Hello,     I wanted to make you aware that Dr. Ramirez's PA, Madai Garcia, has openings at the [facility] for Oct. 2, 3, and 4th if you are interested but please call soon as the spots will fill up quickly.      [phone]     Yuliet Braden, JOANIE Maur[facility]   [place]     I left a message for the patient to return our call.     JOANIE Castillo [facility]  [facility]

## 2019-09-24 DIAGNOSIS — E11.42 TYPE 2 DIABETES MELLITUS WITH DIABETIC POLYNEUROPATHY, WITH LONG-TERM CURRENT USE OF INSULIN (H): ICD-10-CM

## 2019-09-24 DIAGNOSIS — Z79.4 TYPE 2 DIABETES MELLITUS WITH DIABETIC POLYNEUROPATHY, WITH LONG-TERM CURRENT USE OF INSULIN (H): ICD-10-CM

## 2019-09-24 NOTE — TELEPHONE ENCOUNTER
Last visit: 2018    Patient is due for labs and clinic visit.     Limited amount of Rx sent. No refills.    I am sorry but I will not be able to refill further prescription at this time until Mary Lou makes a  labs and clinic visit appointment.  Please make a lab appointment for blood work and follow up clinic appointment in 1 week after that to discuss results.    Please inform patient.    Please help schedule labs and clinic appointment if needed.    Let me know if you have any questions.    Thank you.      Susan Ramirez MD        Lab Results   Component Value Date    A1C 11.0 08/06/2019    A1C 12.6 04/03/2019    A1C 8.9 06/26/2018    A1C 12.5 01/16/2018    A1C 12.4 09/01/2017         TSH   Date Value Ref Range Status   04/03/2019 2.75 0.40 - 4.00 mU/L Final

## 2019-09-24 NOTE — TELEPHONE ENCOUNTER
Next 5 appointments (look out 90 days)    Nov 07, 2019  1:30 PM CST  Return Visit with Susan Ramirez MD  [facility] ([facility]) [address] [facility] [address]  [facility] [address] [phone]        Yuliet Braden CMA  [facility]  [place]

## 2019-10-07 DIAGNOSIS — E78.5 HYPERLIPIDEMIA WITH TARGET LDL LESS THAN 100: ICD-10-CM

## 2019-10-07 NOTE — TELEPHONE ENCOUNTER
"Requested Prescriptions   Pending Prescriptions Disp Refills     pravastatin (PRAVACHOL) 80 MG tablet [Pharmacy Med Name: PRAVASTATIN SODIUM 80MG TABS]  Last Written Prescription Date:  09/10/2019  Last Fill Quantity: 30,  # refills: 0   Last Office Visit: 4/3/2019   Future Office Visit:    Next 5 appointments (look out 90 days)    Nov 07, 2019  1:30 PM CST  [facility] Visit with Susan [facility] MD  [facility] [address] [facility] [address]  [facility] [address]  [phone]          30 tablet 0     Sig: TAKE ONE TABLET BY MOUTH ONCE DAILY TO LOWER CHOLESTEROL & PREVENT HEART DISEASE (NEED TO BE SEEN IN CLINIC FOR FURTHER REFILLS)       Statins Protocol Failed - 10/7/2019  5:02 AM        Failed - LDL on file in past 12 months     Recent Labs   Lab Test 06/26/18  1101   LDL 69             Passed - No abnormal creatine kinase in past 12 months     No lab results found.             Passed - Recent (12 mo) or future (30 days) visit within the authorizing provider's specialty     Patient has had an office visit with the authorizing provider or a provider within the authorizing providers department within the previous 12 mos or has a future within next 30 days. See \"Patient Info\" tab in inbasket, or \"Choose Columns\" in Meds & Orders section of the refill encounter.              Passed - Medication is active on med list        Passed - Patient is age 18 or older        Passed - No active pregnancy on record        Passed - No positive pregnancy test in past 12 months        fenofibrate (TRIGLIDE/LOFIBRA) 160 MG tablet [Pharmacy Med Name: FENOFIBRATE 160MG TABS]  Last Written Prescription Date:  09/10/2019  Last Fill Quantity: 30,  # refills: 0   Last Office Visit: 4/3/2019   Future Office Visit:    Next 5 appointments (look out 90 days)    Nov 07, 2019  1:30 PM CST  Return Visit with Susan Ramirez MD  [facility] [address] [facility] [address]  [facility] [address] [phone]          30 tablet 0     Sig: TAKE ONE TABLET BY MOUTH EVERY NIGHT AT BEDTIME WITH FOOD (NEED TO BE SEEN IN CLINIC FOR FURTHER REFILLS)       Fibrates Failed - 10/7/2019  5:02 AM        Failed - Lipid panel on file in past 12 months     Recent Labs   Lab Test 06/26/18  1101  08/31/15  1349   CHOL 138   < > 243*   TRIG 129   < > 164*   HDL 43*   < > 49*   LDL 69   < > 161*   NHDL 95   < >  --    VLDL  --   --  33*   CHOLHDLRATIO  --   --  5.0    < > = values in this interval not displayed.               Passed - No abnormal creatine kinase in past 12 months     No lab results found.             Passed - Recent (12 mo) or future (30 days) visit within the authorizing provider's specialty     Patient has had an office visit with the authorizing provider or a provider within the authorizing providers department within the previous 12 mos or has a future within next 30 days. See \"Patient Info\" tab in inbasket, or \"Choose Columns\" in Meds & Orders section of the refill encounter.              Passed - Medication is active on med list        Passed - Patient is age 18 or older        Passed - No active pregnancy on record        Passed - No positive pregnancy test in past 12 months          "

## 2019-10-09 NOTE — TELEPHONE ENCOUNTER
Routing refill request to provider for review/approval because:  Audelia given x1 and patient did not follow up, please advise  Labs not current:  lipids

## 2019-10-11 RX ORDER — PRAVASTATIN SODIUM 80 MG/1
TABLET ORAL
Qty: 30 TABLET | Refills: 0 | Status: SHIPPED | OUTPATIENT
Start: 2019-10-11 | End: 2019-10-29

## 2019-10-11 RX ORDER — FENOFIBRATE 160 MG/1
TABLET ORAL
Qty: 30 TABLET | Refills: 0 | Status: SHIPPED | OUTPATIENT
Start: 2019-10-11 | End: 2019-10-29

## 2019-10-29 ENCOUNTER — OFFICE VISIT (OUTPATIENT)
Dept: INTERNAL MEDICINE | Facility: CLINIC | Age: 59
End: 2019-10-29
Payer: MEDICARE

## 2019-10-29 VITALS
TEMPERATURE: 97 F | HEIGHT: 66 IN | DIASTOLIC BLOOD PRESSURE: 70 MMHG | BODY MASS INDEX: 25.96 KG/M2 | SYSTOLIC BLOOD PRESSURE: 102 MMHG | OXYGEN SATURATION: 99 % | HEART RATE: 91 BPM | WEIGHT: 161.5 LBS

## 2019-10-29 DIAGNOSIS — I10 ESSENTIAL HYPERTENSION, BENIGN: ICD-10-CM

## 2019-10-29 DIAGNOSIS — Z79.4 TYPE 2 DIABETES MELLITUS WITH DIABETIC POLYNEUROPATHY, WITH LONG-TERM CURRENT USE OF INSULIN (H): ICD-10-CM

## 2019-10-29 DIAGNOSIS — E03.9 HYPOTHYROIDISM, UNSPECIFIED TYPE: ICD-10-CM

## 2019-10-29 DIAGNOSIS — Z12.31 VISIT FOR SCREENING MAMMOGRAM: ICD-10-CM

## 2019-10-29 DIAGNOSIS — G43.909 MIGRAINE WITHOUT STATUS MIGRAINOSUS, NOT INTRACTABLE, UNSPECIFIED MIGRAINE TYPE: ICD-10-CM

## 2019-10-29 DIAGNOSIS — Z13.820 SCREENING FOR OSTEOPOROSIS: ICD-10-CM

## 2019-10-29 DIAGNOSIS — E11.42 TYPE 2 DIABETES MELLITUS WITH DIABETIC POLYNEUROPATHY, WITH LONG-TERM CURRENT USE OF INSULIN (H): ICD-10-CM

## 2019-10-29 DIAGNOSIS — E78.5 HYPERLIPIDEMIA WITH TARGET LDL LESS THAN 100: ICD-10-CM

## 2019-10-29 DIAGNOSIS — Z78.0 POST-MENOPAUSAL: ICD-10-CM

## 2019-10-29 DIAGNOSIS — R25.3 FASCICULATIONS OF MUSCLE: Primary | ICD-10-CM

## 2019-10-29 PROCEDURE — 99214 OFFICE O/P EST MOD 30 MIN: CPT | Mod: 25 | Performed by: INTERNAL MEDICINE

## 2019-10-29 PROCEDURE — G0008 ADMIN INFLUENZA VIRUS VAC: HCPCS | Performed by: INTERNAL MEDICINE

## 2019-10-29 PROCEDURE — 90682 RIV4 VACC RECOMBINANT DNA IM: CPT | Performed by: INTERNAL MEDICINE

## 2019-10-29 RX ORDER — PRAVASTATIN SODIUM 80 MG/1
80 TABLET ORAL DAILY
Qty: 90 TABLET | Refills: 1 | Status: SHIPPED | OUTPATIENT
Start: 2019-10-29 | End: 2020-08-25

## 2019-10-29 RX ORDER — FENOFIBRATE 160 MG/1
160 TABLET ORAL DAILY
Qty: 90 TABLET | Refills: 1 | Status: SHIPPED | OUTPATIENT
Start: 2019-10-29 | End: 2020-08-25

## 2019-10-29 ASSESSMENT — MIFFLIN-ST. JEOR: SCORE: 1334.31

## 2019-10-29 NOTE — NURSING NOTE
"Chief Complaint   Patient presents with     Twitching     /70   Pulse 91   Temp 97  F (36.1  C) (Temporal)   Ht 1.676 m (5' 6\")   Wt 73.3 kg (161 lb 8 oz)   SpO2 99%   BMI 26.07 kg/m   Estimated body mass index is 26.07 kg/m  as calculated from the following:    Height as of this encounter: 1.676 m (5' 6\").    Weight as of this encounter: 73.3 kg (161 lb 8 oz).  Medication Reconciliation: complete      Health Maintenance that is potentially due pending provider review:  Pap Smear, Colonoscopy/FIT, PHQ9, GAD7 and ACT    Pt will discuss Colonoscopy and Pap Smear with provider.  Completing forms today.    BELINDA Washington  "

## 2019-10-29 NOTE — PATIENT INSTRUCTIONS
*  twiching is probably due to functional matters like electrolyte shifts, water intake, etc.      --Trial of over the counter magnesium, one or two tablets per day.  (beware of loose stools from this)     --take calcium tablets twice per day (should be done anyway for bone helath)    *  Annual influenza vaccine     *  Continue all medications at the same doses.  Contact your usual pharmacy if you need refills.     *  Follow up an planned with Endocrinology [facility] 11/7/19    *  Please get fasting labs done on monring of 11/6/19 \    *  Diabetes educator appointment at any location to discuss more about diabetes, and to have a continuous glucose monitor placed ( insurance will pay for you to have one place twice per year)     --[facility] Diabetes Education - All [facility] [phone]   [email or web address]     *  Mammogram at your convenience.     *  DEXA (bone desnity scan) at your convenience.       BONE HEALTH:  (do these things regardless of the bone density results):    * continue Calcium (at least 1200 mg per day)     *  Vitamin D3 (at least 1000 units per day) supplements either separately or together in the form of Caltrate (or similar product).     * regular exercise to keep muscles strong and weight stable    * no smoking        Investigate Freestyle Jen glucose monitoring system.  This system allows for continuous glucose monitoring up to 14 days and will show you the ways your glucose will fluctuate over this period by measuring your glucose every minute.  The sensor is applied to the back of your arm, and sits in place for up to 14 days.  You can really see how your diet and lifestyle changes affect your glucose levels.    If covered or reasonable expense, please consider getting it.    Check their web site for more details:  [email or web address]/  Check for eligibility for a voucher for discounted Jen prices: [phone]   "(may cost no more than $75/month)    The following are all patches meant to cover/secure medical devices.  All of them have device specific options (e.g., the patches for Dexcom have a rectangular hole cut, Jen and Guardian have no hole, etc.)  The first three are all available on Amazon.  Sugar Patch is ordered direct.  Sugar Patch is popular because it comes in many patterns and designs for those that want to use the patch as a fashion accessory.    SimPatch  http://simpatchnyc.com/    GrifGrAgent Panda  https://[email or web address]/    Skin   https://theskingrip.com/    Sugar Patch  https://[employer].Theralogix    Hypafix is a good, all-purpose tape to cover most anything.  Waterproof and most people don't get any irritation.  I use the 2\" - 2 pack of 2\" x 30' rolls is $13.  https://www.[email or web address]?k=hypafix+tape      SHINGLES VACCINE:        I would recommend that you consider getting a \"shingles vaccine\".  The shingles vaccine does not stop you from getting shingles, but it decreases the intensity of the event, the duration of the event, and decreases the painful nerve condition that results     There are two options available for shingles vaccines:     --Shingrix: 2 shots, give 2-6 months apart  **recommended**   OR   --Zostavax, one shot       Based on the available data, the Shingrix vaccine has superior benefit and should be considered even if you have had the Zostavax vaccine before.         Contact your insurance provider and ask them if either shingles vaccine is covered and is so, how much it will cost you.  Usually this will be cheaper to get in a pharmacy given by the pharmacist.       Regardless of the coverage, I would recommend that you consider the vaccine since shingles is very painful, (just ask anyone who has ever had it).        "
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
## 2019-10-30 ENCOUNTER — TELEPHONE (OUTPATIENT)
Dept: INTERNAL MEDICINE | Facility: CLINIC | Age: 59
End: 2019-10-30

## 2019-10-30 NOTE — TELEPHONE ENCOUNTER
Diabetes Education Scheduling Outreach #1:    Call to patient to schedule. Left message with phone number to call to schedule.    Plan for 2nd outreach attempt within 2 business days.    Roula Mauricio OnCall  Diabetes and Nutrition Scheduling

## 2019-11-01 DIAGNOSIS — E11.42 TYPE 2 DIABETES MELLITUS WITH DIABETIC POLYNEUROPATHY, WITH LONG-TERM CURRENT USE OF INSULIN (H): ICD-10-CM

## 2019-11-01 DIAGNOSIS — Z79.4 TYPE 2 DIABETES MELLITUS WITH DIABETIC POLYNEUROPATHY, WITH LONG-TERM CURRENT USE OF INSULIN (H): ICD-10-CM

## 2019-11-01 RX ORDER — METFORMIN HCL 500 MG
TABLET, EXTENDED RELEASE 24 HR ORAL
Qty: 60 TABLET | Refills: 0 | Status: SHIPPED | OUTPATIENT
Start: 2019-11-01 | End: 2019-11-25

## 2019-11-01 RX ORDER — GLIPIZIDE 10 MG/1
TABLET, FILM COATED, EXTENDED RELEASE ORAL
Qty: 30 TABLET | Refills: 0 | Status: SHIPPED | OUTPATIENT
Start: 2019-11-01 | End: 2019-12-09

## 2019-11-01 NOTE — TELEPHONE ENCOUNTER
Dr. Ramierz patient.  Upcoming appt 11/7/19.  One refill sent.  Mame Winston RN     Group Topic: Monitoring Safety and Relapse    Start Time: 9:00 AM  End Time: 12:00 PM    Number in attendance: 9       Safety Concerns: None  Types of Safety Concerns: NA  Physical Concerns: Pt rated at 7/10   Use of Street Drugs: No  Taking Medication as Prescribed?: Yes    Pt informed her physical concerns rating reflects experiencing painful headaches. Pt wondering if this is a reaction to medication. Therapist instructed Pt to report this to her prescribing MD. Pt agreed.

## 2019-11-01 NOTE — TELEPHONE ENCOUNTER
Requested Prescriptions   Pending Prescriptions Disp Refills     glipiZIDE (GLUCOTROL XL) 10 MG 24 hr tablet [Pharmacy Med Name: GLIPIZIDE ER 10MG TB24]  Last Written Prescription Date:  8/28/19  Last Fill Quantity: 30,  # refills: 1   Last office visit: 7/11/2018  Future Office Visit:   Next 5 appointments (look out 90 days)    Nov 07, 2019  1:30 PM CST  [facility] [address]  [facility] [address] [phone]          30 tablet 1     Sig: TAKE ONE TABLET BY MOUTH ONCE DAILY       Sulfonylurea Agents Failed - 11/1/2019  5:03 AM        Failed - Patient has documented LDL within the past 12 mos.     Recent Labs   Lab Test 06/26/18  1101   LDL 69             Passed - Blood pressure less than 140/90 in past 6 months     BP Readings from Last 3 Encounters:   10/29/19 102/70   04/03/19 110/70   01/15/19 125/84                 Passed - Patient has had a Microalbumin in the past 15 mos.     Recent Labs   Lab Test 04/03/19  1533   MICROL 10   UMALCR 17.63             Passed - Patient has documented A1c within the specified period of time.     If HgbA1C is 8 or greater, it needs to be on file within the past 3 months.  If less than 8, must be on file within the past 6 months.     Recent Labs   Lab Test 08/06/19  1513   A1C 11.0*             Passed - Medication is active on med list        Passed - Patient is age 18 or older        Passed - No active pregnancy on record        Passed - Patient has a recent creatinine (normal) within the past 12 mos.     Recent Labs   Lab Test 04/03/19  1532  08/26/16  1652   CR 0.67   < >  --    CREAT  --   --  0.8    < > = values in this interval not displayed.             Passed - Patient has not had a positive pregnancy test within the past 12 mos.        Passed - Recent (6 mo) or future (30 days) visit within the authorizing provider's specialty     Patient had office visit in  "the last 6 months or has a visit in the next 30 days with authorizing provider or within the authorizing provider's specialty.  See \"Patient Info\" tab in inbasket, or \"Choose Columns\" in Meds & Orders section of the refill encounter.            Patient has future appointment scheduled. Medication is being filled for 1 time refill only due to:  pt has upcoming appt    "

## 2019-11-01 NOTE — TELEPHONE ENCOUNTER
Diabetes Education Scheduling Outreach #2:    Call to patient to schedule. Left message with phone number to call to schedule.    Roula Darnell  [facility]  Diabetes and Nutrition Scheduling

## 2019-11-06 DIAGNOSIS — R25.3 FASCICULATIONS OF MUSCLE: ICD-10-CM

## 2019-11-06 DIAGNOSIS — Z79.4 TYPE 2 DIABETES MELLITUS WITH DIABETIC POLYNEUROPATHY, WITH LONG-TERM CURRENT USE OF INSULIN (H): ICD-10-CM

## 2019-11-06 DIAGNOSIS — E78.5 HYPERLIPIDEMIA WITH TARGET LDL LESS THAN 100: ICD-10-CM

## 2019-11-06 DIAGNOSIS — E11.42 TYPE 2 DIABETES MELLITUS WITH DIABETIC POLYNEUROPATHY, WITH LONG-TERM CURRENT USE OF INSULIN (H): ICD-10-CM

## 2019-11-06 DIAGNOSIS — I10 ESSENTIAL HYPERTENSION, BENIGN: ICD-10-CM

## 2019-11-06 LAB
ALBUMIN SERPL-MCNC: 3.5 G/DL (ref 3.4–5)
ALP SERPL-CCNC: 54 U/L (ref 40–150)
ALT SERPL W P-5'-P-CCNC: 31 U/L (ref 0–50)
ANION GAP SERPL CALCULATED.3IONS-SCNC: 8 MMOL/L (ref 3–14)
AST SERPL W P-5'-P-CCNC: 10 U/L (ref 0–45)
BILIRUB SERPL-MCNC: 0.2 MG/DL (ref 0.2–1.3)
BUN SERPL-MCNC: 25 MG/DL (ref 7–30)
CALCIUM SERPL-MCNC: 9.5 MG/DL (ref 8.5–10.1)
CHLORIDE SERPL-SCNC: 104 MMOL/L (ref 94–109)
CHOLEST SERPL-MCNC: 194 MG/DL
CO2 SERPL-SCNC: 27 MMOL/L (ref 20–32)
CREAT SERPL-MCNC: 0.8 MG/DL (ref 0.52–1.04)
GFR SERPL CREATININE-BSD FRML MDRD: 81 ML/MIN/{1.73_M2}
GLUCOSE SERPL-MCNC: 274 MG/DL (ref 70–99)
HBA1C MFR BLD: 10.3 % (ref 0–5.6)
HDLC SERPL-MCNC: 40 MG/DL
LDLC SERPL CALC-MCNC: 110 MG/DL
NONHDLC SERPL-MCNC: 154 MG/DL
POTASSIUM SERPL-SCNC: 3.6 MMOL/L (ref 3.4–5.3)
PROT SERPL-MCNC: 7.2 G/DL (ref 6.8–8.8)
SODIUM SERPL-SCNC: 139 MMOL/L (ref 133–144)
TRIGL SERPL-MCNC: 219 MG/DL
TSH SERPL DL<=0.005 MIU/L-ACNC: 1.83 MU/L (ref 0.4–4)

## 2019-11-06 PROCEDURE — 80053 COMPREHEN METABOLIC PANEL: CPT | Performed by: INTERNAL MEDICINE

## 2019-11-06 PROCEDURE — 84443 ASSAY THYROID STIM HORMONE: CPT | Performed by: INTERNAL MEDICINE

## 2019-11-06 PROCEDURE — 80061 LIPID PANEL: CPT | Performed by: INTERNAL MEDICINE

## 2019-11-06 PROCEDURE — 36415 COLL VENOUS BLD VENIPUNCTURE: CPT | Performed by: INTERNAL MEDICINE

## 2019-11-06 PROCEDURE — 83036 HEMOGLOBIN GLYCOSYLATED A1C: CPT | Performed by: INTERNAL MEDICINE

## 2019-11-06 NOTE — TELEPHONE ENCOUNTER
"Requested Prescriptions   Pending Prescriptions Disp Refills     insulin detemir (LEVEMIR FLEXPEN/FLEXTOUCH) 100 UNIT/ML pen 15 mL 0     Sig: Inject 30 Units Subcutaneous At Bedtime       Long Acting Insulin Protocol Passed - 11/6/2019  2:52 PM        Passed - Blood pressure less than 140/90 in past 6 months     BP Readings from Last 3 Encounters:   10/29/19 102/70   04/03/19 110/70   01/15/19 125/84                 Passed - LDL on file in past 12 months     Recent Labs   Lab Test 11/06/19  1018   *             Passed - Microalbumin on file in past 12 months     Recent Labs   Lab Test 04/03/19  1533   MICROL 10   UMALCR 17.63             Passed - Serum creatinine on file in past 12 months     Recent Labs   Lab Test 11/06/19  1018  08/26/16  1652   CR 0.80   < >  --    CREAT  --   --  0.8    < > = values in this interval not displayed.             Passed - HgbA1C in past 3 or 6 months     If HgbA1C is 8 or greater, it needs to be on file within the past 3 months.  If less than 8, must be on file within the past 6 months.     Recent Labs   Lab Test 11/06/19  1018   A1C 10.3*             Passed - Medication is active on med list        Passed - Patient is age 18 or older        Passed - Recent (6 mo) or future (30 days) visit within the authorizing provider's specialty     Patient had office visit in the last 6 months or has a visit in the next 30 days with authorizing provider or within the authorizing provider's specialty.  See \"Patient Info\" tab in inbasket, or \"Choose Columns\" in Meds & Orders section of the refill encounter.              Prescription approved per [facility] Refill Protocol.    Trish REAL, RN, BSN, PHN      "

## 2019-11-07 ENCOUNTER — OFFICE VISIT (OUTPATIENT)
Dept: ENDOCRINOLOGY | Facility: CLINIC | Age: 59
End: 2019-11-07
Payer: MEDICARE

## 2019-11-07 VITALS
DIASTOLIC BLOOD PRESSURE: 77 MMHG | HEIGHT: 66 IN | WEIGHT: 161.7 LBS | HEART RATE: 79 BPM | BODY MASS INDEX: 25.99 KG/M2 | TEMPERATURE: 98.9 F | RESPIRATION RATE: 16 BRPM | OXYGEN SATURATION: 98 % | SYSTOLIC BLOOD PRESSURE: 111 MMHG

## 2019-11-07 DIAGNOSIS — E11.42 TYPE 2 DIABETES MELLITUS WITH DIABETIC POLYNEUROPATHY, WITH LONG-TERM CURRENT USE OF INSULIN (H): ICD-10-CM

## 2019-11-07 DIAGNOSIS — Z79.4 TYPE 2 DIABETES MELLITUS WITH DIABETIC POLYNEUROPATHY, WITH LONG-TERM CURRENT USE OF INSULIN (H): ICD-10-CM

## 2019-11-07 DIAGNOSIS — E03.9 HYPOTHYROIDISM, UNSPECIFIED TYPE: Primary | ICD-10-CM

## 2019-11-07 PROCEDURE — 99214 OFFICE O/P EST MOD 30 MIN: CPT | Performed by: INTERNAL MEDICINE

## 2019-11-07 RX ORDER — MULTIVITAMIN WITH IRON
1 TABLET ORAL DAILY
COMMUNITY
End: 2020-06-01

## 2019-11-07 ASSESSMENT — MIFFLIN-ST. JEOR: SCORE: 1335.22

## 2019-11-07 ASSESSMENT — ANXIETY QUESTIONNAIRES
1. FEELING NERVOUS, ANXIOUS, OR ON EDGE: MORE THAN HALF THE DAYS
2. NOT BEING ABLE TO STOP OR CONTROL WORRYING: SEVERAL DAYS
GAD7 TOTAL SCORE: 9
3. WORRYING TOO MUCH ABOUT DIFFERENT THINGS: SEVERAL DAYS
7. FEELING AFRAID AS IF SOMETHING AWFUL MIGHT HAPPEN: SEVERAL DAYS
7. FEELING AFRAID AS IF SOMETHING AWFUL MIGHT HAPPEN: SEVERAL DAYS
5. BEING SO RESTLESS THAT IT IS HARD TO SIT STILL: SEVERAL DAYS
6. BECOMING EASILY ANNOYED OR IRRITABLE: SEVERAL DAYS
GAD7 TOTAL SCORE: 9
GAD7 TOTAL SCORE: 9
4. TROUBLE RELAXING: MORE THAN HALF THE DAYS

## 2019-11-07 NOTE — PATIENT INSTRUCTIONS
[facility]   Dr Ramirez, Endocrinology Department      [facility]   [address] [address]  Appointment [phone]  Fax: [phone]   Monday and Tuesday         [facility]   [address] [place] [address]  Appointment [phone]  Fax: [phone]  Wednesday and Thursday           Increase Lantus to 34 Units/day  Continue metformin and Glipizide at current dose  Your provider has referred you to Diabetes Education: For all [facility]:  Phone [phone]; Fax [phone]  Please call and make the appointment.-->continous glucose monitor (dexom, ipro) (diagnostic)  (check cost with insurance)    Labs and follow up in 3 months.    Recommend checking blood sugars before meals and at bedtime.    If Blood glucose are low more often-> 2-3 times/week- give us a call.  The patient is advised to Make better food choices: reduce carbs, Reduce portion size, weight loss and exercise 3-4 times a week.  Discussed hypoglycemia signs and symptoms as well as management in detail.
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
## 2019-11-07 NOTE — NURSING NOTE
ENDOCRINOLOGY INTAKE FORM    Patient Name:  Mary Lou Gil  :  1960    Is patient Diabetic?   Yes: type 2  Does patient have non-diabetic or other endocrine issues?  Yes: hypothyroidism, hyperlipidemia    Vitals: There were no vitals taken for this visit.  BMI= There is no height or weight on file to calculate BMI.    Flu vaccine:  Yes: 10/29/19  Pneumonia vaccine:  Yes: both    Smoking and Alcohol use:  Social History     Tobacco Use     Smoking status: Current Every Day Smoker     Packs/day: 1.00     Years: 25.00     Pack years: 25.00     Types: Cigarettes, Other     Smokeless tobacco: Never Used     Tobacco comment: started at age 19 and has quit for 10 years    Substance Use Topics     Alcohol use: No     Alcohol/week: 0.0 standard drinks     Drug use: No       Foot Exam: Yes: 19  Eye Exam:  Yes: 19  Dental Exam:  No  Aspirin Use:  No    Lab Results   Component Value Date    A1C 10.3 2019    A1C 11.0 2019    A1C 12.6 2019    A1C 8.9 2018    A1C 12.5 2018       Lab Results   Component Value Date    MICROL 10 2019     No results found for: MICROALBUMIN    Yuliet Braden CMA  [facility]  [facility]
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
## 2019-11-08 ENCOUNTER — ANCILLARY PROCEDURE (OUTPATIENT)
Dept: MAMMOGRAPHY | Facility: CLINIC | Age: 59
End: 2019-11-08
Attending: INTERNAL MEDICINE
Payer: MEDICARE

## 2019-11-08 ENCOUNTER — ANCILLARY PROCEDURE (OUTPATIENT)
Dept: BONE DENSITY | Facility: CLINIC | Age: 59
End: 2019-11-08
Attending: INTERNAL MEDICINE
Payer: MEDICARE

## 2019-11-08 DIAGNOSIS — Z12.31 VISIT FOR SCREENING MAMMOGRAM: ICD-10-CM

## 2019-11-08 DIAGNOSIS — Z13.820 SCREENING FOR OSTEOPOROSIS: ICD-10-CM

## 2019-11-08 DIAGNOSIS — Z78.0 POST-MENOPAUSAL: ICD-10-CM

## 2019-11-08 PROCEDURE — 77080 DXA BONE DENSITY AXIAL: CPT | Performed by: INTERNAL MEDICINE

## 2019-11-08 PROCEDURE — 77067 SCR MAMMO BI INCL CAD: CPT | Mod: TC

## 2019-11-08 ASSESSMENT — ANXIETY QUESTIONNAIRES: GAD7 TOTAL SCORE: 9

## 2019-11-25 DIAGNOSIS — F33.0 MAJOR DEPRESSIVE DISORDER, RECURRENT EPISODE, MILD (H): ICD-10-CM

## 2019-11-25 DIAGNOSIS — Z79.4 TYPE 2 DIABETES MELLITUS WITH DIABETIC POLYNEUROPATHY, WITH LONG-TERM CURRENT USE OF INSULIN (H): ICD-10-CM

## 2019-11-25 DIAGNOSIS — E11.42 TYPE 2 DIABETES MELLITUS WITH DIABETIC POLYNEUROPATHY, WITH LONG-TERM CURRENT USE OF INSULIN (H): ICD-10-CM

## 2019-11-25 RX ORDER — TRAZODONE HYDROCHLORIDE 50 MG/1
TABLET, FILM COATED ORAL
Qty: 180 TABLET | Refills: 2 | Status: SHIPPED | OUTPATIENT
Start: 2019-11-25 | End: 2021-02-04

## 2019-11-25 RX ORDER — METFORMIN HCL 500 MG
TABLET, EXTENDED RELEASE 24 HR ORAL
Qty: 60 TABLET | Refills: 1 | Status: SHIPPED | OUTPATIENT
Start: 2019-11-25 | End: 2020-02-27

## 2019-11-25 NOTE — TELEPHONE ENCOUNTER
"Requested Prescriptions   Pending Prescriptions Disp Refills     traZODone (DESYREL) 50 MG tablet [Pharmacy Med Name: TRAZODONE HCL 50MG TABS] 180 tablet 2     Sig: TAKE ONE OR TWO TABLETS BY MOUTH EVERY NIGHT AT BEDTIME AS NEEDED FOR SLEEP       Serotonin Modulators Passed - 11/25/2019  5:02 AM        Passed - Recent (12 mo) or future (30 days) visit within the authorizing provider's specialty     Patient has had an office visit with the authorizing provider or a provider within the authorizing providers department within the previous 12 mos or has a future within next 30 days. See \"Patient Info\" tab in inbasket, or \"Choose Columns\" in Meds & Orders section of the refill encounter.              Passed - Medication is active on med list        Passed - Patient is age 18 or older        Passed - No active pregnancy on record        Passed - No positive pregnancy test in past 12 months          "

## 2019-11-25 NOTE — TELEPHONE ENCOUNTER
"Requested Prescriptions   Pending Prescriptions Disp Refills     metFORMIN (GLUCOPHAGE-XR) 500 MG 24 hr tablet [Pharmacy Med Name: METFORMIN HCL ER 500MG TB24] 60 tablet 0     Sig: TAKE TWO TABLETS BY MOUTH ONCE DAILY WITH DINNER   Last Written Prescription Date:  11/01/19  Last Fill Quantity: 60,  # refills: 0   Last office visit: 11/7/2019 with prescribing provider:  yes   Future Office Visit:        Biguanide Agents Passed - 11/25/2019  5:02 AM        Passed - Blood pressure less than 140/90 in past 6 months     BP Readings from Last 3 Encounters:   11/07/19 111/77   10/29/19 102/70   04/03/19 110/70                 Passed - Patient has documented LDL within the past 12 mos.     Recent Labs   Lab Test 11/06/19  1018   *             Passed - Patient has had a Microalbumin in the past 15 mos.     Recent Labs   Lab Test 04/03/19  1533   MICROL 10   UMALCR 17.63             Passed - Patient is age 10 or older        Passed - Patient has documented A1c within the specified period of time.     If HgbA1C is 8 or greater, it needs to be on file within the past 3 months.  If less than 8, must be on file within the past 6 months.     Recent Labs   Lab Test 11/06/19  1018   A1C 10.3*             Passed - Patient's CR is NOT>1.4 OR Patient's EGFR is NOT<45 within past 12 mos.     Recent Labs   Lab Test 11/06/19  1018   GFRESTIMATED 81   GFRESTBLACK >90       Recent Labs   Lab Test 11/06/19  1018   CR 0.80             Passed - Patient does NOT have a diagnosis of CHF.        Passed - Medication is active on med list        Passed - Patient is not pregnant        Passed - Patient has not had a positive pregnancy test within the past 12 mos.         Passed - Recent (6 mo) or future (30 days) visit within the authorizing provider's specialty     Patient had office visit in the last 6 months or has a visit in the next 30 days with authorizing provider or within the authorizing provider's specialty.  See \"Patient Info\" tab " "in inbasket, or \"Choose Columns\" in Meds & Orders section of the refill encounter.              "

## 2019-11-26 DIAGNOSIS — F33.0 MAJOR DEPRESSIVE DISORDER, RECURRENT EPISODE, MILD (H): ICD-10-CM

## 2019-11-26 RX ORDER — TRAZODONE HYDROCHLORIDE 50 MG/1
TABLET, FILM COATED ORAL
Qty: 180 TABLET | Refills: 2 | OUTPATIENT
Start: 2019-11-26

## 2019-12-09 DIAGNOSIS — Z79.4 TYPE 2 DIABETES MELLITUS WITH DIABETIC POLYNEUROPATHY, WITH LONG-TERM CURRENT USE OF INSULIN (H): ICD-10-CM

## 2019-12-09 DIAGNOSIS — G43.909 MIGRAINE WITHOUT STATUS MIGRAINOSUS, NOT INTRACTABLE, UNSPECIFIED MIGRAINE TYPE: ICD-10-CM

## 2019-12-09 DIAGNOSIS — M53.3 SI (SACROILIAC) JOINT DYSFUNCTION: ICD-10-CM

## 2019-12-09 DIAGNOSIS — E11.42 TYPE 2 DIABETES MELLITUS WITH DIABETIC POLYNEUROPATHY, WITH LONG-TERM CURRENT USE OF INSULIN (H): ICD-10-CM

## 2019-12-09 DIAGNOSIS — M25.552 HIP PAIN, LEFT: ICD-10-CM

## 2019-12-09 DIAGNOSIS — G89.29 OTHER CHRONIC PAIN: ICD-10-CM

## 2019-12-09 DIAGNOSIS — F33.0 MAJOR DEPRESSIVE DISORDER, RECURRENT EPISODE, MILD (H): ICD-10-CM

## 2019-12-09 DIAGNOSIS — R53.81 PHYSICAL DECONDITIONING: ICD-10-CM

## 2019-12-09 NOTE — TELEPHONE ENCOUNTER
insulin detemir (LEVEMIR FLEXPEN/FLEXTOUCH) 100 UNIT/ML pen    Last Written Prescription Date:  11/7/19  Last Fill Quantity: 15ml,   # refills: 0  Last Office Visit: 11/7/19  Future Office visit:       Routing refill request to provider for review/approval because:  Drug not on the [facility], [facility] or [facility] refill protocol or controlled substance      glipiZIDE (GLUCOTROL XL) 10 MG 24 hr tablet    Last Written Prescription Date:  11/1/19  Last Fill Quantity: 30,   # refills: 0  Last Office Visit: 11/17/19  Future Office visit:       Routing refill request to provider for review/approval because:  Drug not on the [facility], [facility] or [facility] refill protocol or controlled substance

## 2019-12-10 RX ORDER — INSULIN DETEMIR 100 [IU]/ML
INJECTION, SOLUTION SUBCUTANEOUS
Qty: 15 ML | Refills: 1 | Status: SHIPPED | OUTPATIENT
Start: 2019-12-10 | End: 2020-02-27

## 2019-12-10 RX ORDER — GLIPIZIDE 10 MG/1
TABLET, FILM COATED, EXTENDED RELEASE ORAL
Qty: 30 TABLET | Refills: 1 | Status: SHIPPED | OUTPATIENT
Start: 2019-12-10 | End: 2020-02-27

## 2019-12-10 NOTE — TELEPHONE ENCOUNTER
"Prescription approved per [facility] Refill Protocol.    Last office visit with provider was 11/7/19  Glipizide last refilled on 11/1/19 and levemir last refilled on 11/7/19    Requested Prescriptions   Pending Prescriptions Disp Refills     LEVEMIR FLEXTOUCH 100 UNIT/ML pen [Pharmacy Med Name: LEVEMIR FLEXTOUCH 100UNIT/ML SOPN] 15 mL 0     Sig: INJECT 34 UNITS UNDER THE SKIN AT BEDTIME       Long Acting Insulin Protocol Passed - 12/9/2019  1:49 PM        Passed - Blood pressure less than 140/90 in past 6 months     BP Readings from Last 3 Encounters:   11/07/19 111/77   10/29/19 102/70   04/03/19 110/70                 Passed - LDL on file in past 12 months     Recent Labs   Lab Test 11/06/19  1018   *             Passed - Microalbumin on file in past 12 months     Recent Labs   Lab Test 04/03/19  1533   MICROL 10   UMALCR 17.63             Passed - Serum creatinine on file in past 12 months     Recent Labs   Lab Test 11/06/19  1018  08/26/16  1652   CR 0.80   < >  --    CREAT  --   --  0.8    < > = values in this interval not displayed.             Passed - HgbA1C in past 3 or 6 months     If HgbA1C is 8 or greater, it needs to be on file within the past 3 months.  If less than 8, must be on file within the past 6 months.     Recent Labs   Lab Test 11/06/19  1018   A1C 10.3*             Passed - Medication is active on med list        Passed - Patient is age 18 or older        Passed - Recent (6 mo) or future (30 days) visit within the authorizing provider's specialty     Patient had office visit in the last 6 months or has a visit in the next 30 days with authorizing provider or within the authorizing provider's specialty.  See \"Patient Info\" tab in inbasket, or \"Choose Columns\" in Meds & Orders section of the refill encounter.            glipiZIDE (GLUCOTROL XL) 10 MG 24 hr tablet [Pharmacy Med Name: GLIPIZIDE ER 10MG TB24] 30 tablet 0     Sig: TAKE ONE TABLET BY MOUTH ONCE DAILY       Sulfonylurea Agents Passed " "- 12/9/2019  1:49 PM        Passed - Blood pressure less than 140/90 in past 6 months     BP Readings from Last 3 Encounters:   11/07/19 111/77   10/29/19 102/70   04/03/19 110/70                 Passed - Patient has documented LDL within the past 12 mos.     Recent Labs   Lab Test 11/06/19  1018   *             Passed - Patient has had a Microalbumin in the past 15 mos.     Recent Labs   Lab Test 04/03/19  1533   MICROL 10   UMALCR 17.63             Passed - Patient has documented A1c within the specified period of time.     If HgbA1C is 8 or greater, it needs to be on file within the past 3 months.  If less than 8, must be on file within the past 6 months.     Recent Labs   Lab Test 11/06/19  1018   A1C 10.3*             Passed - Medication is active on med list        Passed - Patient is age 18 or older        Passed - No active pregnancy on record        Passed - Patient has a recent creatinine (normal) within the past 12 mos.     Recent Labs   Lab Test 11/06/19  1018  08/26/16  1652   CR 0.80   < >  --    CREAT  --   --  0.8    < > = values in this interval not displayed.             Passed - Patient has not had a positive pregnancy test within the past 12 mos.        Passed - Recent (6 mo) or future (30 days) visit within the authorizing provider's specialty     Patient had office visit in the last 6 months or has a visit in the next 30 days with authorizing provider or within the authorizing provider's specialty.  See \"Patient Info\" tab in inbasket, or \"Choose Columns\" in Meds & Orders section of the refill encounter.              "

## 2019-12-10 NOTE — TELEPHONE ENCOUNTER
"Requested Prescriptions   Pending Prescriptions Disp Refills     citalopram (CELEXA) 40 MG tablet [Pharmacy Med Name: CITALOPRAM HYDROBROMIDE 40MG TABS] 90 tablet 0     Sig: TAKE ONE TABLET BY MOUTH EVERY DAY TO CONTROL SYMPTOMS OF DEPRESSION       SSRIs Protocol Failed - 12/9/2019  5:03 AM        Failed - PHQ-9 score less than 5 in past 6 months     Please review last PHQ-9 score.           Passed - Medication is active on med list        Passed - Patient is age 18 or older        Passed - No active pregnancy on record        Passed - No positive pregnancy test in last 12 months        Passed - Recent (6 mo) or future (30 days) visit within the authorizing provider's specialty     Patient had office visit in the last 6 months or has a visit in the next 30 days with authorizing provider or within the authorizing provider's specialty.  See \"Patient Info\" tab in inbasket, or \"Choose Columns\" in Meds & Orders section of the refill encounter.            verapamil ER (CALAN-SR) 240 MG CR tablet [Pharmacy Med Name: VERAPAMIL HCL ER 240MG TBCR] 90 tablet 1     Sig: TAKE ONE TABLET BY MOUTH EVERY NIGHT AT BEDTIME       Calcium Channel Blockers Protocol  Passed - 12/9/2019  5:03 AM        Passed - Blood pressure under 140/90 in past 12 months     BP Readings from Last 3 Encounters:   11/07/19 111/77   10/29/19 102/70   04/03/19 110/70                 Passed - Normal ALT in past 12 months     Recent Labs   Lab Test 11/06/19  1018   ALT 31             Passed - Recent (12 mo) or future (30 days) visit within the authorizing provider's specialty     Patient has had an office visit with the authorizing provider or a provider within the authorizing providers department within the previous 12 mos or has a future within next 30 days. See \"Patient Info\" tab in inbasket, or \"Choose Columns\" in Meds & Orders section of the refill encounter.              Passed - Medication is active on med list        Passed - Patient is age 18 or " "older        Passed - No active pregnancy on record        Passed - Normal serum creatinine on file in past 12 months     Recent Labs   Lab Test 11/06/19  1018  08/26/16  1652   CR 0.80   < >  --    CREAT  --   --  0.8    < > = values in this interval not displayed.             Passed - No positive pregnancy test in past 12 months        ibuprofen (ADVIL/MOTRIN) 800 MG tablet [Pharmacy Med Name: IBUPROFEN 800MG TABS] 90 tablet 2     Sig: TAKE ONE TABLET BY MOUTH EVERY 8 HOURS AS NEEDED FOR MODERATE PAIN       NSAID Medications Failed - 12/9/2019  5:03 AM        Failed - Normal CBC on file in past 12 months     Recent Labs   Lab Test 05/21/18  2107   WBC 10.6   RBC 5.02   HGB 15.2   HCT 44.1                    Passed - Blood pressure under 140/90 in past 12 months     BP Readings from Last 3 Encounters:   11/07/19 111/77   10/29/19 102/70   04/03/19 110/70                 Passed - Normal ALT on file in past 12 months     Recent Labs   Lab Test 11/06/19  1018   ALT 31             Passed - Normal AST on file in past 12 months     Recent Labs   Lab Test 11/06/19  1018   AST 10             Passed - Recent (12 mo) or future (30 days) visit within the authorizing provider's specialty     Patient has had an office visit with the authorizing provider or a provider within the authorizing providers department within the previous 12 mos or has a future within next 30 days. See \"Patient Info\" tab in inbasket, or \"Choose Columns\" in Meds & Orders section of the refill encounter.              Passed - Patient is age 6-64 years        Passed - Medication is active on med list        Passed - No active pregnancy on record        Passed - Normal serum creatinine on file in past 12 months     Recent Labs   Lab Test 11/06/19  1018  08/26/16  1652   CR 0.80   < >  --    CREAT  --   --  0.8    < > = values in this interval not displayed.             Passed - No positive pregnancy test in past 12 months        "

## 2019-12-11 RX ORDER — CITALOPRAM HYDROBROMIDE 40 MG/1
TABLET ORAL
Qty: 90 TABLET | Refills: 0 | Status: SHIPPED | OUTPATIENT
Start: 2019-12-11 | End: 2020-03-06

## 2019-12-11 RX ORDER — IBUPROFEN 800 MG/1
TABLET, FILM COATED ORAL
Qty: 90 TABLET | Refills: 2 | Status: SHIPPED | OUTPATIENT
Start: 2019-12-11 | End: 2020-03-06

## 2019-12-11 RX ORDER — VERAPAMIL HYDROCHLORIDE 240 MG/1
TABLET, FILM COATED, EXTENDED RELEASE ORAL
Qty: 90 TABLET | Refills: 2 | Status: SHIPPED | OUTPATIENT
Start: 2019-12-11 | End: 2021-05-11

## 2019-12-11 NOTE — TELEPHONE ENCOUNTER
PHQ-9 SCORE 3/14/2018 4/3/2019 11/7/2019   PHQ-9 Total Score - - -   PHQ-9 Total Score MyChart 11 (Moderate depression) - 9 (Mild depression)   PHQ-9 Total Score 11 4 9     Routing refill request to provider for review/approval because:  out of range:  phq9 >4  Labs not current:  cbc

## 2020-01-14 DIAGNOSIS — E11.9 TYPE 2 DIABETES, HBA1C GOAL < 7% (H): ICD-10-CM

## 2020-01-14 RX ORDER — PEN NEEDLE, DIABETIC 31 GX5/16"
NEEDLE, DISPOSABLE MISCELLANEOUS
Qty: 100 EACH | Refills: 1 | Status: SHIPPED | OUTPATIENT
Start: 2020-01-14 | End: 2021-07-30

## 2020-01-14 NOTE — TELEPHONE ENCOUNTER
"Requested Prescriptions   Pending Prescriptions Disp Refills     B-D U/F 31G X 8 MM insulin pen needle [Pharmacy Med Name: BD PEN NEEDLE SHORT 31G X 8 MM MISC] 100 each 1     Sig: USE AS DIRECTED AT BEDTIME  Last Written Prescription Date:  3/20/19  Last Fill Quantity: 100 each,  # refills: 1   Last office visit: 10/29/2019 with prescribing provider:  Jose   Future Office Visit:         Diabetic Supplies Protocol Passed - 1/14/2020  5:01 AM        Passed - Medication is active on med list        Passed - Patient is 18 years of age or older        Passed - Recent (6 mo) or future (30 days) visit within the authorizing provider's specialty     Patient had office visit in the last 6 months or has a visit in the next 30 days with authorizing provider.  See \"Patient Info\" tab in inbasket, or \"Choose Columns\" in Meds & Orders section of the refill encounter.             "

## 2020-02-08 DIAGNOSIS — Z79.4 TYPE 2 DIABETES MELLITUS WITH DIABETIC POLYNEUROPATHY, WITH LONG-TERM CURRENT USE OF INSULIN (H): ICD-10-CM

## 2020-02-08 DIAGNOSIS — E11.42 TYPE 2 DIABETES MELLITUS WITH DIABETIC POLYNEUROPATHY, WITH LONG-TERM CURRENT USE OF INSULIN (H): ICD-10-CM

## 2020-02-10 NOTE — TELEPHONE ENCOUNTER
"Requested Prescriptions   Pending Prescriptions Disp Refills     metFORMIN (GLUCOPHAGE-XR) 500 MG 24 hr tablet [Pharmacy Med Name: METFORMIN HCL ER 500MG TB24] 60 tablet 1     Sig: TAKE TWO TABLETS BY MOUTH ONCE DAILY WITH DINNER       Biguanide Agents Failed - 2/8/2020  5:03 AM        Failed - Patient has documented A1c within the specified period of time.     If HgbA1C is 8 or greater, it needs to be on file within the past 3 months.  If less than 8, must be on file within the past 6 months.     Recent Labs   Lab Test 11/06/19  1018   A1C 10.3*             Passed - Blood pressure less than 140/90 in past 6 months     BP Readings from Last 3 Encounters:   11/07/19 111/77   10/29/19 102/70   04/03/19 110/70                 Passed - Patient has documented LDL within the past 12 mos.     Recent Labs   Lab Test 11/06/19  1018   *             Passed - Patient has had a Microalbumin in the past 15 mos.     Recent Labs   Lab Test 04/03/19  1533   MICROL 10   UMALCR 17.63             Passed - Patient is age 10 or older        Passed - Patient's CR is NOT>1.4 OR Patient's EGFR is NOT<45 within past 12 mos.     Recent Labs   Lab Test 11/06/19  1018   GFRESTIMATED 81   GFRESTBLACK >90       Recent Labs   Lab Test 11/06/19  1018   CR 0.80             Passed - Patient does NOT have a diagnosis of CHF.        Passed - Medication is active on med list        Passed - Patient is not pregnant        Passed - Patient has not had a positive pregnancy test within the past 12 mos.         Passed - Recent (6 mo) or future (30 days) visit within the authorizing provider's specialty     Patient had office visit in the last 6 months or has a visit in the next 30 days with authorizing provider or within the authorizing provider's specialty.  See \"Patient Info\" tab in inbasket, or \"Choose Columns\" in Meds & Orders section of the refill encounter.            glipiZIDE (GLUCOTROL XL) 10 MG 24 hr tablet [Pharmacy Med Name: GLIPIZIDE ER " "10MG TB24] 30 tablet 1     Sig: TAKE ONE TABLET BY MOUTH ONCE DAILY       Sulfonylurea Agents Failed - 2/8/2020  5:03 AM        Failed - Patient has documented A1c within the specified period of time.     If HgbA1C is 8 or greater, it needs to be on file within the past 3 months.  If less than 8, must be on file within the past 6 months.     Recent Labs   Lab Test 11/06/19  1018   A1C 10.3*             Passed - Blood pressure less than 140/90 in past 6 months     BP Readings from Last 3 Encounters:   11/07/19 111/77   10/29/19 102/70   04/03/19 110/70                 Passed - Patient has documented LDL within the past 12 mos.     Recent Labs   Lab Test 11/06/19  1018   *             Passed - Patient has had a Microalbumin in the past 15 mos.     Recent Labs   Lab Test 04/03/19  1533   MICROL 10   UMALCR 17.63             Passed - Medication is active on med list        Passed - Patient is age 18 or older        Passed - No active pregnancy on record        Passed - Patient has a recent creatinine (normal) within the past 12 mos.     Recent Labs   Lab Test 11/06/19  1018  08/26/16  1652   CR 0.80   < >  --    CREAT  --   --  0.8    < > = values in this interval not displayed.             Passed - Patient has not had a positive pregnancy test within the past 12 mos.        Passed - Recent (6 mo) or future (30 days) visit within the authorizing provider's specialty     Patient had office visit in the last 6 months or has a visit in the next 30 days with authorizing provider or within the authorizing provider's specialty.  See \"Patient Info\" tab in inbasket, or \"Choose Columns\" in Meds & Orders section of the refill encounter.              "

## 2020-02-11 NOTE — TELEPHONE ENCOUNTER
Last office visit 11/7/19 patient advised repeat labs/apt in 3 months. Left detailed message requesting patient call back to schedule appointment. May refill once appointment is made.

## 2020-02-12 ENCOUNTER — OFFICE VISIT (OUTPATIENT)
Dept: INTERNAL MEDICINE | Facility: CLINIC | Age: 60
End: 2020-02-12
Payer: MEDICARE

## 2020-02-12 ENCOUNTER — ALLIED HEALTH/NURSE VISIT (OUTPATIENT)
Dept: EDUCATION SERVICES | Facility: CLINIC | Age: 60
End: 2020-02-12
Payer: MEDICARE

## 2020-02-12 VITALS
DIASTOLIC BLOOD PRESSURE: 82 MMHG | OXYGEN SATURATION: 98 % | TEMPERATURE: 97.6 F | SYSTOLIC BLOOD PRESSURE: 120 MMHG | HEART RATE: 83 BPM | WEIGHT: 166.3 LBS | BODY MASS INDEX: 26.73 KG/M2 | HEIGHT: 66 IN

## 2020-02-12 DIAGNOSIS — Z79.4 TYPE 2 DIABETES MELLITUS WITH DIABETIC POLYNEUROPATHY, WITH LONG-TERM CURRENT USE OF INSULIN (H): Primary | ICD-10-CM

## 2020-02-12 DIAGNOSIS — F17.200 TOBACCO USE DISORDER: ICD-10-CM

## 2020-02-12 DIAGNOSIS — J06.9 VIRAL URI WITH COUGH: Primary | ICD-10-CM

## 2020-02-12 DIAGNOSIS — E11.42 TYPE 2 DIABETES MELLITUS WITH DIABETIC POLYNEUROPATHY, WITH LONG-TERM CURRENT USE OF INSULIN (H): Primary | ICD-10-CM

## 2020-02-12 DIAGNOSIS — E03.9 HYPOTHYROIDISM, UNSPECIFIED TYPE: ICD-10-CM

## 2020-02-12 DIAGNOSIS — E11.22 TYPE 2 DIABETES MELLITUS WITH STAGE 3 CHRONIC KIDNEY DISEASE, WITH LONG-TERM CURRENT USE OF INSULIN (H): ICD-10-CM

## 2020-02-12 DIAGNOSIS — Z79.4 TYPE 2 DIABETES MELLITUS WITH STAGE 3 CHRONIC KIDNEY DISEASE, WITH LONG-TERM CURRENT USE OF INSULIN (H): ICD-10-CM

## 2020-02-12 DIAGNOSIS — N18.30 TYPE 2 DIABETES MELLITUS WITH STAGE 3 CHRONIC KIDNEY DISEASE, WITH LONG-TERM CURRENT USE OF INSULIN (H): ICD-10-CM

## 2020-02-12 DIAGNOSIS — Z79.4 LONG TERM (CURRENT) USE OF INSULIN (H): ICD-10-CM

## 2020-02-12 DIAGNOSIS — J45.30 MILD PERSISTENT ASTHMA, UNCOMPLICATED: ICD-10-CM

## 2020-02-12 PROCEDURE — 95250 CONT GLUC MNTR PHYS/QHP EQP: CPT

## 2020-02-12 PROCEDURE — 99214 OFFICE O/P EST MOD 30 MIN: CPT | Performed by: INTERNAL MEDICINE

## 2020-02-12 RX ORDER — ALBUTEROL SULFATE 90 UG/1
2 AEROSOL, METERED RESPIRATORY (INHALATION) EVERY 4 HOURS PRN
Qty: 1 INHALER | Refills: 11 | Status: SHIPPED | OUTPATIENT
Start: 2020-02-12 | End: 2021-05-11

## 2020-02-12 RX ORDER — ALBUTEROL SULFATE 0.83 MG/ML
2.5 SOLUTION RESPIRATORY (INHALATION) EVERY 6 HOURS PRN
Qty: 1 BOX | Refills: 5 | Status: SHIPPED | OUTPATIENT
Start: 2020-02-12

## 2020-02-12 RX ORDER — FLUTICASONE PROPIONATE 110 UG/1
2 AEROSOL, METERED RESPIRATORY (INHALATION) 2 TIMES DAILY
Qty: 12 G | Refills: 5 | Status: SHIPPED | OUTPATIENT
Start: 2020-02-12 | End: 2021-05-11

## 2020-02-12 ASSESSMENT — MIFFLIN-ST. JEOR: SCORE: 1356.08
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
## 2020-02-12 NOTE — NURSING NOTE
"Chief Complaint   Patient presents with     URI     /82   Pulse 83   Temp 97.6  F (36.4  C) (Temporal)   Ht 1.676 m (5' 6\")   Wt 75.4 kg (166 lb 4.8 oz)   SpO2 98%   BMI 26.84 kg/m   Estimated body mass index is 26.84 kg/m  as calculated from the following:    Height as of this encounter: 1.676 m (5' 6\").    Weight as of this encounter: 75.4 kg (166 lb 4.8 oz).        Health Maintenance that is potentially due pending provider review:  Pap Smear, Colonoscopy/FIT and ACT    Pt will discuss colonoscopy and pap smear with provider.  PHQ/ACT/ROMERO--Gave pt questionnaire    BELINDA Washington  "

## 2020-02-12 NOTE — PATIENT INSTRUCTIONS
1. Plan to wear the LibrePro sensor for 14 days. It is okay to shower, bathe, and swim (up to 3 feet deep for 30 minutes)    2. Continue with your usual diabetes care plan - check blood sugars and take medicines, as prescribed.    3. Keep a log of what you eat and drink, when you take your medications and how much you take, and exercise you do while you are wearing the sensor.    3. Do not cover the sensor with extra adhesive (the small hole in the center of the sensor must remain uncovered)    4. Use a little extra care, especially when getting dressed or exercising, to avoid accidentally loosening or removing the sensor.     5. Remove the sensor if you need to have an MRI or CT scan.     If the LibrePro sensor comes off early, place it in a plastic bag or envelope and call your diabetes educator or bring it with you to your follow-up visit.     Return the sensor to the [facility] [address] on 2/27.    Follow-up appointment: 2/27 @ 12:30p    [place] [facility] for the [facility]:  For Your Diabetes Education and Nutrition Appointments Call:  [phone]   For Diabetes Education or Nutrition Related Questions:   Phone: [phone]  E-mail: [email or web address]  Fax: [phone]   If you need a medication refill please contact your pharmacy. Please allow 3 business days for your refills to be completed.    Instructions for emailing the Diabetes Educators    If you need to communicate a non-urgent message to a Diabetes Educator via email, please send to [email or web address].    Please follow the following email guidelines:    Subject line: Secure: your clinic name (example: Secure: [facility])  In the email please include: First name, middle initial, last name and date of birth.    We will be in touch with you within one (1) business day.

## 2020-02-12 NOTE — PROGRESS NOTES
Diabetes Self-Management Education & Support  Professional Continuous Glucose Monitor Insertion    SUBJECTIVE/OBJECTIVE:     Mary Lou Gil presents for professional Continuous Glucose Monitor Insertion.     Patient comments/concerns: My blood sugars are all over. PCP requests LibrePro placement and investigation into personal Jen coverage. Patient also has questions about keto diet.     Lab Results:  Lab Results   Component Value Date    A1C 10.3 11/06/2019      Lab Results   Component Value Date     11/06/2019       Medication:  Diabetes Medication(s)     Biguanides       metFORMIN (GLUCOPHAGE-XR) 500 MG 24 hr tablet    TAKE TWO TABLETS BY MOUTH ONCE DAILY WITH DINNER    Insulin       LEVEMIR FLEXTOUCH 100 UNIT/ML pen    INJECT 34 UNITS UNDER THE SKIN AT BEDTIME    Sulfonylureas       glipiZIDE (GLUCOTROL XL) 10 MG 24 hr tablet    TAKE ONE TABLET BY MOUTH ONCE DAILY          ASSESSMENT:    CGM study indicated for: Difficult to manage hypoglycemia and/or hyperglycemia, Unexplained fluctuations in glucose values     INTERVENTION:   Sensor started today.     Sensor Type: LibrePro  Lot #: [id number]  Serial #: [id number]  Expiration Date: 05/31/20       Sensor was inserted with no resistance or bleeding at insertion site.      Pt will plan to wear the sensor through 2/27/20.    WRITTEN AND VERBAL INFORMATION GIVEN TO SUPPORT UNDERSTANDING OF:  LibrePro CGM: Sensor insertion, intention of monitoring for 14 days. Keep records of BG, food intake, exercise, and medication dosing during wear.       Patient verbalizes understanding of how to remove sensor, if needed, and all instructions provided.     Educational and other materials:  Food/exercise/medication log sheets  Contact information    PLAN:  Pt was given instructions for tracking BG, medications, food intake and activity.  Patient to return all items associated with the professional Continuous Glucose Monitor System.  See Patient Instructions, AVS printed  and provided to patient today.    Follow-up:    Follow up on 2/27 @ 12:30p.    Nina Silverman RD, LD, CDE   Time spent in DSMT: 5 minutes   Time spent in CGM insertion: 5 minutes, in addition to time spent in DSMT  Encounter Type: Individual

## 2020-02-12 NOTE — PATIENT INSTRUCTIONS
"Post infectious Bronchospasm (wheezing):  ===============================================      *  Bronchospasm is irritation of the airways that causes them to spasm and temporarily \"narrow\" which causes coughing (usually minimal sputum production), shortness of breath, dyspnea on exertion, wheezing.  Your airways will be more reactive to things such as temperature changes (too cold, too hot, too humid, etc.), activity, pollutants such as dander, smoke, air pollution, etc.  This will get better with time, but will produce lingering symptoms as described above for a couple weeks to a couple of months.     *  Antibiotics not indicated    *  Fluticasone inhaler:   2 puffs twice per day, every day to help control and prevent the inflammation in the airways for at least the next month.   Fluticasone is not a \"rescue inhaler\", you should not use this inhaler for urgent breathing problem, use the Albuterol inhaler.          *  Albuterol inhaler, use 2 puffs, 3-5 times per day as needed for any coughing, wheezing, tightness in the breathing, or shortness of breath.  This inhaled medication helps reduce the inflammation and spasm of the airways which will help the breathing become easier.  This is a similar medication we use to treat asthma, but you do not have asthma.      Consider using this inhaler before any known triggers for our coughing or wheezing (usually these include cold or hot temperatures, humidity, dust, air pollutants, smoke).      *  Expect that your airways may remain more easily irritated for a few weeks after upper respiratory infections.     If you require the albuterol rescue inhaler on a regular basis more than a few times per week, you may need additional medications to help control the breathing better.    These are the available forms of Albuterol, your insurance formulary will determine which one is preferred.  They all work the same.                 *  Cough suppressant Delsym, follow directions " on bottle    *  Mucinex extended release, one or two tablets twice per day for the next 5-7 days.  This helps loosen the secretions to they can be passed more easily out of the body.     *  Be sure to drink lots of fluids.  If the appetite and intake of food is way down, then at leat try to eat soup.  Dehydration is the thing that causes people to end up in the hospital with viral infections and the flu.     *  For sore throat:  Try lozenges (Cepostat, Cepacol, hard candies, Zinc lozenges, etc)    *  If you have thick secretions:  Mucinex extended release twice per day on a regular basis for the next few days, then twice per day as needed.  OK to take the regular Mucinex, you may just have to take it 2-3 times per day.      *  If you have dry nasal passages or frequent bloody noses during the winter time:  Saline nasal spray as often as needed for dry nose.     *  If you have a lot of congestion and/or runny noses:  OK to try decongestants as needed (e.g. pseudoephedrine or phenylephrine).  Be sure to take the lowest dose needed.  Take decongestants from only ONE source.  It is possible to inadvertantly take more than the recommended amounts if you take decongestants from multiple products (i.e. Do NOT take Mucinex-D and Claritin-D together)    *  If you have been told to NOT take decongestants or if you cannot tolerate decongestants due to effect on blood pressure, sleep or heart rate:  Try Coricidin HBP or Chlortrimeton (chlorpheniramine).  These can have a similar effect as some decongestants but will not affect your heart rate or blood pressure.      *  If you have SEVERE nasal congestion:  Affrin nasal spray as needed for severe nasal congestion (especially before the airplane trip), but do not use for more than one week.      *  Tylenol as needed for any headaches of low grade temperatures.     *  Ibuprofen as needed for body aches, fevers, headaches    *  Call the clinic if any changes in the symptoms such as  worsening fevers, or the amount and quality of the sputum changes, or if you have any major difficulties breathing, or the symptoms fail to clear for a few weeks.    *  Most upper respiratory infection will clear 1-2 weeks, but it is not uncommon for post viral coughs to last for several weeks, even rarely the entire winter.          BRONCHOSPASM (AIRWAY SPASM):             SMOKING CESSATION:  ===========================================    *  Consider visiting www.Quitplan.[employer] for options for support in quitting smoking.     * Set a quit date when you will no longer smoke.    *  Get something for your hands to do when you are relaxing.  Examples may be a rubber band around your wrist, pen to click, poker chip, silver dollar, or  strength ball.  This will keep your hands occupied when you would have normally been holding a cigarette.    *  Have something to put in your mouth ( preferably something healthy).  The oral fixation in smokers can be a difficult thing to get over.  Use gum, [employer], Dum Dum suckers, carrots, celery sticks.  try to avoid the urge to snack or est junk food.  This will help prevent the weight gain that many people who quit smoking can experience.    2 medication options for quitting smoking aids:    ------------------------------------------------------------------    Option #1:  Consider Chantix.  Visit Chantix web site (www.[employer].com) for more details about the medicatino and how it works, and even for coupons.  The nicotine withdrawal is managed via the mechanism by which Chantix works, you do not need nicotine replacement while taking this.   The main side effects include weird or strange dreams, stomach upset, and potential adverse changes in the mood, including worsening of depression or anxiety.  There have even been reports of suicides caused by the worsening in the depression.  If you take Chantix and develop any adverse changes in your mood or become more depressed or  more anxious, then you should stop the medicatioon immediately and contact us.  Any side effects from Chantix usually resolve very quickly.      *  If you start Chantix, I will prescribe the first month, then ask that you contact me with an update after the first month, regardless of how you feel to see how the medication is working for you and to make sure no side effects.  If the medication is working well and there are no side effects, then I will continue the medication.     Option #2:   *  Start Nicotine replacement with either gum or lozenges starting the first day of not smoking.  Slowly decrease the amount of nicotine replacement over a few weeks until you no longer need it.  your body will adjust gradually to requiring no nicotine at all.  The first 1-2 weeks are the toughest as your body gets used to not having nicotine around.    *  Start Zyban (or Buproprion/Wellbutrin) 150 mg once daily first thing in the morning 1 week before your quit date.  If no side effects, then add second dose of 150 mg late in the afternoon or early evening.    *Potential side effects including but not limited to seizure (1 out of 1000 patients on Zyban may experience a seizure), GI upset, insomnia, headache, weight loss.  If your experience side effects while ion Zyban/Wellbutrin, then call [phone] ([facility] Internal Medicine Nurse Line) and let me know.  You only need to stay on the zyban for about 2-3 months before you can stop it.  When you have decided to longer take Zyban/Wellbutrin, then go down to 1 tablet per day again for 1 week, then stop completely.

## 2020-02-18 ENCOUNTER — TELEPHONE (OUTPATIENT)
Dept: ENDOCRINOLOGY | Facility: CLINIC | Age: 60
End: 2020-02-18

## 2020-02-18 NOTE — LETTER
[facility]  [address], [address]  [place], [place]  [address]                                            TEL:[phone]  FAX:[phone]      Mary Lou Saleems  [address]  [facility] [address]      February 18, 2020    Dear Mary Lou       This letter is being sent on behalf of Dr. Ramirez. It is to remind you that your provider expected you to return for a follow up clinic visit. Please make a lab only appointment, and then follow up with a clinic visit one week afterwards to review those results. If this is not done, it may result in your provider not being able to refill your medications.     You may call our office at [phone] ([place]) or [phone] ([place]) to schedule an appointment. You may also see Madai Garcia in [place] at [phone]    If you have already scheduled these appointments, please disregard this notice.      Sincerely,    [facility],  Dr. Susan Ramirez

## 2020-02-18 NOTE — TELEPHONE ENCOUNTER
Panel Management Review      Patient has the following on her problem list:     Diabetes    ASA: Passed    Last A1C  Lab Results   Component Value Date    A1C 10.3 11/06/2019    A1C 11.0 08/06/2019    A1C 12.6 04/03/2019    A1C 8.9 06/26/2018    A1C 12.5 01/16/2018     A1C tested: FAILED    Last LDL:    Lab Results   Component Value Date    CHOL 194 11/06/2019     Lab Results   Component Value Date    HDL 40 11/06/2019     Lab Results   Component Value Date     11/06/2019     Lab Results   Component Value Date    TRIG 219 11/06/2019     Lab Results   Component Value Date    CHOLHDLRATIO 5.0 08/31/2015     Lab Results   Component Value Date    NHDL 154 11/06/2019       Is the patient on a Statin? YES             Is the patient on Aspirin? YES    Medications     HMG CoA Reductase Inhibitors     pravastatin (PRAVACHOL) 80 MG tablet       Salicylates     aspirin (ASA) 81 MG tablet             Last three blood pressure readings:  BP Readings from Last 3 Encounters:   02/12/20 120/82   11/07/19 111/77   10/29/19 102/70       Date of last diabetes office visit: 11/07/19     Tobacco History:     History   Smoking Status     Current Every Day Smoker     Packs/day: 1.00     Years: 25.00     Types: Cigarettes, Other   Smokeless Tobacco     Never Used     Comment: started at age 19 and has quit for 10 years            Composite cancer screening  Chart review shows that this patient is due/due soon for the following Pap Smear and Colonoscopy  Summary:    Patient is due/failing the following:   A1C, COLONOSCOPY, FOLLOW UP and PAP    Action needed:   Patient needs office visit for endo.    Type of outreach:    Sent letter.    Questions for provider review:    None                                                                                                                                    Yuliet Braden CMA  [facility]  Kishor/Julio César       Chart routed to no one .

## 2020-02-20 ASSESSMENT — ASTHMA QUESTIONNAIRES: ACT_TOTALSCORE: 20

## 2020-02-27 ENCOUNTER — ALLIED HEALTH/NURSE VISIT (OUTPATIENT)
Dept: EDUCATION SERVICES | Facility: CLINIC | Age: 60
End: 2020-02-27
Payer: MEDICARE

## 2020-02-27 VITALS — BODY MASS INDEX: 26.95 KG/M2 | WEIGHT: 167 LBS

## 2020-02-27 DIAGNOSIS — E11.42 TYPE 2 DIABETES MELLITUS WITH DIABETIC POLYNEUROPATHY, WITH LONG-TERM CURRENT USE OF INSULIN (H): Primary | ICD-10-CM

## 2020-02-27 DIAGNOSIS — Z79.4 TYPE 2 DIABETES MELLITUS WITH DIABETIC POLYNEUROPATHY, WITH LONG-TERM CURRENT USE OF INSULIN (H): Primary | ICD-10-CM

## 2020-02-27 PROCEDURE — G0108 DIAB MANAGE TRN  PER INDIV: HCPCS

## 2020-02-27 RX ORDER — METFORMIN HCL 500 MG
TABLET, EXTENDED RELEASE 24 HR ORAL
Qty: 60 TABLET | Refills: 1 | Status: SHIPPED | OUTPATIENT
Start: 2020-02-27 | End: 2020-04-15

## 2020-02-27 RX ORDER — GLIPIZIDE 10 MG/1
TABLET, FILM COATED, EXTENDED RELEASE ORAL
Qty: 30 TABLET | Refills: 1 | Status: SHIPPED | OUTPATIENT
Start: 2020-02-27 | End: 2020-04-15

## 2020-02-27 NOTE — Clinical Note
Dr. Ramirez, Dr. Garcia approved of start of Trulicity today and I also increased her levemir dosing to help bring blood sugars down. Please see CDE progress note for continuous glucose monitoring (CGM) reports, assessment and recommendations. As a provider, you can bill for a non face-to-face interpretation of the sensor report. If you would like to bill for this service, please create a 'Documentation Only' encounter noting your interpretation and assessment of CGM reports, your recommended plan, and bill for this CGM interpretation using code [id number].Thank you!Nina Silverman, RD, LD, CDE

## 2020-02-27 NOTE — PROGRESS NOTES
"Diabetes Self-Management Education & Support    Presents for: CGM Review    SUBJECTIVE/OBJECTIVE:  Presents for: CGM Review  Accompanied by: Self  Diabetes education in the past 24mo: Yes  Focus of Visit: CGM  Type of CGM visit: Professional CGM  Diabetes type: (P) Type 2  Cultural Influences/Ethnic Background:  American      Diabetes Symptoms & Complications:  Fatigue: (P) Yes  Neuropathy: (P) Yes  Polydipsia: (P) Yes  Polyphagia: (P) Yes  Polyuria: (P) Yes  Visual change: (P) Yes  Slow healing wounds: (P) Sometimes  Autonomic neuropathy: (P) No  CVA: (P) No  Heart disease: (P) No  Nephropathy: (P) No  Peripheral neuropathy: (P) Yes  Peripheral Vascular Disease: (P) No  Retinopathy: (P) No  Sexual dysfunction: (P) No    Patient Problem List and Family Medical History reviewed for relevant medical history, current medical status, and diabetes risk factors.    Vitals:  Wt 75.8 kg (167 lb)   BMI 26.95 kg/m    Estimated body mass index is 26.95 kg/m  as calculated from the following:    Height as of 2/12/20: 1.676 m (5' 6\").    Weight as of this encounter: 75.8 kg (167 lb).   Last 3 BP:   BP Readings from Last 3 Encounters:   02/12/20 120/82   11/07/19 111/77   10/29/19 102/70       History   Smoking Status     Current Every Day Smoker     Packs/day: 1.00     Years: 25.00     Types: Cigarettes, Other   Smokeless Tobacco     Never Used     Comment: started at age 19 and has quit for 10 years        Labs:  Lab Results   Component Value Date    A1C 10.3 11/06/2019     Lab Results   Component Value Date     11/06/2019     Lab Results   Component Value Date     11/06/2019     HDL Cholesterol   Date Value Ref Range Status   11/06/2019 40 (L) >49 mg/dL Final   ]  GFR Estimate   Date Value Ref Range Status   11/06/2019 81 >60 mL/min/[1.73_m2] Final     Comment:     Non  GFR Calc  Starting 12/18/2018, serum creatinine based estimated GFR (eGFR) will be   calculated using the Chronic Kidney Disease " "Epidemiology Collaboration   (CKD-EPI) equation.       GFR Estimate If Black   Date Value Ref Range Status   11/06/2019 >90 >60 mL/min/[1.73_m2] Final     Comment:      GFR Calc  Starting 12/18/2018, serum creatinine based estimated GFR (eGFR) will be   calculated using the Chronic Kidney Disease Epidemiology Collaboration   (CKD-EPI) equation.       Lab Results   Component Value Date    CR 0.80 11/06/2019     No results found for: MICROALBUMIN    Healthy Eating:  Healthy Eating Assessed Today: Yes  Cultural/Jainism diet restrictions?: (P) No  Meal planning/habits: (P) Low carb  How many times a week on average do you eat food made away from home (restaurant/take-out)?: (P) 1  Meals include: (P) Lunch, Dinner, Evening Snack  Other: Eating very low carb, trakcing net carbs in foods, following a \"keto\" diet but will occasionally eat things like Chinese food & fried chicken   Beverages: (P) Tea, Coffee  Has patient met with a dietitian in the past?: No    Being Active:  Being Active Assessed Today: No  Barrier to exercise: (P) Physical limitation    Monitoring:  Monitoring Assessed Today: Yes  Did patient bring glucose meter to appointment? : No  Blood Glucose Meter: (P) Unknown  Times checking blood sugar at home (number): (P) 4  Times checking blood sugar at home (per): (P) Week  Blood glucose trend: (P) Fluctuating    See LibrePro data below.     Taking Medications:  Diabetes Medication(s)     Biguanides       metFORMIN (GLUCOPHAGE-XR) 500 MG 24 hr tablet    TAKE TWO TABLETS BY MOUTH ONCE DAILY WITH DINNER    Insulin       LEVEMIR FLEXTOUCH 100 UNIT/ML pen    INJECT 34 UNITS UNDER THE SKIN AT BEDTIME    Sulfonylureas       glipiZIDE (GLUCOTROL XL) 10 MG 24 hr tablet    TAKE ONE TABLET BY MOUTH ONCE DAILY    Incretin Mimetic Agents (GLP-1 Receptor Agonists)       dulaglutide (TRULICITY) 0.75 MG/0.5ML pen    Inject 0.75 mg Subcutaneous every 7 days      *Trulicity added at visit today     Taking " Medication Assessed Today: Yes  Current Treatments: (P) Diet, Insulin Injections, Oral Medication (taken by mouth)  Problems taking diabetes medications regularly?: No  Diabetes medication side effects?: No    Problem Solving:  Problem Solving Assessed Today: Yes  Is the patient at risk for hypoglycemia?: Yes  Hypoglycemia Frequency: Never  Medical ID: No  Does patient have glucagon emergency kit?: No  Is the patient at risk for DKA?: No  Does patient have severe weather/disaster plan for diabetes management?: No  Does patient have sick day plan for diabetes management?: No    Reducing Risks:  Reducing Risks Assessed Today: Yes  Diabetes Risks: Age over 45 years, Sedentary Lifestyle  CAD Risks: (P) Diabetes Mellitus, Dyslipidemia, Hypertension, Post-menopausal, Sedentary lifestyle, Stress, Tobacco exposure  Has dilated eye exam at least once a year?: (P) Yes  Sees dentist every 6 months?: (P) No  Feet checked by healthcare provider in the last year?: (P) No    Healthy Coping:  Healthy Coping Assessed Today: Yes  Emotional response to diabetes: Concern for health and well-being  Informal Support system:: (P) Family, Partner  Stage of change: PREPARATION (Decided to change - considering how)  Patient Activation Measure Survey Score:  MOHAN Score (Last Two) 4/17/2012 8/7/2013   MOHAN Raw Score 39 42   Activation Score 56.4 66   MOHAN Level 3 3       Diabetes knowledge and skills assessment:   Patient is knowledgeable in diabetes management concepts related to: Taking Medication    Patient needs further education on the following diabetes management concepts: Healthy Eating, Being Active, Monitoring, Taking Medication, Problem Solving, Reducing Risks and Healthy Coping    Based on learning assessment above, most appropriate setting for further diabetes education would be: Group class or Individual setting.      INTERVENTIONS:    REPORTS:            Education provided today on:  AADE Self-Care Behaviors:  Healthy Eating:  "carbohydrate counting, consistency in amount, composition, and timing of food intake, weight reduction, portion control, plate planning method and label reading  Monitoring: individual blood glucose targets and frequency of monitoring  Taking Medication: action of prescribed medication, drawing up, administering and storing injectable diabetes medications, proper site selection and rotation for injections, side effects of prescribed medications and when to take medications  Problem Solving: high blood glucose - causes, signs/symptoms, treatment and prevention and low blood glucose - causes, signs/symptoms, treatment and prevention  GLP-1 administration technique taught today. Patient verbalized understanding and was able to perform an accurate return demonstration of administration technique. Side effects were discussed, if patient has any abdominal pain, with or without nausea and/or vomiting, stop medication, call provider, clinic or go to the emergency room.    Pt verbalized understanding of concepts discussed and recommendations provided today.       Education Materials Provided:  Carbohydrate Counting, My Plate Planner and LibrePro reports     ASSESSMENT:  Patient states things are going to be \"bad\" because she has been dealing with things that are very difficult with her therapist. She usually follows a keto diet and feels she is obsessive with food prep and ensuring she is eating low carb. She wasn't checking blood sugars while wearing LibrePro. She does not have follow up in place with Endocrinology but knows that she needs this. She is willing to start new med, is frustrated that she continues to see high numbers despite hard work on diet.     Glucose Patterns & Trends:  Weekday Daytime hyperglycemia, Weekday Nocturnal hyperglycemia, Weekend Daytime hyperglycemia and Weekend Nocturnal hyperglycemia      PLAN  See Patient Instructions for co-developed, patient-stated behavior change goals.  AVS printed and " provided to patient today. See Follow-Up section for recommended follow-up.  Recommend addition of GLP1ra - Trulicity 0.75 mg/week x 4 weeks with likely plan to increase to 1.5 mg/week after this, if tolerated. Discussed with Dr. Garcia who is in agreement with this plan - orders placed and per [facility], is 100% covered with no copay!  Recommend increase in levemir 0-0-0-34 --> 0-0-0-37. This represents a 10% increase in TDD.     Nina Silverman RD, LD, CDE   Time Spent: 60 minutes  Encounter Type: Individual    Any diabetes medication dose changes were made via the CDE Protocol and Collaborative Practice Agreement with the patient's referring provider. A copy of this encounter was shared with the provider.

## 2020-02-28 DIAGNOSIS — E03.9 HYPOTHYROIDISM, UNSPECIFIED TYPE: ICD-10-CM

## 2020-02-28 RX ORDER — LEVOTHYROXINE SODIUM 150 UG/1
TABLET ORAL
Qty: 90 TABLET | Refills: 1 | Status: SHIPPED | OUTPATIENT
Start: 2020-02-28 | End: 2020-06-01

## 2020-02-28 NOTE — TELEPHONE ENCOUNTER
Prescription approved per [facility].    Trish TALBERTN, RN, PHN

## 2020-02-28 NOTE — TELEPHONE ENCOUNTER
"Requested Prescriptions   Pending Prescriptions Disp Refills     levothyroxine (SYNTHROID/LEVOTHROID) 150 MCG tablet [Pharmacy Med Name: LEVOTHYROXINE SODIUM 150MCG TABS] 90 tablet 1     Sig: TAKE ONE TABLET BY MOUTH EVERY MORNING FIRST THING IN THE MORNING ON AN EMPTY STOMACH. NO FOOD FOR 1 HOUR       Thyroid Protocol Passed - 2/28/2020  3:08 PM        Passed - Patient is 12 years or older        Passed - Recent (12 mo) or future (30 days) visit within the authorizing provider's specialty     Patient has had an office visit with the authorizing provider or a provider within the authorizing providers department within the previous 12 mos or has a future within next 30 days. See \"Patient Info\" tab in inbasket, or \"Choose Columns\" in Meds & Orders section of the refill encounter.              Passed - Medication is active on med list        Passed - Normal TSH on file in past 12 months     Recent Labs   Lab Test 11/06/19  1018   TSH 1.83              Passed - No active pregnancy on record     If patient is pregnant or has had a positive pregnancy test, please check TSH.          Passed - No positive pregnancy test in past 12 months     If patient is pregnant or has had a positive pregnancy test, please check TSH.          Last Written Prescription Date:  9/10/19  Last Fill Quantity: 90,  # refills: 1   Last office visit: 2/12/2020 with prescribing provider:  Jose   Future Office Visit:      "

## 2020-03-01 ENCOUNTER — HEALTH MAINTENANCE LETTER (OUTPATIENT)
Age: 60
End: 2020-03-01

## 2020-03-06 DIAGNOSIS — M53.3 SI (SACROILIAC) JOINT DYSFUNCTION: ICD-10-CM

## 2020-03-06 DIAGNOSIS — F33.0 MAJOR DEPRESSIVE DISORDER, RECURRENT EPISODE, MILD (H): ICD-10-CM

## 2020-03-06 DIAGNOSIS — R53.81 PHYSICAL DECONDITIONING: ICD-10-CM

## 2020-03-06 DIAGNOSIS — G89.29 OTHER CHRONIC PAIN: ICD-10-CM

## 2020-03-06 DIAGNOSIS — M25.552 HIP PAIN, LEFT: ICD-10-CM

## 2020-03-06 RX ORDER — CITALOPRAM HYDROBROMIDE 40 MG/1
TABLET ORAL
Qty: 90 TABLET | Refills: 0 | Status: SHIPPED | OUTPATIENT
Start: 2020-03-06 | End: 2020-06-11

## 2020-03-06 RX ORDER — IBUPROFEN 800 MG/1
TABLET, FILM COATED ORAL
Qty: 90 TABLET | Refills: 2 | Status: SHIPPED | OUTPATIENT
Start: 2020-03-06 | End: 2023-08-21

## 2020-03-06 NOTE — TELEPHONE ENCOUNTER
"Requested Prescriptions   Pending Prescriptions Disp Refills     citalopram (CELEXA) 40 MG tablet [Pharmacy Med Name: CITALOPRAM HYDROBROMIDE 40MG TABS] 90 tablet 0     Sig: TAKE ONE TABLET BY MOUTH EVERY DAY TO CONTROL SYMPTOMS OF DEPRESSION   Last Written Prescription Date:  12/11/2019  Last Fill Quantity: 90,  # refills: 0   Last Office Visit: 2/12/2020   Future Office Visit:         SSRIs Protocol Failed - 3/6/2020  5:01 AM        Failed - PHQ-9 score less than 5 in past 6 months     Please review last PHQ-9 score.   PHQ-9 SCORE 3/14/2018 4/3/2019 11/7/2019   PHQ-9 Total Score - - -   PHQ-9 Total Score MyChart 11 (Moderate depression) - 9 (Mild depression)   PHQ-9 Total Score 11 4 9     ROMERO-7 SCORE 9/6/2017 11/7/2019   Total Score 10 (moderate anxiety) 9 (mild anxiety)   Total Score 10 9                 Passed - Medication is active on med list        Passed - Patient is age 18 or older        Passed - No active pregnancy on record        Passed - No positive pregnancy test in last 12 months        Passed - Recent (6 mo) or future (30 days) visit within the authorizing provider's specialty     Patient had office visit in the last 6 months or has a visit in the next 30 days with authorizing provider or within the authorizing provider's specialty.  See \"Patient Info\" tab in inbasket, or \"Choose Columns\" in Meds & Orders section of the refill encounter.            ibuprofen (ADVIL/MOTRIN) 800 MG tablet [Pharmacy Med Name: IBUPROFEN 800MG TABS] 90 tablet 2     Sig: TAKE ONE TABLET BY MOUTH EVERY 8 HOURS AS NEEDED FOR MODERATE PAIN   Last Written Prescription Date:  12/11/2019  Last Fill Quantity: 90,  # refills: 2   Last Office Visit: 2/12/2020   Future Office Visit:         NSAID Medications Failed - 3/6/2020  5:01 AM        Failed - Normal CBC on file in past 12 months     Recent Labs   Lab Test 05/21/18  2107   WBC 10.6   RBC 5.02   HGB 15.2   HCT 44.1                    Passed - Blood pressure under " "140/90 in past 12 months     BP Readings from Last 3 Encounters:   02/12/20 120/82   11/07/19 111/77   10/29/19 102/70                 Passed - Normal ALT on file in past 12 months     Recent Labs   Lab Test 11/06/19  1018   ALT 31             Passed - Normal AST on file in past 12 months     Recent Labs   Lab Test 11/06/19  1018   AST 10             Passed - Recent (12 mo) or future (30 days) visit within the authorizing provider's specialty     Patient has had an office visit with the authorizing provider or a provider within the authorizing providers department within the previous 12 mos or has a future within next 30 days. See \"Patient Info\" tab in inbasket, or \"Choose Columns\" in Meds & Orders section of the refill encounter.              Passed - Patient is age 6-64 years        Passed - Medication is active on med list        Passed - No active pregnancy on record        Passed - Normal serum creatinine on file in past 12 months     Recent Labs   Lab Test 11/06/19  1018  08/26/16  1652   CR 0.80   < >  --    CREAT  --   --  0.8    < > = values in this interval not displayed.             Passed - No positive pregnancy test in past 12 months          "

## 2020-03-17 ENCOUNTER — PATIENT OUTREACH (OUTPATIENT)
Dept: EDUCATION SERVICES | Facility: CLINIC | Age: 60
End: 2020-03-17

## 2020-03-17 NOTE — PROGRESS NOTES
Left voicemail to reschedule visit on Thursday, 3/19, as CDE is out of office. She is aware that it will be a telephone visit. Provided Diabetes Ed triage line as call back to r/s.  Nina Silverman RD, LD, CDE

## 2020-03-18 ENCOUNTER — DOCUMENTATION ONLY (OUTPATIENT)
Dept: ENDOCRINOLOGY | Facility: CLINIC | Age: 60
End: 2020-03-18
Payer: MEDICARE

## 2020-03-18 DIAGNOSIS — E11.42 TYPE 2 DIABETES MELLITUS WITH DIABETIC POLYNEUROPATHY, WITH LONG-TERM CURRENT USE OF INSULIN (H): Primary | ICD-10-CM

## 2020-03-18 DIAGNOSIS — Z79.4 TYPE 2 DIABETES MELLITUS WITH DIABETIC POLYNEUROPATHY, WITH LONG-TERM CURRENT USE OF INSULIN (H): Primary | ICD-10-CM

## 2020-03-18 PROCEDURE — 95251 CONT GLUC MNTR ANALYSIS I&R: CPT | Performed by: INTERNAL MEDICINE

## 2020-03-18 NOTE — PROGRESS NOTES
"CGM review:            ASSESSMENT:   Patient states things are going to be \"bad\" because she has been dealing with things that are very difficult with her therapist. She usually follows a keto diet and feels she is obsessive with food prep and ensuring she is eating low carb. She wasn't checking blood sugars while wearing LibrePro. She does not have follow up in place with Endocrinology but knows that she needs this. She is willing to start new med, is frustrated that she continues to see high numbers despite hard work on diet.   Glucose Patterns & Trends:   Weekday Daytime hyperglycemia, Weekday Nocturnal hyperglycemia, Weekend Daytime hyperglycemia and Weekend Nocturnal hyperglycemia.      PLAN   Start Trulicity 0.75 mg/week x 4 weeks with likely plan to increase to 1.5 mg/week after this, if tolerated.   Recommend increase in levemir 0-0-0-34 --> 0-0-0-37.   Continue rest of the medication at current dose.     CGM data reviewed.  Diabetic Educator Assessment and plan reviewed.    I agree with CGM data assessment and plan.    Susan Ramirez MD      "

## 2020-04-15 DIAGNOSIS — E11.42 TYPE 2 DIABETES MELLITUS WITH DIABETIC POLYNEUROPATHY, WITH LONG-TERM CURRENT USE OF INSULIN (H): ICD-10-CM

## 2020-04-15 DIAGNOSIS — Z79.4 LONG TERM (CURRENT) USE OF INSULIN (H): ICD-10-CM

## 2020-04-15 DIAGNOSIS — Z79.4 TYPE 2 DIABETES MELLITUS WITH DIABETIC POLYNEUROPATHY, WITH LONG-TERM CURRENT USE OF INSULIN (H): ICD-10-CM

## 2020-04-15 NOTE — TELEPHONE ENCOUNTER
"Requested Prescriptions   Pending Prescriptions Disp Refills     blood glucose (NO BRAND SPECIFIED) test strip 200 strip 11     Sig: Use to test blood sugar 3 times daily or as directed. To accompany: Blood Glucose Monitor Brands: per insurance.       Diabetic Supplies Protocol Passed - 4/15/2020  2:07 PM        Passed - Medication is active on med list        Passed - Patient is 18 years of age or older        Passed - Recent (6 mo) or future (30 days) visit within the authorizing provider's specialty     Patient had office visit in the last 6 months or has a visit in the next 30 days with authorizing provider.  See \"Patient Info\" tab in inbasket, or \"Choose Columns\" in Meds & Orders section of the refill encounter.               "

## 2020-04-27 ENCOUNTER — NURSE TRIAGE (OUTPATIENT)
Dept: NURSING | Facility: CLINIC | Age: 60
End: 2020-04-27

## 2020-04-27 NOTE — TELEPHONE ENCOUNTER
Started on Trulicity for DM (doesn't take insulin or glipizide anymore). Has now stopped Keto diet - #'s last couple of days have been 400-500 - mouth feels like cotton for past week. Feels weak and tired.     Levemir 100unit / mL pen (previously took 34 units).    Patient doesn't want to go to ED - is wondering if she could stay home and take some insulin.    Paged on call: Dr. Dixon at [phone].  Paged on call: Dr. Dixon at [phone].    Take Levemir 5 units tonight and call endocrinologist in morning. Patient agrees with plan.    COVID 19 Nurse Triage Plan/Patient Instructions    Please be aware that novel coronavirus (COVID-19) may be circulating in the community. If you develop symptoms such as fever, cough, or SOB or if you have concerns about the presence of another infection including coronavirus (COVID-19), please contact your health care provider or visit [email or web address].     Disposition/Instructions    Patient to stay at home and follow home care protocol based instructions.     Thank you for limiting contact with others, wearing a simple mask to cover your cough, practice good hand hygiene habits and accessing our virtual services where possible to limit the spread of this virus.    For more information about COVID19 and options for caring for yourself at home, please visit the CDC website at https://www.cdc.gov/coronavirus/2019-ncov/about/steps-when-sick.html  For more options for care at [facility], please visit our website at [email or web address]    For more information, please use the [place] Department of Health COVID-19 Website: https://www.health.state.[place]/diseases/coronavirus/index.html  [place] Department of Health ([facility]) COVID-19 Hotlines (Interpreters available):      Health questions: Phone Number: [phone] or [phone] and Hours: 7 a.m. to 7 p.m.    Schools and  questions: Phone Number: [phone] or [phone] and Hours 7 a.m. to 7  "shanell May RN on 4/27/2020 at 6:42 PM    Reason for Disposition    [1] Blood glucose > 300 mg/dl (16.7 mmol/l) AND [2] two or more times in a row    Additional Information    Negative: Unconscious or difficult to awaken    Negative: Acting confused (e.g., disoriented, slurred speech)    Negative: Very weak (e.g., can't stand)    Negative: Sounds like a life-threatening emergency to the triager    Negative: [1] Vomiting AND [2] signs of dehydration (e.g., very dry mouth, lightheaded, etc.)    Negative: [1] Blood glucose > 240 mg/dl (13 mmol/l) AND [2] rapid breathing    Negative: Blood glucose > 500 mg/dl (27.5 mmol/l)    Negative: [1] Blood glucose > 240 mg/dl (13 mmol/l) AND [2] urine ketones moderate-large (or more than 1+)    Negative: [1] Blood glucose > 240 mg/dl (13 mmol/l) AND [2] blood ketones > 1.5 mmol/l    Negative: [1] Blood glucose > 240 mg/dl (13 mmol/l) AND [2] vomiting AND [3] unable to check for ketones (in blood or urine)    Negative: [1] New onset Diabetes suspected (e.g., frequent urination, weak, weight loss) AND [2] vomiting or rapid breathing    Negative: Vomiting lasts > 4 hours    Negative: Patient sounds very sick or weak to the triager    Negative: Fever > 100.5 F (38.1 C)    Negative: Blood glucose > 400 mg/dl (22 mmol/l)    Answer Assessment - Initial Assessment Questions  1. BLOOD GLUCOSE: \"What is your blood glucose level?\"       393  2. ONSET: \"When did you check the blood glucose?\"      An hour ago  3. USUAL RANGE: \"What is your glucose level usually?\" (e.g., usual fasting morning value, usual evening value)      230 (lower 200's)  4. KETONES: \"Do you check for ketones (urine or blood test strips)?\" If yes, ask: \"What does the test show now?\"       No  5. TYPE 1 or 2:  \"Do you know what type of diabetes you have?\"  (e.g., Type 1, Type 2, Gestational; doesn't know)       2  6. INSULIN: \"Do you take insulin?\" If yes, ask: \"Have you missed any shots recently?\"      Recently " "stopped insulin - switched to trulicity  7. DIABETES PILLS: \"Do you take any pills for your diabetes?\" If yes, ask: \"Have you missed taking any pills recently?\"      Trulicity - hasn't missed any doses  8. OTHER SYMPTOMS: \"Do you have any symptoms?\" (e.g., fever, frequent urination, difficulty breathing, dizziness, weakness, vomiting)      Headache, really weak - limbs feel heavy, tired, mouth is really dry  9. PREGNANCY: \"Is there any chance you are pregnant?\" \"When was your last menstrual period?\"      No    Protocols used: DIABETES - HIGH BLOOD SUGAR-A-AH      "

## 2020-04-28 ENCOUNTER — NURSE TRIAGE (OUTPATIENT)
Dept: INTERNAL MEDICINE | Facility: CLINIC | Age: 60
End: 2020-04-28

## 2020-04-28 DIAGNOSIS — E11.42 TYPE 2 DIABETES MELLITUS WITH DIABETIC POLYNEUROPATHY, WITH LONG-TERM CURRENT USE OF INSULIN (H): ICD-10-CM

## 2020-04-28 DIAGNOSIS — Z79.4 TYPE 2 DIABETES MELLITUS WITH DIABETIC POLYNEUROPATHY, WITH LONG-TERM CURRENT USE OF INSULIN (H): ICD-10-CM

## 2020-04-28 NOTE — TELEPHONE ENCOUNTER
I left a message for the patient to return our call.     Yuliet Braden, JOANIE  [facility]  [place]

## 2020-04-28 NOTE — TELEPHONE ENCOUNTER
Endo staff- can you please take a look into this?  Please get current regimen and BG data for last few days.    Thank you.

## 2020-04-28 NOTE — TELEPHONE ENCOUNTER
"Patient calling stating her sugar was 368 this am.  Patient stated her sugars have been high for the past week.  Patient c/o her mouth being dry along with headache, weakness and fatigue. Patient stated she is just not feeling very well.  Patient denies any fever and does not check for ketones.  Patient wanting some guidance regarding her sugars and any medication changes.  Please advise if patient needs appointment.  Thanks.    Additional Information    Negative: Unconscious or difficult to awaken    Negative: Acting confused (e.g., disoriented, slurred speech)    Negative: Very weak (can't stand)    Negative: Sounds like a life-threatening emergency to the triager    Negative: Vomiting and signs of dehydration (e.g., very dry mouth, lightheaded, etc.)    Negative: Blood glucose > 240 mg/dl (13 mmol/l) and rapid breathing    Negative: Blood glucose > 500 mg/dl (27.5 mmol/l)    Negative: Blood glucose > 240 mg/dl (13 mmol/l) AND urine ketones moderate-large (or more than 1+)    Negative: Blood glucose > 240 mg/dl (13 mmol/l) and blood ketones > 1.5 mmol/l    Negative: Blood glucose > 240 mg/dl (13 mmol/l) AND vomiting AND unable to check for ketones (in blood or urine)    Negative: Vomiting lasting > 4 hours    Negative: Patient sounds very sick or weak to the triager    Negative: Fever > 100.5 F (38.1 C)    Negative: Caller has URGENT medication or insulin pump question and triager unable to answer question    Symptoms of high blood sugar (e.g., frequent urination, weak, weight loss) and not able to test blood glucose    Answer Assessment - Initial Assessment Questions  1. BLOOD GLUCOSE: \"What is your blood glucose level?\"       368 this am  2. ONSET: \"When did you check the blood glucose?\"      This am  3. USUAL RANGE: \"What is your glucose level usually?\" (e.g., usual fasting morning value, usual evening value)      They were going down when first starting trulicity.  They were mid 200's to 170 and some were 80.  " "Patient has been adding carbs to her diet recently too per instruction from CDE.    4. KETONES: \"Do you check for ketones (urine or blood test strips)?\" If yes, ask: \"What does the test show now?\"       Na- patient doesn't do  5. TYPE 1 or 2:  \"Do you know what type of diabetes you have?\"  (e.g., Type 1, Type 2, Gestational; doesn't know)       Type 2  6. INSULIN: \"Do you take insulin?\" If yes, ask: \"Have you missed any shots recently?\"      no  7. DIABETES PILLS: \"Do you take any pills for your diabetes?\" If yes, ask: \"Have you missed taking any pills recently?\"      Just trulicity  8. OTHER SYMPTOMS: \"Do you have any symptoms?\" (e.g., fever, frequent urination, difficulty breathing, dizziness, weakness, vomiting)      Headache, dry mouth, headache, weakness, fatigue  9. PREGNANCY: \"Is there any chance you are pregnant?\" \"When was your last menstrual period?\"      no    Protocols used: DIABETES - HIGH BLOOD SUGAR-A-OH      "

## 2020-04-28 NOTE — TELEPHONE ENCOUNTER
Not on levemir or glipizide 1.5-2 months ago.    Doing metformin and trulicity only.    04/28  1142  360  04/27  626p  393  04/27  305p  363  04/27  1039  403  04/26  516p  377  04/26  1214p  393  04/25  117p  374  04/24  938p  271    525p  410      Yuliet Braden, [facility]  [place]

## 2020-04-28 NOTE — TELEPHONE ENCOUNTER
Called Mary Lou. BG noted to be high.  Currently taking Metfomrin XR 1000 mg at dinner and Trulicity 1.5 mg/week.  Did not tolerate higher dose of metformin.    Was doing keto and BG were good.  Now she stopped keto and BG are higher.    Was on Levemire and glipizide in addition to above in past.    She plans to continue non keto diet but will be reducing the carbs.    Recommend to start Levemire 15 units/day.    Send BG data 2 weeks/ can virtual visit at that time. Help schedule.

## 2020-04-30 NOTE — TELEPHONE ENCOUNTER
I left a message for the patient to return our call.     Yuliet Braden, JOANIE  [facility]  [place]

## 2020-05-01 ENCOUNTER — TELEPHONE (OUTPATIENT)
Dept: ENDOCRINOLOGY | Facility: CLINIC | Age: 60
End: 2020-05-01

## 2020-05-01 DIAGNOSIS — Z79.4 TYPE 2 DIABETES MELLITUS WITH DIABETIC POLYNEUROPATHY, WITH LONG-TERM CURRENT USE OF INSULIN (H): ICD-10-CM

## 2020-05-01 DIAGNOSIS — E11.42 TYPE 2 DIABETES MELLITUS WITH DIABETIC POLYNEUROPATHY, WITH LONG-TERM CURRENT USE OF INSULIN (H): ICD-10-CM

## 2020-05-01 NOTE — TELEPHONE ENCOUNTER
Patient calling  Going back on Keto diet. Her numbers are still high on insulin detemir (LEVEMIR FLEXTOUCH) 100 UNIT/ML pen     Ok to call and lm [phone]

## 2020-05-04 NOTE — TELEPHONE ENCOUNTER
Next 5 appointments (look out 90 days)    May 28, 2020  1:30 PM CDT  Return Visit with Susan Ramirez MD  [facility] ([facility]) [address] [facility] [address]  [facility] [id number] [phone]        Yuliet Braden CMA  [facility]  [place]

## 2020-05-04 NOTE — TELEPHONE ENCOUNTER
Please see message below and advise. Patient states she started taking Levemir 15 units before bed on 4/29/20.    4/29  387 AM fasting  354 evening (patient unsure if before or after meal)    4/30  338 AM fasting  433 evening (patient unsure if before or after meal)    5/1  360 AM fasting  296 evening (patient unsure if before or after meal)    5/2  345 AM fasting  272 evening (patient unsure if before or after meal)    5/3   304 fasting  314 evening (patient unsure if before or after meal)    Patient states she has started eating low-carb snacks, and drinking a lot of water. Patient states going forward she would like to go back on the Keto diet. Please advise if adjustments need to be made to medications.

## 2020-05-04 NOTE — TELEPHONE ENCOUNTER
BG high.    yes:     Diabetes Medication(s)     Biguanides       metFORMIN (GLUCOPHAGE-XR) 500 MG 24 hr tablet    TAKE TWO TABLETS BY MOUTH ONCE DAILY WITH DINNER    Insulin       insulin detemir (LEVEMIR FLEXTOUCH) 100 UNIT/ML pen    Inject 15 Units Subcutaneous At Bedtime Patient will call when needed. Do not fill at this time. Update the dose in system.    Incretin Mimetic Agents (GLP-1 Receptor Agonists)       dulaglutide (TRULICITY) 1.5 MG/0.5ML pen    Inject 1.5 mg Subcutaneous every 7 days        Continue metformin at current dose. She is not able to tolerate higher dose.  Continue Trulicity at current dose.  Increase Levemire: take 20 units in AM and start 10 units PM.  Send BG data in 1 week.    Please inform patient.

## 2020-05-27 DIAGNOSIS — Z79.4 TYPE 2 DIABETES MELLITUS WITH DIABETIC POLYNEUROPATHY, WITH LONG-TERM CURRENT USE OF INSULIN (H): ICD-10-CM

## 2020-05-27 DIAGNOSIS — E03.9 HYPOTHYROIDISM, UNSPECIFIED TYPE: ICD-10-CM

## 2020-05-27 DIAGNOSIS — E11.42 TYPE 2 DIABETES MELLITUS WITH DIABETIC POLYNEUROPATHY, WITH LONG-TERM CURRENT USE OF INSULIN (H): ICD-10-CM

## 2020-05-27 LAB
HBA1C MFR BLD: 11.2 % (ref 0–5.6)
T4 FREE SERPL-MCNC: 1.91 NG/DL (ref 0.76–1.46)
TSH SERPL DL<=0.005 MIU/L-ACNC: 0.38 MU/L (ref 0.4–4)

## 2020-05-27 PROCEDURE — 99000 SPECIMEN HANDLING OFFICE-LAB: CPT | Performed by: INTERNAL MEDICINE

## 2020-05-27 PROCEDURE — 84439 ASSAY OF FREE THYROXINE: CPT | Mod: 90 | Performed by: INTERNAL MEDICINE

## 2020-05-27 PROCEDURE — 84443 ASSAY THYROID STIM HORMONE: CPT | Performed by: INTERNAL MEDICINE

## 2020-05-27 PROCEDURE — 83036 HEMOGLOBIN GLYCOSYLATED A1C: CPT | Performed by: INTERNAL MEDICINE

## 2020-05-27 PROCEDURE — 36415 COLL VENOUS BLD VENIPUNCTURE: CPT | Performed by: INTERNAL MEDICINE

## 2020-05-28 ENCOUNTER — MYC MEDICAL ADVICE (OUTPATIENT)
Dept: ENDOCRINOLOGY | Facility: CLINIC | Age: 60
End: 2020-05-28

## 2020-06-01 ENCOUNTER — VIRTUAL VISIT (OUTPATIENT)
Dept: ENDOCRINOLOGY | Facility: CLINIC | Age: 60
End: 2020-06-01
Payer: MEDICARE

## 2020-06-01 DIAGNOSIS — Z79.4 TYPE 2 DIABETES MELLITUS WITH DIABETIC POLYNEUROPATHY, WITH LONG-TERM CURRENT USE OF INSULIN (H): Primary | ICD-10-CM

## 2020-06-01 DIAGNOSIS — E11.42 TYPE 2 DIABETES MELLITUS WITH DIABETIC POLYNEUROPATHY, WITH LONG-TERM CURRENT USE OF INSULIN (H): Primary | ICD-10-CM

## 2020-06-01 DIAGNOSIS — E03.9 HYPOTHYROIDISM, UNSPECIFIED TYPE: ICD-10-CM

## 2020-06-01 LAB — T4 FREE SERPL DIALY-MCNC: 3.4 NG/DL (ref 1.1–2.4)

## 2020-06-01 PROCEDURE — 99214 OFFICE O/P EST MOD 30 MIN: CPT | Mod: 95 | Performed by: INTERNAL MEDICINE

## 2020-06-01 RX ORDER — LEVOTHYROXINE SODIUM 137 UG/1
137 TABLET ORAL DAILY
Qty: 90 TABLET | Refills: 0 | Status: SHIPPED | OUTPATIENT
Start: 2020-06-01 | End: 2020-08-27

## 2020-06-01 RX ORDER — GLIPIZIDE 5 MG/1
5 TABLET, FILM COATED, EXTENDED RELEASE ORAL DAILY
Qty: 90 TABLET | Refills: 1 | Status: SHIPPED | OUTPATIENT
Start: 2020-06-01 | End: 2020-11-19

## 2020-06-01 NOTE — PATIENT INSTRUCTIONS
[facility] & [place] locations   Dr Ramirez, Endocrinology Department      [facility]   [address]  [place] [address]  Appointment [phone]  Fax: [phone]  [place]: Monday and Tuesday         [facility]   [address]  [place] [address]  Appointment [phone]  Fax: [phone]  [place]: Wednesday and Thursday            Please check the cost coverage and copay with insurance before recommended tests, services and medications (especially if new medications are prescribed).     If ordered, please get blood work done 1 week prior to your next appointment so they will be available to Dr. Ramirez at your visit.    To provide the best diabetic care, please bring your blood glucose meter to each and every visit with your  Endocrinologist. Your blood glucose meter/insulin pump will be downloaded at every appointment.  Please arrive 15 minutes before your scheduled appointment. This will allow for your blood glucose meter/insulin pump  to be downloaded.  If you are wearing DEXCOM please bring  or sharing code from the Dexcom Clarity Appt so that it can be downloaded.  If you are using freestyle genet personal sensors please bring the reader.  If you are using TANDEM insulin pump please have your username and password to get info from Tandem website.        Continue metformin 1000 milligrams daily, Trulicity 1.5 mg/week.  Increase Levemir to 25 Units AM and 15 Units PM.  Restart GLIPIZIDE XL 5 mg/day with food.  Decrease levothyroxine to 137 mcg/day. New prescription sent.  Labs in 6 weeks.  Please make a lab appointment for blood work and follow up clinic appointment in 1 week after that to discuss results.  Follow up in 6 weeks.    Recommend checking blood sugars before meals and at bedtime.    If Blood glucose are low more often-> 2-3 times/week- give us a call.  The patient is  advised to Make better food choices: reduce carbs, Reduce portion size, weight loss and exercise 3-4 times a week.  Discussed hypoglycemia signs and symptoms as well as management in detail.
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
## 2020-06-01 NOTE — NURSING NOTE
5/27 309 3:38 pm,   5/25 221 7:56 pm,   5/25 286 11:28 am,   5/25 245 12:13 am,   5/25 325 2:03 pm,   5/23 190 7:34 pm,   5/23 305 11:28 am,   5/23 238 12:24 am,   5/21 275 4:29 pm  5/20 307 12:14 pm,   5/19 369 7:03 pm,   5/19 352 2:06 pm,   5/18 362 9:46 pm,   5/17 306 9:46 am,   5/16 251 2:39 pm,   5/15 288 11:20 pm,   5/14 337 1:52 pm,   5/14 289 9:44 am,   5/13 246 7:11 pm,    Yuliet Braden, [facility]  [place]
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
## 2020-06-11 ENCOUNTER — NURSE TRIAGE (OUTPATIENT)
Dept: NURSING | Facility: CLINIC | Age: 60
End: 2020-06-11

## 2020-06-11 DIAGNOSIS — F33.0 MAJOR DEPRESSIVE DISORDER, RECURRENT EPISODE, MILD (H): ICD-10-CM

## 2020-06-11 RX ORDER — CITALOPRAM HYDROBROMIDE 40 MG/1
TABLET ORAL
Qty: 90 TABLET | Refills: 0 | Status: SHIPPED | OUTPATIENT
Start: 2020-06-11 | End: 2021-01-11

## 2020-06-11 NOTE — TELEPHONE ENCOUNTER
"Pharmacy is calling regarding how to get some documentation that they received on their fax machine that looks like it was meant for Dr. Xavi Maurer at the [facility].   Fax number for clinic was given to the pharmacy. [phone].    Answer Assessment - Initial Assessment Questions  1. REASON FOR CALL or QUESTION: \"What is your reason for calling today?\" or \"How can I best help you?\" or \"What question do you have that I can help answer?\"      Pharmacy is calling regarding documentation they had received on their fax that appeared to be in regards to this patient and a doctor at the [facility].    Protocols used: INFORMATION ONLY CALL-A-    Saba Rowland RN on 6/11/2020 at 12:27 PM    "

## 2020-06-25 ENCOUNTER — NURSE TRIAGE (OUTPATIENT)
Dept: NURSING | Facility: CLINIC | Age: 60
End: 2020-06-25

## 2020-06-25 ENCOUNTER — TELEPHONE (OUTPATIENT)
Dept: ENDOCRINOLOGY | Facility: CLINIC | Age: 60
End: 2020-06-25

## 2020-06-25 NOTE — TELEPHONE ENCOUNTER
Forms/Letter Request  Name of form/letter: Letter for dentist. Patient needs to get a tooth pulled and her A1C is at 11 and the dentist needs a doctor's note stating that it is okay for him to continue with pulling the tooth out.  Have you been seen for this request: No  Do we have the form/letter: No  When is form/letter needed by: asap  How would you like the form/letter returned: Fax to [facility], phone number is [phone]. Patient didn't have fax number  Patient Notified form requests are processed in 3-5 business days:No  Okay to leave a detailed message? Yes Cell number on file:    Telephone Information:   Mobile [phone]       Jyotsna Fuentes,

## 2020-06-25 NOTE — LETTER
[facility]  [address], [address]  [place], [place]  [address]                                            TEL:[phone]  FAX:[phone]      Mary Lou Gil  [address]  [facility] [address]      June 25, 2020    Dear Mary Lou    Mary Lou is followed by me for Diabetes care and management.  In general she has poorly controlled DM and is at risk for infections.  She reports that she is in pain and needs urgent dental evaluation and procedure.  I approve my patient, Mary Lou Gil, to have a tooth extraction.  Please call endocrinology clinic (nurse line: [phone]) if questions.         Sincerely,      Dr. Susan Ramirez

## 2020-06-25 NOTE — TELEPHONE ENCOUNTER
Patient stated that the tooth is hanging and a little infection is starting.  Patient would like to continue with the tooth extraction.  The dentist is requesting a note.  Please advise, thanks.

## 2020-06-25 NOTE — TELEPHONE ENCOUNTER
See telephone encounter with MD Ramirez's office from today.     Vijaya Gu, ISAIAS/ERIBERTO    Additional Information    Negative: [1] Caller is not with the adult (patient) AND [2] reporting urgent symptoms    Negative: Lab result questions    Negative: Medication questions    Negative: Caller can't be reached by phone    Negative: Caller has already spoken to PCP or another triager    Negative: RN needs further essential information from caller in order to complete triage    Negative: Requesting regular office appointment    Negative: [1] Caller requesting NON-URGENT health information AND [2] PCP's office is the best resource    Health Information question, no triage required and triager able to answer question    Protocols used: INFORMATION ONLY CALL-A-

## 2020-06-25 NOTE — TELEPHONE ENCOUNTER
Pt states she is at the dental clinic now and they did not receive the letter from MD Ramirez's office regarding the tooth extraction.      MD Kingsley got on the line with RN.  RN read the letter from Dr. Ramirez from today.  MD Kingsley verbalized understanding and had no further questions.      Vijaya Gu RN/ERIBERTO

## 2020-06-25 NOTE — TELEPHONE ENCOUNTER
Faxed to [phone].  Patient notified.    Yuliet Braden, [facility]  [facility]

## 2020-06-25 NOTE — TELEPHONE ENCOUNTER
Lab Results   Component Value Date    A1C 11.2 05/27/2020    A1C 10.3 11/06/2019    A1C 11.0 08/06/2019    A1C 12.6 04/03/2019    A1C 8.9 06/26/2018     DM under poor control.  Would recommend to hold off procedure unless it is urgent.

## 2020-08-13 DIAGNOSIS — Z79.4 LONG TERM (CURRENT) USE OF INSULIN (H): ICD-10-CM

## 2020-08-13 DIAGNOSIS — E11.42 TYPE 2 DIABETES MELLITUS WITH DIABETIC POLYNEUROPATHY, WITH LONG-TERM CURRENT USE OF INSULIN (H): ICD-10-CM

## 2020-08-13 DIAGNOSIS — Z79.4 TYPE 2 DIABETES MELLITUS WITH DIABETIC POLYNEUROPATHY, WITH LONG-TERM CURRENT USE OF INSULIN (H): ICD-10-CM

## 2020-08-13 RX ORDER — ISOPROPYL ALCOHOL 0.75 G/1
SWAB TOPICAL
Qty: 100 EACH | Refills: 3 | Status: SHIPPED | OUTPATIENT
Start: 2020-08-13 | End: 2020-12-22

## 2020-08-13 NOTE — TELEPHONE ENCOUNTER
PCP please advise if you would like pt to follow up with you this month. Looks like she has completed virtual follow up with Endo on 6/2020    Trish TALBERTN, RN, PHN

## 2020-08-24 DIAGNOSIS — E78.5 HYPERLIPIDEMIA WITH TARGET LDL LESS THAN 100: ICD-10-CM

## 2020-08-25 DIAGNOSIS — E03.9 HYPOTHYROIDISM, UNSPECIFIED TYPE: ICD-10-CM

## 2020-08-25 RX ORDER — FENOFIBRATE 160 MG/1
TABLET ORAL
Qty: 90 TABLET | Refills: 1 | Status: SHIPPED | OUTPATIENT
Start: 2020-08-25 | End: 2021-10-05

## 2020-08-25 RX ORDER — PRAVASTATIN SODIUM 80 MG/1
TABLET ORAL
Qty: 90 TABLET | Refills: 1 | Status: SHIPPED | OUTPATIENT
Start: 2020-08-25 | End: 2021-10-05

## 2020-08-27 RX ORDER — LEVOTHYROXINE SODIUM 137 UG/1
TABLET ORAL
Qty: 90 TABLET | Refills: 0 | Status: SHIPPED | OUTPATIENT
Start: 2020-08-27 | End: 2020-11-19

## 2020-08-27 NOTE — TELEPHONE ENCOUNTER
Message sent via [employer].      Yuliet Braden CMA  [facility]  [facility]

## 2020-08-27 NOTE — TELEPHONE ENCOUNTER
Pending Prescriptions:                       Disp   Refills    levothyroxine (SYNTHROID/LEVOTHROID) 137 M*90 tab*0        Sig: TAKE ONE TABLET BY MOUTH ONCE DAILY     Routing refill request to provider for review/approval because:  Labs out of range:  TSH

## 2020-11-05 DIAGNOSIS — E11.42 TYPE 2 DIABETES MELLITUS WITH DIABETIC POLYNEUROPATHY, WITH LONG-TERM CURRENT USE OF INSULIN (H): ICD-10-CM

## 2020-11-05 DIAGNOSIS — Z79.4 TYPE 2 DIABETES MELLITUS WITH DIABETIC POLYNEUROPATHY, WITH LONG-TERM CURRENT USE OF INSULIN (H): ICD-10-CM

## 2020-11-05 NOTE — TELEPHONE ENCOUNTER
Routing refill request to provider for review/approval because:  Overdue for office visit with you.  Last seen 2/12/20, but has had visits with endo.

## 2020-11-19 ENCOUNTER — TELEPHONE (OUTPATIENT)
Dept: INTERNAL MEDICINE | Facility: CLINIC | Age: 60
End: 2020-11-19

## 2020-11-19 ENCOUNTER — VIRTUAL VISIT (OUTPATIENT)
Dept: FAMILY MEDICINE | Facility: OTHER | Age: 60
End: 2020-11-19

## 2020-11-19 DIAGNOSIS — E03.9 HYPOTHYROIDISM, UNSPECIFIED TYPE: ICD-10-CM

## 2020-11-19 DIAGNOSIS — E11.42 TYPE 2 DIABETES MELLITUS WITH DIABETIC POLYNEUROPATHY, WITH LONG-TERM CURRENT USE OF INSULIN (H): ICD-10-CM

## 2020-11-19 DIAGNOSIS — I10 ESSENTIAL HYPERTENSION, BENIGN: ICD-10-CM

## 2020-11-19 DIAGNOSIS — Z79.4 TYPE 2 DIABETES MELLITUS WITH DIABETIC POLYNEUROPATHY, WITH LONG-TERM CURRENT USE OF INSULIN (H): ICD-10-CM

## 2020-11-19 RX ORDER — LEVOTHYROXINE SODIUM 137 UG/1
TABLET ORAL
Qty: 90 TABLET | Refills: 0 | Status: SHIPPED | OUTPATIENT
Start: 2020-11-19 | End: 2021-02-11

## 2020-11-19 RX ORDER — GLIPIZIDE 5 MG/1
TABLET, FILM COATED, EXTENDED RELEASE ORAL
Qty: 90 TABLET | Refills: 1 | Status: SHIPPED | OUTPATIENT
Start: 2020-11-19 | End: 2021-05-27

## 2020-11-19 RX ORDER — LISINOPRIL AND HYDROCHLOROTHIAZIDE 20; 25 MG/1; MG/1
1 TABLET ORAL EVERY MORNING
Qty: 90 TABLET | Refills: 0 | Status: SHIPPED | OUTPATIENT
Start: 2020-11-19 | End: 2021-02-28

## 2020-11-19 NOTE — TELEPHONE ENCOUNTER
"Requested Prescriptions   Pending Prescriptions Disp Refills     levothyroxine (SYNTHROID/LEVOTHROID) 137 MCG tablet [Pharmacy Med Name: LEVOTHYROXINE SODIUM 137MCG TABS] 90 tablet 0     Sig: TAKE ONE TABLET BY MOUTH ONCE DAILY       Thyroid Protocol Failed - 11/19/2020  5:01 AM        Failed - Normal TSH on file in past 12 months     Recent Labs   Lab Test 05/27/20  1313   TSH 0.38*              Passed - Patient is 12 years or older        Passed - Recent (12 mo) or future (30 days) visit within the authorizing provider's specialty     Patient has had an office visit with the authorizing provider or a provider within the authorizing providers department within the previous 12 mos or has a future within next 30 days. See \"Patient Info\" tab in inbasket, or \"Choose Columns\" in Meds & Orders section of the refill encounter.              Passed - Medication is active on med list        Passed - No active pregnancy on record     If patient is pregnant or has had a positive pregnancy test, please check TSH.          Passed - No positive pregnancy test in past 12 months     If patient is pregnant or has had a positive pregnancy test, please check TSH.             glipiZIDE (GLUCOTROL XL) 5 MG 24 hr tablet [Pharmacy Med Name: GLIPIZIDE ER 5MG TB24] 90 tablet 1     Sig: TAKE ONE TABLET BY MOUTH ONCE DAILY WITH FOOD       Sulfonylurea Agents Failed - 11/19/2020  5:01 AM        Failed - Patient has documented A1c within the specified period of time.     If HgbA1C is 8 or greater, it needs to be on file within the past 3 months.  If less than 8, must be on file within the past 6 months.     Recent Labs   Lab Test 05/27/20  1313   A1C 11.2*             Failed - Patient has a recent creatinine (normal) within the past 12 mos.     Recent Labs   Lab Test 11/06/19  1018 08/26/16  1652 08/26/16  1652   CR 0.80   < >  --    CREAT  --   --  0.8    < > = values in this interval not displayed.       Ok to refill medication if creatinine " "is low          Passed - Medication is active on med list        Passed - Patient is age 18 or older        Passed - No active pregnancy on record        Passed - Patient has not had a positive pregnancy test within the past 12 mos.        Passed - Recent (6 mo) or future (30 days) visit within the authorizing provider's specialty     Patient had office visit in the last 6 months or has a visit in the next 30 days with authorizing provider or within the authorizing provider's specialty.  See \"Patient Info\" tab in inbasket, or \"Choose Columns\" in Meds & Orders section of the refill encounter.               Last office visit 6/1/20.  Routing refill request to provider for review/approval because:  Labs out of range  Sarah Perez RN          "

## 2020-11-19 NOTE — LETTER
[facility]  [address], [address]  [place], [place]  [address]                                            TEL:[phone]  FAX:[phone]      Mary Lou Saleems  [address]  [facility] [address]      November 19, 2020    Dear Mary Lou       This letter is being sent on behalf of Dr. Ramirez. It is to remind you that your provider expected you to return for a follow up clinic visit. Please make a lab only appointment, and then follow up with a clinic visit one week afterwards to review those results. If this is not done, it may result in your provider not being able to refill your medications.     You may call our office at [phone] ([place]) or [phone] ([place]) to schedule an appointment. You may also see Madai Garcia in [place] at [phone]    If you have already scheduled these appointments, please disregard this notice.      Sincerely,    [facility],  Dr. Susan Ramirez

## 2020-11-19 NOTE — PROGRESS NOTES
"Date: 2020 13:37:53  Clinician: Pablo Chau  Clinician NPI: [id number]  Patient: Mary Lou Gil  Patient : 1960  Patient Address: [address] [place] [address]  Patient Phone: [phone]  Visit Protocol: URI  Patient Summary:  Mary Lou is a 60 year old ( : 1960 ) female who initiated a [facility] Visit for cold, sinus infection, or influenza. When asked the question \"Please sign me up to receive news, health information and promotions. \", Mary Lou responded \"Yes\".    Mary Lou states her symptoms started suddenly 5-6 days ago. After her symptoms started, they improved and then got worse again.   Her symptoms consist of rhinitis, myalgia, chills, malaise, a sore throat, ear pain, a headache, a cough, and nasal congestion.   Symptom details     Nasal secretions: The color of her mucus is clear.    Cough: Mary Lou coughs a few times an hour and her cough is not more bothersome at night. Phlegm comes into her throat when she coughs. She believes her cough is caused by post-nasal drip. The color of the phlegm is clear.     Sore throat: Mary Lou reports having mild throat pain (1-3 on a 10 point pain scale), does not have exudate on her tonsils, and can swallow liquids. The lymph nodes in her neck are not enlarged. A rash has not appeared on the skin since the sore throat started.     Headache: She states the headache is mild (1-3 on a 10 point pain scale).      Mary Lou denies having vomiting, facial pain or pressure, teeth pain, ageusia, diarrhea, wheezing, fever, enlarged lymph nodes, nausea, and anosmia. She also denies taking antibiotic medication in the past month, having recent facial or sinus surgery in the past 60 days, and having a sinus infection within the past year. She is not experiencing dyspnea.   Precipitating events  Within the past week, Mary Lou has not been exposed to someone with strep throat. She has recently been exposed to someone with influenza. Mary Lou has " not been in close contact with any high risk individuals.   Pertinent COVID-19 (Coronavirus) information  Mary Lou does not work or volunteer as healthcare worker or a . In the past 14 days, Mary Lou has not worked or volunteered at a healthcare facility or group living setting.   In the past 14 days, she also has not lived in a congregate living setting.   Mary Lou has not had a close contact with a laboratory-confirmed COVID-19 patient within 14 days of symptom onset.    Since December 2019, Mary Lou has not been tested for COVID-19 and has not had upper respiratory infection or influenza-like illness.   Pertinent medical history  Mary Lou does not get yeast infections when she takes antibiotics.   Mary Lou does not need a return to work/school note.   Weight: 150 lbs   Mary Lou smokes or uses smokeless tobacco.   Weight: 150 lbs  Reason for repeat visit for the same protocol within 24 hours:  I don't have a problem with swallowing, just a mild sore throat.  See the History of referred by protocol and completed visits section for details on previous visits (visits currently in queue to be diagnosed will not appear in this section).    MEDICATIONS: Trulicity subcutaneous, Levemir FlexTouch U-100 Insulin subcutaneous, citalopram oral, lisinopril-hydrochlorothiazide oral, metformin oral, verapamil oral, trazodone oral, glipizide oral, levothyroxine oral, ALLERGIES: vinyl ether  Clinician Response:  Dear Mary Lou,  Based on the information provided, you have viral sinusitis, also known as a sinus infection. Sinus infections are caused by bacteria or a virus and symptoms are almost always identical. The difference between the 2 types of infections is timing.  Sinus infections start as viral infections and symptoms improve on their own in about 7 days. If symptoms have not improved after 7 days or have even worsened, a bacterial infection may have developed.  Medication information  Because you have a  viral infection, antibiotics will not help you get better. Treating a viral infection with antibiotics could actually make you feel worse.  I am prescribing:     Fluticasone 50 mcg/actuation nasal spray. Inhale 2 sprays in each nostril 1 time per day; after 1 week, may adjust to 1 - 2 sprays in each nostril 1 time per day. This medication takes several days to start working, so keep taking it even if it doesn't help right away. There are no refills with this prescription.   Self care  Steps you can take to be as comfortable as possible:     Rest.    Drink plenty of fluids.    Take a warm shower to loosen congestion    Use a cool-mist humidifier.    Use throat lozenges.    Suck on frozen items such as popsicles.    Drink hot tea with lemon and honey.    Gargle with warm salt water (1/4 teaspoon of salt per 8 ounce glass of water).    Take a spoonful of honey to reduce your cough.     Also, as your provider, I need you to know that becoming tobacco-free is the most important thing you can do to protect your current and future health.  When to seek care  Please be seen in a clinic or urgent care if any of the following occur:   New symptoms develop, or symptoms become worse   Call ahead before going to the clinic or urgent care.  Call 911 or go to the emergency room if you feel that your throat is closing off, you suddenly develop a rash, you are unable to swallow fluids, you are drooling, or you are having difficulty breathing.  Additional treatment plan   Your symptoms show that you may have coronavirus (COVID-19). This illness can cause fever, cough and trouble breathing. Many people get a mild case and get better on their own. Some people can get very sick.  What should I do?  We would like to test you for this virus.   1. Please call [phone] to schedule your visit. Explain that you were referred by [facility] to have a COVID-19 test. Be ready to share your [facility] visit ID number.  * If you need to schedule in  "[place] please call [phone] or for [facility] employees please call [phone].  * If you need to schedule in the [place] area please call [phone]. [place] employees call [phone].  The following will serve as your written order for this COVID Test, ordered by me, for the indication of suspected COVID [Z20.828]: The test will be ordered in [employer], our electronic health record, after you are scheduled. It will show as ordered and authorized by Morris Darnell MD.  Order: COVID-19 (Coronavirus) PCR for SYMPTOMATIC testing from [facility].   2. When it's time for your COVID test:  Stay at least 6 feet away from others. (If someone will drive you to your test, stay in the backseat, as far away from the  as you can.)   Cover your mouth and nose with a mask, tissue or washcloth.  Go straight to the testing site. Don't make any stops on the way there or back.      3.Starting now: Stay home and away from others (self-isolate) until:   You've had no fever---and no medicine that reduces fever---for one full day (24 hours). And...   Your other symptoms have gotten better. For example, your cough or breathing has improved. And...   At least 10 days have passed since your symptoms started.       During this time, don't leave the house except for testing or medical care.   Stay in your own room, even for meals. Use your own bathroom if you can.   Stay away from others in your home. No hugging, kissing or shaking hands. No visitors.  Don't go to work, school or anywhere else.    Clean \"high touch\" surfaces often (doorknobs, counters, handles, etc.). Use a household cleaning spray or wipes. You'll find a full list of  on the EPA website: www.epa.gov/pesticide-registration/list-n-disinfectants-use-against-sars-cov-2.   Cover your mouth and nose with a mask, tissue or washcloth to avoid spreading germs.  Wash your hands and face often. Use soap and water.  Caregivers in these groups are at risk for severe " illness due to COVID-19:  o People 65 years and older  o People who live in a nursing home or long-term care facility  o People with chronic disease (lung, heart, cancer, diabetes, kidney, liver, immunologic)  o People who have a weakened immune system, including those who:   Are in cancer treatment  Take medicine that weakens the immune system, such as corticosteroids  Had a bone marrow or organ transplant  Have an immune deficiency  Have poorly controlled HIV or AIDS  Are obese (body mass index of 40 or higher)  Smoke regularly   o Caregivers should wear gloves while washing dishes, handling laundry and cleaning bedrooms and bathrooms.  o Use caution when washing and drying laundry: Don't shake dirty laundry, and use the warmest water setting that you can.  o For more tips, go to www.cdc.gov/coronavirus/2019-ncov/downloads/10Things.pdf.    How can I take care of myself?    Get lots of rest. Drink extra fluids (unless a doctor has told you not to).   Take Tylenol (acetaminophen) for fever or pain. If you have liver or kidney problems, ask your family doctor if it's okay to take Tylenol.   Adults can take either:    650 mg (two 325 mg pills) every 4 to 6 hours, or...   1,000 mg (two 500 mg pills) every 8 hours as needed.    Note: Don't take more than 3,000 mg in one day. Acetaminophen is found in many medicines (both prescribed and over-the-counter medicines). Read all labels to be sure you don't take too much.   For children, check the Tylenol bottle for the right dose. The dose is based on the child's age or weight.    If you have other health problems (like cancer, heart failure, an organ transplant or severe kidney disease): Call your specialty clinic if you don't feel better in the next 2 days.       Know when to call 911. Emergency warning signs include:    Trouble breathing or shortness of breath Pain or pressure in the chest that doesn't go away Feeling confused like you haven't felt before, or not being  able to wake up Bluish-colored lips or face.  Where can I get more information?   [facility] -- About COVID-19: [email or web address]   CDC -- What to Do If You're Sick: www.cdc.gov/coronavirus/2019-ncov/about/steps-when-sick.html   [facility] -- Ending Home Isolation: www.cdc.gov/coronavirus/2019-ncov/hcp/disposition-in-home-patients.html   [facility] -- Caring for Someone: www.cdc.gov/coronavirus/2019-ncov/if-you-are-sick/care-for-someone.html   [facility] -- Interim Guidance for Hospital Discharge to Home: www.[facility].[place]/diseases/coronavirus/hcp/hospdischarge.pdf   [facility] clinical trials (COVID-19 research studies): clinicalaffairs.[facility]-clinical-trials    Below are the COVID-19 hotlines at the [place] Department of [facility] ([facility]). Interpreters are available.    For health questions: Call 351-631-9661 or 1-184.174.4420 (7 a.m. to 7 p.m.) For questions about schools and childcare: Call 696-137-0828 or 1-275.750.5094 (7 a.m. to 7 p.m.)    Diagnosis: Contact with and (suspected) exposure to other viral communicable diseases  Diagnosis ICD: Z20.828  Prescription: fluticasone 50 mcg/actuation nasal spray,suspension 1 120 spray aerosol with adapter (grams), 30 days supply. Inhale 2 sprays in each nostril 1 time per day; after 1 week, may adjust to 1 - 2 sprays in each nostril 1 time per day.. Refills: 0, Refill as needed: no, Allow substitutions: yes

## 2020-11-19 NOTE — TELEPHONE ENCOUNTER
Rx sent.  Due for labs and f/u.  Please make a lab appointment for blood work and follow up clinic appointment in 1 week after that to discuss results.      TSH   Date Value Ref Range Status   05/27/2020 0.38 (L) 0.40 - 4.00 mU/L Final     Lab Results   Component Value Date    A1C 11.2 05/27/2020    A1C 10.3 11/06/2019    A1C 11.0 08/06/2019    A1C 12.6 04/03/2019    A1C 8.9 06/26/2018

## 2020-11-19 NOTE — TELEPHONE ENCOUNTER
Reason for Call:  Other symptoms    Detailed comments: The patient called and stated that she has been having cold like symptoms with a bad cough for a little bit over a week now. She has been taking mucinex but it does not seem to be helping. She is wondering what the best course of action would be at this point. She would like a call back to discuss this.     Phone Number Patient can be reached at: Cell number on file:    Telephone Information:   Mobile [phone]       Best Time: Any    Can we leave a detailed message on this number? YES    Call taken on 11/19/2020 at 12:22 PM by Katharina Reyes

## 2020-11-19 NOTE — TELEPHONE ENCOUNTER
Lisinopril-hydrochlorothiazide    Routing refill request to provider for review/approval because:  Labs not current:  BMP

## 2020-11-22 DIAGNOSIS — Z20.822 SUSPECTED COVID-19 VIRUS INFECTION: Primary | ICD-10-CM

## 2020-11-23 DIAGNOSIS — Z20.822 SUSPECTED COVID-19 VIRUS INFECTION: ICD-10-CM

## 2020-11-23 PROCEDURE — U0003 INFECTIOUS AGENT DETECTION BY NUCLEIC ACID (DNA OR RNA); SEVERE ACUTE RESPIRATORY SYNDROME CORONAVIRUS 2 (SARS-COV-2) (CORONAVIRUS DISEASE [COVID-19]), AMPLIFIED PROBE TECHNIQUE, MAKING USE OF HIGH THROUGHPUT TECHNOLOGIES AS DESCRIBED BY CMS-2020-01-R: HCPCS | Performed by: FAMILY MEDICINE

## 2020-11-24 LAB
SARS-COV-2 RNA SPEC QL NAA+PROBE: NOT DETECTED
SPECIMEN SOURCE: NORMAL

## 2020-12-13 ENCOUNTER — HEALTH MAINTENANCE LETTER (OUTPATIENT)
Age: 60
End: 2020-12-13

## 2020-12-14 ENCOUNTER — MYC MEDICAL ADVICE (OUTPATIENT)
Dept: ENDOCRINOLOGY | Facility: CLINIC | Age: 60
End: 2020-12-14

## 2020-12-14 ENCOUNTER — MYC MEDICAL ADVICE (OUTPATIENT)
Dept: INTERNAL MEDICINE | Facility: CLINIC | Age: 60
End: 2020-12-14

## 2020-12-14 DIAGNOSIS — Z79.4 TYPE 2 DIABETES MELLITUS WITH DIABETIC POLYNEUROPATHY, WITH LONG-TERM CURRENT USE OF INSULIN (H): Primary | ICD-10-CM

## 2020-12-14 DIAGNOSIS — E78.5 HYPERLIPIDEMIA LDL GOAL <100: ICD-10-CM

## 2020-12-14 DIAGNOSIS — E11.42 TYPE 2 DIABETES MELLITUS WITH DIABETIC POLYNEUROPATHY, WITH LONG-TERM CURRENT USE OF INSULIN (H): Primary | ICD-10-CM

## 2020-12-14 DIAGNOSIS — I10 ESSENTIAL HYPERTENSION, BENIGN: ICD-10-CM

## 2020-12-16 NOTE — TELEPHONE ENCOUNTER
Future orders placed.      Get the fasting labs done and follow up with myself in the office and with Dr. Ramirez.

## 2020-12-18 ENCOUNTER — ALLIED HEALTH/NURSE VISIT (OUTPATIENT)
Dept: INTERNAL MEDICINE | Facility: CLINIC | Age: 60
End: 2020-12-18

## 2020-12-18 VITALS — DIASTOLIC BLOOD PRESSURE: 84 MMHG | SYSTOLIC BLOOD PRESSURE: 144 MMHG

## 2020-12-18 DIAGNOSIS — E03.9 HYPOTHYROIDISM, UNSPECIFIED TYPE: ICD-10-CM

## 2020-12-18 DIAGNOSIS — E11.42 TYPE 2 DIABETES MELLITUS WITH DIABETIC POLYNEUROPATHY, WITH LONG-TERM CURRENT USE OF INSULIN (H): ICD-10-CM

## 2020-12-18 DIAGNOSIS — E11.42 TYPE 2 DIABETES MELLITUS WITH DIABETIC POLYNEUROPATHY, WITH LONG-TERM CURRENT USE OF INSULIN (H): Primary | ICD-10-CM

## 2020-12-18 DIAGNOSIS — Z79.4 TYPE 2 DIABETES MELLITUS WITH DIABETIC POLYNEUROPATHY, WITH LONG-TERM CURRENT USE OF INSULIN (H): Primary | ICD-10-CM

## 2020-12-18 DIAGNOSIS — E78.5 HYPERLIPIDEMIA LDL GOAL <100: ICD-10-CM

## 2020-12-18 DIAGNOSIS — Z79.4 TYPE 2 DIABETES MELLITUS WITH DIABETIC POLYNEUROPATHY, WITH LONG-TERM CURRENT USE OF INSULIN (H): ICD-10-CM

## 2020-12-18 DIAGNOSIS — I10 ESSENTIAL HYPERTENSION, BENIGN: ICD-10-CM

## 2020-12-18 LAB
ALT SERPL W P-5'-P-CCNC: 26 U/L (ref 0–50)
AST SERPL W P-5'-P-CCNC: 16 U/L (ref 0–45)
HBA1C MFR BLD: 7.7 % (ref 0–5.6)
HGB BLD-MCNC: 14.4 G/DL (ref 11.7–15.7)
T4 FREE SERPL-MCNC: 1.66 NG/DL (ref 0.76–1.46)
TSH SERPL DL<=0.005 MIU/L-ACNC: 0.7 MU/L (ref 0.4–4)

## 2020-12-18 PROCEDURE — 84439 ASSAY OF FREE THYROXINE: CPT | Performed by: INTERNAL MEDICINE

## 2020-12-18 PROCEDURE — 84443 ASSAY THYROID STIM HORMONE: CPT | Performed by: INTERNAL MEDICINE

## 2020-12-18 PROCEDURE — 83036 HEMOGLOBIN GLYCOSYLATED A1C: CPT | Performed by: INTERNAL MEDICINE

## 2020-12-18 PROCEDURE — 84450 TRANSFERASE (AST) (SGOT): CPT | Performed by: INTERNAL MEDICINE

## 2020-12-18 PROCEDURE — 85018 HEMOGLOBIN: CPT | Performed by: INTERNAL MEDICINE

## 2020-12-18 PROCEDURE — 84460 ALANINE AMINO (ALT) (SGPT): CPT | Performed by: INTERNAL MEDICINE

## 2020-12-18 PROCEDURE — 99000 SPECIMEN HANDLING OFFICE-LAB: CPT | Performed by: INTERNAL MEDICINE

## 2020-12-18 PROCEDURE — 36415 COLL VENOUS BLD VENIPUNCTURE: CPT | Performed by: INTERNAL MEDICINE

## 2020-12-18 PROCEDURE — 99207 PR NO CHARGE NURSE ONLY: CPT | Performed by: INTERNAL MEDICINE

## 2020-12-18 NOTE — PROGRESS NOTES
Mary Lou Gil was evaluated at [facility] on December 18, 2020 at which time her blood pressure was:    BP Readings from Last 3 Encounters:   12/18/20 (!) 144/84   02/12/20 120/82   11/07/19 111/77     Pulse Readings from Last 3 Encounters:   02/12/20 83   11/07/19 79   10/29/19 91       Reviewed lifestyle modifications for blood pressure control and reduction: including making healthy food choices, managing weight, getting regular exercise, smoking cessation, reducing alcohol consumption, monitoring blood pressure regularly.     Symptoms: None    BP Goal:< 140/90 mmHg    BP Assessment:  BP too high    Potential Reasons for BP too high: Medication nonadherence    BP Follow-Up Plan: Recheck BP in 30 days at pharmacy    Recommendation to Provider: Patient has not been taking her lisinopril.  She said she will start taking it again and recheck BP when she comes back for labs next week.    Note completed by: Marcellus Dalton, PharmD  [facility]  [phone]

## 2020-12-21 DIAGNOSIS — Z79.4 LONG TERM (CURRENT) USE OF INSULIN (H): ICD-10-CM

## 2020-12-21 DIAGNOSIS — Z79.4 TYPE 2 DIABETES MELLITUS WITH DIABETIC POLYNEUROPATHY, WITH LONG-TERM CURRENT USE OF INSULIN (H): ICD-10-CM

## 2020-12-21 DIAGNOSIS — E11.42 TYPE 2 DIABETES MELLITUS WITH DIABETIC POLYNEUROPATHY, WITH LONG-TERM CURRENT USE OF INSULIN (H): ICD-10-CM

## 2020-12-22 LAB — T4 FREE SERPL DIALY-MCNC: 3.5 NG/DL (ref 1.1–2.4)

## 2020-12-22 RX ORDER — ALCOHOL ANTISEPTIC PADS
PADS, MEDICATED (EA) TOPICAL
Qty: 100 EACH | Refills: 3 | Status: SHIPPED | OUTPATIENT
Start: 2020-12-22

## 2020-12-28 ENCOUNTER — TELEPHONE (OUTPATIENT)
Dept: ENDOCRINOLOGY | Facility: CLINIC | Age: 60
End: 2020-12-28

## 2020-12-28 DIAGNOSIS — E03.9 HYPOTHYROIDISM, UNSPECIFIED TYPE: Primary | ICD-10-CM

## 2020-12-28 NOTE — TELEPHONE ENCOUNTER
ENDO THYROID LABS-[facility] Latest Ref Rng & Units 12/18/2020 5/27/2020   TSH 0.40 - 4.00 mU/L 0.70 0.38 (L)   T4 FREE 0.76 - 1.46 ng/dL 1.66 (H) 1.91 (H)     Improving labs. TSH now in range.  Slightly high Free T4.  If you are feeling OK- continue current dose of thyroid hormone supplement and repeat labs few days prior to upcoming appointment in February 2021.  If you are having symptoms like palpitations, unintentional weight loss, diarrhea, tremors then let me know and we will consider dose adjustment now.    Please call patient with above information.

## 2021-01-07 ENCOUNTER — APPOINTMENT (OUTPATIENT)
Dept: MRI IMAGING | Facility: CLINIC | Age: 61
End: 2021-01-07
Attending: EMERGENCY MEDICINE
Payer: MEDICARE

## 2021-01-07 ENCOUNTER — NURSE TRIAGE (OUTPATIENT)
Dept: NURSING | Facility: CLINIC | Age: 61
End: 2021-01-07

## 2021-01-07 ENCOUNTER — HOSPITAL ENCOUNTER (EMERGENCY)
Facility: CLINIC | Age: 61
Discharge: HOME | End: 2021-01-07
Attending: EMERGENCY MEDICINE | Admitting: EMERGENCY MEDICINE
Payer: MEDICARE

## 2021-01-07 VITALS
WEIGHT: 147 LBS | HEIGHT: 66 IN | OXYGEN SATURATION: 98 % | BODY MASS INDEX: 23.63 KG/M2 | TEMPERATURE: 98.6 F | SYSTOLIC BLOOD PRESSURE: 124 MMHG | RESPIRATION RATE: 16 BRPM | DIASTOLIC BLOOD PRESSURE: 88 MMHG | HEART RATE: 89 BPM

## 2021-01-07 DIAGNOSIS — F33.0 MAJOR DEPRESSIVE DISORDER, RECURRENT EPISODE, MILD (H): ICD-10-CM

## 2021-01-07 DIAGNOSIS — R42 VERTIGO: ICD-10-CM

## 2021-01-07 LAB
ANION GAP SERPL CALCULATED.3IONS-SCNC: 9 MMOL/L (ref 3–14)
BASOPHILS # BLD AUTO: 0.1 10E9/L (ref 0–0.2)
BASOPHILS NFR BLD AUTO: 1.1 %
BUN SERPL-MCNC: 22 MG/DL (ref 7–30)
CALCIUM SERPL-MCNC: 9.4 MG/DL (ref 8.5–10.1)
CHLORIDE SERPL-SCNC: 103 MMOL/L (ref 94–109)
CO2 SERPL-SCNC: 24 MMOL/L (ref 20–32)
CREAT SERPL-MCNC: 0.6 MG/DL (ref 0.52–1.04)
DIFFERENTIAL METHOD BLD: ABNORMAL
EOSINOPHIL # BLD AUTO: 0.2 10E9/L (ref 0–0.7)
EOSINOPHIL NFR BLD AUTO: 2 %
ERYTHROCYTE [DISTWIDTH] IN BLOOD BY AUTOMATED COUNT: 13.2 % (ref 10–15)
GFR SERPL CREATININE-BSD FRML MDRD: >90 ML/MIN/{1.73_M2}
GLUCOSE SERPL-MCNC: 159 MG/DL (ref 70–99)
HCT VFR BLD AUTO: 46.9 % (ref 35–47)
HGB BLD-MCNC: 15.9 G/DL (ref 11.7–15.7)
IMM GRANULOCYTES # BLD: 0 10E9/L (ref 0–0.4)
IMM GRANULOCYTES NFR BLD: 0.4 %
LYMPHOCYTES # BLD AUTO: 2.9 10E9/L (ref 0.8–5.3)
LYMPHOCYTES NFR BLD AUTO: 27.9 %
MCH RBC QN AUTO: 29.9 PG (ref 26.5–33)
MCHC RBC AUTO-ENTMCNC: 33.9 G/DL (ref 31.5–36.5)
MCV RBC AUTO: 88 FL (ref 78–100)
MONOCYTES # BLD AUTO: 0.6 10E9/L (ref 0–1.3)
MONOCYTES NFR BLD AUTO: 5.6 %
NEUTROPHILS # BLD AUTO: 6.5 10E9/L (ref 1.6–8.3)
NEUTROPHILS NFR BLD AUTO: 63 %
NRBC # BLD AUTO: 0 10*3/UL
NRBC BLD AUTO-RTO: 0 /100
PLATELET # BLD AUTO: 289 10E9/L (ref 150–450)
POTASSIUM SERPL-SCNC: 3 MMOL/L (ref 3.4–5.3)
RBC # BLD AUTO: 5.31 10E12/L (ref 3.8–5.2)
SODIUM SERPL-SCNC: 136 MMOL/L (ref 133–144)
TROPONIN I SERPL-MCNC: <0.015 UG/L (ref 0–0.04)
WBC # BLD AUTO: 10.3 10E9/L (ref 4–11)

## 2021-01-07 PROCEDURE — 93005 ELECTROCARDIOGRAM TRACING: CPT

## 2021-01-07 PROCEDURE — 85025 COMPLETE CBC W/AUTO DIFF WBC: CPT | Performed by: EMERGENCY MEDICINE

## 2021-01-07 PROCEDURE — 255N000002 HC RX 255 OP 636: Performed by: EMERGENCY MEDICINE

## 2021-01-07 PROCEDURE — A9585 GADOBUTROL INJECTION: HCPCS | Performed by: EMERGENCY MEDICINE

## 2021-01-07 PROCEDURE — 84484 ASSAY OF TROPONIN QUANT: CPT | Performed by: EMERGENCY MEDICINE

## 2021-01-07 PROCEDURE — 80048 BASIC METABOLIC PNL TOTAL CA: CPT | Performed by: EMERGENCY MEDICINE

## 2021-01-07 PROCEDURE — 99285 EMERGENCY DEPT VISIT HI MDM: CPT | Mod: 25

## 2021-01-07 PROCEDURE — 70553 MRI BRAIN STEM W/O & W/DYE: CPT | Mod: MG

## 2021-01-07 RX ORDER — MECLIZINE HCL 12.5 MG 12.5 MG/1
25 TABLET ORAL 3 TIMES DAILY PRN
Qty: 30 TABLET | Refills: 0 | Status: SHIPPED | OUTPATIENT
Start: 2021-01-07 | End: 2021-10-05

## 2021-01-07 RX ORDER — GADOBUTROL 604.72 MG/ML
6 INJECTION INTRAVENOUS ONCE
Status: COMPLETED | OUTPATIENT
Start: 2021-01-07 | End: 2021-01-07

## 2021-01-07 RX ADMIN — GADOBUTROL 6 ML: 604.72 INJECTION INTRAVENOUS at 21:22

## 2021-01-07 ASSESSMENT — ENCOUNTER SYMPTOMS
WEAKNESS: 1
DIZZINESS: 1
HEADACHES: 1
VOMITING: 0
NAUSEA: 0

## 2021-01-07 ASSESSMENT — MIFFLIN-ST. JEOR: SCORE: 1263.54

## 2021-01-07 NOTE — ED AVS SNAPSHOT
[facility] Emergency Dept  [address] [address]  Phone: [phone]  Fax: [phone]                                    Mary Lou Gil   MRN: [id number]    Department: [facility] Emergency Dept   Date of Visit: 1/7/2021           After Visit Summary Signature Page    I have received my discharge instructions, and my questions have been answered. I have discussed any challenges I see with this plan with the nurse or doctor.    ..........................................................................................................................................  Patient/Patient Representative Signature      ..........................................................................................................................................  Patient Representative Print Name and Relationship to Patient    ..................................................               ................................................  Date                                   Time    ..........................................................................................................................................  Reviewed by Signature/Title    ...................................................              ..............................................  Date                                               Time          22EPIC Rev 08/18

## 2021-01-08 NOTE — ED NOTES
Bed: ED25  Expected date:   Expected time:   Means of arrival:   Comments:  532 60f dizziness headache no COVID eta 5

## 2021-01-08 NOTE — TELEPHONE ENCOUNTER
Patient says her eyes can't focus and has been feeling shaky and nauseas and dizzy for the past 2 days.  BS is good at 153. Patient reports she has a history of migraines but says does not have one right now. Patient has diabetes and high blood pressure.  She says she has lost weight due to dieting.  Patient says her T4 is elevated; TSH is 0.7. Patient reports some pressure in eyes but says it's not painful.    Paged on-call provider, Dr. Jordan, at 6:43 pm via [employer] to call FNA/patient back regarding above issue. Call back from Dr. Jordan stating that patient needs to be seen in the ED.  Called patient at 6:59 pm but VM was full so, left phone number for patient to call back. At 7:03 pm, FNA called patient and informed to be seen in the ED immediately and if no transportation then needs to call 911.  Patient verbalized understanding.          Reason for Disposition    [1] Dizziness (vertigo) present now AND [2] one or more stroke risk factors (i.e., hypertension, diabetes, prior stroke/TIA/heart attack)  (Exception: prior physician evaluation for this AND no different/worse than usual)    Additional Information    Negative: [1] Weakness (i.e., paralysis, loss of muscle strength) of the face, arm or leg on one side of the body AND [2] sudden onset AND [3] present now    Negative: [1] Numbness (i.e., loss of sensation) of the face, arm or leg on one side of the body AND [2] sudden onset AND [3] present now    Negative: [1] Loss of speech or garbled speech AND [2] sudden onset AND [3] present now    Negative: Difficult to awaken or acting confused (e.g., disoriented, slurred speech)    Negative: Sounds like a life-threatening emergency to the triager    Negative: SEVERE dizziness (vertigo) (e.g., unable to walk without assistance)    Protocols used: DIZZINESS - VERTIGO-A-

## 2021-01-08 NOTE — ED PROVIDER NOTES
"  History   Chief Complaint:  Dizziness    HPI   Mary Lou Gil is a 60 year old female with history of type II diabetes, hypothyroidism, hypertension, hyperlipidemia,  who presents via EMS with dizziness. Two days prior to arrival the patient was driving and, when she got out of her car, she thought she was going to pass out. Over the last day since then, she reports having \"little spouts\" of the same dizziness. Today, she expressed that her symptoms got really bad all of a sudden around 1730 or 1800 and called the triage line and they recommended presenting to the ED, so she called EMS. Here, she reports spinning, weakness in her arms, in and out vision, and \"head pressure\" unlike her typical migraine. The patient said that shutting her eyes does not worsen or improve her feelings of spinning. The patient denies vomiting and nausea. She reports the left eye twitch has been chronic since last September, but she has not seen improvement with medication nor has she had brain imaging. The patient smokes a pack of cigarettes a day and is trying to quit. She has been smoking for 25 years.     Review of Systems   Eyes: Positive for visual disturbance (\"in and out vision\").   Gastrointestinal: Negative for nausea and vomiting.   Neurological: Positive for dizziness, syncope (near syncope), weakness (bilaterally in her arms) and headaches.   10 systems reviewed and negative except as above and in HPI.        Allergies:  Nicotine Polacrilex  Neurontin   Norvasc  Percocet  Vinyl Ether    Medications:  Aspirin 81 mg  Albuterol  Citalopram  Epinephrine  Fenofibrate  Fluticasone  Glipizide  Levothyroxine  Insulin (Levemir)  Lisinopril-Hydrochlorothiazide  Metformin  Pravastatin   Tizanidine  Tramadol  Trazodone  Trulicity  Verapamil     Past Medical History:    Anemia  Degeneration of lumbar intervertebral disc  Depressive disorder  Type II Diabetes   Hypertension  Hyperlipidemia  Migraines  Chronic lower back pain   Spinal " "stenosis  Tobacco use  Uncomplicated asthma   Unspecified hypothyroidism  Diabetic neuropathy  GERD  Endometrial hyperplasia  Bilateral carpal tunnel     Past Surgical History:    Tonsillectomy  Appendectomy   delivery  Postpartum Tubal Ligation  Hysteroscopy with morcellator  Thyroidectomy    Family History:    Coronary artery disease  Diabetes  Hypertension  Aneurysm    Social History:  Patient states that she smokes too much and is trying to quit. Has smoked for 25 years and is down to half a pack a day.   Patient arrived via EMS.     Physical Exam     Patient Vitals for the past 24 hrs:   BP Temp Temp src Pulse Resp SpO2 Height Weight   21 124/88 98.6  F (37  C) Oral 89 16 98 % -- --   21 124/88 98.6  F (37  C) Oral 89 16 98 % 1.676 m (5' 6\") 66.7 kg (147 lb)       Physical Exam  General: Resting on the gurney, appears uncomfortable  Head:  The scalp, face, and head appear normal  Mouth/Throat: Mucus membranes are moist  CV:  Regular rate    Normal S1 and S2  No pathological murmur   Resp:  Breath sounds clear and equal bilaterally    Non-labored, no retractions or accessory muscle use    No coarseness    No wheezing   GI:  Abdomen is soft, no rigidity    No tenderness to palpation  MS:  Normal motor assessment of all extremities.    Good capillary refill noted.  Skin:  No rash or lesions noted.  Neuro:  Speech is normal and fluent. No apparent deficit.    No nystagmus    Patient reports vertigo    Frequent facial twitching which is new in the last several weeks per patient, Cranial nerves otherwise normal    Normal gait. Normal strength   Psych:  Awake. Alert.  Normal affect.      Appropriate interactions.    Emergency Department Course     ECG:  Indication: Dizziness   Time:   Vent. Rate 99 bpm. IA interval 152. QRS duration 90. QT/QTc 374/479. P-R-T axis 61 18 69. Normal sinus rhythm. Possible left atrial enlargement. Borderline ECG. Read time:     Imaging:  MR Brain w/o " & w Contrast:  Normal brain MRI. Reading per radiology.     Laboratory:  CBC: WBC: 10.3, HGB: 15.9 (H), PLT: 289  BMP: Glucose 159 (H), K: 3.0 (L), o/w WNL (Creatinine: 0.60)    Troponin: <0.015      Emergency Department Course:    Reviewed:  I reviewed nursing notes, vitals and past medical history    Assessments:  1941: I performed an exam of the patient as documented above.   2215: I reassessed the patient and discussed the results of their work up, as well as discussed discharge recommendations.     Disposition:  The patient was discharged to home.     Impression & Plan     Medical Decision Making:  Mary Lou Gil is a 60 year old female who presents for evaluation of vertigo. The differential diagnosis of vertigo is broad and includes common etiologies such as menieres disease, labyrinthitis, benign positional vertigo, otitis media, etc.  More serious etiologies considered include central etiologies such as tumor, intracerebral bleed, dissection, ischemic cerebral vascular accident. On my exam, she had a left eye twitch which was she reported had been present for several weeks. She was otherwise neurologically intact, however given her lack of nystagmus and her eye twitching an MRI was obtained. This was fortunately unremarkable, and the rest of her workup in the emergency room suggests a benign cause of vertigo today.  Patient feels improved on reassessment and was able to ambulate without difficulty. Further outpatient management is indicated with vertigo medications. Additionally, I provided a referral to physical therapy in follow-up.    Diagnosis:    ICD-10-CM    1. Vertigo  R42 PHYSICAL THERAPY REFERRAL       Discharge Medications:  New Prescriptions    MECLIZINE (ANTIVERT) 12.5 MG TABLET    Take 2 tablets (25 mg) by mouth 3 times daily as needed for dizziness       Scribe Disclosure:  I, Leandra Stovall, am serving as a scribe at 7:39 PM on 1/7/2021 to document services personally performed by  Belia Garcia MD based on my observations and the provider's statements to me.   I, Yanna Aragon, am serving as a scribe on 1/7/2021 at 9:43 PM to personally document services performed by Belia Garcia MD based on my observations and the provider's statements to me.           Belia Garcia MD  01/07/21 [id number]

## 2021-01-11 ENCOUNTER — TELEPHONE (OUTPATIENT)
Dept: INTERNAL MEDICINE | Facility: CLINIC | Age: 61
End: 2021-01-11

## 2021-01-11 DIAGNOSIS — F33.0 MAJOR DEPRESSIVE DISORDER, RECURRENT EPISODE, MILD (H): ICD-10-CM

## 2021-01-11 RX ORDER — CITALOPRAM HYDROBROMIDE 40 MG/1
TABLET ORAL
Qty: 90 TABLET | Refills: 0 | Status: SHIPPED | OUTPATIENT
Start: 2021-01-11 | End: 2021-01-13

## 2021-01-11 NOTE — TELEPHONE ENCOUNTER
Copy of from placed in scanning.     Left detailed voicemail informing patient that original form was placed with bouncer for .    BELINDA Washington

## 2021-01-11 NOTE — TELEPHONE ENCOUNTER
form completed, contact patient to .     Please make copy and send to [facility] to be scanned into chart.

## 2021-01-11 NOTE — TELEPHONE ENCOUNTER
Insulin Treated Diabetic Report    Reason for Call:  Form, our goal is to have forms completed with 72 hours, however, some forms may require a visit or additional information.    Type of letter, form or note:  medical    Who is the form from?: Patient    Where did the form come from: Patient or family brought in       What clinic location was the form placed at?: Internal Medicine    Where the form was placed: Dr. Garcia's Box/Folder    What number is listed as a contact on the form?: [phone]       Additional comments: Pt will     Call taken on 1/11/2021 at 12:11 PM by Katie De La Cruz

## 2021-01-13 RX ORDER — CITALOPRAM HYDROBROMIDE 40 MG/1
TABLET ORAL
Qty: 90 TABLET | Refills: 0 | Status: SHIPPED | OUTPATIENT
Start: 2021-01-13 | End: 2021-05-11

## 2021-01-13 NOTE — TELEPHONE ENCOUNTER
Routing refill request to provider for review/approval because:   PHQ-9 score less than 5 in past 6 months Protocol Details    Recent (6 mo) or future (30 days) visit within the authorizing provider's specialty

## 2021-02-04 DIAGNOSIS — F33.0 MAJOR DEPRESSIVE DISORDER, RECURRENT EPISODE, MILD (H): ICD-10-CM

## 2021-02-04 RX ORDER — TRAZODONE HYDROCHLORIDE 50 MG/1
TABLET, FILM COATED ORAL
Qty: 180 TABLET | Refills: 2 | Status: SHIPPED | OUTPATIENT
Start: 2021-02-04 | End: 2021-02-05

## 2021-02-05 DIAGNOSIS — F33.0 MAJOR DEPRESSIVE DISORDER, RECURRENT EPISODE, MILD (H): ICD-10-CM

## 2021-02-05 RX ORDER — TRAZODONE HYDROCHLORIDE 50 MG/1
TABLET, FILM COATED ORAL
Qty: 180 TABLET | Refills: 2 | Status: SHIPPED | OUTPATIENT
Start: 2021-02-05 | End: 2022-04-05

## 2021-02-11 ENCOUNTER — VIRTUAL VISIT (OUTPATIENT)
Dept: ENDOCRINOLOGY | Facility: CLINIC | Age: 61
End: 2021-02-11
Payer: MEDICARE

## 2021-02-11 DIAGNOSIS — E11.42 TYPE 2 DIABETES MELLITUS WITH DIABETIC POLYNEUROPATHY, WITH LONG-TERM CURRENT USE OF INSULIN (H): ICD-10-CM

## 2021-02-11 DIAGNOSIS — Z79.4 INSULIN-REQUIRING OR DEPENDENT TYPE II DIABETES MELLITUS (H): ICD-10-CM

## 2021-02-11 DIAGNOSIS — E08.41 DIABETIC MONONEUROPATHY ASSOCIATED WITH DIABETES MELLITUS DUE TO UNDERLYING CONDITION (H): ICD-10-CM

## 2021-02-11 DIAGNOSIS — E03.9 HYPOTHYROIDISM, UNSPECIFIED TYPE: Primary | ICD-10-CM

## 2021-02-11 DIAGNOSIS — E11.9 INSULIN-REQUIRING OR DEPENDENT TYPE II DIABETES MELLITUS (H): ICD-10-CM

## 2021-02-11 DIAGNOSIS — E78.5 HYPERLIPIDEMIA LDL GOAL <100: ICD-10-CM

## 2021-02-11 DIAGNOSIS — Z79.4 TYPE 2 DIABETES MELLITUS WITH DIABETIC POLYNEUROPATHY, WITH LONG-TERM CURRENT USE OF INSULIN (H): ICD-10-CM

## 2021-02-11 PROCEDURE — 99214 OFFICE O/P EST MOD 30 MIN: CPT | Mod: 95 | Performed by: INTERNAL MEDICINE

## 2021-02-11 RX ORDER — LEVOTHYROXINE SODIUM 137 UG/1
137 TABLET ORAL DAILY
Qty: 90 TABLET | Refills: 3 | Status: SHIPPED | OUTPATIENT
Start: 2021-02-11 | End: 2022-02-09
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
## 2021-02-11 NOTE — PATIENT INSTRUCTIONS
[facility] & [place] locations   Dr Ramirez, Endocrinology Department      [facility]   [address]  [place] [address]  Appointment [phone]  Fax: [phone]  [place]: Monday and Tuesday         [facility]   [address]  [place] [address]  Appointment [phone]  Fax: [phone]  [place]: Wednesday and Thursday            Please check the cost coverage and copay with insurance before recommended tests, services and medications (especially if new medications are prescribed).     If ordered, please get blood work done 1 week prior to your next appointment so they will be available to Dr. Ramirez at your visit.    To provide the best diabetic care, please bring your blood glucose meter to each and every visit with your  Endocrinologist. Your blood glucose meter/insulin pump will be downloaded at every appointment.  Please arrive 15 minutes before your scheduled appointment. This will allow for your blood glucose meter/insulin pump  to be downloaded.  If you are wearing DEXCOM please bring  or sharing code from the Dexcom Clarity Appt so that it can be downloaded.  If you are using freestyle genet personal sensors please bring the reader.  If you are using TANDEM insulin pump please have your username and password to get info from Tandem website.    Continue current regimen for diabetes.  Continue levothyroxine at current dose.  Fasting labs in 3 months.  Please make a lab appointment for blood work and follow up clinic appointment in 1 week after that to discuss results.  Consider diagnostic Sensor- CGM after COVID.    Recommend checking blood sugars before meals and at bedtime.    If Blood glucose are low more often-> 2-3 times/week- give us a call.  The patient is advised to Make better food choices: reduce carbs, Reduce portion size, weight loss and exercise 3-4 times a  week.  Discussed hypoglycemia signs and symptoms as well as management in detail.

## 2021-02-11 NOTE — NURSING NOTE
02/11 282    02/10 197  02/09 176  02/08 207  02/07 182  02/06 126  02/05 215  02/04 02/03 193    Yuliet Braden CMA  [facility]  [place]

## 2021-02-27 DIAGNOSIS — I10 ESSENTIAL HYPERTENSION, BENIGN: ICD-10-CM

## 2021-02-28 RX ORDER — LISINOPRIL AND HYDROCHLOROTHIAZIDE 20; 25 MG/1; MG/1
1 TABLET ORAL EVERY MORNING
Qty: 90 TABLET | Refills: 0 | Status: SHIPPED | OUTPATIENT
Start: 2021-02-28 | End: 2021-05-11

## 2021-04-17 ENCOUNTER — HEALTH MAINTENANCE LETTER (OUTPATIENT)
Age: 61
End: 2021-04-17

## 2021-05-03 ENCOUNTER — OFFICE VISIT (OUTPATIENT)
Dept: URGENT CARE | Facility: URGENT CARE | Age: 61
End: 2021-05-03
Payer: MEDICARE

## 2021-05-03 VITALS
WEIGHT: 147 LBS | TEMPERATURE: 98.4 F | DIASTOLIC BLOOD PRESSURE: 78 MMHG | RESPIRATION RATE: 18 BRPM | HEART RATE: 88 BPM | SYSTOLIC BLOOD PRESSURE: 143 MMHG | BODY MASS INDEX: 23.73 KG/M2 | OXYGEN SATURATION: 99 %

## 2021-05-03 DIAGNOSIS — G89.29 CHRONIC RIGHT SHOULDER PAIN: Primary | ICD-10-CM

## 2021-05-03 DIAGNOSIS — M25.511 CHRONIC RIGHT SHOULDER PAIN: Primary | ICD-10-CM

## 2021-05-03 PROCEDURE — 99213 OFFICE O/P EST LOW 20 MIN: CPT | Performed by: FAMILY MEDICINE

## 2021-05-03 RX ORDER — NAPROXEN 500 MG/1
500 TABLET ORAL 2 TIMES DAILY WITH MEALS
Qty: 14 TABLET | Refills: 0 | Status: SHIPPED | OUTPATIENT
Start: 2021-05-03 | End: 2021-05-10

## 2021-05-04 ENCOUNTER — TELEPHONE (OUTPATIENT)
Dept: OTHER | Age: 61
End: 2021-05-04

## 2021-05-04 NOTE — TELEPHONE ENCOUNTER
Pt only has rides on Fridays from 11-2.  She scheduled for a week from Friday and its a 3-5 day order.      ns/3-5 days/[facility], [facility] or [facility]  Chronic right shoulder pain/no imaging/Raheem Cobb, /[facility]

## 2021-05-04 NOTE — TELEPHONE ENCOUNTER
Talked with . Patient was fine being seen a week out since that is when patient has a ride.     Will close the encounter.     Yadira Rowley MS, ATC

## 2021-05-07 DIAGNOSIS — Z79.4 TYPE 2 DIABETES MELLITUS WITH DIABETIC POLYNEUROPATHY, WITH LONG-TERM CURRENT USE OF INSULIN (H): ICD-10-CM

## 2021-05-07 DIAGNOSIS — E11.42 TYPE 2 DIABETES MELLITUS WITH DIABETIC POLYNEUROPATHY, WITH LONG-TERM CURRENT USE OF INSULIN (H): ICD-10-CM

## 2021-05-07 NOTE — TELEPHONE ENCOUNTER
Patients current RX is not medicare compliant. Per medicare guidelines if patient is high utilization they need a new rx/ need to be seen every 6 months    Thanks!  Sol Horan

## 2021-05-07 NOTE — TELEPHONE ENCOUNTER
Lab Results   Component Value Date    A1C 7.7 12/18/2020    A1C 11.2 05/27/2020    A1C 10.3 11/06/2019    A1C 11.0 08/06/2019    A1C 12.6 04/03/2019     Labs outdated.  Seen 5/3/21.    Please order labs.    Refill as appropriate.    Christi Walker RN  [facility]

## 2021-05-09 ENCOUNTER — IMMUNIZATION (OUTPATIENT)
Dept: LAB | Facility: CLINIC | Age: 61
End: 2021-05-09
Payer: MEDICARE

## 2021-05-10 DIAGNOSIS — Z79.4 TYPE 2 DIABETES MELLITUS WITH DIABETIC POLYNEUROPATHY, WITH LONG-TERM CURRENT USE OF INSULIN (H): ICD-10-CM

## 2021-05-10 DIAGNOSIS — E11.42 TYPE 2 DIABETES MELLITUS WITH DIABETIC POLYNEUROPATHY, WITH LONG-TERM CURRENT USE OF INSULIN (H): ICD-10-CM

## 2021-05-10 NOTE — TELEPHONE ENCOUNTER
Patient was needing refills on her test strips, per medicare guidelines you saw her last in February and would need to write the rx to be medicare compliant.    Thanks,  Sol Mauricio [facility]

## 2021-05-11 ENCOUNTER — VIRTUAL VISIT (OUTPATIENT)
Dept: INTERNAL MEDICINE | Facility: CLINIC | Age: 61
End: 2021-05-11
Payer: MEDICARE

## 2021-05-11 DIAGNOSIS — E11.42 TYPE 2 DIABETES MELLITUS WITH DIABETIC POLYNEUROPATHY, WITH LONG-TERM CURRENT USE OF INSULIN (H): ICD-10-CM

## 2021-05-11 DIAGNOSIS — J45.30 MILD PERSISTENT ASTHMA, UNCOMPLICATED: Primary | ICD-10-CM

## 2021-05-11 DIAGNOSIS — Z79.4 TYPE 2 DIABETES MELLITUS WITH DIABETIC POLYNEUROPATHY, WITH LONG-TERM CURRENT USE OF INSULIN (H): ICD-10-CM

## 2021-05-11 DIAGNOSIS — F33.0 MAJOR DEPRESSIVE DISORDER, RECURRENT EPISODE, MILD (H): ICD-10-CM

## 2021-05-11 DIAGNOSIS — T78.2XXD ANAPHYLACTIC REACTION, SUBSEQUENT ENCOUNTER: ICD-10-CM

## 2021-05-11 DIAGNOSIS — I10 ESSENTIAL HYPERTENSION, BENIGN: ICD-10-CM

## 2021-05-11 PROCEDURE — 99214 OFFICE O/P EST MOD 30 MIN: CPT | Mod: 95 | Performed by: INTERNAL MEDICINE

## 2021-05-11 RX ORDER — CITALOPRAM HYDROBROMIDE 40 MG/1
40 TABLET ORAL EVERY MORNING
Qty: 90 TABLET | Refills: 1 | Status: SHIPPED | OUTPATIENT
Start: 2021-05-11 | End: 2021-12-28

## 2021-05-11 RX ORDER — ALBUTEROL SULFATE 90 UG/1
2 AEROSOL, METERED RESPIRATORY (INHALATION) EVERY 4 HOURS PRN
Qty: 18 G | Refills: 5 | Status: SHIPPED | OUTPATIENT
Start: 2021-05-11 | End: 2022-12-14

## 2021-05-11 RX ORDER — EPINEPHRINE 0.3 MG/.3ML
0.3 INJECTION SUBCUTANEOUS
Qty: 0.6 ML | Refills: 3 | Status: SHIPPED | OUTPATIENT
Start: 2021-05-11 | End: 2024-03-27

## 2021-05-11 RX ORDER — LISINOPRIL AND HYDROCHLOROTHIAZIDE 20; 25 MG/1; MG/1
1 TABLET ORAL EVERY MORNING
Qty: 90 TABLET | Refills: 1 | Status: SHIPPED | OUTPATIENT
Start: 2021-05-11 | End: 2021-11-26

## 2021-05-11 NOTE — PROGRESS NOTES
Mary Lou is a 60 year old who is being evaluated via a billable video visit.      How would you like to obtain your AVS? Valentin  If the video visi t is dropped, the invitation should be resent by: Valentin  Will anyone else be joining your video visit? No      Assessment & Plan     (J45.30) Mild persistent asthma, uncomplicated  (primary encounter diagnosis)  Comment: This condition is currently controlled on the current medical regimen.  Continue current therapy.   Discussed asthma in detail and discussed how their breathing symptoms and the pattern of the shortness of breath fits with asthma.   Discussed pathophysiology of asthma and treatments based on the degree of symptoms.   Discussed triggers for asthma in general, and those specific to the patient.  Also told them to anticipate potentially more intense coughing and shortness of breath with any URI (regardless of bacterial or viral etiology) and to be prepared to use the albuterol more often if needed and to return and see me for any such exacerbation.    I recommended having rescue inhaler available and using all throughout the year as needed, demonstrated proper MDI technique for them.  Emphasized the need for them to let me know if there are any changes for the worse in their breathing related to asthma, either acute or chronic and to seek emergency care if the breathing acutely worsens in a sever manner.     Plan: albuterol (PROAIR HFA/PROVENTIL HFA/VENTOLIN         HFA) 108 (90 Base) MCG/ACT inhaler            (E11.42,  Z79.4) Type 2 diabetes mellitus with diabetic polyneuropathy, with long-term current use of insulin (H)  Comment: This condition is currently controlled on the current medical regimen.  Continue current therapy.   Has done great job in improving her diabetes control, taking MUCH better care of her lower cabr diet plans.   Follow up with Endocrinology clinic sometime soon.   Plan:     (F33.0) Major depressive disorder, recurrent episode,  mild (H)  Comment: This condition is currently controlled on the current medical regimen.  Continue current therapy.   Plan: citalopram (CELEXA) 40 MG tablet            (I10) Essential hypertension, benign  Comment: This condition is currently controlled on the current medical regimen.  Continue current therapy.   Plan: lisinopril-hydrochlorothiazide (ZESTORETIC)         20-25 MG tablet            (T78.2XXD) Anaphylactic reaction, subsequent encounter  Comment: refill for future use.   Plan: EPINEPHrine (ANY BX GENERIC EQUIV) 0.3 MG/0.3ML        injection 2-pack                        Tobacco Cessation:   reports that she has been smoking cigarettes and other. She has a 25.00 pack-year smoking history. She has never used smokeless tobacco.          No follow-ups on file.    Xavi Garcia MD  [facility]    Bart Barreto is a 60 year old who presents for the following health issues     HPI     ED/UC Followup:    Facility:  [facility]  Date of visit: 5/3/2021  Reason for visit: chronic right shoulder pain   Current Status: still experiencing right shoulder pain, has an appointment with sports medicine on Friday, 5/14/2021     1.    Asthma stable.  Has not had any recent breathing troubles beyond usual baseline.  Has not any recent acute respiratory events.  Using medication as directed with reported side effects    ACT Total Scores 3/14/2018 4/3/2019 2/19/2020   ACT TOTAL SCORE (Goal Greater than or Equal to 20) 23 24 20   In the past 12 months, how many times did you visit the emergency room for your asthma without being admitted to the hospital? 0 0 0   In the past 12 months, how many times were you hospitalized overnight because of your asthma? 0 0 0          2.  Diabetes:  In regards specifically to the patient's diabetes, they reports no unusual symptoms.  Medication compliance: good  Diabetic diet compliance: good    Patient reports no  significant episodes of hypo- or hyperglycemia    Diabetic complications: neuropathy     Most  recent labs:    Lab Results   Component Value Date    A1C 7.7 12/18/2020    A1C 11.2 05/27/2020    A1C 10.3 11/06/2019    A1C 11.0 08/06/2019    A1C 12.6 04/03/2019    A1C 8.9 06/26/2018    A1C 12.5 01/16/2018    A1C 12.4 09/01/2017    A1C 11.1 03/21/2017    A1C 11.1 11/10/2016    A1C 11.5 07/05/2016    A1C 7.2 08/31/2015    A1C 8.8 05/14/2015    A1C 9.0 03/02/2015    A1C 7.0 11/14/2013    A1C 7.9 10/07/2013    A1C 8.1 08/13/2013    A1C 9.0 06/18/2013    A1C 7.7 02/28/2013    A1C 6.5 04/17/2012    A1C 5.9 08/02/2011    A1C 5.7 11/23/2010    A1C 5.6 07/06/2010    A1C 5.7 02/01/2010          3.  History of hypothyroidism.  On replacement therapy.  She has not experienced any significant side effects of this medication.   The patient denies of fatigue, weight changes, heat/cold intolerance, hair changes, nail changes, bowel changes.     Latest labs reviewed:    Lab Results   Component Value Date    TSH 0.70 12/18/2020    TSH 0.38 05/27/2020    TSH 1.83 11/06/2019    TSH 2.75 04/03/2019    TSH 1.21 06/26/2018    TSH 1.14 01/16/2018    TSH 43.86 09/01/2017    TSH 36.52 03/21/2017    TSH 5.93 11/10/2016    TSH 0.29 07/05/2016    TSH 1.30 03/04/2016    TSH 24.49 11/25/2015    TSH 0.06 08/31/2015    TSH 0.17 05/14/2015    TSH 3.23 03/02/2015    TSH 0.34 11/14/2013    TSH 3.00 08/13/2013    TSH 0.12 06/18/2013    TSH 59.20 02/28/2013    TSH 0.40 04/17/2012    TSH 6.44 01/05/2012    TSH 0.17 08/02/2011    TSH 0.02 07/07/2010    TSH 0.08 02/02/2010        4.    Hypertension:  History of hypertension, on medication.  No reported side effects from medications.    Reviewed last 6 BP readings in chart:  BP Readings from Last 6 Encounters:   05/14/21 136/80   05/03/21 (!) 143/78   01/07/21 124/88   12/18/20 (!) 144/84   02/12/20 120/82   11/07/19 111/77     No active cardiac complaints or symptoms with exercise.     4.    ANXIETY:  Has  "known history of anxiety and has been on medication for this.  The patient does not report any significant side effects of this medication.  The prior symptoms leading to the original diagnosis and decision to start medication therapy are better.     Appetite is stable.  Sleeping patterns are stable.    Overall, the level of \"racing thoughts\", emotional lability, and ease of agitation are better.    No recent panic attacks.    ROMERO-7 SCORE 9/6/2017 11/7/2019 1/9/2021   Total Score 10 (moderate anxiety) 9 (mild anxiety) 12 (moderate anxiety)   Total Score 10 9 12              **I reviewed the information recorded in the patient's EPIC chart (including but not limited to medical history, surgical history, family history, problem list, medication list, and allergy list) and updated the information as indicated based on the patients reported information.         Review of Systems   Constitutional, HEENT, cardiovascular, pulmonary, gi and gu systems are negative, except as otherwise noted.      Objective           Vitals:  No vitals were obtained today due to virtual visit.    Physical Exam   GENERAL: Healthy, alert and no distress  EYES: Eyes grossly normal to inspection.  No discharge or erythema, or obvious scleral/conjunctival abnormalities.  RESP: No audible wheeze, cough, or visible cyanosis.  No visible retractions or increased work of breathing.    SKIN: Visible skin clear. No significant rash, abnormal pigmentation or lesions.  NEURO: Cranial nerves grossly intact.  Mentation and speech appropriate for age.  PSYCH: Mentation appears normal, affect normal/bright, judgement and insight intact, normal speech and appearance well-groomed.                Video-Visit Details    Type of service:  Video Visit    1st VideoStart: 05/11/2021 03:27 pm  Stop: 05/11/2021 03:44 pm      Originating Location (pt. Location): Home    Distant Location (provider location):  [facility]     Platform used " for Video Visit: Etta

## 2021-05-14 ENCOUNTER — OFFICE VISIT (OUTPATIENT)
Dept: ORTHOPEDICS | Facility: CLINIC | Age: 61
End: 2021-05-14
Payer: MEDICARE

## 2021-05-14 ENCOUNTER — ANCILLARY PROCEDURE (OUTPATIENT)
Dept: GENERAL RADIOLOGY | Facility: CLINIC | Age: 61
End: 2021-05-14
Attending: FAMILY MEDICINE
Payer: MEDICARE

## 2021-05-14 VITALS
DIASTOLIC BLOOD PRESSURE: 80 MMHG | HEIGHT: 66 IN | SYSTOLIC BLOOD PRESSURE: 136 MMHG | WEIGHT: 147 LBS | BODY MASS INDEX: 23.63 KG/M2

## 2021-05-14 DIAGNOSIS — M25.511 PAIN IN JOINT OF RIGHT SHOULDER: Primary | ICD-10-CM

## 2021-05-14 DIAGNOSIS — M75.101 ROTATOR CUFF SYNDROME OF RIGHT SHOULDER: ICD-10-CM

## 2021-05-14 DIAGNOSIS — M25.519 PAIN IN JOINT, SHOULDER REGION: ICD-10-CM

## 2021-05-14 PROCEDURE — 73030 X-RAY EXAM OF SHOULDER: CPT | Mod: RT | Performed by: RADIOLOGY

## 2021-05-14 PROCEDURE — 99204 OFFICE O/P NEW MOD 45 MIN: CPT | Performed by: FAMILY MEDICINE

## 2021-05-14 RX ORDER — DICLOFENAC SODIUM 75 MG/1
75 TABLET, DELAYED RELEASE ORAL 2 TIMES DAILY PRN
Qty: 60 TABLET | Refills: 1 | Status: SHIPPED | OUTPATIENT
Start: 2021-05-14 | End: 2021-10-05

## 2021-05-14 RX ORDER — CYCLOBENZAPRINE HCL 10 MG
10 TABLET ORAL
Qty: 30 TABLET | Refills: 0 | Status: SHIPPED | OUTPATIENT
Start: 2021-05-14 | End: 2021-10-05

## 2021-05-14 ASSESSMENT — MIFFLIN-ST. JEOR: SCORE: 1263.54
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
## 2021-05-14 NOTE — PATIENT INSTRUCTIONS
1. Pain in joint of right shoulder    2. Rotator cuff syndrome of right shoulder      -Patient has severe chronic right shoulder pain due to rotator cuff tendinitis versus frozen shoulder  -Patient was unable to tolerate much of a physical exam today due to severe pain and dysfunction.  -Patient will start formal physical therapy and home exercise program  -We will start diclofenac 75 mg twice a day as needed.  Patient will start Flexeril 10 mg at bedtime  -Patient will follow up in 4 weeks for reevaluation.  If no improvement in pain, to consider a cortisone injection versus MRI.  -Call direct clinic number [[phone]] at any time with questions or concerns.    Albert Yeo MD [facility] Orthopedics and Sports Medicine  [facility]
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
## 2021-05-15 NOTE — PROGRESS NOTES
SUBJECTIVE:  Chief Complaint   Patient presents with     Shoulder Pain     R shoulder pain radiating down R arm. Pt states she fell a couple months ago and arm has been hurting off and on since then. Tonight is the worst it has been   .jewell presents with a chief complaint of right shoulder.  The injury occurred  weeks ago.     The patient complained of moderate pain and has had decreased ROM.    Pain exacerbated by movement         Past Medical History:   Diagnosis Date     Anemia, unspecified 02/01/2010     Degeneration of lumbar intervertebral disc 07/11/2016     Depressive disorder      Diabetes mellitus (H) 02/01/2010    Dx in 2006;  insulin started 7/9/15     Essential hypertension, benign 02/01/2010     Hyperlipidaemia      Hyperlipidemia LDL goal < 100      Migraine      Other chronic pain     back, legs and feet     Spinal stenosis      Tobacco use disorder 02/01/2010     Uncomplicated asthma     ? with allergic reactions?     Unspecified hypothyroidism 02/01/2010     Allergies   Allergen Reactions     Nicotine Polacrilex Anaphylaxis     PROBABLY REACTION TO VINYL IN NICOTINE PATCH     Neurontin [Gabapentin] Rash     Norvasc [Amlodipine Besylate] GI Disturbance     constipation     Percocet [Oxycodone-Acetaminophen] Itching     Sulfamethoxazole-Trimethoprim Itching and Other (See Comments)     Vinyl Ether Swelling and Cough     Vinyl causes throat and lip swelling, cough and headache     Social History     Tobacco Use     Smoking status: Current Every Day Smoker     Packs/day: 1.00     Years: 25.00     Pack years: 25.00     Types: Cigarettes, Other     Smokeless tobacco: Never Used     Tobacco comment: started at age 19 and has quit for 10 years    Substance Use Topics     Alcohol use: No     Alcohol/week: 0.0 standard drinks       ROSINTEGUMENTARY/SKIN: NEGATIVE for open wound/bleeding and NEGATIVE for bruising    EXAM: Gen: healthy,alert,no distress  Extremity: shoulder has pain with palpation androm.    There is not compromise to the distal circulation.  Pulses are +2 and CRT is brisk.GENERAL APPEARANCE: healthy, alert and no distress  SKIN: no suspicious lesions or rashes    X-RAY was not done.      ICD-10-CM    1. Chronic right shoulder pain  M25.511 naproxen (NAPROSYN) 500 MG tablet    G89.29 Orthopedic & Spine  Referral       RICE

## 2021-05-21 ENCOUNTER — THERAPY VISIT (OUTPATIENT)
Dept: PHYSICAL THERAPY | Facility: CLINIC | Age: 61
End: 2021-05-21
Attending: FAMILY MEDICINE
Payer: MEDICARE

## 2021-05-21 DIAGNOSIS — M25.511 PAIN IN JOINT OF RIGHT SHOULDER: ICD-10-CM

## 2021-05-21 DIAGNOSIS — M75.101 ROTATOR CUFF SYNDROME OF RIGHT SHOULDER: ICD-10-CM

## 2021-05-21 PROCEDURE — 97110 THERAPEUTIC EXERCISES: CPT | Mod: GP | Performed by: PHYSICAL THERAPIST

## 2021-05-21 PROCEDURE — 97161 PT EVAL LOW COMPLEX 20 MIN: CPT | Mod: GP | Performed by: PHYSICAL THERAPIST

## 2021-05-21 NOTE — PROGRESS NOTES
Physical Therapy Initial Evaluation  Subjective:  The history is provided by the patient. No  was used.   Therapist Generated HPI Evaluation  Problem details: Pt presents with complaints of R shoulder pain. Pt reported an incident last October in which she tripped and hit her R shoulder hard against a wall. Pt noted pain developed shortly thereafter. Pt reported pain has slowly progressed in frequency and intensity since onset. Pt reported limited ROM in all directions, affecting her daily tasks. Pt reported UC visit in early May (5-3-21) due to increased shoulder pain. Referral to Orthopedics made at the time. Referral to PT generated after MD office visit on 5-4-21. Pt accompanied to appointment by S caregiver; reported caregiver services provided 1x/week.         Type of problem:  Right shoulder.    This is a chronic condition.  Condition occurred with:  A fall (hit shoulder against a wall as a result of tripping).  Where condition occurred: in the community.  Patient reports pain:  Anterior and lateral.  Pain is described as aching and burning and is constant.  Pain radiates to:  Shoulder. Pain is worse during the day and worse during the night.  Since onset symptoms are gradually worsening.  Associated symptoms:  Loss of motion/stiffness. Symptoms are exacerbated by using arm at shoulder level, using arm overhead, lying on extremity, using arm behind back and lifting  and relieved by rest.                              Objective:  System                   Shoulder Evaluation:  ROM:  AROM:    Flexion:  Left:  145    Right:  90                    Flexion/External Rotation:  Left:  Upper thoracic spine    Right:  Lower cervical spine  Extension/Internal Rotation:  Left:  Lower thoracic spine    Right:  Hand to midline sacrum    PROM:    Flexion:  Right: ~100; pain limited      Abduction:  Right:  ~90 degrees; pain limited    Internal Rotation:  Right:  ~50; pain limited  External Rotation:   Right:  ~45; pain limited                    Strength:  not assessed                                                               General     ROS    Assessment/Plan:    Patient is a 60 year old female with right side shoulder complaints.    Patient has the following significant findings with corresponding treatment plan.                Diagnosis 1:  R shoulder pain  Pain -  self management, education and home program  Decreased ROM/flexibility - manual therapy and therapeutic exercise  Decreased joint mobility - manual therapy and therapeutic exercise  Decreased function - therapeutic activities    Therapy Evaluation Codes:   1) History comprised of:   Personal factors that impact the plan of care:      Time since onset of symptoms.    Comorbidity factors that impact the plan of care are:      None.     Medications impacting care: None.  2) Examination of Body Systems comprised of:   Body structures and functions that impact the plan of care:      Shoulder.   Activity limitations that impact the plan of care are:      Bathing, Dressing, Jumping and Sleeping.  3) Clinical presentation characteristics are:   Stable/Uncomplicated.    Cumulative Therapy Evaluation is: Low complexity.    Previous and current functional limitations:  (See Goal Flow Sheet for this information)    Short term and Long term goals: (See Goal Flow Sheet for this information)     Communication ability:  Patient appears to be able to clearly communicate and understand verbal and written communication and follow directions correctly.  Treatment Explanation - The following has been discussed with the patient:   RX ordered/plan of care  Anticipated outcomes  Possible risks and side effects  This patient would benefit from PT intervention to resume normal activities.   Rehab potential is questionable.    Frequency:  1 X week, once daily  Duration:  for 8 weeks  Discharge Plan:  Independent in home treatment program.  Reach maximal therapeutic  benefit.    Please refer to the daily flowsheet for treatment today, total treatment time and time spent performing 1:1 timed codes.

## 2021-05-21 NOTE — LETTER
DEPARTMENT OF HEALTH AND HUMAN SERVICES  CENTERS FOR MEDICARE & MEDICAID SERVICES    PLAN/UPDATED PLAN OF PROGRESS FOR OUTPATIENT REHABILITATION       PATIENTS NAME:  Mary Lou Gil   : 1960  PROVIDER NUMBER:    [id number]  HICN:  [id number]   PROVIDER NAME: [facility] [place]  MEDICAL RECORD NUMBER: [id number]   START OF CARE DATE:  SOC Date: 21   TYPE:  PT    PRIMARY/TREATMENT DIAGNOSIS: (Pertinent Medical Diagnosis)     Pain in joint of right shoulder  Rotator cuff syndrome of right shoulder    VISITS FROM START OF CARE:  Rxs Used: 1     Physical Therapy Initial Evaluation  Therapist Generated HPI Evaluation  Problem details: Pt presents with complaints of R shoulder pain. Pt reported an incident last October in which she tripped and hit her R shoulder hard against a wall. Pt noted pain developed shortly thereafter. Pt reported pain has slowly progressed in frequency and intensity since onset. Pt reported limited ROM in all directions, affecting her daily tasks. Pt reported UC visit in early May (5-3-21) due to increased shoulder pain. Referral to Orthopedics made at the time. Referral to PT generated after MD office visit on 21. Pt accompanied to appointment by [facility] caregiver; reported caregiver services provided 1x/week.         Type of problem:  Right shoulder.  This is a chronic condition.  Condition occurred with:  A fall (hit shoulder against a wall as a result of tripping).  Where condition occurred: in the community.  Patient reports pain:  Anterior and lateral.  Pain is described as aching and burning and is constant.  Pain radiates to:  Shoulder. Pain is worse during the day and worse during the night.  Since onset symptoms are gradually worsening.  Associated symptoms:  Loss of motion/stiffness. Symptoms are exacerbated by using arm at shoulder level, using arm overhead, lying on extremity, using arm behind back and lifting  and relieved by  rest.    Patient Health History  Pain is reported as 7/10 on pain scale.  General health as reported by patient is fair.  Pertinent medical history includes: depression, diabetes, high blood pressure, thyroid problems and smoking.   Medical allergies: other.   Surgeries include:  Other.    Current medications:  Anti-depressants, high blood pressure medication, muscle relaxants, pain medication, sleep medication and thyroid medication.         Objective:  System  Shoulder Evaluation:  ROM:  AROM:    Flexion:  Left:  145    Right:  90  Flexion/External Rotation:  Left:  Upper thoracic spine    Right:  Lower cervical spine  Extension/Internal Rotation:  Left:  Lower thoracic spine    Right:  Hand to midline sacrum    PROM:    Flexion:  Right: ~100; pain limited    Abduction:  Right:  ~90 degrees; pain limited  Internal Rotation:  Right:  ~50; pain limited  External Rotation:  Right:  ~45; pain limited  Strength:  not assessed    ROS  Assessment/Plan:    Patient is a 60 year old female with right side shoulder complaints.    Patient has the following significant findings with corresponding treatment plan.                Diagnosis 1:  R shoulder pain  Pain -  self management, education and home program  Decreased ROM/flexibility - manual therapy and therapeutic exercise  Decreased joint mobility - manual therapy and therapeutic exercise  Decreased function - therapeutic activities    Therapy Evaluation Codes:   1) History comprised of:   Personal factors that impact the plan of care:      Time since onset of symptoms.    Comorbidity factors that impact the plan of care are:      None.     Medications impacting care: None.  2) Examination of Body Systems comprised of:   Body structures and functions that impact the plan of care:      Shoulder.   Activity limitations that impact the plan of care are:      Bathing, Dressing, Jumping and Sleeping.  3) Clinical presentation characteristics  "are:   Stable/Uncomplicated.    Cumulative Therapy Evaluation is: Low complexity.    Previous and current functional limitations:  (See Goal Flow Sheet for this information)    Short term and Long term goals: (See Goal Flow Sheet for this information)   Communication ability:  Patient appears to be able to clearly communicate and understand verbal and written communication and follow directions correctly.  Treatment Explanation - The following has been discussed with the patient:   RX ordered/plan of care  Anticipated outcomes  Possible risks and side effects  This patient would benefit from PT intervention to resume normal activities.   Rehab potential is questionable.      Frequency:  1 X week, once daily  Duration:  for 8 weeks  Discharge Plan:  Independent in home treatment program.  Reach maximal therapeutic benefit.      Caregiver Signature/Credentials _____________________________ Date ________       Treating Provider: Shaniqua Lopez, PT   I have reviewed and certified the need for these services and plan of treatment while under my care.        PHYSICIAN'S SIGNATURE:   ___________________________ Date___________                                     Albert Yeo, MD    Certification period:  Beginning of Cert date period: 05/21/21 to  End of Cert period date: 08/18/21     Functional Level Progress Report: Please see attached \"Goal Flow sheet for Functional level.\"    ____X____ Continue Services or       ________ DC Services                Service dates: From  SOC Date: 05/21/21 date to present                         "

## 2021-05-25 NOTE — PROGRESS NOTES
Physical Therapy Initial Evaluation  Subjective:    Patient Health History         Pain is reported as 7/10 on pain scale.  General health as reported by patient is fair.  Pertinent medical history includes: depression, diabetes, high blood pressure, thyroid problems and smoking.     Medical allergies: other.   Surgeries include:  Other.    Current medications:  Anti-depressants, high blood pressure medication, muscle relaxants, pain medication, sleep medication and thyroid medication.                                           Objective:  System    Physical Exam    General     ROS    Assessment/Plan:

## 2021-05-26 DIAGNOSIS — E11.42 TYPE 2 DIABETES MELLITUS WITH DIABETIC POLYNEUROPATHY, WITH LONG-TERM CURRENT USE OF INSULIN (H): ICD-10-CM

## 2021-05-26 DIAGNOSIS — Z79.4 TYPE 2 DIABETES MELLITUS WITH DIABETIC POLYNEUROPATHY, WITH LONG-TERM CURRENT USE OF INSULIN (H): ICD-10-CM

## 2021-05-26 NOTE — LETTER
[facility] [place]  [address], [address]  [place], [place]  [address]                                            TEL:[phone]  FAX:[phone]      Mary Lou Saleems  [address]  [facility] [address]      May 27, 2021    Dear Mary Lou       This letter is being sent on behalf of Dr. Ramirez. It is to remind you that your provider expected you to return for a follow up visit. Please make a lab only appointment and then follow up with a virtual visit one week afterwards to review those results. If this is not done, it may result in your provider not being able to refill your medications.     You may call Endocrinology scheduling at [phone] schedule an appointment.     If you have already scheduled these appointments, please disregard this notice.      Sincerely,    [facility] [place],  Dr. Susan Ramirez

## 2021-05-27 RX ORDER — GLIPIZIDE 5 MG/1
TABLET, FILM COATED, EXTENDED RELEASE ORAL
Qty: 90 TABLET | Refills: 0 | Status: SHIPPED | OUTPATIENT
Start: 2021-05-27 | End: 2021-08-25

## 2021-05-27 NOTE — TELEPHONE ENCOUNTER
Medication is being filled for 1 time refill only due to:  patient is due for an appointment      Please call patient or mail a letter

## 2021-05-27 NOTE — TELEPHONE ENCOUNTER
"Requested Prescriptions   Pending Prescriptions Disp Refills     glipiZIDE (GLUCOTROL XL) 5 MG 24 hr tablet [Pharmacy Med Name: GLIPIZIDE ER 5MG TB24] 90 tablet 1     Sig: TAKE ONE TABLET BY MOUTH ONCE DAILY WITH FOOD       Sulfonylurea Agents Passed - 5/26/2021  5:02 AM        Passed - Patient has documented A1c within the specified period of time.     If HgbA1C is 8 or greater, it needs to be on file within the past 3 months.  If less than 8, must be on file within the past 6 months.     Recent Labs   Lab Test 12/18/20  1544   A1C 7.7*             Passed - Medication is active on med list        Passed - Patient is age 18 or older        Passed - No active pregnancy on record        Passed - Patient has a recent creatinine (normal) within the past 12 mos.     Recent Labs   Lab Test 01/07/21 2000 08/26/16  1652 08/26/16  1652   CR 0.60   < >  --    CREAT  --   --  0.8    < > = values in this interval not displayed.       Ok to refill medication if creatinine is low          Passed - Patient has not had a positive pregnancy test within the past 12 mos.        Passed - Recent (6 mo) or future (30 days) visit within the authorizing provider's specialty     Patient had office visit in the last 6 months or has a visit in the next 30 days with authorizing provider or within the authorizing provider's specialty.  See \"Patient Info\" tab in inbasket, or \"Choose Columns\" in Meds & Orders section of the refill encounter.                 "

## 2021-05-28 ENCOUNTER — THERAPY VISIT (OUTPATIENT)
Dept: PHYSICAL THERAPY | Facility: CLINIC | Age: 61
End: 2021-05-28
Payer: MEDICARE

## 2021-05-28 DIAGNOSIS — M25.511 CHRONIC RIGHT SHOULDER PAIN: ICD-10-CM

## 2021-05-28 DIAGNOSIS — G89.29 CHRONIC RIGHT SHOULDER PAIN: ICD-10-CM

## 2021-05-28 PROCEDURE — 97110 THERAPEUTIC EXERCISES: CPT | Mod: GP | Performed by: PHYSICAL THERAPIST

## 2021-06-02 ENCOUNTER — TRANSFERRED RECORDS (OUTPATIENT)
Dept: HEALTH INFORMATION MANAGEMENT | Facility: CLINIC | Age: 61
End: 2021-06-02

## 2021-06-02 LAB — RETINOPATHY: NEGATIVE

## 2021-06-04 ENCOUNTER — THERAPY VISIT (OUTPATIENT)
Dept: PHYSICAL THERAPY | Facility: CLINIC | Age: 61
End: 2021-06-04
Payer: MEDICARE

## 2021-06-04 DIAGNOSIS — G89.29 CHRONIC RIGHT SHOULDER PAIN: ICD-10-CM

## 2021-06-04 DIAGNOSIS — M25.511 CHRONIC RIGHT SHOULDER PAIN: ICD-10-CM

## 2021-06-04 PROCEDURE — 97110 THERAPEUTIC EXERCISES: CPT | Mod: GP | Performed by: PHYSICAL THERAPIST

## 2021-06-09 DIAGNOSIS — M75.101 ROTATOR CUFF SYNDROME OF RIGHT SHOULDER: ICD-10-CM

## 2021-06-15 DIAGNOSIS — Z79.4 TYPE 2 DIABETES MELLITUS WITH DIABETIC POLYNEUROPATHY, WITH LONG-TERM CURRENT USE OF INSULIN (H): ICD-10-CM

## 2021-06-15 DIAGNOSIS — E11.42 TYPE 2 DIABETES MELLITUS WITH DIABETIC POLYNEUROPATHY, WITH LONG-TERM CURRENT USE OF INSULIN (H): ICD-10-CM

## 2021-06-17 RX ORDER — CYCLOBENZAPRINE HCL 10 MG
TABLET ORAL
Qty: 30 TABLET | Refills: 0 | OUTPATIENT
Start: 2021-06-17

## 2021-06-17 RX ORDER — METFORMIN HCL 500 MG
TABLET, EXTENDED RELEASE 24 HR ORAL
Qty: 60 TABLET | Refills: 4 | Status: SHIPPED | OUTPATIENT
Start: 2021-06-17 | End: 2022-02-04

## 2021-06-17 NOTE — TELEPHONE ENCOUNTER
Patient has not been checking [employer] messages.   Per last office visit note of 5/14/21:  Patient will follow up in 4 weeks for reevaluation.  If no improvement in pain, to consider a cortisone injection versus MRI.    No appointment has been scheduled for follow up.     Left voicemail asking if patient requested refill. Let her know that the plan from last office visit was to follow up in 4 weeks if she was still having issues. Recommended schedule an appointment if that was the case. Scheduling number provided. Ask patient to let us know regarding the refill.       MANOLO Arnold RN

## 2021-06-17 NOTE — TELEPHONE ENCOUNTER
"Requested Prescriptions   Pending Prescriptions Disp Refills     metFORMIN (GLUCOPHAGE-XR) 500 MG 24 hr tablet [Pharmacy Med Name: METFORMIN HCL ER 500MG TB24] 60 tablet 4     Sig: TAKE TWO TABLETS BY MOUTH ONCE DAILY WITH DINNER       Biguanide Agents Passed - 6/15/2021 12:26 PM        Passed - Patient is age 10 or older        Passed - Patient has documented A1c within the specified period of time.     If HgbA1C is 8 or greater, it needs to be on file within the past 3 months.  If less than 8, must be on file within the past 6 months.     Recent Labs   Lab Test 12/18/20  1544   A1C 7.7*             Passed - Patient's CR is NOT>1.4 OR Patient's EGFR is NOT<45 within past 12 mos.     Recent Labs   Lab Test 01/07/21 2000   GFRESTIMATED >90   GFRESTBLACK >90       Recent Labs   Lab Test 01/07/21 2000   CR 0.60             Passed - Patient does NOT have a diagnosis of CHF.        Passed - Medication is active on med list        Passed - Patient is not pregnant        Passed - Patient has not had a positive pregnancy test within the past 12 mos.         Passed - Recent (6 mo) or future (30 days) visit within the authorizing provider's specialty     Patient had office visit in the last 6 months or has a visit in the next 30 days with authorizing provider or within the authorizing provider's specialty.  See \"Patient Info\" tab in inbasket, or \"Choose Columns\" in Meds & Orders section of the refill encounter.                 "

## 2021-06-17 NOTE — TELEPHONE ENCOUNTER
Patient left voicemail message. She did not request a medication refill - it was an automated refill request.    Tel: [phone]

## 2021-06-18 ENCOUNTER — THERAPY VISIT (OUTPATIENT)
Dept: PHYSICAL THERAPY | Facility: CLINIC | Age: 61
End: 2021-06-18
Payer: MEDICARE

## 2021-06-18 DIAGNOSIS — G89.29 CHRONIC RIGHT SHOULDER PAIN: ICD-10-CM

## 2021-06-18 DIAGNOSIS — M25.511 CHRONIC RIGHT SHOULDER PAIN: ICD-10-CM

## 2021-06-18 PROCEDURE — 97110 THERAPEUTIC EXERCISES: CPT | Mod: GP | Performed by: PHYSICAL THERAPIST

## 2021-06-18 PROCEDURE — 97530 THERAPEUTIC ACTIVITIES: CPT | Mod: GP | Performed by: PHYSICAL THERAPIST

## 2021-07-30 DIAGNOSIS — E11.9 TYPE 2 DIABETES, HBA1C GOAL < 7% (H): ICD-10-CM

## 2021-07-30 RX ORDER — PEN NEEDLE, DIABETIC 31 GX5/16"
NEEDLE, DISPOSABLE MISCELLANEOUS
Qty: 100 EACH | Refills: 1 | Status: SHIPPED | OUTPATIENT
Start: 2021-07-30 | End: 2021-10-05

## 2021-07-30 NOTE — TELEPHONE ENCOUNTER
Prescription approved per [facility] Refill Protocol.  Marcos Santana RN  [facility] Triage Nurse

## 2021-08-24 PROBLEM — M25.511 SHOULDER PAIN, RIGHT: Status: RESOLVED | Noted: 2021-05-28 | Resolved: 2021-08-24

## 2021-08-24 NOTE — PROGRESS NOTES
DISCHARGE REPORT    Progress reporting period is from 5-21-21 to 6-18-21. Pt completed 4 sessions of PT.       SUBJECTIVE  Subjective changes noted by patient:  Reported reaching overhead with less pain. Still notes difficulty lying on the R for prolonged periods of time. Reports she has not been consistent with her HEP; reports depression and challenging to motivate self.    Previous pain level was  5-10/10.   Changes in function:  Yes (See Goal flowsheet attached for changes in current functional level)  Adverse reaction to treatment or activity: None    OBJECTIVE  Objective status at last visit on 6-18-21: AROM: shoulder flexion-120 degrees; scaption-100 degrees; ext/IR: hand to R buttock. PROM: flexion-125 degrees; ER-60 degrees.      ASSESSMENT/PLAN  Updated problem list and treatment plan: Diagnosis 1:  R shoulder pain   STG/LTGs have been met or progress has been made towards goals:  Yes (See Goal flow sheet completed today.)  Assessment of Progress: The patient has not returned to therapy. Current status is unknown.  Self Management Plans:  Patient has been instructed in a home treatment program.  Mary Lou continues to require the following intervention to meet STG and LTG's:  No further PT scheduled following last visit.    Recommendations:  Discharge.    Please refer to the daily flowsheet for treatment today, total treatment time and time spent performing 1:1 timed codes.

## 2021-08-25 DIAGNOSIS — E11.42 TYPE 2 DIABETES MELLITUS WITH DIABETIC POLYNEUROPATHY, WITH LONG-TERM CURRENT USE OF INSULIN (H): ICD-10-CM

## 2021-08-25 DIAGNOSIS — Z79.4 TYPE 2 DIABETES MELLITUS WITH DIABETIC POLYNEUROPATHY, WITH LONG-TERM CURRENT USE OF INSULIN (H): ICD-10-CM

## 2021-08-25 RX ORDER — GLIPIZIDE 5 MG/1
TABLET, FILM COATED, EXTENDED RELEASE ORAL
Qty: 90 TABLET | Refills: 0 | Status: SHIPPED | OUTPATIENT
Start: 2021-08-25 | End: 2021-11-30

## 2021-08-25 NOTE — TELEPHONE ENCOUNTER
"Requested Prescriptions   Pending Prescriptions Disp Refills     glipiZIDE (GLUCOTROL XL) 5 MG 24 hr tablet [Pharmacy Med Name: GLIPIZIDE ER 5MG TB24] 90 tablet 0     Sig: TAKE ONE TABLET BY MOUTH ONCE DAILY WITH FOOD       Sulfonylurea Agents Failed - 8/25/2021  5:01 AM        Failed - Patient has documented A1c within the specified period of time.     If HgbA1C is 8 or greater, it needs to be on file within the past 3 months.  If less than 8, must be on file within the past 6 months.     Recent Labs   Lab Test 12/18/20  1544   A1C 7.7*             Failed - Recent (6 mo) or future (30 days) visit within the authorizing provider's specialty     Patient had office visit in the last 6 months or has a visit in the next 30 days with authorizing provider or within the authorizing provider's specialty.  See \"Patient Info\" tab in inbasket, or \"Choose Columns\" in Meds & Orders section of the refill encounter.            Passed - Medication is active on med list        Passed - Patient is age 18 or older        Passed - No active pregnancy on record        Passed - Patient has a recent creatinine (normal) within the past 12 mos.     Recent Labs   Lab Test 01/07/21 2000 08/26/16  1652   CR 0.60  --    CREAT  --  0.8       Ok to refill medication if creatinine is low          Passed - Patient has not had a positive pregnancy test within the past 12 mos.             Last virtual visit 2-11-21.  Per dictation, due for follow up appointment in 3 months.  Patient is also over-due for labs (future orders in chart).    Patient informed and will schedule appointments.    Medication refilled x 1.        "

## 2021-08-31 DIAGNOSIS — M75.101 ROTATOR CUFF SYNDROME OF RIGHT SHOULDER: ICD-10-CM

## 2021-08-31 RX ORDER — DICLOFENAC SODIUM 75 MG/1
75 TABLET, DELAYED RELEASE ORAL 2 TIMES DAILY PRN
Qty: 60 TABLET | Refills: 1 | Status: CANCELLED | OUTPATIENT
Start: 2021-08-31

## 2021-08-31 NOTE — TELEPHONE ENCOUNTER
Received incoming refill request for  Pending Prescriptions:                       Disp   Refills    diclofenac (VOLTAREN) 75 MG EC tablet     60 tab*1            Sig: Take 1 tablet (75 mg) by mouth 2 times daily as           needed for moderate pain    Patient is no longer a patient here.
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
## 2021-09-29 ENCOUNTER — E-VISIT (OUTPATIENT)
Dept: URGENT CARE | Facility: URGENT CARE | Age: 61
End: 2021-09-29
Payer: MEDICARE

## 2021-09-29 DIAGNOSIS — J06.9 VIRAL URI: Primary | ICD-10-CM

## 2021-09-29 PROCEDURE — 99421 OL DIG E/M SVC 5-10 MIN: CPT | Performed by: PHYSICIAN ASSISTANT

## 2021-09-29 NOTE — PATIENT INSTRUCTIONS
Dear Mary Lou Gil    After reviewing your responses, I've been able to diagnose you with?a sinus infection caused by a virus.?     It is also important to stay well hydrated, get lots of rest and take over-the-counter decongestants,?tylenol?or ibuprofen if you?are able to?take those medications per your primary care provider to help relieve discomfort.?     If your symptoms worsen, you develop severe headache, vomiting, high fever (>102), or are not improving in 7 days, please contact your primary care provider for an appointment or visit any of our convenient Walk-in Care or Urgent Care Centers to be seen which can be found on our website?here.?     Thanks again for choosing?us?as your health care partner,?   ?  Yahir Calderon PA-C?    The symptoms you describe suggest a viral cause, which is much more common than a bacterial cause. Antibiotics will treat bacterial infections, but have no effect on viral infections. If possible, especially if improving, start with symptom care for the first 7-10 days, then consider seeking further treatment or taking an antibiotic. Bacterial infections generally are more severe, including symptoms such as pus, fever over 101degrees F, or rapidly worsening.  You may want to try a nasal lavage (also known as nasal irrigation). You can find over-the-counter products, such as Neti-Pot, at retail locations or make your own at home. Instructions for homemade nasal lavage and more information on the process are available online at http://www.aafp.org/afp/2009/1115/p1121.html.      Viral Upper Respiratory Illness (Adult)    You have a viral upper respiratory illness (URI), which is another term for the common cold. This illness is contagious during the first few days. It is spread through the air by coughing and sneezing. It may also be spread by direct contact (touching the sick person and then touching your own eyes, nose, or mouth). Frequent handwashing will decrease risk of  spread. Most viral illnesses go away within 7 to 10 days with rest and simple home remedies. Sometimes the illness may last for several weeks. Antibiotics will not kill a virus, and they are generally not prescribed for this condition.  Home care    If symptoms are severe, rest at home for the first 2 to 3 days. When you resume activity, don't let yourself get too tired.    Don't smoke. If you need help stopping, talk with your healthcare provider.    Avoid being exposed to cigarette smoke (yours or others ).    You may use acetaminophen or ibuprofen to control pain and fever, unless another medicine was prescribed. If you have chronic liver or kidney disease, have ever had a stomach ulcer or gastrointestinal bleeding, or are taking blood-thinning medicines, talk with your healthcare provider before using these medicines. Aspirin should never be given to anyone under 18 years of age who is ill with a viral infection or fever. It may cause severe liver or brain damage.    Your appetite may be poor, so a light diet is fine. Stay well hydrated by drinking 6 to 8 glasses of fluids per day (water, soft drinks, juices, tea, or soup). Extra fluids will help loosen secretions in the nose and lungs.    Over-the-counter cold medicines will not shorten the length of time you re sick, but they may be helpful for the following symptoms: cough, sore throat, and nasal and sinus congestion. If you take prescription medicines, ask your healthcare provider or pharmacist which over-the-counter medicines are safe to use. (Note: Don't use decongestants if you have high blood pressure.)  Follow-up care  Follow up with your healthcare provider, or as advised.  When to seek medical advice  Call your healthcare provider right away if any of these occur:    Cough with lots of colored sputum (mucus)    Severe headache; face, neck, or ear pain    Difficulty swallowing due to throat pain    Fever of 100.4 F (38 C) or higher, or as directed by  your healthcare provider  Call 911  Call 911 if any of these occur:    Chest pain, shortness of breath, wheezing, or difficulty breathing    Coughing up blood    Very severe pain with swallowing, especially if it goes along with a muffled voice   Cristian last reviewed this educational content on 6/1/2018 2000-2021 [employer]. All rights reserved. This information is not intended as a substitute for professional medical care. Always follow your healthcare professional's instructions.

## 2021-10-02 ENCOUNTER — HEALTH MAINTENANCE LETTER (OUTPATIENT)
Age: 61
End: 2021-10-02

## 2021-10-05 ENCOUNTER — VIRTUAL VISIT (OUTPATIENT)
Dept: ENDOCRINOLOGY | Facility: CLINIC | Age: 61
End: 2021-10-05
Payer: MEDICARE

## 2021-10-05 DIAGNOSIS — E11.42 TYPE 2 DIABETES MELLITUS WITH DIABETIC POLYNEUROPATHY, WITH LONG-TERM CURRENT USE OF INSULIN (H): Primary | ICD-10-CM

## 2021-10-05 DIAGNOSIS — E03.9 HYPOTHYROIDISM, UNSPECIFIED TYPE: ICD-10-CM

## 2021-10-05 DIAGNOSIS — Z79.4 TYPE 2 DIABETES MELLITUS WITH DIABETIC POLYNEUROPATHY, WITH LONG-TERM CURRENT USE OF INSULIN (H): Primary | ICD-10-CM

## 2021-10-05 PROCEDURE — 99214 OFFICE O/P EST MOD 30 MIN: CPT | Mod: 95 | Performed by: INTERNAL MEDICINE

## 2021-10-05 RX ORDER — GUAIFENESIN AND DEXTROMETHORPHAN HYDROBROMIDE 600; 30 MG/1; MG/1
1 TABLET, EXTENDED RELEASE ORAL EVERY 12 HOURS
COMMUNITY
End: 2024-03-27

## 2021-10-05 NOTE — NURSING NOTE
Last 10 glucose readings taken before breakfast.    10/5/21: 221  10/4/21: 266  10/2/21: 179  9/22/21: 199  9/21/21: 179  9/20/21: 135  9/17/21: 197  9/15/21: 235  9/13/21: 167  9/12/21: 167
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
## 2021-10-05 NOTE — PATIENT INSTRUCTIONS
[facility]  Dr Ramirez, Endocrinology Department    [facility]   [address] [facility]  [place] [address]  Appointment [phone]  Fax: [phone]  [place]: Monday - Thursday      To provide the best diabetic care, please bring your blood glucose meter to each and every visit with your Endocrinologist.  Your blood glucose meter/insulin pump will be downloaded at every appointment.    Please arrive 15 minutes before your scheduled appointment.  This will allow for your blood glucose meter/insulin pump to be downloaded.  If you are wearing DEXCOM please bring  or sharing code so that it can be downloaded.  If you are using freestyle genet personal sensors please bring the reader.  If you are using TANDEM insulin pump please have your username and password to get info from [employer] website.  Continue current regimen for diabetes.  continue levothyroxine at current dose.  Labs needed.  Dose change based on that.  Your provider has referred you to Diabetes Education: For all [facility]:  Phone [phone]; Fax [phone]  Please call and make the appointment.--> 15 day DIAGNOSTIC continuous glucose monitor placement and follow up.  Repeat labs and follow up in 3 months.  Please make a lab appointment for blood work and follow up clinic appointment in 1 week after that to discuss results.    Recommend checking blood sugars before meals and at bedtime.    If Blood glucose are low more often-> 2-3 times/week- give us a call.  The patient is advised to Make better food choices: reduce carbs, Reduce portion size, weight loss and exercise 3-4 times a week.  Discussed hypoglycemia signs and symptoms as well as management in detail.
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
## 2021-10-13 ENCOUNTER — OFFICE VISIT (OUTPATIENT)
Dept: URGENT CARE | Facility: URGENT CARE | Age: 61
End: 2021-10-13
Attending: PHYSICIAN ASSISTANT
Payer: MEDICARE

## 2021-10-13 ENCOUNTER — NURSE TRIAGE (OUTPATIENT)
Dept: INTERNAL MEDICINE | Facility: CLINIC | Age: 61
End: 2021-10-13

## 2021-10-13 VITALS
SYSTOLIC BLOOD PRESSURE: 118 MMHG | BODY MASS INDEX: 23.73 KG/M2 | HEART RATE: 89 BPM | DIASTOLIC BLOOD PRESSURE: 77 MMHG | WEIGHT: 147 LBS | TEMPERATURE: 98.2 F | OXYGEN SATURATION: 98 % | RESPIRATION RATE: 18 BRPM

## 2021-10-13 DIAGNOSIS — J30.2 SEASONAL ALLERGIC RHINITIS, UNSPECIFIED TRIGGER: ICD-10-CM

## 2021-10-13 DIAGNOSIS — Z20.822 SUSPECTED COVID-19 VIRUS INFECTION: ICD-10-CM

## 2021-10-13 DIAGNOSIS — R06.2 WHEEZING: ICD-10-CM

## 2021-10-13 DIAGNOSIS — R07.0 THROAT PAIN: ICD-10-CM

## 2021-10-13 DIAGNOSIS — R05.8 PRODUCTIVE COUGH: ICD-10-CM

## 2021-10-13 DIAGNOSIS — J02.0 STREP THROAT: Primary | ICD-10-CM

## 2021-10-13 LAB
DEPRECATED S PYO AG THROAT QL EIA: POSITIVE
SARS-COV-2 RNA RESP QL NAA+PROBE: NEGATIVE

## 2021-10-13 PROCEDURE — U0003 INFECTIOUS AGENT DETECTION BY NUCLEIC ACID (DNA OR RNA); SEVERE ACUTE RESPIRATORY SYNDROME CORONAVIRUS 2 (SARS-COV-2) (CORONAVIRUS DISEASE [COVID-19]), AMPLIFIED PROBE TECHNIQUE, MAKING USE OF HIGH THROUGHPUT TECHNOLOGIES AS DESCRIBED BY CMS-2020-01-R: HCPCS | Performed by: PHYSICIAN ASSISTANT

## 2021-10-13 PROCEDURE — 87880 STREP A ASSAY W/OPTIC: CPT | Performed by: PHYSICIAN ASSISTANT

## 2021-10-13 PROCEDURE — 99214 OFFICE O/P EST MOD 30 MIN: CPT | Performed by: PHYSICIAN ASSISTANT

## 2021-10-13 PROCEDURE — U0005 INFEC AGEN DETEC AMPLI PROBE: HCPCS | Performed by: PHYSICIAN ASSISTANT

## 2021-10-13 RX ORDER — CETIRIZINE HYDROCHLORIDE 10 MG/1
10 TABLET ORAL EVERY EVENING
Qty: 30 TABLET | Refills: 0 | Status: SHIPPED | OUTPATIENT
Start: 2021-10-13 | End: 2024-03-27

## 2021-10-13 RX ORDER — CEFDINIR 300 MG/1
300 CAPSULE ORAL 2 TIMES DAILY
Qty: 20 CAPSULE | Refills: 0 | Status: SHIPPED | OUTPATIENT
Start: 2021-10-13 | End: 2021-10-23

## 2021-10-13 RX ORDER — PREDNISONE 20 MG/1
20 TABLET ORAL DAILY
Qty: 5 TABLET | Refills: 0 | Status: SHIPPED | OUTPATIENT
Start: 2021-10-13 | End: 2021-10-18

## 2021-10-13 NOTE — PROGRESS NOTES
Patient presents with:  Urgent Care: Pt complains of sinus symptoms for two weeks. Had a cough, nasal congestion, with and SOB. Denies chest pain. Had an e-visit  09/29/2021 and symptoms have failed to improve. Pt was advised to see  for evaluation and treatment of symptoms.     (J02.0) Strep throat  (primary encounter diagnosis)  Comment:   Plan: cefdinir (OMNICEF) 300 MG capsule            (Z20.822) Suspected COVID-19 virus infection  Comment:   Plan: Symptomatic COVID-19 Virus (Coronavirus) by PCR        Nasopharyngeal        Follow covid isolation guidelines    (R07.0) Throat pain  Comment:   Plan: Streptococcus A Rapid Screen w/Reflex to PCR -         Clinic Collect            (R05.8) Productive cough  Comment:   Plan: cefdinir (OMNICEF) 300 MG capsule            (J30.2) Seasonal allergic rhinitis, unspecified trigger  Comment:   Plan: cetirizine (ZYRTEC) 10 MG tablet            (R06.2) Wheezing  Comment:   Plan: predniSONE (DELTASONE) 20 MG tablet                    Covid-19  Pt was evaluated during a global COVID-19 pandemic, which necessitated consideration that the patient might be at risk for infection with the SARS-CoV-2 virus that causes COVID-19.   Applicable protocols for evaluation were followed during the patient's care.   COVID-19 was considered as part of the patient's evaluation. The plan for testing is:  a test was ordered during this visit.     No severe headache, chest pain, shortness of breath  No additional infectious symptoms  Rest, isolate for 10 days, hydrate, test, follow up if worsening or new symptoms  HH members to isolate until test results, if positive isolate for 2 weeks and follow up for testing if symptoms occur  Red flags and emergent follow up discussed, and understood by patient  Follow up with PCP if symptoms worsen or fail to improve     Surgical mask, gown, shield, hairnet, gloves worn by provider        Patient Instructions   Follow up immediately with severe headache,  chest pain, or shortness of breath     Rest, isolate for 10 days, hydrate, follow up if worsening or new symptoms  Household members to isolate until test results, if positive isolate for 2 weeks and follow up for testing if symptoms occur                 SUBJECTIVE:   Mary Lou Gil is a 61 year old female who presents today with cough and sinus congestion for the past 2 weeks, also some wheezing.      Has an albuterol inhaler and nebulizer which offers some relief.    Some sneezing.    Immunization History   Administered Date(s) Administered     COVID-19,PF,Jovanni 05/09/2021     FLU 6-35 months 02/14/2002, 08/31/2010     Influenza (IIV3) PF 02/14/2002, 09/21/2010, 10/30/2012     Influenza Quad, Recombinant, pf(RIV4) (Flublok) 10/29/2019, 12/18/2020     Influenza Vaccine IM > 6 months Valent IIV4 (Alfuria,Fluzone) 11/14/2013, 01/16/2018     Mantoux Tuberculin Skin Test 01/25/1995     Pneumo Conj 13-V (2010&after) 05/14/2015     Pneumococcal 23 valent 08/02/2011     TDAP Vaccine (Adacel) 12/18/2020     TDAP Vaccine (Boostrix) 02/01/2010     Td (Adult), Adsorbed 01/25/1995     Zoster vaccine recombinant adjuvanted (SHINGRIX) 12/18/2020, 03/12/2021           Past Medical History:   Diagnosis Date     Anemia, unspecified 02/01/2010     Degeneration of lumbar intervertebral disc 07/11/2016     Depressive disorder      Diabetes mellitus (H) 02/01/2010    Dx in 2006;  insulin started 7/9/15     Essential hypertension, benign 02/01/2010     Hyperlipidaemia      Hyperlipidemia LDL goal < 100      Migraine      Other chronic pain     back, legs and feet     Spinal stenosis      Tobacco use disorder 02/01/2010     Uncomplicated asthma     ? with allergic reactions?     Unspecified hypothyroidism 02/01/2010         Current Outpatient Medications   Medication Sig Dispense Refill     Multiple Vitamins-Iron (DAILY-DAGO/IRON/BETA-CAROTENE) TABS TAKE 1 TABLET BY MOUTH DAILY. (Patient not taking: Reported on 10/19/2020) 30 tablet  7     Social History     Tobacco Use     Smoking status: Never Smoker     Smokeless tobacco: Never Used   Substance Use Topics     Alcohol use: Not on file     Family History   Problem Relation Age of Onset     Diabetes Mother      Diabetes Father          ROS:    10 point ROS of systems including Constitutional, Eyes, Respiratory, Cardiovascular, Gastroenterology, Genitourinary, Integumentary, Muscularskeletal, Psychiatric ,neurological were all negative except for pertinent positives noted in my HPI       OBJECTIVE:  /77   Pulse 89   Temp 98.2  F (36.8  C)   Resp 18   Wt 66.7 kg (147 lb)   SpO2 98%   BMI 23.73 kg/m    Physical Exam:  GENERAL APPEARANCE: healthy, alert and no distress  EYES: EOMI,  PERRL, conjunctiva clear  HENT: ear canals and TM's normal.  Nose and mouth without ulcers, erythema or lesions  HENT: nasal turbinates boggy with bluish hue and rhinorrhea clear  NECK: supple, nontender, no lymphadenopathy  RESP: lungs clear to auscultation - no rales, rhonchi or wheezes  CV: regular rates and rhythm, normal S1 S2, no murmur noted  NEURO: Normal strength and tone, sensory exam grossly normal,  normal speech and mentation  SKIN: no suspicious lesions or rashes

## 2021-10-13 NOTE — TELEPHONE ENCOUNTER
Pt complains of sinus symptoms for two weeks. Had a cough, nasal congestion, with and SOB. Denies chest pain. Would like to know id abx could be approved. Had an e-visit  09/29/2021 and symptoms have failed to improve. Pt was advised to see  for evaluation and treatment of symptoms.     Reason for Disposition    Sinus congestion (pressure, fullness) present > 10 days    Additional Information    Negative: Sounds like a life-threatening emergency to the triager    Negative: Difficulty breathing, and not from stuffy nose (e.g., not relieved by cleaning out the nose)    Negative: SEVERE headache and has fever    Negative: Patient sounds very sick or weak to the triager    Negative: SEVERE sinus pain    Negative: Severe headache    Negative: Redness or swelling on the cheek, forehead, or around the eye    Negative: Fever > 103 F (39.4 C)    Negative: Fever > 101 F (38.3 C) and over 60 years of age    Negative: Fever > 100.0 F (37.8 C) and has diabetes mellitus or a weak immune system (e.g., HIV positive, cancer chemotherapy, organ transplant, splenectomy, chronic steroids)    Negative: Fever > 100.0 F (37.8 C) and bedridden (e.g., nursing home patient, stroke, chronic illness, recovering from surgery)    Negative: Fever present > 3 days (72 hours)    Negative: Fever returns after gone for over 24 hours and symptoms worse or not improved    Negative: Sinus pain (not just congestion) and fever    Negative: Earache    Protocols used: SINUS PAIN OR CONGESTION-A-OH

## 2021-10-13 NOTE — PATIENT INSTRUCTIONS
"  Patient Education   After Your COVID-19 (Coronavirus) Test  You have been tested for COVID-19 (coronavirus).   If you'll have surgery in the next few days, we'll let you know ahead of time if you have the virus. Please call your surgeon's office with any questions.  For all other patients: Results are usually available in [employer] within 2 to 3 days.   If you do not have a [employer] account, you'll get a letter in the mail in about 7 to 10 days.   [employer] is often the fastest way to get test results. Please sign up if you do not already have a [employer] account. See the handout Getting COVID-19 Test Results in [employer] for help.  What if my test result is positive?  If your test is positive and you have not viewed your result in [employer], you'll get a phone call with your result. (A positive test means that you have the virus.)     Follow the tips under \"How do I self-isolate?\" below for 10 days (20 days if you have a weak immune system).    You don't need to be retested for COVID-19 before going back to school or work. As long as you're fever-free and feeling better, you can go back to school, work and other activities after waiting the 10 or 20 days.  What if I have questions after I get my results?  If you have questions about your results, please visit our testing website at [email or web address].   After 7 to 10 days, if you have not gotten your results:     Call [phone] ([phone]) and ask to speak with our COVID-19 results team.    If you're being treated at an infusion center: Call your infusion center directly.  What are the symptoms of COVID-19?  Cough, fever and trouble breathing are the most common signs of COVID-19.  Other symptoms can include new headaches, new muscle or body aches, new and unexplained fatigue (feeling very tired), chills, sore throat, congestion (stuffy or runny nose), diarrhea (loose poop), loss of taste or smell, belly pain, and nausea or vomiting " "(feeling sick to your stomach or throwing up).  You may already have symptoms of COVID-19, or they may show up later.  What should I do if I have symptoms?  If you're having surgery: Call your surgeon's office.  For all other patients: Stay home and away from others (self-isolate) until ...    You've had no fever--and no medicine that reduces fever--for 1 full day (24 hours), AND    Other symptoms have gotten better. For example, your cough or breathing has improved, AND    At least 10 days have passed since your symptoms first started.  How do I self-isolate?    Stay in your own room, even for meals. Use your own bathroom if you can.    Stay away from others in your home. No hugging, kissing or shaking hands. No visitors.    Don't go to work, school or anywhere else.    Clean \"high touch\" surfaces often (doorknobs, counters, handles). Use household cleaning spray or wipes. You'll find a full list of  on the EPA website: www.epa.gov/pesticide-registration/list-n-disinfectants-use-against-sars-cov-2.    Cover your mouth and nose with a mask or other face covering to avoid spreading germs.    Wash your hands and face often. Use soap and water.    Caregivers in these groups are at risk for severe illness due to COVID-19:  ? People 65 years and older  ? People who live in a nursing home or long-term care facility  ? People with chronic disease (lung, heart, cancer, diabetes, kidney, liver, immunologic)  ? People who have a weakened immune system, including those who:    Are in cancer treatment    Take medicine that weakens the immune system, such as corticosteroids    Had a bone marrow or organ transplant    Have an immune deficiency    Have poorly controlled HIV or AIDS    Are obese (body mass index of 40 or higher)    Smoke regularly    Caregivers should wear gloves while washing dishes, handling laundry and cleaning bedrooms and bathrooms.    Use caution when washing and drying laundry: Don't shake dirty " laundry and use the warmest water setting that you can.    For more tips on managing your health at home, go to www.cdc.gov/coronavirus/2019-ncov/downloads/10Things.pdf.  How can I take care of myself at home?  1. Get lots of rest. Drink extra fluids (unless a doctor has told you not to).  2. Take Tylenol (acetaminophen) for fever or pain. If you have liver or kidney problems, ask your family doctor if it's OK to take Tylenol.   Adults can take either:  ? 650 mg (two 325 mg pills) every 4 to 6 hours, or   ? 1,000 mg (two 500 mg pills) every 8 hours as needed.  ? Note: Don't take more than 3,000 mg in one day. Acetaminophen is found in many medicines (both prescribed and over-the-counter medicines). Read all labels to be sure you don't take too much.   For children, check the Tylenol bottle for the right dose. The dose is based on the child's age or weight.  3. If you have other health problems (like cancer, heart failure, an organ transplant or severe kidney disease): Call your specialty clinic if you don't feel better in the next 2 days.  4. Know when to call 911. Emergency warning signs include:  ? Trouble breathing or shortness of breath  ? Chest pain or pressure that doesn't go away  ? Feeling confused like you haven't felt before, or not being able to wake up  ? Bluish-colored lips or face  5. If your doctor prescribed a blood thinner medicine: Follow their instructions.  Where can I get more information?    [facility] - About COVID-19:   [email or web address]    CDC - If You're Sick: cdc.gov/coronavirus/2019-ncov/about/steps-when-sick.html    CDC - Ending Home Isolation: www.cdc.gov/coronavirus/2019-ncov/hcp/disposition-in-home-patients.html    CDC - Caring for Someone: www.cdc.gov/coronavirus/2019-ncov/if-you-are-sick/care-for-someone.html    [facility] - Interim Guidance for Hospital Discharge to Home: www.health.[facility].mn.us/diseases/coronavirus/hcp/hospdischarge.pdf    [facility]  clinical trials (COVID-19 research studies): [email or web address]    Below are the COVID-19 hotlines at the [place] Department of Health ([facility]). Interpreters are available.  ? For health questions: Call 785-430-6569 or [phone] (7 a.m. to 7 p.m.)  ? For questions about schools and childcare: Call 735-198-0091 or [phone] (7 a.m. to 7 p.m.)    For informational purposes only. Not to replace the advice of your health care provider. Clinically reviewed by Infection Prevention and the [facility] COVID-19 Clinical Team. Copyright   2020 [employer]. All rights reserved. [employer] 550222 - Rev 11/11/20.       Patient Education     Pharyngitis: Strep (Confirmed)    You have had a positive test for strep throat. Strep throat is a contagious illness. It's spread by coughing, kissing, sharing glasses or eating utensils, or by touching others after touching your mouth or nose. Symptoms include throat pain that is worse with swallowing, aching all over, headache, swollen lymph nodes at the front of the neck, and red swollen tonsils sometimes with white patches and fever. It's treated with antibiotic medicine. This should help you start to feel better in 1 to 2 days.   Home care    Rest at home. Drink plenty of fluids so you won't get dehydrated.    No work or school for the first 2 days of taking the antibiotics. You can then return to school or work if you are feeling better, have been taking the antibiotic for at least 24 hours and don't have a fever.     Take antibiotic medicine for the full 10 days, even if you feel better. This is very important to ensure the infection is treated completely. It's also important to prevent medicine-resistant germs from developing. If you were given an antibiotic shot, you don't need any more antibiotics.    You may use acetaminophen or ibuprofen to control pain or fever, unless another medicine was prescribed for this. Talk with your  healthcare provider before taking these medicines if you have chronic liver or kidney disease or if you have had a stomach ulcer or gastrointestinal bleeding.    Throat lozenges or sprays help reduce pain. Gargling with warm saltwater will also reduce throat pain. Dissolve 1/2 teaspoon of salt in 1 glass of warm water. This may be useful just before meals.     Soft foods and cool or warm fluids are best. Don't eat salty or spicy foods.    Follow-up care  Follow up with your healthcare provider or our staff if you don't get better over the next week.   When to get medical advice  Call your healthcare provider right away or get immediate medical care if any of these occur:     Fever of 100.4 F (38 C) or higher, or as directed by your healthcare provider    New or worsening ear pain, sinus pain, or headache    Painful lumps in the back of neck    Stiff neck    Lymph nodes getting larger or becoming soft in the middle    You have trouble swallowing liquids or you can't open your mouth wide because of throat pain    Signs of dehydration. These include very dark urine or no urine, sunken eyes, and dizziness.    Noisy breathing    Muffled voice    Rash  Call 911  Call 911right away if you:     Have trouble breathing    Can't swallow or talk    Prevention  Here are steps you can take to help prevent an infection:     Wash your hands often with soap and clean, running water for at least 20 seconds.    Don t have close contact with people who have sore throats, colds, or other upper respiratory infections.    Don t smoke, and stay away from secondhand smoke.  [employer] last reviewed this educational content on 3/1/2020    2763-7078 [employer]. All rights reserved. This information is not intended as a substitute for professional medical care. Always follow your healthcare professional's instructions.           Patient Education     Allergic Rhinitis  Allergic rhinitis is an allergic reaction that affects the  nose, and often the eyes. It s often known as nasal allergies. Nasal allergies are often due to things in the environment that are breathed in. Depending what you are sensitive to, nasal allergies may occur only during certain seasons, or they may occur year round. Common indoor allergens include house dust mites, mold, cockroaches, and pet dander. Outdoor allergens include pollen from trees, grasses, and weeds.    Symptoms include a drippy, stuffy, and itchy nose. They also include sneezing and red and itchy eyes. You may feel tired more often. Severe allergies may also affect your breathing and trigger a condition called asthma.    Tests can be done to see what allergens are affecting you. You may be referred to an allergy specialist for testing and further evaluation.   Home care  Your healthcare provider may prescribe medicines to help relieve allergy symptoms. These may include oral medicines, nasal sprays, or eye drops.   Ask your provider for advice on how to stay away from substances that you are allergic to. Below are a few tips for each type of allergen.   Pet dander:    Do not have pets with fur and feathers.    If you have a pet, keep it out of your bedroom and off upholstered furniture.  Pollen:    When pollen counts are high, keep windows of your car and home closed. If possible, use an air conditioner instead.    Wear a filter mask when mowing or doing yard work.  House dust mites:    Wash bedding every week in warm water and detergent and dry on a hot setting.    Cover the mattress, box spring, and pillows with allergy covers.     If possible, sleep in a room with no carpet, curtains, or upholstered furniture.  Cockroaches:    Store food in sealed containers.    Remove garbage from the home promptly.    Fix water leaks.  Mold:    Keep humidity low by using a dehumidifier or air conditioner. Keep the dehumidifier and air conditioner clean and free of mold.    Clean moldy areas with bleach and water.  Don't mix bleach with other .  In general:    Vacuum once or twice a week. If possible, use a vacuum with a high-efficiency particulate air (HEPA) filter.    Don't smoke. Stay away from cigarette smoke. Cigarette smoke is an irritant that can make symptoms worse.  Follow-up care  Follow up as advised by the healthcare provider or our staff. If you were referred to an allergy specialist, make this appointment promptly.   When to seek medical advice  Call your healthcare provider or get medical care right away if the following occur:     Coughing    Fever of 100.4 F (38 C) or higher, or as directed by your healthcare provider    Raised red bumps (hives)    Continuing symptoms, new symptoms, or worsening symptoms  Call 911  Call 911 if you have:     Trouble breathing    Severe swelling of the face or severe itching of the eyes or mouth    Wheezing or shortness of breath    Chest tightness    Dizziness or lightheadedness    Feeling of doom    Stomach pain, bloating, vomiting, or diarrhea  [employer] last reviewed this educational content on 10/1/2019    4596-0843 [employer]. All rights reserved. This information is not intended as a substitute for professional medical care. Always follow your healthcare professional's instructions.

## 2021-11-26 DIAGNOSIS — I10 ESSENTIAL HYPERTENSION, BENIGN: ICD-10-CM

## 2021-11-26 DIAGNOSIS — Z79.4 TYPE 2 DIABETES MELLITUS WITH DIABETIC POLYNEUROPATHY, WITH LONG-TERM CURRENT USE OF INSULIN (H): ICD-10-CM

## 2021-11-26 DIAGNOSIS — E11.42 TYPE 2 DIABETES MELLITUS WITH DIABETIC POLYNEUROPATHY, WITH LONG-TERM CURRENT USE OF INSULIN (H): ICD-10-CM

## 2021-11-26 RX ORDER — LISINOPRIL AND HYDROCHLOROTHIAZIDE 20; 25 MG/1; MG/1
TABLET ORAL
Qty: 90 TABLET | Refills: 1 | Status: SHIPPED | OUTPATIENT
Start: 2021-11-26 | End: 2022-05-26

## 2021-11-26 NOTE — TELEPHONE ENCOUNTER
Routing refill request to provider for review/approval because:  Potassium   Date Value Ref Range Status   01/07/2021 3.0 (L) 3.4 - 5.3 mmol/L Final             Sheila Polo RN  [facility]

## 2021-11-30 RX ORDER — GLIPIZIDE 5 MG/1
TABLET, FILM COATED, EXTENDED RELEASE ORAL
Qty: 90 TABLET | Refills: 0 | Status: SHIPPED | OUTPATIENT
Start: 2021-11-30 | End: 2022-02-25

## 2021-11-30 NOTE — TELEPHONE ENCOUNTER
Pending Prescriptions:                       Disp   Refills    glipiZIDE (GLUCOTROL XL) 5 MG 24 hr tablet*90 tab*0        Sig: TAKE ONE TABLET BY MOUTH ONCE DAILY WITH FOOD. DUE           FOR OFFICE VISIT    Routing refill request to provider for review/approval because:  Labs not current:  A1C    Jennifer MASTERSON RN   [facility]

## 2021-12-06 ENCOUNTER — NURSE TRIAGE (OUTPATIENT)
Dept: NURSING | Facility: CLINIC | Age: 61
End: 2021-12-06
Payer: MEDICARE

## 2021-12-07 ENCOUNTER — OFFICE VISIT (OUTPATIENT)
Dept: URGENT CARE | Facility: URGENT CARE | Age: 61
End: 2021-12-07
Payer: MEDICARE

## 2021-12-07 VITALS
HEART RATE: 104 BPM | RESPIRATION RATE: 18 BRPM | OXYGEN SATURATION: 97 % | SYSTOLIC BLOOD PRESSURE: 135 MMHG | BODY MASS INDEX: 23.73 KG/M2 | WEIGHT: 147 LBS | TEMPERATURE: 98.3 F | DIASTOLIC BLOOD PRESSURE: 104 MMHG

## 2021-12-07 DIAGNOSIS — R30.0 DYSURIA: ICD-10-CM

## 2021-12-07 DIAGNOSIS — N10 PYELONEPHRITIS, ACUTE: Primary | ICD-10-CM

## 2021-12-07 LAB
ALBUMIN UR-MCNC: 30 MG/DL
APPEARANCE UR: CLEAR
BACTERIA #/AREA URNS HPF: ABNORMAL /HPF
BILIRUB UR QL STRIP: NEGATIVE
COLOR UR AUTO: YELLOW
GLUCOSE UR STRIP-MCNC: NEGATIVE MG/DL
HGB UR QL STRIP: ABNORMAL
KETONES UR STRIP-MCNC: ABNORMAL MG/DL
LEUKOCYTE ESTERASE UR QL STRIP: ABNORMAL
MUCOUS THREADS #/AREA URNS LPF: PRESENT /LPF
NITRATE UR QL: NEGATIVE
PH UR STRIP: 5.5 [PH] (ref 5–7)
RBC #/AREA URNS AUTO: ABNORMAL /HPF
SP GR UR STRIP: 1.02 (ref 1–1.03)
SQUAMOUS #/AREA URNS AUTO: ABNORMAL /LPF
UROBILINOGEN UR STRIP-ACNC: 0.2 E.U./DL
WBC #/AREA URNS AUTO: ABNORMAL /HPF

## 2021-12-07 PROCEDURE — 99214 OFFICE O/P EST MOD 30 MIN: CPT | Performed by: FAMILY MEDICINE

## 2021-12-07 PROCEDURE — 87086 URINE CULTURE/COLONY COUNT: CPT | Performed by: FAMILY MEDICINE

## 2021-12-07 PROCEDURE — 81001 URINALYSIS AUTO W/SCOPE: CPT | Performed by: FAMILY MEDICINE

## 2021-12-07 RX ORDER — CEFDINIR 300 MG/1
300 CAPSULE ORAL 2 TIMES DAILY
Qty: 20 CAPSULE | Refills: 0 | Status: SHIPPED | OUTPATIENT
Start: 2021-12-07 | End: 2021-12-17

## 2021-12-07 NOTE — PATIENT INSTRUCTIONS
Patient Education     Discharge Instructions for Pyelonephritis  You have been told you have a kidney infection. This is called pyelonephritis. The infection can be serious. It can damage your kidneys and cause bacteria to enter your bloodstream. You were treated in the hospital. Once you return home, here s what you can do at home to aid in your recovery and prevent future infections.   Home care    Take all the medicine you were prescribed, even if you feel better. Not finishing the medicine can make the infection come back. It may also make a future infection harder to treat.    Unless told not to by your healthcare provider, drink 8 to 12 glasses of fluid every day. Clear fluids, such as water, are best. This may help flush the infection from your system.    Preventing future infection    Keep your genital area clean. Use mild soap. Rinse with water.    If you are a woman, always wipe the genital area from front to back.    Urinate frequently. Don't hold urine in your bladder for a long time.    Always urinate after having sex.    Practice safe sex. Protect yourself and your partner from sexually transmitted infections (STIs).    Follow-up care  Follow up with your healthcare provider, or as advised. And see your healthcare provider for regular lab tests as directed.   When to call your healthcare provider  Call your healthcare provider right away if you have any of the following:    Decreased urine output or trouble urinating    Severe pain in the lower back or flank    Fever of 100.4 F (38 C) or higher, or as directed by your healthcare provider    Shaking chills    Vomiting    Blood in your urine    Dark-colored or foul-smelling urine    Nausea or other problems that prevent you from taking your prescribed medicine    New or worsening symptoms  [employer] last reviewed this educational content on 4/1/2020 2000-2021 [employer]. All rights reserved. This information is not intended as a  substitute for professional medical care. Always follow your healthcare professional's instructions.

## 2021-12-07 NOTE — PROGRESS NOTES
SUBJECTIVE: Mary Lou Gil is a 61 year old female who  presents today for a possible UTI.   Symptoms of dysuria and frequency have been going on for 3day(s).    Hematuria no.  still presentand moderate.    There is no history of fever, chills, nausea or vomiting.   This patient does have a history of urinary tract infections.   Patient denies long duration and flank pain    Past Medical History:   Diagnosis Date     Anemia, unspecified 02/01/2010     Degeneration of lumbar intervertebral disc 07/11/2016     Depressive disorder      Diabetes mellitus (H) 02/01/2010    Dx in 2006;  insulin started 7/9/15     Essential hypertension, benign 02/01/2010     Hyperlipidaemia      Hyperlipidemia LDL goal < 100      Migraine      Other chronic pain     back, legs and feet     Spinal stenosis      Tobacco use disorder 02/01/2010     Uncomplicated asthma     ? with allergic reactions?     Unspecified hypothyroidism 02/01/2010     Allergies   Allergen Reactions     Nicotine Polacrilex Anaphylaxis     PROBABLY REACTION TO VINYL IN NICOTINE PATCH     Neurontin [Gabapentin] Rash     Norvasc [Amlodipine Besylate] GI Disturbance     constipation     Percocet [Oxycodone-Acetaminophen] Itching     Sulfamethoxazole-Trimethoprim Itching and Other (See Comments)     Vinyl Ether Swelling and Cough     Vinyl causes throat and lip swelling, cough and headache     Social History     Tobacco Use     Smoking status: Current Every Day Smoker     Packs/day: 1.00     Years: 25.00     Pack years: 25.00     Types: Cigarettes, Other     Smokeless tobacco: Never Used     Tobacco comment: started at age 19 and has quit for 10 years    Substance Use Topics     Alcohol use: No     Alcohol/week: 0.0 standard drinks       ROS: CONSTITUTIONAL:NEGATIVE for fever, chills, change in weight    OBJECTIVE:  BP (!) 135/104   Pulse 104   Temp 98.3  F (36.8  C)   Resp 18   Wt 66.7 kg (147 lb)   SpO2 97%   BMI 23.73 kg/m      bilateral Flank  pain       ICD-10-CM    1. Pyelonephritis, acute  N10 cefdinir (OMNICEF) 300 MG capsule   2. Dysuria  R30.0 UA with Microscopic reflex to Culture - lab collect     Urine Microscopic     Urine Culture     cefdinir (OMNICEF) 300 MG capsule       Drink plenty of fluids.  Prevention and treatment of UTI's discussed.Signs and symptoms of pyelonephritis mentioned.  Follow up with primary care physician if not improving

## 2021-12-07 NOTE — TELEPHONE ENCOUNTER
"  \"I think I have a UTI. I am going to UC tomorrow at 10 am, I just want to make sure that's ok. \"  Sx started two days ago with urinary urgency, frequency and some burning.\"  Denies fever, flank pain or other sx   Triaged and advised to be seen within 24 hr   Imelda Mcclain RN [facility]      Reason for Disposition    [1] Discomfort (pain, burning or stinging) when passing urine AND [2] female    Age > 50 years    Additional Information    Negative: Shock suspected (e.g., cold/pale/clammy skin, too weak to stand, low BP, rapid pulse)    Negative: Sounds like a life-threatening emergency to the triager    Negative: [1] Unable to urinate (or only a few drops) > 4 hours AND [2] bladder feels very full (e.g., palpable bladder or strong urge to urinate)    Negative: Vomiting    Negative: Patient sounds very sick or weak to the triager    Negative: [1] SEVERE pain with urination  (e.g., excruciating) AND [2] not improved after 2 hours of pain medicine and Sitz bath    Negative: Fever > 100.4 F (38.0 C)    Negative: Side (flank) or lower back pain present    Negative: Diabetes mellitus or weak immune system (e.g., HIV positive, cancer chemo, splenectomy, organ transplant, chronic steroids)    Negative: Bedridden (e.g., nursing home patient, CVA, chronic illness, recovering from surgery)    Negative: Artificial heart valve or artificial joint    Negative: Blood in urine (red, pink, or tea-colored)    Negative: Possibility of pregnancy    Negative: Unusual vaginal discharge (e.g., bad smelling, yellow, green, or foamy-white)    Protocols used: URINARY SYMPTOMS-A-AH, URINATION PAIN - FEMALE-A-AH      "

## 2021-12-08 LAB — BACTERIA UR CULT: NORMAL

## 2022-01-12 ENCOUNTER — TELEPHONE (OUTPATIENT)
Dept: ENDOCRINOLOGY | Facility: CLINIC | Age: 62
End: 2022-01-12
Payer: MEDICARE

## 2022-01-12 DIAGNOSIS — E11.42 TYPE 2 DIABETES MELLITUS WITH DIABETIC POLYNEUROPATHY, WITH LONG-TERM CURRENT USE OF INSULIN (H): ICD-10-CM

## 2022-01-12 DIAGNOSIS — Z79.4 TYPE 2 DIABETES MELLITUS WITH DIABETIC POLYNEUROPATHY, WITH LONG-TERM CURRENT USE OF INSULIN (H): ICD-10-CM

## 2022-01-12 RX ORDER — INSULIN DETEMIR 100 [IU]/ML
INJECTION, SOLUTION SUBCUTANEOUS
Qty: 15 ML | Refills: 0 | Status: SHIPPED | OUTPATIENT
Start: 2022-01-12 | End: 2022-04-15

## 2022-01-16 ENCOUNTER — HEALTH MAINTENANCE LETTER (OUTPATIENT)
Age: 62
End: 2022-01-16

## 2022-01-26 NOTE — TELEPHONE ENCOUNTER
Rx sent.       normal appearance , without tenderness upon palpation , no deformities , trachea midline , Thyroid normal size , no thyroid nodules , no masses , no JVD , thyroid nontender , normal appearance , without tenderness upon palpation , no deformities , trachea midline , Thyroid normal size , no thyroid nodules , no masses , no JVD , thyroid nontender

## 2022-01-28 ENCOUNTER — VIRTUAL VISIT (OUTPATIENT)
Dept: INTERNAL MEDICINE | Facility: CLINIC | Age: 62
End: 2022-01-28
Payer: MEDICARE

## 2022-01-28 ENCOUNTER — LAB (OUTPATIENT)
Dept: LAB | Facility: CLINIC | Age: 62
End: 2022-01-28
Payer: MEDICARE

## 2022-01-28 DIAGNOSIS — Z79.4 TYPE 2 DIABETES MELLITUS WITH DIABETIC POLYNEUROPATHY, WITH LONG-TERM CURRENT USE OF INSULIN (H): ICD-10-CM

## 2022-01-28 DIAGNOSIS — F33.0 MAJOR DEPRESSIVE DISORDER, RECURRENT EPISODE, MILD (H): Primary | ICD-10-CM

## 2022-01-28 DIAGNOSIS — E11.42 TYPE 2 DIABETES MELLITUS WITH DIABETIC POLYNEUROPATHY, WITH LONG-TERM CURRENT USE OF INSULIN (H): ICD-10-CM

## 2022-01-28 DIAGNOSIS — E03.9 HYPOTHYROIDISM, UNSPECIFIED TYPE: ICD-10-CM

## 2022-01-28 LAB
HBA1C MFR BLD: 9.2 % (ref 0–5.6)
T4 FREE SERPL-MCNC: 1.04 NG/DL (ref 0.76–1.46)
TSH SERPL DL<=0.005 MIU/L-ACNC: 10.36 MU/L (ref 0.4–4)

## 2022-01-28 PROCEDURE — 84439 ASSAY OF FREE THYROXINE: CPT | Mod: 90

## 2022-01-28 PROCEDURE — 36415 COLL VENOUS BLD VENIPUNCTURE: CPT

## 2022-01-28 PROCEDURE — 84443 ASSAY THYROID STIM HORMONE: CPT

## 2022-01-28 PROCEDURE — 83036 HEMOGLOBIN GLYCOSYLATED A1C: CPT

## 2022-01-28 PROCEDURE — 84439 ASSAY OF FREE THYROXINE: CPT

## 2022-01-28 PROCEDURE — 99442 PR PHYSICIAN TELEPHONE EVALUATION 11-20 MIN: CPT | Mod: 95 | Performed by: INTERNAL MEDICINE

## 2022-01-28 PROCEDURE — 99000 SPECIMEN HANDLING OFFICE-LAB: CPT

## 2022-01-28 ASSESSMENT — PATIENT HEALTH QUESTIONNAIRE - PHQ9: SUM OF ALL RESPONSES TO PHQ QUESTIONS 1-9: 17

## 2022-01-28 NOTE — PROGRESS NOTES
Mary Lou is a 61 year old who is being evaluated via a billable telephone visit.      What phone number would you like to be contacted at? [phone]  How would you like to obtain your AVS? Valentin    Assessment & Plan     (F33.0) Major depressive disorder, recurrent episode, mild (H)  (primary encounter diagnosis)  Comment: Reports that she is doing well on this current dose of medicine.  I agree with the addition of the light box therapy to aid in seasonal affective issues.  Strongly recommend that she resume vitamin D supplementation, specifically taking 4000 units once daily November through March, then 2000 units once daily the other months.  Additional benefits from this medicine beyond improve mood in the winter months include improved bone health, and possibly improved immune system function.  Plan:     (E03.9) Hypothyroidism, unspecified type  Comment: Patient should have a follow-up appointment with her endocrinologist to discuss the most recent thyroid labs and any medication adjustments.  Continue the thyroid supplement the current dose until she hears otherwise.  Plan:     (E11.42,  Z79.4) Type 2 diabetes mellitus with diabetic polyneuropathy, with long-term current use of insulin (H)  Comment: Diabetes worsened was 1 year ago but not as bad as it has historically been for her.  Recommend a follow-up virtual appointment with her endocrinologist to review the results and adjust any medications as indicated.  I recommended that the patient investigate personal continuous glucose monitoring systems (such as Freestyle Jen or DexCom, etc.) as an alternative to the standard glucometer.  These systems allow for continuous glucose monitoring for 7-14 days and will provide much more data regarding glucose fluctuations, giving real time feedback on the effect of lifestyle and diet interventions, and medication effect.  I described the basics on how these devices operate and the potentially huge benefit that  "comes with closer observation of glucose levels.  Discussed the goal of \"time in the target range\" of 70-75% with very few , if any, low glucose readings (below 2% ideally)     Plan:                 Tobacco Cessation:   reports that she has been smoking cigarettes and other. She has a 25.00 pack-year smoking history. She has never used smokeless tobacco.          Return in about 3 months (around 4/28/2022) for in person Office visit, Routine Visit.    Xavi Garcia MD  [facility] HospitalCHANEL Barreto is a 61 year old who presents for the following health issues     HPI     Abnormal Mood Symptoms  Onset/Duration: Pt has been dealing with depression on and off for the past few years.     Depression (if yes, do PHQ-9): YES  Anxiety (if yes, do ROMERO-7): no  Accompanying Signs & Symptoms:  Still participating in activities that you used to enjoy: no  Fatigue: YES  Irritability: no  Difficulty concentrating: YES  Changes in appetite: YES  Problems with sleep: YES  Heart racing/beating fast: YES  Abnormally elevated, expansive, or irritable mood: YES  Persistently increased activity or energy: no  Thoughts of hurting yourself or others: no  History:  Recent stress or major life event: YES- father passed away this month.   Prior depression or anxiety: Yes  Family history of depression or anxiety: YES  Alcohol/drug use: no  Difficulty sleeping: YES  Precipitating or alleviating factors: None  Therapies tried and outcome: none and individual therapy  PHQ 1/9/2021 1/24/2022 1/28/2022   PHQ-9 Total Score 9 8 17   Q9: Thoughts of better off dead/self-harm past 2 weeks Not at all Not at all Not at all     ROMERO-7 SCORE 11/7/2019 1/9/2021 1/24/2022   Total Score 9 (mild anxiety) 12 (moderate anxiety) -   Total Score 9 12 6       **Despite her father passing away this morning she is overall doing well.  Typically has more depression and more accelerated anxiety during the winter " months.  She is seeing a therapist once every 1 to 2 weeks.  Therapist recently recommended light therapy, she has been using a light box therapy on a daily basis and feels it is helping.    2.  Type 2 diabetes, followed by Endocrinology    Reviewed most recent labs:    Lab Results   Component Value Date    A1C 9.2 01/28/2022    A1C 7.7 12/18/2020    A1C 11.2 05/27/2020    A1C 10.3 11/06/2019    A1C 11.0 08/06/2019    A1C 12.6 04/03/2019    A1C 8.9 06/26/2018    A1C 12.5 01/16/2018    A1C 12.4 09/01/2017    A1C 11.1 03/21/2017    A1C 11.1 11/10/2016    A1C 11.5 07/05/2016    A1C 7.2 08/31/2015    A1C 8.8 05/14/2015    A1C 9.0 03/02/2015    A1C 7.0 11/14/2013    A1C 7.9 10/07/2013    A1C 8.1 08/13/2013    A1C 9.0 06/18/2013    A1C 7.7 02/28/2013    A1C 6.5 04/17/2012    A1C 5.9 08/02/2011    A1C 5.7 11/23/2010    A1C 5.6 07/06/2010    A1C 5.7 02/01/2010          3.  History of hypothyroidism, followed by endocrinology for this.  Reviewed most recent labs, including those from today:    Lab Results   Component Value Date    TSH 10.36 01/28/2022    TSH 0.70 12/18/2020    TSH 0.38 05/27/2020    TSH 1.83 11/06/2019    TSH 2.75 04/03/2019    TSH 1.21 06/26/2018    TSH 1.14 01/16/2018    TSH 43.86 09/01/2017    TSH 36.52 03/21/2017    TSH 5.93 11/10/2016    TSH 0.29 07/05/2016    TSH 1.30 03/04/2016    TSH 24.49 11/25/2015    TSH 0.06 08/31/2015    TSH 0.17 05/14/2015    TSH 3.23 03/02/2015    TSH 0.34 11/14/2013    TSH 3.00 08/13/2013    TSH 0.12 06/18/2013    TSH 59.20 02/28/2013    TSH 0.40 04/17/2012    TSH 6.44 01/05/2012    TSH 0.17 08/02/2011    TSH 0.02 07/07/2010    TSH 0.08 02/02/2010          Review of Systems   Constitutional, HEENT, cardiovascular, pulmonary, gi and gu systems are negative, except as otherwise noted.      Objective           Vitals:  No vitals were obtained today due to virtual visit.    Physical Exam   healthy, alert and no distress  PSYCH: Alert and oriented times 3; coherent speech, normal    rate and volume, able to articulate logical thoughts, able   to abstract reason, no tangential thoughts, no hallucinations   or delusions  Her affect is normal  RESP: No cough, no audible wheezing, able to talk in full sentences  Remainder of exam unable to be completed due to telephone visits                Phone call duration: 12;45 minutes

## 2022-02-01 LAB — T4 FREE SERPL DIALY-MCNC: 2.1 NG/DL

## 2022-02-02 ENCOUNTER — TELEPHONE (OUTPATIENT)
Dept: ENDOCRINOLOGY | Facility: CLINIC | Age: 62
End: 2022-02-02
Payer: MEDICARE

## 2022-02-02 NOTE — TELEPHONE ENCOUNTER
ENDO THYROID LABS-[facility] Latest Ref Rng & Units 1/28/2022 12/18/2020   TSH 0.40 - 4.00 mU/L 10.36 (H) 0.70   T3 60 - 181 ng/dL     FREE T4 0.76 - 1.46 ng/dL 1.04 1.66 (H)       Lab Results   Component Value Date    A1C 9.2 01/28/2022    A1C 7.7 12/18/2020    A1C 11.2 05/27/2020    A1C 10.3 11/06/2019    A1C 11.0 08/06/2019    A1C 12.6 04/03/2019     Last visit 10/2021.  Recommend inperson visit on Feb 9th (wed) at 3;30 to discuss labs and dose adjustment.    Please inform patient and help schedule clinic visit.

## 2022-02-02 NOTE — TELEPHONE ENCOUNTER
LMTCB to discuss below.     Per  pt to be seen on 2/9/22 at 330pm to discuss medication adjustment -IN person

## 2022-02-03 NOTE — TELEPHONE ENCOUNTER
Date: 2/9/2022 Status: Scheduled   Time: 3:30 PM Length: 30   Visit Type: RETURN DIABETES [[id number]] Copay: $0.00   Provider: Susan Ramirez MD

## 2022-02-04 DIAGNOSIS — Z79.4 TYPE 2 DIABETES MELLITUS WITH DIABETIC POLYNEUROPATHY, WITH LONG-TERM CURRENT USE OF INSULIN (H): ICD-10-CM

## 2022-02-04 DIAGNOSIS — E11.42 TYPE 2 DIABETES MELLITUS WITH DIABETIC POLYNEUROPATHY, WITH LONG-TERM CURRENT USE OF INSULIN (H): ICD-10-CM

## 2022-02-04 RX ORDER — METFORMIN HCL 500 MG
TABLET, EXTENDED RELEASE 24 HR ORAL
Qty: 60 TABLET | Refills: 1 | Status: SHIPPED | OUTPATIENT
Start: 2022-02-04 | End: 2022-03-31

## 2022-02-09 ENCOUNTER — OFFICE VISIT (OUTPATIENT)
Dept: ENDOCRINOLOGY | Facility: CLINIC | Age: 62
End: 2022-02-09
Payer: MEDICARE

## 2022-02-09 VITALS
DIASTOLIC BLOOD PRESSURE: 83 MMHG | HEART RATE: 105 BPM | HEIGHT: 66 IN | TEMPERATURE: 98 F | WEIGHT: 142.8 LBS | RESPIRATION RATE: 18 BRPM | BODY MASS INDEX: 22.95 KG/M2 | SYSTOLIC BLOOD PRESSURE: 140 MMHG | OXYGEN SATURATION: 99 %

## 2022-02-09 DIAGNOSIS — E03.9 HYPOTHYROIDISM, UNSPECIFIED TYPE: ICD-10-CM

## 2022-02-09 DIAGNOSIS — Z79.4 TYPE 2 DIABETES MELLITUS WITH DIABETIC POLYNEUROPATHY, WITH LONG-TERM CURRENT USE OF INSULIN (H): Primary | ICD-10-CM

## 2022-02-09 DIAGNOSIS — E78.5 HYPERLIPIDEMIA WITH TARGET LDL LESS THAN 100: ICD-10-CM

## 2022-02-09 DIAGNOSIS — E11.9 INSULIN-REQUIRING OR DEPENDENT TYPE II DIABETES MELLITUS (H): ICD-10-CM

## 2022-02-09 DIAGNOSIS — Z79.4 INSULIN-REQUIRING OR DEPENDENT TYPE II DIABETES MELLITUS (H): ICD-10-CM

## 2022-02-09 DIAGNOSIS — Z79.4 LONG TERM (CURRENT) USE OF INSULIN (H): ICD-10-CM

## 2022-02-09 DIAGNOSIS — E11.42 TYPE 2 DIABETES MELLITUS WITH DIABETIC POLYNEUROPATHY, WITH LONG-TERM CURRENT USE OF INSULIN (H): Primary | ICD-10-CM

## 2022-02-09 PROCEDURE — 99215 OFFICE O/P EST HI 40 MIN: CPT | Performed by: INTERNAL MEDICINE

## 2022-02-09 RX ORDER — FLASH GLUCOSE SENSOR
1 KIT MISCELLANEOUS DAILY
Qty: 2 EACH | Refills: 11 | Status: SHIPPED | OUTPATIENT
Start: 2022-02-09 | End: 2022-08-10

## 2022-02-09 RX ORDER — FLASH GLUCOSE SCANNING READER
1 EACH MISCELLANEOUS DAILY
Qty: 1 EACH | Refills: 0 | Status: SHIPPED | OUTPATIENT
Start: 2022-02-09 | End: 2024-04-03 | Stop reason: ALTCHOICE

## 2022-02-09 RX ORDER — DULAGLUTIDE 3 MG/.5ML
3 INJECTION, SOLUTION SUBCUTANEOUS WEEKLY
Qty: 3 ML | Refills: 1 | Status: SHIPPED | OUTPATIENT
Start: 2022-02-09 | End: 2022-04-21

## 2022-02-09 RX ORDER — LEVOTHYROXINE SODIUM 150 UG/1
150 TABLET ORAL DAILY
Qty: 90 TABLET | Refills: 1 | Status: SHIPPED | OUTPATIENT
Start: 2022-02-09 | End: 2022-07-18

## 2022-02-09 ASSESSMENT — MIFFLIN-ST. JEOR: SCORE: 1239.49

## 2022-02-09 NOTE — PATIENT INSTRUCTIONS
[facility]  Dr Ramirez, Endocrinology Department    [facility]   [address] [facility]  [place] [address]  Appointment [phone]  Fax: [phone]  [place]: Monday - Thursday      To provide the best diabetic care, please bring your blood glucose meter to each and every visit with your Endocrinologist.  Your blood glucose meter/insulin pump will be downloaded at every appointment.    Please arrive 15 minutes before your scheduled appointment.  This will allow for your blood glucose meter/insulin pump to be downloaded.  If you are wearing DEXCOM please bring  or sharing code so that it can be downloaded.  If you are using freestyle genet personal sensors please bring the reader.  If you are using TANDEM insulin pump please have your username and password to get info from [employer] website.    Continue Metformin 1000 mg at night, Glipizide Xl 5 mg/day, [place] 25 units AM.  Increase Trulicity to 3 mg/week.  Freestyle genet Rx sent.  (check cost with insurance)  CDE follow up in 4 weeks.  Your provider has referred you to Diabetes Education: For all [facility]:  Phone [phone]; Fax [phone]  Please call and make the appointment.    For thyroid: Increase levothyroxine to 150 mcg/day.  [facility] in 3 months.  Please make a lab appointment for blood work and follow up clinic appointment in 1 week after that to discuss results.    Take Levothyroxine on an empty stomach. Take it with a full glass of water at least 30 minutes to 1 hour before eating breakfast.   This medicine should be taken at least 4 hours before or 4 hours after these medicines: antacids (Maalox , Mylanta , Tums ), calcium supplements, cholestyramine (Prevalite , Questran ), colestipol (Colestid ), iron supplements, orlistat (Yahir , Xenical ), simethicone (Gas-X , Mylicon ), and sucralfate (Carafate ).   Swallow the capsule whole. Do not cut or crush it.     Recommend  checking blood sugars before meals and at bedtime.    If Blood glucose are low more often-> 2-3 times/week- give us a call.  Make better food choices: reduce carbs, Reduce portion size, weight loss and exercise 3-4 times a week.    What is hypoglycemia:  Hypoglycemia is when blood sugar levels become too low - below 70 m/dl.      What causes hypoglycemia?  - using too much insulin  -taking too many diabetes pills  -not eating enough, or skipping meals or snacks  -not eating enough carbohydrate with meals  -changing your exercise routine  -drinking alcohol in excess    It is also possible to have hypoglycemia even when you are carefully managing your blood sugar levels.    What does it feel like when blood sugars get too low?  You may feel:  - anxious  -confused  -dizzy  -hungry  -light-headed  -nervous  -shaky  -sleepy  -sweaty    You may have  -blurred or cloudy vision  -heart palpitations (heart skips a beat or races)  -tingling or numbness around the mouth and tongue  -tremors    What to do if you have symptoms of hypoglycmemia:  If you think your blood sugar is too low, check it with a glucose meter.  If its below 70 mg/dl, consume one of the following:  Fruit juice (1/2 cup)  Glucose tablets (15 grams)  Hard candy (5 to 7 pieces)  Honey or sugar (2 teaspoons)  Milk (1/2 cup)  Soft drink (non-diet, 1/2 cup)    Wait 15 minutes and check your blood glucose again.  IF it is still below 70 mg/dl, have another food item listed above. Wait another 15 minutes and repeat the blood glucose test.  Have a small meal or snack that contains some carbohydrate after your blood glucose rises above 70 mg/dl.    If you are at risk of hypoglycemia, always carry with you glucose tablets or one of the foods listed above.      To prevent Hypoglycemia:  Avoid situations that may cause hypoglycemia  Before making any change to your diet or exercise routine, discuss them with your healthcare provider  Keep a record of your blood glucose  levels.  Include the time of day, diabetes medications, when you had your last meal or snack, and what you were doing at the time (e.g. Watching TV, gardening, jogging, etc).    Talk to your healthcare provider if your blood glucose levels are often low        Patient guide on hypoglycemia    [email or web address]/[email or web address]-[email or web address]
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
## 2022-02-09 NOTE — NURSING NOTE
Blood Sugar Readin/9/22 - 204    22 - 118, 293    22 - 215    22 - 256    22 - 228    22 - 158, 154, 194    22 - 206    22 - 147, 164, 205, 157    21 - 113, 217, 246    22 - 178, 223    22 - 162, 199    22 - 231
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
## 2022-02-17 ENCOUNTER — TELEPHONE (OUTPATIENT)
Dept: ENDOCRINOLOGY | Facility: CLINIC | Age: 62
End: 2022-02-17
Payer: MEDICARE

## 2022-02-17 ENCOUNTER — MEDICAL CORRESPONDENCE (OUTPATIENT)
Dept: HEALTH INFORMATION MANAGEMENT | Facility: CLINIC | Age: 62
End: 2022-02-17
Payer: MEDICARE

## 2022-02-17 NOTE — TELEPHONE ENCOUNTER
Hello,    This is [facility] requesting a Certificate of Medical Necessity for the Freestyle Jne 14 day products ordered for this patient.  A link to the form is below and can be filled out and faxed back to us at [phone].    [email or web address]    If you have any questions please call us at [phone].     Thank you,    [facility] - DCS Team

## 2022-02-17 NOTE — TELEPHONE ENCOUNTER
Forms/paperwork reviewed, completed and signed.  Please fax or send the papers as requested, document in chart and close the encounter.    Thank you.    Susan Ramirez MD

## 2022-02-17 NOTE — TELEPHONE ENCOUNTER
Form was faxed and sent to scanning.      Yuliet Braden, [facility]  [place]

## 2022-02-17 NOTE — TELEPHONE ENCOUNTER
CMN for Jen 14-day    LAST OFFICE/VIRTUAL VISIT:  02/09/22    FUTURE OFFICE/VIRTUAL VISIT:  04/25/22    Lab Results   Component Value Date    A1C 9.2 01/28/2022    A1C 7.7 12/18/2020    A1C 11.2 05/27/2020    A1C 10.3 11/06/2019    A1C 11.0 08/06/2019    A1C 12.6 04/03/2019         Yuliet Braden CMA  [facility]  [facility]

## 2022-02-25 DIAGNOSIS — E11.42 TYPE 2 DIABETES MELLITUS WITH DIABETIC POLYNEUROPATHY, WITH LONG-TERM CURRENT USE OF INSULIN (H): ICD-10-CM

## 2022-02-25 DIAGNOSIS — Z79.4 TYPE 2 DIABETES MELLITUS WITH DIABETIC POLYNEUROPATHY, WITH LONG-TERM CURRENT USE OF INSULIN (H): ICD-10-CM

## 2022-02-25 RX ORDER — GLIPIZIDE 5 MG/1
TABLET, FILM COATED, EXTENDED RELEASE ORAL
Qty: 90 TABLET | Refills: 0 | Status: SHIPPED | OUTPATIENT
Start: 2022-02-25 | End: 2022-05-26

## 2022-02-26 ENCOUNTER — HOSPITAL ENCOUNTER (EMERGENCY)
Facility: CLINIC | Age: 62
Discharge: HOME | End: 2022-02-27
Attending: EMERGENCY MEDICINE | Admitting: EMERGENCY MEDICINE
Payer: MEDICARE

## 2022-02-26 ENCOUNTER — APPOINTMENT (OUTPATIENT)
Dept: GENERAL RADIOLOGY | Facility: CLINIC | Age: 62
End: 2022-02-26
Attending: EMERGENCY MEDICINE
Payer: MEDICARE

## 2022-02-26 DIAGNOSIS — R07.89 CHEST PRESSURE: ICD-10-CM

## 2022-02-26 DIAGNOSIS — F41.1 ANXIETY REACTION: ICD-10-CM

## 2022-02-26 DIAGNOSIS — R00.2 PALPITATIONS: ICD-10-CM

## 2022-02-26 LAB
ANION GAP SERPL CALCULATED.3IONS-SCNC: 9 MMOL/L (ref 3–14)
BASOPHILS # BLD AUTO: 0.2 10E3/UL (ref 0–0.2)
BASOPHILS NFR BLD AUTO: 2 %
BUN SERPL-MCNC: 17 MG/DL (ref 7–30)
CALCIUM SERPL-MCNC: 9.4 MG/DL (ref 8.5–10.1)
CHLORIDE BLD-SCNC: 104 MMOL/L (ref 94–109)
CO2 SERPL-SCNC: 21 MMOL/L (ref 20–32)
CREAT SERPL-MCNC: 0.67 MG/DL (ref 0.52–1.04)
EOSINOPHIL # BLD AUTO: 0.6 10E3/UL (ref 0–0.7)
EOSINOPHIL NFR BLD AUTO: 5 %
ERYTHROCYTE [DISTWIDTH] IN BLOOD BY AUTOMATED COUNT: 13.3 % (ref 10–15)
GFR SERPL CREATININE-BSD FRML MDRD: >90 ML/MIN/1.73M2
GLUCOSE BLD-MCNC: 171 MG/DL (ref 70–99)
GLUCOSE BLDC GLUCOMTR-MCNC: 160 MG/DL (ref 70–99)
HCT VFR BLD AUTO: 46.2 % (ref 35–47)
HGB BLD-MCNC: 16.1 G/DL (ref 11.7–15.7)
IMM GRANULOCYTES # BLD: 0.1 10E3/UL
IMM GRANULOCYTES NFR BLD: 1 %
LYMPHOCYTES # BLD AUTO: 4.4 10E3/UL (ref 0.8–5.3)
LYMPHOCYTES NFR BLD AUTO: 32 %
MCH RBC QN AUTO: 31.1 PG (ref 26.5–33)
MCHC RBC AUTO-ENTMCNC: 34.8 G/DL (ref 31.5–36.5)
MCV RBC AUTO: 89 FL (ref 78–100)
MONOCYTES # BLD AUTO: 0.6 10E3/UL (ref 0–1.3)
MONOCYTES NFR BLD AUTO: 4 %
NEUTROPHILS # BLD AUTO: 7.7 10E3/UL (ref 1.6–8.3)
NEUTROPHILS NFR BLD AUTO: 56 %
NRBC # BLD AUTO: 0 10E3/UL
NRBC BLD AUTO-RTO: 0 /100
PLATELET # BLD AUTO: 292 10E3/UL (ref 150–450)
POTASSIUM BLD-SCNC: 3.3 MMOL/L (ref 3.4–5.3)
RBC # BLD AUTO: 5.17 10E6/UL (ref 3.8–5.2)
SODIUM SERPL-SCNC: 134 MMOL/L (ref 133–144)
TROPONIN I SERPL HS-MCNC: 16 NG/L
WBC # BLD AUTO: 13.6 10E3/UL (ref 4–11)

## 2022-02-26 PROCEDURE — 71046 X-RAY EXAM CHEST 2 VIEWS: CPT

## 2022-02-26 PROCEDURE — 250N000013 HC RX MED GY IP 250 OP 250 PS 637: Performed by: EMERGENCY MEDICINE

## 2022-02-26 PROCEDURE — 99285 EMERGENCY DEPT VISIT HI MDM: CPT | Mod: 25

## 2022-02-26 PROCEDURE — 82310 ASSAY OF CALCIUM: CPT | Performed by: EMERGENCY MEDICINE

## 2022-02-26 PROCEDURE — 36415 COLL VENOUS BLD VENIPUNCTURE: CPT | Performed by: EMERGENCY MEDICINE

## 2022-02-26 PROCEDURE — 84484 ASSAY OF TROPONIN QUANT: CPT | Performed by: EMERGENCY MEDICINE

## 2022-02-26 PROCEDURE — 85025 COMPLETE CBC W/AUTO DIFF WBC: CPT | Performed by: EMERGENCY MEDICINE

## 2022-02-26 PROCEDURE — 93005 ELECTROCARDIOGRAM TRACING: CPT

## 2022-02-26 RX ORDER — LORAZEPAM 0.5 MG/1
0.5 TABLET ORAL ONCE
Status: COMPLETED | OUTPATIENT
Start: 2022-02-26 | End: 2022-02-26

## 2022-02-26 RX ORDER — ASPIRIN 81 MG/1
162 TABLET, CHEWABLE ORAL ONCE
Status: COMPLETED | OUTPATIENT
Start: 2022-02-26 | End: 2022-02-26

## 2022-02-26 RX ADMIN — ASPIRIN 81 MG CHEWABLE TABLET 162 MG: 81 TABLET CHEWABLE at 22:45

## 2022-02-26 RX ADMIN — LORAZEPAM 0.5 MG: 0.5 TABLET ORAL at 22:45

## 2022-02-27 VITALS
BODY MASS INDEX: 22.5 KG/M2 | DIASTOLIC BLOOD PRESSURE: 72 MMHG | RESPIRATION RATE: 18 BRPM | SYSTOLIC BLOOD PRESSURE: 97 MMHG | OXYGEN SATURATION: 96 % | HEIGHT: 66 IN | WEIGHT: 140 LBS | HEART RATE: 88 BPM | TEMPERATURE: 98.2 F

## 2022-02-27 LAB — TROPONIN I SERPL HS-MCNC: 15 NG/L

## 2022-02-27 PROCEDURE — 36415 COLL VENOUS BLD VENIPUNCTURE: CPT | Performed by: EMERGENCY MEDICINE

## 2022-02-27 PROCEDURE — 84484 ASSAY OF TROPONIN QUANT: CPT | Performed by: EMERGENCY MEDICINE

## 2022-02-27 ASSESSMENT — ENCOUNTER SYMPTOMS
CHEST TIGHTNESS: 1
SHORTNESS OF BREATH: 0
ABDOMINAL PAIN: 0
PALPITATIONS: 1
FEVER: 0

## 2022-02-27 NOTE — ED PROVIDER NOTES
History   Chief Complaint:  Generalized Weakness and Chest Pain       HPI   Mary Lou Gil is a 61 year old female who presents with feeling shaky, palpitations, chest pressure. Patient notes that symptoms began 20 minutes ago while riding in her car. Patient began feeling tingling in her fingers, was feeling tremulous, and had fluttering in her chest with mild chest pressure. Presents to the ED for evaluation. Notes past history of panic attacks with similar sensation. She is diabetic and blood sugar was checked at triage and found to be 160. Denies chest pain or shortness of breath. Was feeling well earlier in the day with no recent illness.    Review of Systems   Constitutional: Negative for fever.   Respiratory: Positive for chest tightness. Negative for shortness of breath.    Cardiovascular: Positive for palpitations. Negative for chest pain.   Gastrointestinal: Negative for abdominal pain.   Neurological:        Tingling in hands   All other systems reviewed and are negative.      Allergies:  Nicotine Polacrilex  Neurontin [Gabapentin]  Norvasc [Amlodipine Besylate]  Percocet [Oxycodone-Acetaminophen]  Sulfamethoxazole-Trimethoprim  Vinyl Ether    Medications:  Zrytec   Celexa   Mucinex   Glipizide   Levothyroxine   Metformin   Lisinopril   Sumatriptan   Prednisone   Tizanidine   Trazodone     Past Medical History:     Anemia   Lumbar degeneration   Hypertension   Diabetes mellitus type 2   Hyperlipidemia    Migraines    Spinal stenosis   Tobacco use disorder    Asthma    Hypothyroidism    Depression    B12 and D deficiency    Dyspepsia   Scurvy   GERD   Carpal tunnel syndrome       Past Surgical History:    Hysterectomy   Thyroidectomy   Appendectomy       Tubal ligation     Family History:    CAD   Diabetes   Hypertension    Aneurysm   Dementia   Breast cancer    Social History:  Smokes 1 pack of cigarettes per day    Physical Exam     Patient Vitals for the past 24 hrs:   BP Temp Temp src  "Pulse Resp SpO2 Height Weight   02/27/22 0055 -- -- -- -- -- 96 % -- --   02/27/22 0015 -- -- -- -- -- 98 % -- --   02/27/22 0014 97/72 -- -- 88 -- -- -- --   02/26/22 2240 (!) 164/91 98.2  F (36.8  C) Temporal 115 18 97 % 1.676 m (5' 6\") 63.5 kg (140 lb)       Physical Exam  General: Alert and cooperative with exam. Patient in mild distress. Normal mentation. Very anxious in appearance  Head:  Scalp is NC/AT  Eyes:  No scleral icterus, PERRL  ENT:  The external nose and ears are normal. The oropharynx is normal and without erythema; mucus membranes are moist. Uvula midline, no evidence of deep space infection.  Neck:  Normal range of motion without rigidity.  CV:  Mildly tachycardic rate, regular rhythm    No pathologic murmur   Resp:  Breath sounds are clear bilaterally    Non-labored, no retractions or accessory muscle use  GI:  Abdomen is soft, no distension, no tenderness. No peritoneal signs  MS:  No lower extremity edema   Skin:  Warm and dry, No rash or lesions noted.  Neuro: Oriented x 3. No gross motor deficits.        Emergency Department Course   ECG  ECG obtained at 2229, ECG read at 2245  Sinus tachycardia    Possible left atrial enlargement   Rate 112 bpm. TX interval 138 ms. QRS duration 84 ms. QT/QTc 346/472 ms. P-R-T axes 61 -20 70.     Imaging:  Chest XR,  PA & LAT   Final Result   IMPRESSION: Negative chest.        Report per radiology    Laboratory:  Labs Ordered and Resulted from Time of ED Arrival to Time of ED Departure   GLUCOSE BY METER - Abnormal       Result Value    GLUCOSE BY METER POCT 160 (*)    BASIC METABOLIC PANEL - Abnormal    Sodium 134      Potassium 3.3 (*)     Chloride 104      Carbon Dioxide (CO2) 21      Anion Gap 9      Urea Nitrogen 17      Creatinine 0.67      Calcium 9.4      Glucose 171 (*)     GFR Estimate >90     CBC WITH PLATELETS AND DIFFERENTIAL - Abnormal    WBC Count 13.6 (*)     RBC Count 5.17      Hemoglobin 16.1 (*)     Hematocrit 46.2      MCV 89      MCH " 31.1      MCHC 34.8      RDW 13.3      Platelet Count 292      % Neutrophils 56      % Lymphocytes 32      % Monocytes 4      % Eosinophils 5      % Basophils 2      % Immature Granulocytes 1      NRBCs per 100 WBC 0      Absolute Neutrophils 7.7      Absolute Lymphocytes 4.4      Absolute Monocytes 0.6      Absolute Eosinophils 0.6      Absolute Basophils 0.2      Absolute Immature Granulocytes 0.1      Absolute NRBCs 0.0     TROPONIN I - Normal    Troponin I High Sensitivity 16     TROPONIN I - Normal    Troponin I High Sensitivity 15          Emergency Department Course:  Reviewed:  I reviewed nursing notes, vitals, past medical history and Care Everywhere    Assessments:  I obtained history and examined the patient as noted above.   I rechecked the patient and explained findings.     Interventions:  Medications   LORazepam (ATIVAN) tablet 0.5 mg (0.5 mg Oral Given 2/26/22 2245)   aspirin (ASA) chewable tablet 162 mg (162 mg Oral Given 2/26/22 2245)       Disposition:  The patient was discharged to home.     Impression & Plan       Medical Decision Making:  Mary Lou Gil is a 61 year old female who presents for evaluation of likely anxiety/stress reaction.  There is history of anxiety in the past.  She feels improved after interventions as noted above in the Emergency Department.  There is no signs at this point of a general medical problem causing anxiety (PE, ACS, other metabolic derangement, infection, etc). EKG without evidence of acute ischemia, infarction, or significant arrhythmia. Chest x-ray normal. Troponin negative x2. Labs notable for mild elevation of white count and hemoglobin; likely hemoconcentrated; no evidence of infectious process. There are no signs of serious decompensation warranting psychiatric hospitalization or 72 hold (no suicidal or homicidal ideation, no danger to self).  Supportive outpatient management is therefore indicated. Symptoms resolved at time of discharge. Recommended  close follow-up with PCP as needed.    Diagnosis:    ICD-10-CM    1. Anxiety reaction  F41.1    2. Chest pressure  R07.89    3. Palpitations  R00.2        Scribe Disclosure:  I, Susan Kingsley, am serving as a scribe at 11:53 PM on 2/26/2022 to document services personally performed by William Dimas DO based on my observations and the provider's statements to me.          William Dimas DO  02/27/22 1695

## 2022-02-27 NOTE — ED TRIAGE NOTES
Pt reports feeling like her blood sugar dropped, she eat a chocolate and developed a chest pain, dizziness and weakness for the last 20 mins. Pt helped herself to the floor at arrival thru triage.

## 2022-02-28 ENCOUNTER — PATIENT OUTREACH (OUTPATIENT)
Dept: INTERNAL MEDICINE | Facility: CLINIC | Age: 62
End: 2022-02-28
Payer: MEDICARE

## 2022-02-28 NOTE — TELEPHONE ENCOUNTER
What type of discharge? Emergency Department  Risk of Hospital admission or ED visit: 84%  Is a TCM episode required? No  When should the patient follow up with PCP? within 30 days of discharge.    Deandra Gonzales RN  [facility]

## 2022-02-28 NOTE — TELEPHONE ENCOUNTER
"  ED for acute condition Discharge Protocol    \"Hi, my name is Deandra Gonzales RN, a registered nurse, and I am calling from [facility]. I am calling to follow up and see how things are going for you after your recent emergency visit.\"    Tell me how you are doing now that you are home?\" Tired, getting flutters in the chest (better than when she went into the ED)-denies chest pain/pressure, shaking and tingling stopped, moving slow, having trouble with balance-nothing new per pt report      Discharge Instructions    \"Let's review your discharge instructions.  What is/are the follow-up recommendations?  Pt. Response: Follow-up with Dr. Garcia. Pt does see a therapist for anxiety.    \"Has an appointment with your primary care provider been scheduled?\"  Yes. (confirm and remind to bring meds) Appointment scheduled for March 14 at 2:30 PM     Medications    \"Tell me what changed about your medicines when you discharged?\"    None    \"What questions do you have about your medications?\"   None        Call Summary    \"What questions or concerns do you have about your recent visit and your follow-up care?\"     none    \"If you have questions or things don't continue to improve, we encourage you contact us through the main clinic number (give number).  Even if the clinic is not open, triage nurses are available 24/7 to help you.     We would like you to know that our clinic has extended hours (provide information).  We also have urgent care (provide details on closest location and hours/contact info)\"    \"Thank you for your time and take care!\"                "

## 2022-03-04 ENCOUNTER — NURSE TRIAGE (OUTPATIENT)
Dept: NURSING | Facility: CLINIC | Age: 62
End: 2022-03-04
Payer: MEDICARE

## 2022-03-04 DIAGNOSIS — Z79.4 TYPE 2 DIABETES MELLITUS WITH DIABETIC POLYNEUROPATHY, WITH LONG-TERM CURRENT USE OF INSULIN (H): Primary | ICD-10-CM

## 2022-03-04 DIAGNOSIS — E11.42 TYPE 2 DIABETES MELLITUS WITH DIABETIC POLYNEUROPATHY, WITH LONG-TERM CURRENT USE OF INSULIN (H): Primary | ICD-10-CM

## 2022-03-05 NOTE — TELEPHONE ENCOUNTER
Patient stated her prescription of trulicity was increased from 150 to 300.  She has been having some lower blood sugars today and wants to know if she should continue to follow the plan/    Blood glucose:  4:12pm was 196    108, 92, 88 last 3 blood sugars    Just had a small bowl of mashed potatoes and candy to increase it. She has also taken a glucose tablet. Patient was feeling some symptoms of hypoglycemia: shakiness, anxious but feels better now.  Patient denies any confusion, dizziness or disorientation.    Explained to patient is she is symptomatic for 30 min or more after trying simple sugars to increase her blood sugar she needs to go to the ED.  Also explained if patient is less than 70 patient should go to ED. Patient understands and agrees.    Discussed having a sandwich, milk, cheese before bed.    Will route message to provider to let them know about her blood sugars.  Patient would like a follow up phone call from provider team to let her know if she should continue with this dosage increase.           Reason for Disposition    Low blood sugar definition and treatment, questions about    Additional Information    Negative: Unconscious or difficult to awaken    Negative: Seizure occurs    Negative: Acting confused (e.g., disoriented, slurred speech)    Negative: Very weak (e.g., can't stand)    Negative: Sounds like a life-threatening emergency to the triager    Negative: [1] Vomiting AND [2] signs of dehydration (e.g., very dry mouth, lightheaded, dark urine)    Negative: [1] Low blood sugar symptoms persist > 30 minutes AND [2] using low blood sugar Care Advice    Negative: [1] Low blood glucose (< 70 mg/dL  or 3.9 mmol/L) persists > 30 minutes AND [2] using low blood sugar Care Advice    Negative: Patient sounds very sick or weak to the triager    Negative: [1] Low blood sugar symptoms with no other adult present AND [2] hasn't tried Care Advice    Negative: [1] Low blood glucose (< 70 mg/dL  or 3.9  mmol/L) with no other adult present AND [2] hasn't tried Care Advice    Negative: Diabetes drug error or overdose (e.g., insulin error or extra dose)    Negative: [1] Caller has URGENT medication or insulin pump question AND [2] triager unable to answer question    Negative: [1] Blood glucose < 70  mg/dL (3.9 mmol/L) or symptomatic, now improved with Care Advice AND [2] cause unknown    Negative: [1] Caller has NON-URGENT medication or insulin pump question AND [2] triager unable to answer question    Negative: [1] Morning (before breakfast) blood glucose < 80 mg/dL (4.4 mmol/L) AND [2] more than once in past week    Negative: [1] Evening (after bedtime snack) blood glucose < 100 mg/dL (5.6 mmol/L) AND [2] more than once in past week    Negative: [1] Blood glucose < 70 mg/dL (3.9 mmol/L) or symptomatic AND [2] cause known    Protocols used: DIABETES - LOW BLOOD SUGAR-A-AH

## 2022-03-07 NOTE — TELEPHONE ENCOUNTER
Endo staff- can you please take a look into this?  Try to get more information and BG data for last 8 days.    Thank you.

## 2022-03-11 ENCOUNTER — TELEPHONE (OUTPATIENT)
Dept: INTERNAL MEDICINE | Facility: CLINIC | Age: 62
End: 2022-03-11
Payer: MEDICARE

## 2022-03-11 NOTE — TELEPHONE ENCOUNTER
Patient Quality Outreach    Patient is due for the following:   Diabetes -  Diabetic Follow-Up Visit  Asthma  -  Asthma follow-up visit  Hypertension -  Hypertension follow-up visit  Depression  -  Depression follow-up visit    NEXT STEPS:   Patient was scheduled for an appointment.    Type of outreach:    Chart review performed, no outreach needed.      Questions for provider review:    None     Alta Braden, CMA

## 2022-03-14 ENCOUNTER — OFFICE VISIT (OUTPATIENT)
Dept: INTERNAL MEDICINE | Facility: CLINIC | Age: 62
End: 2022-03-14
Payer: MEDICARE

## 2022-03-14 VITALS
HEIGHT: 65 IN | HEART RATE: 86 BPM | BODY MASS INDEX: 22.66 KG/M2 | OXYGEN SATURATION: 99 % | WEIGHT: 136 LBS | DIASTOLIC BLOOD PRESSURE: 79 MMHG | TEMPERATURE: 98.4 F | SYSTOLIC BLOOD PRESSURE: 125 MMHG

## 2022-03-14 DIAGNOSIS — E78.5 HYPERLIPIDEMIA LDL GOAL <100: ICD-10-CM

## 2022-03-14 DIAGNOSIS — E11.22 TYPE 2 DIABETES MELLITUS WITH STAGE 3A CHRONIC KIDNEY DISEASE, WITH LONG-TERM CURRENT USE OF INSULIN (H): ICD-10-CM

## 2022-03-14 DIAGNOSIS — F33.0 MAJOR DEPRESSIVE DISORDER, RECURRENT EPISODE, MILD (H): ICD-10-CM

## 2022-03-14 DIAGNOSIS — I73.9 INTERMITTENT CLAUDICATION (H): ICD-10-CM

## 2022-03-14 DIAGNOSIS — Z79.4 TYPE 2 DIABETES MELLITUS WITH DIABETIC POLYNEUROPATHY, WITH LONG-TERM CURRENT USE OF INSULIN (H): ICD-10-CM

## 2022-03-14 DIAGNOSIS — F33.9 EPISODE OF RECURRENT MAJOR DEPRESSIVE DISORDER, UNSPECIFIED DEPRESSION EPISODE SEVERITY (H): ICD-10-CM

## 2022-03-14 DIAGNOSIS — N18.31 TYPE 2 DIABETES MELLITUS WITH STAGE 3A CHRONIC KIDNEY DISEASE, WITH LONG-TERM CURRENT USE OF INSULIN (H): ICD-10-CM

## 2022-03-14 DIAGNOSIS — Z79.4 TYPE 2 DIABETES MELLITUS WITH STAGE 3A CHRONIC KIDNEY DISEASE, WITH LONG-TERM CURRENT USE OF INSULIN (H): ICD-10-CM

## 2022-03-14 DIAGNOSIS — E11.42 TYPE 2 DIABETES MELLITUS WITH DIABETIC POLYNEUROPATHY, WITH LONG-TERM CURRENT USE OF INSULIN (H): ICD-10-CM

## 2022-03-14 DIAGNOSIS — N30.00 ACUTE CYSTITIS WITHOUT HEMATURIA: ICD-10-CM

## 2022-03-14 DIAGNOSIS — R30.9 PAINFUL URINATION: Primary | ICD-10-CM

## 2022-03-14 DIAGNOSIS — G63 POLYNEUROPATHY ASSOCIATED WITH UNDERLYING DISEASE (H): ICD-10-CM

## 2022-03-14 LAB
ALBUMIN UR-MCNC: NEGATIVE MG/DL
APPEARANCE UR: ABNORMAL
BACTERIA #/AREA URNS HPF: ABNORMAL /HPF
BILIRUB UR QL STRIP: NEGATIVE
COLOR UR AUTO: YELLOW
GLUCOSE UR STRIP-MCNC: NEGATIVE MG/DL
HGB UR QL STRIP: NEGATIVE
KETONES UR STRIP-MCNC: NEGATIVE MG/DL
LEUKOCYTE ESTERASE UR QL STRIP: ABNORMAL
NITRATE UR QL: NEGATIVE
PH UR STRIP: 5.5 [PH] (ref 5–7)
RBC #/AREA URNS AUTO: ABNORMAL /HPF
SP GR UR STRIP: 1.01 (ref 1–1.03)
SQUAMOUS #/AREA URNS AUTO: ABNORMAL /LPF
UROBILINOGEN UR STRIP-ACNC: 0.2 E.U./DL
WBC #/AREA URNS AUTO: ABNORMAL /HPF

## 2022-03-14 PROCEDURE — 87186 SC STD MICRODIL/AGAR DIL: CPT | Performed by: INTERNAL MEDICINE

## 2022-03-14 PROCEDURE — 99214 OFFICE O/P EST MOD 30 MIN: CPT | Performed by: INTERNAL MEDICINE

## 2022-03-14 PROCEDURE — 87086 URINE CULTURE/COLONY COUNT: CPT | Performed by: INTERNAL MEDICINE

## 2022-03-14 PROCEDURE — 81001 URINALYSIS AUTO W/SCOPE: CPT | Performed by: INTERNAL MEDICINE

## 2022-03-14 RX ORDER — CITALOPRAM HYDROBROMIDE 40 MG/1
40 TABLET ORAL EVERY MORNING
Qty: 90 TABLET | Refills: 1 | Status: SHIPPED | OUTPATIENT
Start: 2022-03-14 | End: 2022-04-21

## 2022-03-14 RX ORDER — ROSUVASTATIN CALCIUM 10 MG/1
10 TABLET, COATED ORAL DAILY
Qty: 90 TABLET | Refills: 3 | Status: SHIPPED | OUTPATIENT
Start: 2022-03-14 | End: 2023-06-08

## 2022-03-14 RX ORDER — NITROFURANTOIN 25; 75 MG/1; MG/1
100 CAPSULE ORAL 2 TIMES DAILY
Qty: 14 CAPSULE | Refills: 0 | Status: SHIPPED | OUTPATIENT
Start: 2022-03-14 | End: 2022-03-21

## 2022-03-14 ASSESSMENT — ASTHMA QUESTIONNAIRES
ACT_TOTALSCORE: 25
QUESTION_5 LAST FOUR WEEKS HOW WOULD YOU RATE YOUR ASTHMA CONTROL: COMPLETELY CONTROLLED
ACT_TOTALSCORE: 25
QUESTION_1 LAST FOUR WEEKS HOW MUCH OF THE TIME DID YOUR ASTHMA KEEP YOU FROM GETTING AS MUCH DONE AT WORK, SCHOOL OR AT HOME: NONE OF THE TIME
QUESTION_3 LAST FOUR WEEKS HOW OFTEN DID YOUR ASTHMA SYMPTOMS (WHEEZING, COUGHING, SHORTNESS OF BREATH, CHEST TIGHTNESS OR PAIN) WAKE YOU UP AT NIGHT OR EARLIER THAN USUAL IN THE MORNING: NOT AT ALL
QUESTION_2 LAST FOUR WEEKS HOW OFTEN HAVE YOU HAD SHORTNESS OF BREATH: NOT AT ALL
QUESTION_4 LAST FOUR WEEKS HOW OFTEN HAVE YOU USED YOUR RESCUE INHALER OR NEBULIZER MEDICATION (SUCH AS ALBUTEROL): NOT AT ALL

## 2022-03-14 NOTE — RESULT ENCOUNTER NOTE
The urine showed a possible urinary tract infection however this could possibly represent a contaminated specimen again since there were moderate number of squamous epithelial cells (skin cells ) seen in the urine.  Given your symptoms, I would recommend a course of antibiotics.  Take Macrobid, 1 tablet twice daily for 7 days.  I sent the prescription to the [facility] pharmacy for you.

## 2022-03-14 NOTE — PROGRESS NOTES
Assessment & Plan     (R30.9) Painful urination  (primary encounter diagnosis)  Comment: UTI see bleow   Plan: UA macro with reflex to Microscopic and Culture        - Clinc Collect, Urine Microscopic Exam, Urine         Culture            (F33.0) Major depressive disorder, recurrent episode, mild (H)  Comment: This condition is currently controlled on the current medical regimen.  Continue current therapy.   She has not experienced any significant side effects of this medication.   Plan: citalopram (CELEXA) 40 MG tablet            (E11.42,  Z79.4) Type 2 diabetes mellitus with diabetic polyneuropathy, with long-term current use of insulin (H)  Comment: improving, mostly with better diet.   Discussed importance of aggressive management of diabetes, including aggressive low cabr diet (one of the most powerful ways to control type II DM), performing regular glucose monitoring, (either with standard glucometer, or preferably personal continuous glucose monitor), medication compliance, regular laboratory monitoring at least every 6 months (or possibly more often if diabetes not at goal), and attending regular follow up appointments as ordered.    Aggressive diabetes management of diabetes will greatly reduce the future risk of diabetes related complications such as CAD, CVA, PVD, and retinopathy/neuropathy/nephropathy.  Based on level of diabetes control: testing frequency 1-3 times per day TIME PER DAY, roger STRONGLY recommend that she use the continuous glucose monitor for more closer monitoring of her glucose levels.   She reports that she has noticed MUCH better glucose levels with closer motnirogn where she can see the effect of diet choices on her glucose levels.   Plan: rosuvastatin (CRESTOR) 10 MG tablet            (F33.9) Episode of recurrent major depressive disorder, unspecified depression episode severity (H)  Comment: This condition is currently controlled on the current medical regimen.  Continue  current therapy.   Plan:     (E78.5) Hyperlipidemia LDL goal <100  Comment: Discussed guidelines recommending a statin cholesterol lowering medication for any patient with either diabetes and/or vascular disease, aiming for a LDL goal under 100 for sure, ideally under 70, using whatever dose of statin tolerated.    Plan: rosuvastatin (CRESTOR) 10 MG tablet            (N30.00) Acute cystitis without hematuria  Comment: Uncomplicated UTI based on the clinical information.    An antibiotic is indicated.  Discussed urinary tract infections, including risk factors (including sexual activity, bladder outlet obstruction issues, etc), prevention strategies, and basics of UTI management.   Drink fluids to produce regular urination which can help prevent future UTIs.  If the symptoms are not cleared with the prescribed treatment, contact us for further evaluation.     Plan: nitroFURantoin macrocrystal-monohydrate         (MACROBID) 100 MG capsule            (G63) Polyneuropathy associated with underlying disease (H)  Comment: This condition is currently controlled on the current medical regimen.  Continue current therapy.   Manage diabetes more aggressively   Plan:     (I73.9) Intermittent claudication (H)  Comment: This condition is currently controlled on the current medical regimen.  Continue current therapy.   Discussed secondary risk factor modification and recommended continuing aggressive management of these items.   Plan:     (E11.22,  N18.31,  Z79.4) Type 2 diabetes mellitus with stage 3a chronic kidney disease, with long-term current use of insulin (H)  Comment:   Plan:                 Tobacco Cessation:   reports that she has been smoking cigarettes and other. She has a 25.00 pack-year smoking history. She has never used smokeless tobacco.  Tobacco Cessation Action Plan: Information offered: Patient not interested at this time        No follow-ups on file.    Xavi Garcia MD  [facility]  [place] KRISTIAN Barreto is a 61 year old who presents for the following health issues     HPI     ED/UC Followup:    Facility:  [facility]  Date of visit: 02/26/2022  Reason for visit: Generalized Weakness and Chest Pain  Current Status: improvement       Genitourinary - Female  Onset/Duration: 03/08/2022  Description:   Painful urination (Dysuria): YES           Frequency: YES  Blood in urine (Hematuria): no  Delay in urine (Hesitency): YES  Intensity: moderate  Progression of Symptoms:  waxing and waning  Accompanying Signs & Symptoms:  Fever/chills: no  Flank pain: no  Nausea and vomiting: YES- vomiting- unknown if related  Vaginal symptoms: odor, discharge  Abdominal/Pelvic Pain: no  History:   History of frequent UTI s: YES- recent hx of UTI  History of kidney stones: YES- years ago  Sexually Active: no  Possibility of pregnancy: No  Precipitating or alleviating factors: None  Therapies tried and outcome: OTC generic UTI medicine       Diabetes:  In regards specifically to the patient's diabetes, they reports no unusual symptoms.  Medication compliance: good  Diabetic diet compliance: good    Patient reports no significant episodes of hypo- or hyperglycemia    Review of patient's personal continuous glucose monitor data:  Last 14 days  Time in target range ():  85%  Above target range:  14%  Below target range:  1%  Sensor usage: 75%     Most  recent labs:    Lab Results   Component Value Date    A1C 9.2 01/28/2022    A1C 7.7 12/18/2020    A1C 11.2 05/27/2020    A1C 10.3 11/06/2019    A1C 11.0 08/06/2019    A1C 12.6 04/03/2019    A1C 8.9 06/26/2018    A1C 12.5 01/16/2018    A1C 12.4 09/01/2017    A1C 11.1 03/21/2017    A1C 11.1 11/10/2016    A1C 11.5 07/05/2016    A1C 7.2 08/31/2015    A1C 8.8 05/14/2015    A1C 9.0 03/02/2015    A1C 7.0 11/14/2013    A1C 7.9 10/07/2013    A1C 8.1 08/13/2013    A1C 9.0 06/18/2013    A1C 7.7 02/28/2013    A1C 6.5 04/17/2012    A1C 5.9 08/02/2011    A1C 5.7  "11/23/2010    A1C 5.6 07/06/2010    A1C 5.7 02/01/2010          **I reviewed the information recorded in the patient's EPIC chart (including but not limited to medical history, surgical history, family history, problem list, medication list, and allergy list) and updated the information as indicated based on the patients reported information.         Review of Systems   Constitutional, HEENT, cardiovascular, pulmonary, gi and gu systems are negative, except as otherwise noted.      Objective    /79   Pulse 86   Temp 98.4  F (36.9  C)   Ht 1.651 m (5' 5\")   Wt 61.7 kg (136 lb)   SpO2 99%   BMI 22.63 kg/m    Body mass index is 22.63 kg/m .  Physical Exam   GENERAL alert and no distress  EYES:  Normal sclera,conjunctiva, EOMI  HENT: oral and posterior pharynx without lesions or erythema, facies symmetric  NECK: Neck supple. No LAD, without thyroidmegaly.  RESP: Clear to ausculation bilaterally without wheezes or crackles. Normal BS in all fields.  CV: RRR normal S1S2 without murmurs, rubs or gallops.  LYMPH: no cervical lymph adenopathy appreciated  MS: extremities- no gross deformities of the visible extremities noted,   EXT:  no lower extremity edema  PSYCH: Alert and oriented times 3; speech- coherent  SKIN:  No obvious significant skin lesions on visible portions of face     Results for orders placed or performed in visit on 03/14/22   UA macro with reflex to Microscopic and Culture - Clinc Collect     Status: Abnormal    Specimen: Urine, Midstream   Result Value Ref Range    Color Urine Yellow Colorless, Straw, Light Yellow, Yellow    Appearance Urine Cloudy (A) Clear    Glucose Urine Negative Negative mg/dL    Bilirubin Urine Negative Negative    Ketones Urine Negative Negative mg/dL    Specific Gravity Urine 1.010 1.003 - 1.035    Blood Urine Negative Negative    pH Urine 5.5 5.0 - 7.0    Protein Albumin Urine Negative Negative mg/dL    Urobilinogen Urine 0.2 0.2, 1.0 E.U./dL    Nitrite Urine " Negative Negative    Leukocyte Esterase Urine Large (A) Negative   Urine Microscopic Exam     Status: Abnormal   Result Value Ref Range    Bacteria Urine Many (A) None Seen /HPF    RBC Urine 0-2 0-2 /HPF /HPF    WBC Urine 25-50 (A) 0-5 /HPF /HPF    Squamous Epithelials Urine Moderate (A) None Seen /LPF   Urine Culture     Status: Abnormal    Specimen: Urine, Midstream   Result Value Ref Range    Culture >100,000 CFU/mL Escherichia coli (A)        Susceptibility    Escherichia coli - RENETTA     Ampicillin <=2.0 Susceptible ug/mL     Ampicillin/ Sulbactam <=2.0 Susceptible ug/mL     Piperacillin/Tazobactam <=4.0 Susceptible ug/mL     Cefazolin* <=4.0 Susceptible ug/mL      * Cefazolin RENETTA breakpoints are for the treatment of uncomplicated urinary tract infections. For the treatment of systemic infections, please contact the laboratory for additional testing.     Cefoxitin <=4.0 Susceptible ug/mL     Ceftazidime <=1.0 Susceptible ug/mL     Ceftriaxone <=1.0 Susceptible ug/mL     Cefepime <=1.0 Susceptible ug/mL     Gentamicin <=1.0 Susceptible ug/mL     Tobramycin <=1.0 Susceptible ug/mL     Ciprofloxacin <=0.25 Susceptible ug/mL     Levofloxacin <=0.12 Susceptible ug/mL     Nitrofurantoin <=16.0 Susceptible ug/mL     Trimethoprim/Sulfamethoxazole <=1/19 Susceptible ug/mL

## 2022-03-15 LAB — BACTERIA UR CULT: ABNORMAL

## 2022-03-31 DIAGNOSIS — Z79.4 TYPE 2 DIABETES MELLITUS WITH DIABETIC POLYNEUROPATHY, WITH LONG-TERM CURRENT USE OF INSULIN (H): ICD-10-CM

## 2022-03-31 DIAGNOSIS — E11.42 TYPE 2 DIABETES MELLITUS WITH DIABETIC POLYNEUROPATHY, WITH LONG-TERM CURRENT USE OF INSULIN (H): ICD-10-CM

## 2022-03-31 RX ORDER — METFORMIN HCL 500 MG
TABLET, EXTENDED RELEASE 24 HR ORAL
Qty: 60 TABLET | Refills: 0 | Status: SHIPPED | OUTPATIENT
Start: 2022-03-31 | End: 2022-04-21

## 2022-04-04 DIAGNOSIS — F33.0 MAJOR DEPRESSIVE DISORDER, RECURRENT EPISODE, MILD (H): ICD-10-CM

## 2022-04-05 RX ORDER — TRAZODONE HYDROCHLORIDE 50 MG/1
TABLET, FILM COATED ORAL
Qty: 180 TABLET | Refills: 2 | Status: SHIPPED | OUTPATIENT
Start: 2022-04-05 | End: 2024-03-27

## 2022-04-05 NOTE — TELEPHONE ENCOUNTER
Routing refill request to provider for review/approval because:  Drug interaction warning    Deandra Gonzales, RN

## 2022-04-07 ENCOUNTER — MEDICAL CORRESPONDENCE (OUTPATIENT)
Dept: HEALTH INFORMATION MANAGEMENT | Facility: CLINIC | Age: 62
End: 2022-04-07

## 2022-04-15 DIAGNOSIS — E11.42 TYPE 2 DIABETES MELLITUS WITH DIABETIC POLYNEUROPATHY, WITH LONG-TERM CURRENT USE OF INSULIN (H): ICD-10-CM

## 2022-04-15 DIAGNOSIS — Z79.4 TYPE 2 DIABETES MELLITUS WITH DIABETIC POLYNEUROPATHY, WITH LONG-TERM CURRENT USE OF INSULIN (H): ICD-10-CM

## 2022-04-15 RX ORDER — INSULIN DETEMIR 100 [IU]/ML
INJECTION, SOLUTION SUBCUTANEOUS
Qty: 15 ML | Refills: 1 | Status: SHIPPED | OUTPATIENT
Start: 2022-04-15 | End: 2022-04-25

## 2022-04-21 DIAGNOSIS — F33.0 MAJOR DEPRESSIVE DISORDER, RECURRENT EPISODE, MILD (H): ICD-10-CM

## 2022-04-21 DIAGNOSIS — E11.42 TYPE 2 DIABETES MELLITUS WITH DIABETIC POLYNEUROPATHY, WITH LONG-TERM CURRENT USE OF INSULIN (H): ICD-10-CM

## 2022-04-21 DIAGNOSIS — Z79.4 TYPE 2 DIABETES MELLITUS WITH DIABETIC POLYNEUROPATHY, WITH LONG-TERM CURRENT USE OF INSULIN (H): ICD-10-CM

## 2022-04-21 RX ORDER — DULAGLUTIDE 3 MG/.5ML
INJECTION, SOLUTION SUBCUTANEOUS
Qty: 6 ML | Refills: 0 | Status: SHIPPED | OUTPATIENT
Start: 2022-04-21 | End: 2023-08-01

## 2022-04-21 RX ORDER — METFORMIN HCL 500 MG
TABLET, EXTENDED RELEASE 24 HR ORAL
Qty: 180 TABLET | Refills: 0 | Status: SHIPPED | OUTPATIENT
Start: 2022-04-21 | End: 2022-10-04

## 2022-04-21 NOTE — TELEPHONE ENCOUNTER
Routing refill request to provider for review/approval because:  Drug interaction warning  PHQ-9 score:    PHQ 1/28/2022   PHQ-9 Total Score 17   Q9: Thoughts of better off dead/self-harm past 2 weeks Not at all     Called pharmacy and they did not receive the script sent 3/14/22.     Deandra Gonzales RN

## 2022-04-22 RX ORDER — CITALOPRAM HYDROBROMIDE 40 MG/1
TABLET ORAL
Qty: 90 TABLET | Refills: 0 | Status: SHIPPED | OUTPATIENT
Start: 2022-04-22 | End: 2022-11-11

## 2022-04-25 ENCOUNTER — TELEPHONE (OUTPATIENT)
Dept: ENDOCRINOLOGY | Facility: CLINIC | Age: 62
End: 2022-04-25

## 2022-04-25 ENCOUNTER — VIRTUAL VISIT (OUTPATIENT)
Dept: ENDOCRINOLOGY | Facility: CLINIC | Age: 62
End: 2022-04-25
Payer: MEDICARE

## 2022-04-25 DIAGNOSIS — E11.22 TYPE 2 DIABETES MELLITUS WITH STAGE 3A CHRONIC KIDNEY DISEASE, WITH LONG-TERM CURRENT USE OF INSULIN (H): ICD-10-CM

## 2022-04-25 DIAGNOSIS — N18.31 TYPE 2 DIABETES MELLITUS WITH STAGE 3A CHRONIC KIDNEY DISEASE, WITH LONG-TERM CURRENT USE OF INSULIN (H): ICD-10-CM

## 2022-04-25 DIAGNOSIS — Z79.4 TYPE 2 DIABETES MELLITUS WITH DIABETIC POLYNEUROPATHY, WITH LONG-TERM CURRENT USE OF INSULIN (H): Primary | ICD-10-CM

## 2022-04-25 DIAGNOSIS — E03.9 HYPOTHYROIDISM, UNSPECIFIED TYPE: ICD-10-CM

## 2022-04-25 DIAGNOSIS — E08.41 DIABETIC MONONEUROPATHY ASSOCIATED WITH DIABETES MELLITUS DUE TO UNDERLYING CONDITION (H): ICD-10-CM

## 2022-04-25 DIAGNOSIS — Z79.4 TYPE 2 DIABETES MELLITUS WITH STAGE 3A CHRONIC KIDNEY DISEASE, WITH LONG-TERM CURRENT USE OF INSULIN (H): ICD-10-CM

## 2022-04-25 DIAGNOSIS — E78.5 HYPERLIPIDEMIA LDL GOAL <100: ICD-10-CM

## 2022-04-25 DIAGNOSIS — E11.42 TYPE 2 DIABETES MELLITUS WITH DIABETIC POLYNEUROPATHY, WITH LONG-TERM CURRENT USE OF INSULIN (H): Primary | ICD-10-CM

## 2022-04-25 PROCEDURE — 99214 OFFICE O/P EST MOD 30 MIN: CPT | Mod: 95 | Performed by: INTERNAL MEDICINE

## 2022-04-25 PROCEDURE — 95251 CONT GLUC MNTR ANALYSIS I&R: CPT | Performed by: INTERNAL MEDICINE

## 2022-04-25 RX ORDER — INSULIN DETEMIR 100 [IU]/ML
INJECTION, SOLUTION SUBCUTANEOUS
Qty: 15 ML | Refills: 1 | Status: SHIPPED | OUTPATIENT
Start: 2022-04-25 | End: 2023-02-08

## 2022-04-25 ASSESSMENT — PATIENT HEALTH QUESTIONNAIRE - PHQ9: SUM OF ALL RESPONSES TO PHQ QUESTIONS 1-9: 18

## 2022-04-25 NOTE — PATIENT INSTRUCTIONS
[facility]  Dr Ramirez, Endocrinology Department    [facility] [place] [address] [id number]  [place] [address]  Appointment [phone]  Fax: [phone]  [place]: Monday - Thursday      Continue levothyroxine 150 mcg/day.  Labs needed.  Dose adjustment based on that.    DM labs (fasting) needed.    Continue Metformin XR 1000 mg at night.  Continue Glipizide Xl 5 mg/day (with food)  Continue Trulicity 3.0 mg/week   DECREASE [place] tp 20 units AM only.  Continue to use genet. Scan often.  Follow up with CDE in 4 weeks for BG review.  Your provider has referred you to Diabetes Education: For all [place] [facility]:  Phone [phone]; Fax [phone]  Please call and make the appointment.    Repeat labs in 3 months.  Please make a lab appointment for blood work and follow up clinic appointment in 1 week after that to discuss results.

## 2022-04-25 NOTE — PROGRESS NOTES
Mary Lou S Stivens  is being evaluated via a billable video visit.      How would you like to obtain your AVS? [employer]  For the video visit, send the invitation by: Text to cell phone: [phone]  Will anyone else be joining your video visit? Litzy Garg on 4/25/2022 at 3:07 PM

## 2022-04-25 NOTE — NURSING NOTE
Pt states there are no changes to their allergy and medication list since last reviewed on 3/14/22. Jyotsna Garg on 4/25/2022 at 3:05 PM
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
## 2022-04-26 ENCOUNTER — TELEPHONE (OUTPATIENT)
Dept: ENDOCRINOLOGY | Facility: CLINIC | Age: 62
End: 2022-04-26
Payer: MEDICARE

## 2022-04-26 NOTE — TELEPHONE ENCOUNTER
Diabetes Education Scheduling Outreach #1:    Call to patient to schedule. Left message with phone number to call to schedule.    Also sent MyChart message with phone number to call to schedule.    Roula Mauricio OnCall  Diabetes and Nutrition Scheduling

## 2022-05-08 ENCOUNTER — HEALTH MAINTENANCE LETTER (OUTPATIENT)
Age: 62
End: 2022-05-08

## 2022-05-11 ENCOUNTER — MEDICAL CORRESPONDENCE (OUTPATIENT)
Dept: HEALTH INFORMATION MANAGEMENT | Facility: CLINIC | Age: 62
End: 2022-05-11
Payer: MEDICARE

## 2022-05-24 ENCOUNTER — TELEPHONE (OUTPATIENT)
Dept: ENDOCRINOLOGY | Facility: CLINIC | Age: 62
End: 2022-05-24
Payer: MEDICARE

## 2022-05-24 DIAGNOSIS — E11.42 TYPE 2 DIABETES MELLITUS WITH DIABETIC POLYNEUROPATHY, WITH LONG-TERM CURRENT USE OF INSULIN (H): Primary | ICD-10-CM

## 2022-05-24 DIAGNOSIS — Z79.4 TYPE 2 DIABETES MELLITUS WITH DIABETIC POLYNEUROPATHY, WITH LONG-TERM CURRENT USE OF INSULIN (H): Primary | ICD-10-CM

## 2022-05-24 NOTE — TELEPHONE ENCOUNTER
[facility] Call Center    Phone Message    May a detailed message be left on voicemail: yes     Reason for Call: Symptoms or Concerns     If patient has red-flag symptoms, warm transfer to triage line    Current symptom or concern: throwing up    Symptoms have been present for:  1 1/2 month(s)    Has patient previously been seen for this? no      Are there any new or worsening symptoms? Yes: pt stated since increasing trulicity she has been throwing up on and off.  Pt would like to check with provider about adjusting dose      Action Taken: Message routed to:  Other: endo    Travel Screening: Not Applicable

## 2022-05-25 RX ORDER — DULAGLUTIDE 1.5 MG/.5ML
1.5 INJECTION, SOLUTION SUBCUTANEOUS
Qty: 6 ML | Refills: 0 | Status: SHIPPED | OUTPATIENT
Start: 2022-05-25 | End: 2022-08-10

## 2022-05-25 NOTE — TELEPHONE ENCOUNTER
Last visit 4/2022- at that time trulicity dose was not increased.  She was on 3.0 mg weekly.  Can you please check with pt how long are her s/s?  What is the current dose of trulicity?  I would recommend to decrease dose to 1.5 mg weekly and see response.

## 2022-05-25 NOTE — TELEPHONE ENCOUNTER
I called the pt, her dose of trulicity was increased at her February 2022 visit and she has been experiencing n/v, fatigue, and lack of appetite since then. The pt does not vomit everyday, however has the other sx every day. I advise she decrease her dose, per below. RX sent. Pt will call us with an update.

## 2022-05-26 DIAGNOSIS — Z79.4 TYPE 2 DIABETES MELLITUS WITH DIABETIC POLYNEUROPATHY, WITH LONG-TERM CURRENT USE OF INSULIN (H): ICD-10-CM

## 2022-05-26 DIAGNOSIS — I10 ESSENTIAL HYPERTENSION, BENIGN: ICD-10-CM

## 2022-05-26 DIAGNOSIS — E11.42 TYPE 2 DIABETES MELLITUS WITH DIABETIC POLYNEUROPATHY, WITH LONG-TERM CURRENT USE OF INSULIN (H): ICD-10-CM

## 2022-05-26 RX ORDER — GLIPIZIDE 5 MG/1
TABLET, FILM COATED, EXTENDED RELEASE ORAL
Qty: 90 TABLET | Refills: 0 | Status: SHIPPED | OUTPATIENT
Start: 2022-05-26 | End: 2022-10-14

## 2022-05-26 NOTE — TELEPHONE ENCOUNTER
Routing refill request to provider for review/approval because:  Labs out of range:    Potassium   Date Value Ref Range Status   02/26/2022 3.3 (L) 3.4 - 5.3 mmol/L Final   01/07/2021 3.0 (L) 3.4 - 5.3 mmol/L Final      Jane Saleh, RN

## 2022-05-27 RX ORDER — LISINOPRIL AND HYDROCHLOROTHIAZIDE 20; 25 MG/1; MG/1
TABLET ORAL
Qty: 90 TABLET | Refills: 2 | Status: SHIPPED | OUTPATIENT
Start: 2022-05-27 | End: 2023-11-08

## 2022-06-28 ENCOUNTER — TELEPHONE (OUTPATIENT)
Dept: ENDOCRINOLOGY | Facility: CLINIC | Age: 62
End: 2022-06-28

## 2022-06-28 NOTE — TELEPHONE ENCOUNTER
[facility] ICD-10 Verification Form for long term services and support    LAST OFFICE/VIRTUAL VISIT:  04/25/22    FUTURE OFFICE/VIRTUAL VISIT:  None    Lab Results   Component Value Date    A1C 9.2 01/28/2022    A1C 7.7 12/18/2020    A1C 11.2 05/27/2020    A1C 10.3 11/06/2019    A1C 11.0 08/06/2019    A1C 12.6 04/03/2019         Yuliet Braden CMA  [facility]  [place]

## 2022-07-06 NOTE — TELEPHONE ENCOUNTER
Form was faxed and sent to scanning.      Yuliet Braden, [facility]  [place]

## 2022-07-14 ENCOUNTER — LAB (OUTPATIENT)
Dept: LAB | Facility: CLINIC | Age: 62
End: 2022-07-14
Payer: MEDICARE

## 2022-07-14 DIAGNOSIS — Z79.4 TYPE 2 DIABETES MELLITUS WITH DIABETIC POLYNEUROPATHY, WITH LONG-TERM CURRENT USE OF INSULIN (H): ICD-10-CM

## 2022-07-14 DIAGNOSIS — E03.9 HYPOTHYROIDISM, UNSPECIFIED TYPE: ICD-10-CM

## 2022-07-14 DIAGNOSIS — E78.5 HYPERLIPIDEMIA WITH TARGET LDL LESS THAN 100: ICD-10-CM

## 2022-07-14 DIAGNOSIS — E11.42 TYPE 2 DIABETES MELLITUS WITH DIABETIC POLYNEUROPATHY, WITH LONG-TERM CURRENT USE OF INSULIN (H): ICD-10-CM

## 2022-07-14 LAB
CREAT UR-MCNC: 80 MG/DL
HBA1C MFR BLD: 8 % (ref 0–5.6)
MICROALBUMIN UR-MCNC: <5 MG/L
MICROALBUMIN/CREAT UR: NORMAL MG/G{CREAT}

## 2022-07-14 PROCEDURE — 82565 ASSAY OF CREATININE: CPT

## 2022-07-14 PROCEDURE — 82043 UR ALBUMIN QUANTITATIVE: CPT

## 2022-07-14 PROCEDURE — 36415 COLL VENOUS BLD VENIPUNCTURE: CPT

## 2022-07-14 PROCEDURE — 80061 LIPID PANEL: CPT

## 2022-07-14 PROCEDURE — 83036 HEMOGLOBIN GLYCOSYLATED A1C: CPT

## 2022-07-14 PROCEDURE — 84443 ASSAY THYROID STIM HORMONE: CPT

## 2022-07-14 PROCEDURE — 84439 ASSAY OF FREE THYROXINE: CPT

## 2022-07-15 LAB
CHOLEST SERPL-MCNC: 164 MG/DL
CREAT SERPL-MCNC: 0.66 MG/DL (ref 0.52–1.04)
FASTING STATUS PATIENT QL REPORTED: YES
GFR SERPL CREATININE-BSD FRML MDRD: >90 ML/MIN/1.73M2
HDLC SERPL-MCNC: 53 MG/DL
LDLC SERPL CALC-MCNC: 73 MG/DL
NONHDLC SERPL-MCNC: 111 MG/DL
T4 FREE SERPL-MCNC: 1.8 NG/DL (ref 0.76–1.46)
TRIGL SERPL-MCNC: 189 MG/DL

## 2022-07-17 LAB — TSH SERPL DL<=0.005 MIU/L-ACNC: 0.03 MU/L (ref 0.4–4)

## 2022-07-18 ENCOUNTER — TELEPHONE (OUTPATIENT)
Dept: ENDOCRINOLOGY | Facility: CLINIC | Age: 62
End: 2022-07-18

## 2022-07-18 DIAGNOSIS — E03.9 HYPOTHYROIDISM, UNSPECIFIED TYPE: ICD-10-CM

## 2022-07-18 DIAGNOSIS — Z79.4 TYPE 2 DIABETES MELLITUS WITH DIABETIC POLYNEUROPATHY, WITH LONG-TERM CURRENT USE OF INSULIN (H): Primary | ICD-10-CM

## 2022-07-18 DIAGNOSIS — E11.42 TYPE 2 DIABETES MELLITUS WITH DIABETIC POLYNEUROPATHY, WITH LONG-TERM CURRENT USE OF INSULIN (H): Primary | ICD-10-CM

## 2022-07-18 RX ORDER — LEVOTHYROXINE SODIUM 150 UG/1
TABLET ORAL
Qty: 90 TABLET | Refills: 1 | Status: SHIPPED | OUTPATIENT
Start: 2022-07-18 | End: 2023-04-21

## 2022-07-18 NOTE — TELEPHONE ENCOUNTER
Lab Results   Component Value Date    A1C 8.0 07/14/2022    A1C 9.2 01/28/2022    A1C 7.7 12/18/2020    A1C 11.2 05/27/2020    A1C 10.3 11/06/2019    A1C 11.0 08/06/2019    A1C 12.6 04/03/2019   A1c improved to 8.0%    Recent Labs   Lab Test 07/14/22  1133 11/06/19  1018 11/25/15  1134 08/31/15  1349 06/29/15  0845   CHOL 164 194   < > 243* 213*   HDL 53 40*   < > 49* 48*   LDL 73 110*   < > 161* 131*   TRIG 189* 219*   < > 164* 171*   CHOLHDLRATIO  --   --   --  5.0 4.4    < > = values in this interval not displayed.     Lipid panel is improved    Lab Results   Component Value Date    UMALCR  07/14/2022      Comment:      Unable to calculate, urine albumin or creatinine is outside the detectable limits.    UMALCR 17.63 04/03/2019      Creatinine   Date Value Ref Range Status   07/14/2022 0.66 0.52 - 1.04 mg/dL Final   01/07/2021 0.60 0.52 - 1.04 mg/dL Final     Kidney function appear stable and in acceptable range.    ENDO THYROID LABS-[facility] Latest Ref Rng & Units 7/14/2022 1/28/2022   TSH 0.40 - 4.00 mU/L 0.03 (L) 10.36 (H)   T3 60 - 181 ng/dL     FREE T4 0.76 - 1.46 ng/dL 1.80 (H) 1.04     Last endocrinology visit 4/2022.  Labs are done but I do not see follow-up is scheduled which was recommended in last visit.    Currently she is taking levothyroxine 150 mcg/day  Recommend to decrease dose of levothyroxine-take 150 mcg X 6 days a week and 75 mcg X 1 day a week. (7/18/2022)  Labs in 2-3 months.  Please make a lab appointment for blood work and follow up clinic appointment in 1 week after that to discuss results.    Please inform patient and help schedule virtual (video preferred)/ clinic visit.    OK to book in next available open GAURI slot. Please make sure that one other GAURI slot is open on the given day. There are 2 GAURI slots/day- please keep one slot open for urgent needs.  Let me know if you have any questions.    Time spent 10 minutes.

## 2022-07-21 ENCOUNTER — TELEPHONE (OUTPATIENT)
Dept: ENDOCRINOLOGY | Facility: CLINIC | Age: 62
End: 2022-07-21

## 2022-07-21 NOTE — TELEPHONE ENCOUNTER
How Severe Are Your Spot(S)?: mild Pt read [employer] message.   What Type Of Note Output Would You Prefer (Optional)?: Standard Output What Is The Reason For Today's Visit?: Full Body Skin Examination What Is The Reason For Today's Visit? (Being Monitored For X): the development of new lesions

## 2022-07-21 NOTE — TELEPHONE ENCOUNTER
[facility] Call Center    Phone Message    May a detailed message be left on voicemail: yes     Reason for Call: Other: Pt would like a further explaination of the [employer] message left on medication change. Pt also stated she will make lab appt for the end of November once it gets a little closer.  Please call back and advise on medication. Thank you     Action Taken: Message routed to:  Other: endo    Travel Screening: Not Applicable

## 2022-08-08 ENCOUNTER — TRANSFERRED RECORDS (OUTPATIENT)
Dept: INTERNAL MEDICINE | Facility: CLINIC | Age: 62
End: 2022-08-08

## 2022-08-08 DIAGNOSIS — Z79.4 TYPE 2 DIABETES MELLITUS WITH DIABETIC POLYNEUROPATHY, WITH LONG-TERM CURRENT USE OF INSULIN (H): ICD-10-CM

## 2022-08-08 DIAGNOSIS — E11.42 TYPE 2 DIABETES MELLITUS WITH DIABETIC POLYNEUROPATHY, WITH LONG-TERM CURRENT USE OF INSULIN (H): ICD-10-CM

## 2022-08-08 LAB — RETINOPATHY: NEGATIVE

## 2022-08-10 DIAGNOSIS — E11.42 TYPE 2 DIABETES MELLITUS WITH DIABETIC POLYNEUROPATHY, WITH LONG-TERM CURRENT USE OF INSULIN (H): ICD-10-CM

## 2022-08-10 DIAGNOSIS — Z79.4 TYPE 2 DIABETES MELLITUS WITH DIABETIC POLYNEUROPATHY, WITH LONG-TERM CURRENT USE OF INSULIN (H): ICD-10-CM

## 2022-08-10 RX ORDER — FLASH GLUCOSE SENSOR
1 KIT MISCELLANEOUS DAILY
Qty: 2 EACH | Refills: 11 | Status: SHIPPED | OUTPATIENT
Start: 2022-08-10 | End: 2022-10-21

## 2022-08-10 RX ORDER — DULAGLUTIDE 3 MG/.5ML
3 INJECTION, SOLUTION SUBCUTANEOUS WEEKLY
Qty: 6 ML | Refills: 1 | Status: SHIPPED | OUTPATIENT
Start: 2022-08-10 | End: 2023-08-01

## 2022-08-10 NOTE — TELEPHONE ENCOUNTER
Pending Prescriptions:                       Disp   Refills    Continuous Blood Gluc Sensor (FREESTYLE LI*2 each 11       Si each daily For use with Freestyle Jen reader for           continuous monitioring of blood glucose levels           along with sensor. Replace sensor every 14 days.    Routing refill request to provider for review/approval because:  Drug not on the [facility] refill protocol

## 2022-08-12 DIAGNOSIS — Z79.4 LONG TERM (CURRENT) USE OF INSULIN (H): ICD-10-CM

## 2022-08-12 DIAGNOSIS — E11.42 TYPE 2 DIABETES MELLITUS WITH DIABETIC POLYNEUROPATHY, WITH LONG-TERM CURRENT USE OF INSULIN (H): Primary | ICD-10-CM

## 2022-08-12 DIAGNOSIS — Z79.4 TYPE 2 DIABETES MELLITUS WITH DIABETIC POLYNEUROPATHY, WITH LONG-TERM CURRENT USE OF INSULIN (H): Primary | ICD-10-CM

## 2022-08-15 RX ORDER — PEN NEEDLE, DIABETIC 31 GX5/16"
NEEDLE, DISPOSABLE MISCELLANEOUS
Qty: 100 EACH | Refills: 3 | Status: SHIPPED | OUTPATIENT
Start: 2022-08-15 | End: 2023-11-27

## 2022-08-30 ASSESSMENT — ASTHMA QUESTIONNAIRES
QUESTION_5 LAST FOUR WEEKS HOW MANY DAYS DID YOUR CHILD HAVE ANY DAYTIME ASTHMA SYMPTOMS: NOT AT ALL
QUESTION_3 DO YOU COUGH BECAUSE OF YOUR ASTHMA: NO, NONE OF THE TIME.
QUESTION_2 HOW MUCH OF A PROBLEM IS YOUR ASTHMA WHEN YOU RUN, EXCERCISE OR PLAY SPORTS: IT'S NOT A PROBLEM.
QUESTION_4 DO YOU WAKE UP DURING THE NIGHT BECAUSE OF YOUR ASTHMA: NO, NONE OF THE TIME.
QUESTION_4 LAST FOUR WEEKS HOW OFTEN HAVE YOU USED YOUR RESCUE INHALER OR NEBULIZER MEDICATION (SUCH AS ALBUTEROL): NOT AT ALL
QUESTION_3 LAST FOUR WEEKS HOW OFTEN DID YOUR ASTHMA SYMPTOMS (WHEEZING, COUGHING, SHORTNESS OF BREATH, CHEST TIGHTNESS OR PAIN) WAKE YOU UP AT NIGHT OR EARLIER THAN USUAL IN THE MORNING: NOT AT ALL
ACT_TOTALSCORE_PEDS: 27
ACT_TOTALSCORE: 25
QUESTION_2 LAST FOUR WEEKS HOW OFTEN HAVE YOU HAD SHORTNESS OF BREATH: NOT AT ALL
QUESTION_1 LAST FOUR WEEKS HOW MUCH OF THE TIME DID YOUR ASTHMA KEEP YOU FROM GETTING AS MUCH DONE AT WORK, SCHOOL OR AT HOME: NONE OF THE TIME
QUESTION_5 LAST FOUR WEEKS HOW WOULD YOU RATE YOUR ASTHMA CONTROL: COMPLETELY CONTROLLED
QUESTION_6 LAST FOUR WEEKS HOW MANY DAYS DID YOUR CHILD WHEEZE DURING THE DAY BECAUSE OF ASTHMA: NOT AT ALL
QUESTION_1 HOW IS YOUR ASTHMA TODAY: VERY GOOD
ACT_TOTALSCORE_PEDS: 27
ACT_TOTALSCORE: 25
QUESTION_7 LAST FOUR WEEKS HOW MANY DAYS DID YOUR CHILD WAKE UP DURING THE NIGHT BECAUSE OF ASTHMA: NOT AT ALL

## 2022-08-30 ASSESSMENT — ENCOUNTER SYMPTOMS
ABDOMINAL PAIN: 0
EYE PAIN: 0
FREQUENCY: 1
WEAKNESS: 1
DIARRHEA: 0
HEARTBURN: 0
NERVOUS/ANXIOUS: 1
PALPITATIONS: 0
SHORTNESS OF BREATH: 0
BREAST MASS: 0
DYSURIA: 0
JOINT SWELLING: 0
MYALGIAS: 1
HEADACHES: 1
HEMATURIA: 0
DIZZINESS: 1
FEVER: 0
CHILLS: 0
CONSTIPATION: 0
NAUSEA: 1
SORE THROAT: 0
COUGH: 0
ARTHRALGIAS: 0
PARESTHESIAS: 0
HEMATOCHEZIA: 0

## 2022-08-30 ASSESSMENT — PATIENT HEALTH QUESTIONNAIRE - PHQ9
SUM OF ALL RESPONSES TO PHQ QUESTIONS 1-9: 15
10. IF YOU CHECKED OFF ANY PROBLEMS, HOW DIFFICULT HAVE THESE PROBLEMS MADE IT FOR YOU TO DO YOUR WORK, TAKE CARE OF THINGS AT HOME, OR GET ALONG WITH OTHER PEOPLE: SOMEWHAT DIFFICULT
SUM OF ALL RESPONSES TO PHQ QUESTIONS 1-9: 15

## 2022-08-30 ASSESSMENT — ACTIVITIES OF DAILY LIVING (ADL)
CURRENT_FUNCTION: TRANSPORTATION REQUIRES ASSISTANCE
CURRENT_FUNCTION: SHOPPING REQUIRES ASSISTANCE
CURRENT_FUNCTION: HOUSEWORK REQUIRES ASSISTANCE

## 2022-09-02 ENCOUNTER — OFFICE VISIT (OUTPATIENT)
Dept: INTERNAL MEDICINE | Facility: CLINIC | Age: 62
End: 2022-09-02
Payer: MEDICARE

## 2022-09-02 VITALS
HEART RATE: 93 BPM | DIASTOLIC BLOOD PRESSURE: 81 MMHG | WEIGHT: 127 LBS | HEIGHT: 65 IN | OXYGEN SATURATION: 98 % | TEMPERATURE: 98.4 F | SYSTOLIC BLOOD PRESSURE: 133 MMHG | BODY MASS INDEX: 21.16 KG/M2

## 2022-09-02 DIAGNOSIS — E55.9 VITAMIN D DEFICIENCY: ICD-10-CM

## 2022-09-02 DIAGNOSIS — Z79.4 TYPE 2 DIABETES MELLITUS WITH DIABETIC POLYNEUROPATHY, WITH LONG-TERM CURRENT USE OF INSULIN (H): ICD-10-CM

## 2022-09-02 DIAGNOSIS — Z12.31 VISIT FOR SCREENING MAMMOGRAM: ICD-10-CM

## 2022-09-02 DIAGNOSIS — E89.0 POSTOPERATIVE HYPOTHYROIDISM: ICD-10-CM

## 2022-09-02 DIAGNOSIS — E53.8 VITAMIN B12 DEFICIENCY WITHOUT ANEMIA: ICD-10-CM

## 2022-09-02 DIAGNOSIS — E11.42 TYPE 2 DIABETES MELLITUS WITH DIABETIC POLYNEUROPATHY, WITH LONG-TERM CURRENT USE OF INSULIN (H): ICD-10-CM

## 2022-09-02 DIAGNOSIS — Z11.4 SCREENING FOR HIV (HUMAN IMMUNODEFICIENCY VIRUS): ICD-10-CM

## 2022-09-02 DIAGNOSIS — Z12.4 CERVICAL CANCER SCREENING: ICD-10-CM

## 2022-09-02 DIAGNOSIS — H69.93 DYSFUNCTION OF BOTH EUSTACHIAN TUBES: ICD-10-CM

## 2022-09-02 DIAGNOSIS — Z00.00 ENCOUNTER FOR MEDICARE ANNUAL WELLNESS EXAM: Primary | ICD-10-CM

## 2022-09-02 DIAGNOSIS — F33.0 MILD EPISODE OF RECURRENT MAJOR DEPRESSIVE DISORDER (H): ICD-10-CM

## 2022-09-02 DIAGNOSIS — Z79.899 ENCOUNTER FOR LONG-TERM (CURRENT) USE OF MEDICATIONS: ICD-10-CM

## 2022-09-02 DIAGNOSIS — Z87.891 PERSONAL HISTORY OF TOBACCO USE: ICD-10-CM

## 2022-09-02 DIAGNOSIS — E78.5 HYPERLIPIDEMIA LDL GOAL <100: ICD-10-CM

## 2022-09-02 DIAGNOSIS — Z12.11 SCREEN FOR COLON CANCER: ICD-10-CM

## 2022-09-02 PROCEDURE — 99396 PREV VISIT EST AGE 40-64: CPT | Performed by: INTERNAL MEDICINE

## 2022-09-02 PROCEDURE — G0296 VISIT TO DETERM LDCT ELIG: HCPCS | Performed by: INTERNAL MEDICINE

## 2022-09-02 RX ORDER — MAGNESIUM 200 MG
1000 TABLET ORAL DAILY
Qty: 90 TABLET | Refills: 3 | Status: SHIPPED | OUTPATIENT
Start: 2022-09-02 | End: 2024-04-03

## 2022-09-02 RX ORDER — FLUTICASONE PROPIONATE 50 MCG
2 SPRAY, SUSPENSION (ML) NASAL DAILY PRN
Qty: 48 G | Refills: 3 | Status: SHIPPED | OUTPATIENT
Start: 2022-09-02 | End: 2023-03-24

## 2022-09-02 ASSESSMENT — ENCOUNTER SYMPTOMS
CHILLS: 0
NAUSEA: 1
ABDOMINAL PAIN: 0
HEARTBURN: 0
WEAKNESS: 1
FEVER: 0
EYE PAIN: 0
HEADACHES: 1
DIARRHEA: 0
CONSTIPATION: 0
BREAST MASS: 0
COUGH: 0
MYALGIAS: 1
JOINT SWELLING: 0
ARTHRALGIAS: 0
HEMATURIA: 0
DYSURIA: 0
HEMATOCHEZIA: 0
PARESTHESIAS: 0
PALPITATIONS: 0
FREQUENCY: 1
DIZZINESS: 1
SHORTNESS OF BREATH: 0
SORE THROAT: 0
NERVOUS/ANXIOUS: 1

## 2022-09-02 ASSESSMENT — ACTIVITIES OF DAILY LIVING (ADL)
CURRENT_FUNCTION: HOUSEWORK REQUIRES ASSISTANCE
CURRENT_FUNCTION: TRANSPORTATION REQUIRES ASSISTANCE
CURRENT_FUNCTION: SHOPPING REQUIRES ASSISTANCE

## 2022-09-02 NOTE — PROGRESS NOTES
The patient was provided with suggestions to help her develop a healthy physical lifestyle.  She is at risk for lack of exercise and has been provided with information to increase physical activity for the benefit of her well-being.  The patient was counseled and encouraged to consider modifying their diet and eating habits. She was provided with information on recommended healthy diet options.  The patient reports that she has difficulty with activities of daily living. I have asked that the patient make a follow up appointment in 26 weeks where this issue will be further evaluated and addressed.  Information on urinary incontinence and treatment options given to patient.  The patient was provided with suggestions to help her develop a healthy emotional lifestyle.  The patient s PHQ-9 score is consistent with moderate depression. She was provided with information regarding depression and was advised to schedule a follow up appointment in 26 weeks to further address this issue.  Lung Cancer Screening Shared Decision Making Visit     Mary Lou Gil, a 62 year old female, is eligible for lung cancer screening    History   Smoking Status     Current Every Day Smoker     Packs/day: 1.50     Years: 25.00     Types: Other, Cigarettes   Smokeless Tobacco     Never Used     Comment: started at age 19 and has quit for 10 years        I have discussed with patient the risks and benefits of screening for lung cancer with low-dose CT.     The risks include:    radiation exposure: one low dose chest CT has as much ionizing radiation as about 15 chest x-rays, or 6 months of background radiation living in [place]      false positives: most findings/nodules are NOT cancer, but some might still require additional diagnostic evaluation, including biopsy    over-diagnosis: some slow growing cancers that might never have been clinically significant will be detected and treated unnecessarily     The benefit of early detection of  "lung cancer is contingent upon adherence to annual screening or more frequent follow up if indicated.     Furthermore, to benefit from screening, Mary Lou must be willing and able to undergo diagnostic procedures, if indicated. Although no specific guide is available for determining severity of comorbidities, it is reasonable to withhold screening in patients who have greater mortality risk from other diseases.     We did discuss that the best way to prevent lung cancer is to not smoke.    Some patients may value a numeric estimation of lung cancer risk when evaluating if lung cancer screening is right for them, here is one calculator:    ShouldIScreen  Answers for HPI/ROS submitted by the patient on 8/30/2022  If you checked off any problems, how difficult have these problems made it for you to do your work, take care of things at home, or get along with other people?: Somewhat difficult  PHQ9 TOTAL SCORE: 15  In general, how would you rate your overall physical health?: poor  Frequency of exercise:: None  Do you usually eat at least 4 servings of fruit and vegetables a day, include whole grains & fiber, and avoid regularly eating high fat or \"junk\" foods? : No  Taking medications regularly:: Yes  Medication side effects:: None  Activities of Daily Living: transportation requires assistance, shopping requires assistance, housework requires assistance  Home safety: no safety concerns identified  Hearing Impairment:: no hearing concerns  In the past 6 months, have you been bothered by leaking of urine?: Yes  abdominal pain: No  Blood in stool: No  Blood in urine: No  chest pain: No  chills: No  congestion: No  constipation: No  cough: No  diarrhea: No  dizziness: Yes  ear pain: No  eye pain: No  nervous/anxious: Yes  fever: No  frequency: Yes  genital sores: No  headaches: Yes  hearing loss: No  heartburn: No  arthralgias: No  joint swelling: No  peripheral edema: No  mood changes: Yes  myalgias: Yes  nausea: " Yes  dysuria: No  palpitations: No  Skin sensation changes: No  sore throat: No  urgency: No  rash: No  shortness of breath: No  visual disturbance: No  weakness: Yes  pelvic pain: No  vaginal bleeding: No  vaginal discharge: No  tenderness: No  breast mass: No  breast discharge: No  In general, how would you rate your overall mental or emotional health?: fair  Additional concerns today:: Yes

## 2022-09-02 NOTE — PROGRESS NOTES
"SUBJECTIVE:   Mary Lou Gil is a 62 year old female who presents for Preventive Visit.      Patient has been advised of split billing requirements and indicates understanding: Yes  Are you in the first 12 months of your Medicare coverage?  No    Healthy Habits:     In general, how would you rate your overall health?  Poor    Frequency of exercise:  None    Do you usually eat at least 4 servings of fruit and vegetables a day, include whole grains    & fiber and avoid regularly eating high fat or \"junk\" foods?  No    Taking medications regularly:  Yes    Medication side effects:  None    Ability to successfully perform activities of daily living:  Transportation requires assistance, shopping requires assistance and housework requires assistance    Home Safety:  No safety concerns identified    Hearing Impairment:  No hearing concerns    In the past 6 months, have you been bothered by leaking of urine? Yes    In general, how would you rate your overall mental or emotional health?  Fair      PHQ-2 Total Score: 5    Additional concerns today:  Yes    Do you feel safe in your environment? Yes    Have you ever done Advance Care Planning? (For example, a Health Directive, POLST, or a discussion with a medical provider or your loved ones about your wishes): No, advance care planning information given to patient to review.  Patient declined advance care planning discussion at this time.       Fall risk     Fallen once but not hospitalized.    Cognitive Screening   1) Repeat 3 items (Leader, Season, Table)    2) Clock draw: NORMAL  3) 3 item recall: Recalls 3 objects  Results: NORMAL clock, 1-2 items recalled: COGNITIVE IMPAIRMENT LESS LIKELY    Mini-CogTM Copyright ADELINE Pal. Licensed by the author for use in [facility]; reprinted with permission ([email or web address]@.[email or web address]). All rights reserved.      Do you have sleep apnea, excessive snoring or daytime drowsiness?: no    Reviewed and updated as needed this visit by " clinical staff   Tobacco  Allergies  Meds  Problems  Med Hx  Surg Hx  Fam Hx  Soc   Hx          Reviewed and updated as needed this visit by Provider    Allergies  Meds  Problems    Fam Hx           Social History     Tobacco Use     Smoking status: Current Every Day Smoker     Packs/day: 1.50     Years: 25.00     Pack years: 37.50     Types: Other, Cigarettes     Smokeless tobacco: Never Used     Tobacco comment: started at age 19 and has quit for 10 years    Substance Use Topics     Alcohol use: No         Alcohol Use 8/30/2022   Prescreen: >3 drinks/day or >7 drinks/week? No       1.  Diabetes:  In regards specifically to the patient's diabetes, they reports no unusual symptoms.  Medication compliance: good  Diabetic diet compliance: good    Patient reports no significant episodes of hypo- or hyperglycemia    Diabetic complications: none     Most  recent labs:    Lab Results   Component Value Date    A1C 8.0 07/14/2022    A1C 9.2 01/28/2022    A1C 7.7 12/18/2020    A1C 11.2 05/27/2020    A1C 10.3 11/06/2019    A1C 11.0 08/06/2019    A1C 12.6 04/03/2019    A1C 8.9 06/26/2018    A1C 12.5 01/16/2018    A1C 12.4 09/01/2017    A1C 11.1 03/21/2017    A1C 11.1 11/10/2016    A1C 11.5 07/05/2016    A1C 7.2 08/31/2015    A1C 8.8 05/14/2015    A1C 9.0 03/02/2015    A1C 7.0 11/14/2013    A1C 7.9 10/07/2013    A1C 8.1 08/13/2013    A1C 9.0 06/18/2013    A1C 7.7 02/28/2013    A1C 6.5 04/17/2012    A1C 5.9 08/02/2011    A1C 5.7 11/23/2010    A1C 5.6 07/06/2010    A1C 5.7 02/01/2010          2.  /  Depression:  Has known history of depression.  Has been on medication for this.  The patient does not report any significant side effects of this medication.  The prior symptoms leading to the original diagnosis and decision to start medication therapy are better.     Appetite is stable.  Sleeping patterns are stable.    No reported thoughts of suicide or homicide.    PHQ 1/28/2022 4/25/2022 8/30/2022   PHQ-9 Total Score 17  18 15   Q9: Thoughts of better off dead/self-harm past 2 weeks Not at all Not at all Not at all            Current providers sharing in care for this patient include:   Patient Care Team:  Xavi Garcia MD as PCP - General (Internal Medicine)  Xavi Garcia MD as Assigned PCP  Nina Silverman RD as Diabetes Educator (Dietitian, Registered)  Susan Ramirez MD as Assigned Endocrinology Provider  Yeo, Albert, MD as Assigned Musculoskeletal Provider    The following health maintenance items are reviewed in Epic and correct as of today:  Health Maintenance Due   Topic Date Due     URINE DRUG SCREEN  Never done     HIV SCREENING  Never done     PAP  05/12/2017     LUNG CANCER SCREENING  08/26/2017     COLORECTAL CANCER SCREENING  12/11/2017     ASTHMA ACTION PLAN  03/14/2019     DIABETIC FOOT EXAM  04/03/2020     MAMMO SCREENING  11/08/2021     COVID-19 Vaccine (3 - Booster for Jovanni series) 05/28/2022     EYE EXAM  06/02/2022     INFLUENZA VACCINE (1) 09/01/2022       **I reviewed the information recorded in the patient's EPIC chart (including but not limited to medical history, surgical history, family history, problem list, medication list, and allergy list) and updated the information as indicated based on the patients reported information.           FHS-7: No flowsheet data found.  click delete button to remove this line now  Mammogram Screening: Recommended mammography every 1-2 years with patient discussion and risk factor consideration  Pertinent mammograms are reviewed under the imaging tab.    Review of Systems   Constitutional: Negative for chills and fever.   HENT: Negative for congestion, ear pain, hearing loss and sore throat.    Eyes: Negative for pain and visual disturbance.   Respiratory: Negative for cough and shortness of breath.    Cardiovascular: Negative for chest pain, palpitations and peripheral edema.   Gastrointestinal: Positive for nausea. Negative for abdominal  "pain, constipation, diarrhea, heartburn and hematochezia.   Breasts:  Negative for tenderness, breast mass and discharge.   Genitourinary: Positive for frequency. Negative for dysuria, genital sores, hematuria, pelvic pain, urgency, vaginal bleeding and vaginal discharge.   Musculoskeletal: Positive for myalgias. Negative for arthralgias and joint swelling.   Skin: Negative for rash.   Neurological: Positive for dizziness, weakness and headaches. Negative for paresthesias.   Psychiatric/Behavioral: Positive for mood changes. The patient is nervous/anxious.      Constitutional, HEENT, cardiovascular, pulmonary, gi and gu systems are negative, except as otherwise noted.    OBJECTIVE:   /81   Pulse 93   Temp 98.4  F (36.9  C)   Ht 1.651 m (5' 5\")   Wt 57.6 kg (127 lb)   SpO2 98%   BMI 21.13 kg/m   Estimated body mass index is 21.13 kg/m  as calculated from the following:    Height as of this encounter: 1.651 m (5' 5\").    Weight as of this encounter: 57.6 kg (127 lb).  Physical Exam  GENERAL alert and no distress  EYES:  Normal sclera,conjunctiva, EOMI  HENT: oral and posterior pharynx without lesions or erythema, facies symmetric  NECK: Neck supple. No LAD, without thyroidmegaly.  RESP: Clear to ausculation bilaterally without wheezes or crackles. Normal BS in all fields.  CV: RRR normal S1S2 without murmurs, rubs or gallops.  LYMPH: no cervical lymph adenopathy appreciated  MS: extremities- no gross deformities of the visible extremities noted,   EXT:  no lower extremity edema  PSYCH: Alert and oriented times 3; speech- coherent  SKIN:  No obvious significant skin lesions on visible portions of face     Diagnostic Test Results:  Labs reviewed in Epic    ASSESSMENT / PLAN:     (Z00.00) Encounter for Medicare annual wellness exam  (primary encounter diagnosis)  Comment: Discussed cardiac disease risk factors and cardiac disease risk factor modification, including diabetes screening, blood pressure screening " (and management if indicated), and cholesterol screening.   Reviewed immunzation guidelines, including pneumococcal vaccines, annual influenza, and shingles vaccines.   Discussed routine cancer screenings, including skin cancer, colon cancer screening for everyone until age 80, prostate cancer screening in men until age 75, mammogram and PAP/pelvic for women until age 75.   Recommended regular dentist visits to care for remaining teeth.   Recommended regular screening for vision and glaucoma.   Recommended safe driving and accident avoidance.   Plan: REVIEW OF HEALTH MAINTENANCE PROTOCOL ORDERS            (E11.42,  Z79.4) Type 2 diabetes mellitus with diabetic polyneuropathy, with long-term current use of insulin (H)  Comment: Predominantly managed by the endocrinology clinic.  Continue current medications, continue current low-carbohydrate diet.  Discussed importance of aggressive management of diabetes, including agg ressive low cabr diet (one of the most powerful ways to control type II DM), performing regular glucose monitoring, (either with standard glucometer, or preferably personal continuous glucose monitor), medication compliance, regular laboratory monitoring at least every 6 months (or possibly more often if diabetes not at goal), and attending regular follow up appointments as ordered.    Aggressive diabetes management of diabetes will greatly reduce the future risk of diabetes related complications such as CAD, CVA, PVD, and retinopathy/neuropathy/nephropathy.  Based on level of diabetes control: testing frequency 1-3 TIMEs PER DAY with standard glucometer, but prefer that she use the personal continuous glucometer for better and more consistent updates on her glucose level and to monitor the effect of medications and diet changes.    Plan: REVIEW OF HEALTH MAINTENANCE PROTOCOL ORDERS            (F33.0) Mild episode of recurrent major depressive disorder (H)  Comment: This condition is currently  controlled on the current medical regimen.  Continue current therapy.   Plan: REVIEW OF HEALTH MAINTENANCE PROTOCOL ORDERS            (H69.83) Dysfunction of both eustachian tubes  Comment: Discussed the pathophysiology of eustachian tube dysfunciton with the pt including a basic description of the anatomy.  Symptomatic therapy suggested: push fluids, use decongestant nasal spray up to 3 days, decongestant of choice or antihistamine-decongestant of choice as needed, saline nasal spray as needed, Robitussin DM prn.  RTC prn if symptoms persist or worsen. Call or return to clinic prn if these symptoms worsen or fail to improve as anticipated.   Plan: fluticasone (FLONASE) 50 MCG/ACT nasal spray            (Z12.31) Visit for screening mammogram  Comment:   Plan: REVIEW OF HEALTH MAINTENANCE PROTOCOL ORDERS,         MA SCREENING DIGITAL BILAT - Future  (s+30)            (E78.5) Hyperlipidemia LDL goal <100  Comment: Discussed guidelines recommending a statin cholesterol lowering medication for any patient with either diabetes and/or vascular disease, aiming for a LDL goal under 100 for sure, ideally under 70, using whatever dose of statin tolerated.    Plan: REVIEW OF HEALTH MAINTENANCE PROTOCOL ORDERS            (E89.0) Postoperative hypothyroidism  Comment: Recheck labs, titrate dose accordingly.  Plan: REVIEW OF HEALTH MAINTENANCE PROTOCOL ORDERS            (E55.9) Vitamin D deficiency  Comment:   Plan: REVIEW OF HEALTH MAINTENANCE PROTOCOL ORDERS,         cholecalciferol (VITAMIN D3) 125 mcg (5000         units) capsule            (E53.8) Vitamin B12 deficiency without anemia  Comment:   Plan: REVIEW OF HEALTH MAINTENANCE PROTOCOL ORDERS,         Cyanocobalamin (B-12) 1000 MCG SUBL            (Z87.891) Personal history of tobacco use  Comment: Recommended lung cancer screening with low-dose chest CT.  Recommend she discontinue any and all smoking to reduce risk of future smoking-related illnesses.  Plan: Prof fee:  "Shared Decision Making for Lung         Cancer Screening, CT Chest Lung Cancer Scrn Low        Dose wo            (Z79.899) Encounter for long-term (current) use of medications  Comment:   Plan: REVIEW OF HEALTH MAINTENANCE PROTOCOL ORDERS            (Z12.11) Screen for colon cancer  Comment: Last colonoscopy December 2012, due for another colonoscopy.  Order placed.  Plan: REVIEW OF HEALTH MAINTENANCE PROTOCOL ORDERS,         Colonoscopy Screening  Referral            (Z12.4) Cervical cancer screening  Comment: She prefers the defer this at this time.  Plan: REVIEW OF HEALTH MAINTENANCE PROTOCOL ORDERS            (Z11.4) Screening for HIV (human immunodeficiency virus)  Comment: One time screening test per CDC recommendations.   Plan: REVIEW OF HEALTH MAINTENANCE PROTOCOL ORDERS               Patient has been advised of split billing requirements and indicates understanding: Yes    COUNSELING:  Reviewed preventive health counseling, as reflected in patient instructions       Regular exercise       Healthy diet/nutrition       Vision screening       Hearing screening       Dental care       Bladder control       Fall risk prevention       Immunizations    up to date              Consider lung cancer screening for ages 55-80 years (77 for Medicare) and 20 pack-year smoking history         Colon cancer screening       Hepatitis C screening       HIV screening for high risk patient    Estimated body mass index is 21.13 kg/m  as calculated from the following:    Height as of this encounter: 1.651 m (5' 5\").    Weight as of this encounter: 57.6 kg (127 lb).        She reports that she has been smoking other and cigarettes. She has a 37.50 pack-year smoking history. She has never used smokeless tobacco.  Nicotine/Tobacco Cessation Plan:   Information offered: Patient not interested at this time      Appropriate preventive services were discussed with this patient, including applicable screening as appropriate for " cardiovascular disease, diabetes, osteopenia/osteoporosis, and glaucoma.  As appropriate for age/gender, discussed screening for colorectal cancer, prostate cancer, breast cancer, and cervical cancer. Checklist reviewing preventive services available has been given to the patient.    Reviewed patients plan of care and provided an AVS. The  fuad Barreto meets the Care Plan requirement. This Care Plan has been established and reviewed with the .    Counseling Resources:  ATP IV Guidelines  Pooled Cohorts Equation Calculator  Breast Cancer Risk Calculator  Breast Cancer: Medication to Reduce Risk  FRAX Risk Assessment  ICSI Preventive Guidelines  Dietary Guidelines for Americans, 2010  USDA's MyPlate  ASA Prophylaxis  Lung CA Screening    Xavi Garcia MD  [facility]    Identified Health Risks:

## 2022-09-02 NOTE — PROGRESS NOTES
"   SUBJECTIVE:   CC: Mary Lou Gil is an 62 year old woman who presents for preventive health visit.       Patient has been advised of split billing requirements and indicates understanding: Yes  Healthy Habits:     In general, how would you rate your overall health?  Poor    Frequency of exercise:  None    Do you usually eat at least 4 servings of fruit and vegetables a day, include whole grains    & fiber and avoid regularly eating high fat or \"junk\" foods?  No    Taking medications regularly:  Yes    Medication side effects:  None    Ability to successfully perform activities of daily living:  Transportation requires assistance, shopping requires assistance and housework requires assistance    Home Safety:  No safety concerns identified    Hearing Impairment:  No hearing concerns    In the past 6 months, have you been bothered by leaking of urine? Yes    In general, how would you rate your overall mental or emotional health?  Fair      PHQ-2 Total Score: 5    Additional concerns today:  Yes      "

## 2022-09-02 NOTE — PATIENT INSTRUCTIONS
"  Please get the annual influenza vaccine (\"flu shot\") when it is available.  You can get this from almost any pharmacy or our clinic location.  Check our web site or [employer] page for details about special \"flu shot clinics\" that we usually run in late September/October.      Get the updated Covid booster when it is available in the next few weeks.     [facility] Covid Scheduling number: [phone]   (to arrange Covid testing and/or Covid vaccine appointments)       Continue all medications at the same doses.  Contact your usual pharmacy if you need refills.     Get the repeat thyroid labs in late Sept/early October to recheck to make sure that the thyroid medication doses were appropriate.      Lung cancer screening with low dose Chest CT (you will be contacted to arrange this)     Mammogram at your convenience.      Follow up as planned with your Endocrinologist as planned in December.       5 GOALS IN MANAGING DIABETES (i.e. to give the best chance to prevent diabetic complications and vascular disease):     1.  Aggressive diabetic management.  The target for A1C (3 months average blood sugar) is ideally below 6.5, preferably below 7.5    Your diabetes is under good control.      Lab Results   Component Value Date    A1C 8.0 07/14/2022    A1C 9.2 01/28/2022    A1C 7.7 12/18/2020    A1C 11.2 05/27/2020    A1C 10.3 11/06/2019    A1C 11.0 08/06/2019    A1C 12.6 04/03/2019    A1C 8.9 06/26/2018    A1C 12.5 01/16/2018    A1C 12.4 09/01/2017    A1C 11.1 03/21/2017    A1C 11.1 11/10/2016    A1C 11.5 07/05/2016    A1C 7.2 08/31/2015    A1C 8.8 05/14/2015    A1C 9.0 03/02/2015    A1C 7.0 11/14/2013    A1C 7.9 10/07/2013    A1C 8.1 08/13/2013    A1C 9.0 06/18/2013    A1C 7.7 02/28/2013    A1C 6.5 04/17/2012    A1C 5.9 08/02/2011    A1C 5.7 11/23/2010    A1C 5.6 07/06/2010    A1C 5.7 02/01/2010       2.  Aggressive blood pressure control, under 130/80 ideally.  Using medications if needed.    Your blood pressure is " "under good control    BP Readings from Last 4 Encounters:   09/02/22 133/81   03/14/22 125/79   02/27/22 97/72   02/09/22 (!) 140/83       3.  Aggressive LDL cholesterol (bad cholesterol) lowering as needed.  Your goal is an LDL under 100 for sure, preferably under 70.     *  All patients with diabetes are recommended to be on a \"statin\" cholesterol lowering medication regardless of the cholesterol levels to give the best chance at avoiding vascular disease.      New guidelines identify four high-risk groups who could benefit from statins:   *people with pre-existing heart disease, such as those who have had a heart attack;   *people ages 40 to 75 who have diabetes of any type  *patients ages 40 to 75 with at least a 7.5% risk of developing cardiovascular disease over the next decade, according to a formula described in the guidelines  *patients with the sort of super-high cholesterol that sometimes runs in families, as evidenced by an LDL of 190 milligrams per deciliter or higher    *  Your cholesterol levels are well controlled.    Recent Labs   Lab Test 07/14/22  1133 11/06/19  1018 11/25/15  1134 08/31/15  1349 06/29/15  0845   CHOL 164 194   < > 243* 213*   HDL 53 40*   < > 49* 48*   LDL 73 110*   < > 161* 131*   TRIG 189* 219*   < > 164* 171*   CHOLHDLRATIO  --   --   --  5.0 4.4    < > = values in this interval not displayed.       4.  No smoking    5.  Consider daily preventative Aspirin once per day, every day over age 50 unless there is a specific reason that you cannot take aspirin.       *You should take Aspirin 81 mg once per day, unless you have a reason NOT to take aspirin (i.e. side effect, excessive bruising or bleeding, taking another \"blood tinning\" medication, etc.)       DIABETES REMINDERS:   1. Check your blood glucose regularly either with standard glucometer or personal continuous glucose monitor.    2) Your blood sugar goals:  before eating and  two hours after eating.  If using " personal continuous glucose monitor, the goal is Time in the Target range ( ) of 70-75% with very few (under 2%) Below target.    3) Always be prepared to treat low blood sugar symptoms should it happen. Keep a sugar-containing beverage or food nearby.  4) When to call your clinic:     Blood sugar over 400     If you have 2 to 3 low blood sugars (under 70) in a row    Low readings the same time of day several days in a row  5) When to call 911: If your low blood glucose symptoms do not get better with treatment, or if you/someone else is unable to give you treatment.  6) Work with a Certified Diabetes Educator to assist you with your diabetes management  7) Contact us if you have ANY questions about your medications or instructions, or have problems with getting prescriptions filled.         Preventive Health Recommendations  Female Ages 50 - 64    Yearly exam: See your health care provider every year in order to  Review health changes.   Discuss preventive care.    Review your medicines if your doctor has prescribed any.    Get a Pap test every three years (unless you have an abnormal result and your provider advises testing more often).  If you get Pap tests with HPV test, you only need to test every 5 years, unless you have an abnormal result.   You do not need a Pap test if your uterus was removed (hysterectomy) and you have not had cancer.  You should be tested each year for STDs (sexually transmitted diseases) if you're at risk.   Have a mammogram every 1 to 2 years.  Have a colonoscopy at age 50, or have a yearly FIT test (stool test). These exams screen for colon cancer.    Have a cholesterol test every 5 years, or more often if advised.  Have a diabetes test (fasting glucose) every three years. If you are at risk for diabetes, you should have this test more often.   If you are at risk for osteoporosis (brittle bone disease), think about having a bone density scan (DEXA).    Shots: Get a flu shot each  "year. Get a tetanus shot every 10 years.    Nutrition:   Eat at least 5 servings of fruits and vegetables each day.  Eat whole-grain bread, whole-wheat pasta and brown rice instead of white grains and rice.  Talk to your provider about Calcium and Vitamin D.    --Calcium: aim for 1200 mg per day (any brand is fine)   --Vitamin D3 at least 1000 units per day (any brand is fine)       --Good Grains:  Oats, brown rice, Quinoa (these do not raise the blood sugar as much)     --Bad grains:  Anything made from wheat or white rice     (because these raise the blood sugars significantly, and the possible gluten issue from wheat for some people).      --Proteins:  Aim for \"lean proteins\" including chicken, fish, seafood, pork, turkey, and eggs (in moderation); Eat red meat only occasionally        Lifestyle  Exercise at least 150 minutes a week (30 minutes a day, 5 days a week). This will help you control your weight and prevent disease.  Limit alcohol to one drink per day.  No smoking.   Wear sunscreen to prevent skin cancer.   See your dentist every six months for an exam and cleaning.  See your eye doctor every 1 to 2 years.        Patient Education   Personalized Prevention Plan  You are due for the preventive services outlined below.  Your care team is available to assist you in scheduling these services.  If you have already completed any of these items, please share that information with your care team to update in your medical record.  Health Maintenance Due   Topic Date Due    URINE DRUG SCREEN  Never done    ANNUAL REVIEW OF HM ORDERS  Never done    HIV Screening  Never done    PAP Smear  05/12/2017    LUNG CANCER SCREENING  08/26/2017    Colorectal Cancer Screening  12/11/2017    Asthma Action Plan - yearly  03/14/2019    Diabetic Foot Exam  04/03/2020    Mammogram  11/08/2021    COVID-19 Vaccine (3 - Booster for Jovanni series) 05/28/2022    Eye Exam  06/02/2022    Flu Vaccine (1) 09/01/2022     Your Health Risk " Assessment indicates you feel you are not in good health    A healthy lifestyle helps keep the body fit and the mind alert. It helps protect you from disease, helps you fight disease, and helps prevent chronic disease (disease that doesn't go away) from getting worse. This is important as you get older and begin to notice twinges in muscles and joints and a decline in the strength and stamina you once took for granted. A healthy lifestyle includes good healthcare, good nutrition, weight control, recreation, and regular exercise. Avoid harmful substances and do what you can to keep safe. Another part of a healthy lifestyle is stay mentally active and socially involved.    Good healthcare   Have a wellness visit every year.   If you have new symptoms, let us know right away. Don't wait until the next checkup.   Take medicines exactly as prescribed and keep your medicines in a safe place. Tell us if your medicine causes problems.   Healthy diet and weight control   Eat 3 or 4 small, nutritious, low-fat, high-fiber meals a day. Include a variety of fruits, vegetables, and whole-grain foods.   Make sure you get enough calcium in your diet. Calcium, vitamin D, and exercise help prevent osteoporosis (bone thinning).   If you live alone, try eating with others when you can. That way you get a good meal and have company while you eat it.   Try to keep a healthy weight. If you eat more calories than your body uses for energy, it will be stored as fat and you will gain weight.     Recreation   Recreation is not limited to sports and team events. It includes any activity that provides relaxation, interest, enjoyment, and exercise. Recreation provides an outlet for physical, mental, and social energy. It can give a sense of worth and achievement. It can help you stay healthy.    Mental Exercise and Social Involvement  Mental and emotional health is as important as physical health. Keep in touch with friends and family. Stay as  active as possible. Continue to learn and challenge yourself.   Things you can do to stay mentally active are:  Learn something new, like a foreign language or musical instrument.   Play SCRABBLE or do crossword puzzles. If you cannot find people to play these games with you at home, you can play them with others on your computer through the Internet.   Join a games club--anything from card games to chess or checkers or lawn bowling.   Start a new hobby.   Go back to school.   Volunteer.   Read.   Keep up with world events.    Exercise for a Healthier Heart  You may wonder how you can improve the health of your heart. If you re thinking about exercise, you re on the right track. You don t need to become an athlete. But you do need a certain amount of brisk exercise to help strengthen your heart. If you have been diagnosed with a heart condition, your healthcare provider may advise exercise to help stabilize your condition. To help make exercise a habit, choose safe, fun activities.      Exercise with a friend. When activity is fun, you're more likely to stick with it.   Before you start  Check with your healthcare provider before starting an exercise program. This is especially important if you have not been active for a while. It's also important if you have a long-term (chronic) health problem such as heart disease, diabetes, or obesity. Or if you are at high risk for having these problems.   Why exercise?  Exercising regularly offers many healthy rewards. It can help you do all of the following:   Improve your blood cholesterol level to help prevent further heart trouble  Lower your blood pressure to help prevent a stroke or heart attack  Control diabetes, or reduce your risk of getting this disease  Improve your heart and lung function  Reach and stay at a healthy weight  Make your muscles stronger so you can stay active  Prevent falls and fractures by slowing the loss of bone mass (osteoporosis)  Manage stress  better  Reduce your blood pressure  Improve your sense of self and your body image  Exercise tips    Ease into your routine. Set small goals. Then build on them. If you are not sure what your activity level should be, talk with your healthcare provider first before starting an exercise routine.  Exercise on most days. Aim for a total of 150 minutes (2 hours and 30 minutes) or more of moderate-intensity aerobic activity each week. Or 75 minutes (1 hour and 15 minutes) or more of vigorous-intensity aerobic activity each week. Or try for a combination of both. Moderate activity means that you breathe heavier and your heart rate increases but you can still talk. Think about doing 40 minutes of moderate exercise, 3 to 4 times a week. For best results, activity should last for about 40 minutes to lower blood pressure and cholesterol. It's OK to work up to the 40-minute period over time. Examples of moderate-intensity activity are walking 1 mile in 15 minutes. Or doing 30 to 45 minutes of yard work.  Step up your daily activity level.  Along with your exercise program, try being more active the whole day. Walk instead of drive. Or park further away so that you take more steps each day. Do more household tasks or yard work. You may not be able to meet the advised mount of physical activity. But doing some moderate- or vigorous-intensity aerobic activity can help reduce your risk for heart disease. Your healthcare provider can help you figure out what is best for you.  Choose 1 or more activities you enjoy.  Walking is one of the easiest things you can do. You can also try swimming, riding a bike, dancing, or taking an exercise class.    When to call your healthcare provider  Call your healthcare provider if you have any of these:   Chest pain or feel dizzy or lightheaded  Burning, tightness, pressure, or heaviness in your chest, neck, shoulders, back, or arms  Abnormal shortness of breath  More joint or muscle pain  A very  fast or irregular heartbeat (palpitations)  [employer] last reviewed this educational content on 7/1/2019 2000-2021 [employer]. All rights reserved. This information is not intended as a substitute for professional medical care. Always follow your healthcare professional's instructions.          Understanding USDA MyPlate  The USDA has guidelines to help you make healthy food choices. These are called MyPlate. MyPlate shows the food groups that make up healthy meals using the image of a place setting. Before you eat, think about the healthiest choices for what to put on your plate or in your cup or bowl. To learn more about building a healthy plate, visit www.choosemyplate.gov.    The food groups  Fruits. Any fruit or 100% fruit juice counts as part of the Fruit Group. Fruits may be fresh, canned, frozen, or dried, and may be whole, cut-up, or pureed. Make 1/2 of your plate fruits and vegetables.  Vegetables. Any vegetable or 100% vegetable juice counts as a member of the Vegetable Group. Vegetables may be fresh, frozen, canned, or dried. They can be served raw or cooked and may be whole, cut-up, or mashed. Make 1/2 of your plate fruits and vegetables.  Grains. All foods made from grains are part of the Grains Group. These include wheat, rice, oats, cornmeal, and barley. Grains are often used to make foods such as bread, pasta, oatmeal, cereal, tortillas, and grits. Grains should be no more than 1/4 of your plate. At least half of your grains should be whole grains.  Protein. This group includes meat, poultry, seafood, beans and peas, eggs, processed soy products (such as tofu), nuts (including nut butters), and seeds. Make protein choices no more than 1/4 of your plate. Meat and poultry choices should be lean or low fat.  Dairy. The Dairy Group includes all fluid milk products and foods made from milk that contain calcium, such as yogurt and cheese. (Foods that have little calcium, such as cream,  butter, and cream cheese, are not part of this group.) Most dairy choices should be low-fat or fat-free.  Oils. Oils aren't a food group, but they do contain essential nutrients. However it's important to watch your intake of oils. These are fats that are liquid at room temperature. They include canola, corn, olive, soybean, vegetable, and sunflower oil. Foods that are mainly oil include mayonnaise, certain salad dressings, and soft margarines. You likely already get your daily oil allowance from the foods you eat.  Things to limit  Eating healthy also means limiting these things in your diet:     Salt (sodium). Many processed foods have a lot of sodium. To keep sodium intake down, eat fresh vegetables, meats, poultry, and seafood when possible. Purchase low-sodium, reduced-sodium, or no-salt-added food products at the store. And don't add salt to your meals at home. Instead, season them with herbs and spices such as dill, oregano, cumin, and paprika. Or try adding flavor with lemon or lime zest and juice.  Saturated fat. Saturated fats are most often found in animal products such as beef, pork, and chicken. They are often solid at room temperature, such as butter. To reduce your saturated fat intake, choose leaner cuts of meat and poultry. And try healthier cooking methods such as grilling, broiling, roasting, or baking. For a simple lower-fat swap, use plain nonfat yogurt instead of mayonnaise when making potato salad or macaroni salad.  Added sugars. These are sugars added to foods. They are in foods such as ice cream, candy, soda, fruit drinks, sports drinks, energy drinks, cookies, pastries, jams, and syrups. Cut down on added sugars by sharing sweet treats with a family member or friend. You can also choose fruit for dessert, and drink water or other unsweetened beverages.     [employer] last reviewed this educational content on 6/1/2020 2000-2021 [employer]. All rights reserved. This  information is not intended as a substitute for professional medical care. Always follow your healthcare professional's instructions.        Activities of Daily Living    Your Health Risk Assessment indicates you have difficulties with activities of daily living such as housework, bathing, preparing meals, taking medication, etc. Please make a follow up appointment for us to address this issue in more detail.    Urinary Incontinence, Female (Adult)   Urinary incontinence means loss of bladder control. This problem affects many women, especially as they get older. If you have incontinence, you may be embarrassed to ask for help. But know that this problem can be treated.   Types of Incontinence  There are different types of incontinence. Two of the main types are described here. You can have more than one type.   Stress incontinence. With this type, urine leaks when pressure (stress) is put on the bladder. This may happen when you cough, sneeze, or laugh. Stress incontinence most often occurs because the pelvic floor muscles that support the bladder and urethra are weak. This can happen after pregnancy and vaginal childbirth or a hysterectomy. It can also be due to excess body weight or hormone changes.  Urge incontinence (also called overactive bladder). With this type, a sudden urge to urinate is felt often. This may happen even though there may not be much urine in the bladder. The need to urinate often during the night is common. Urge incontinence most often occurs because of bladder spasms. This may be due to bladder irritation or infection. Damage to bladder nerves or pelvic muscles, constipation, and certain medicines can also lead to urge incontinence.  Treatment depends on the cause. Further evaluation is needed to find the type you have. This will likely include an exam and certain tests. Based on the results, you and your healthcare provider can then plan treatment. Until a diagnosis is made, the home care  tips below can help ease symptoms.   Home care  Do pelvic floor muscle exercises, if they are prescribed. The pelvic floor muscles help support the bladder and urethra. Many women find that their symptoms improve when doing special exercises that strengthen these muscles. To do the exercises, contract the muscles you would use to stop your stream of urine. But do this when you re not urinating. Hold for 10 seconds, then relax. Repeat 10 to 20 times in a row, at least 3 times a day. Your healthcare provider may give you other instructions for how to do the exercises and how often.  Keep a bladder diary. This helps track how often and how much you urinate over a set period of time. Bring this diary with you to your next visit with the provider. The information can help your provider learn more about your bladder problem.  Lose weight, if advised to by your provider. Extra weight puts pressure on the bladder. Your provider can help you create a weight-loss plan that s right for you. This may include exercising more and making certain diet changes.  Don't have foods and drinks that may irritate the bladder. These can include alcohol and caffeinated drinks.  Quit smoking. Smoking and other tobacco use can lead to a long-term (chronic) cough that strains the pelvic floor muscles. Smoking may also damage the bladder and urethra. Talk with your provider about treatments or methods you can use to quit smoking.  If drinking large amounts of fluid makes you have symptoms, you may be advised to limit your fluid intake. You may also be advised to drink most of your fluids during the day and to limit fluids at night.  If you re worried about urine leakage or accidents, you may wear absorbent pads to catch urine. Change the pads often. This helps reduce discomfort. It may also reduce the risk of skin or bladder infections.    Follow-up care  Follow up with your healthcare provider, or as directed. It may take some to find the  right treatment for your problem. But healthy lifestyle changes can be made right away. These include such things as exercising on a regular basis, eating a healthy diet, losing weight (if needed), and quitting smoking. Your treatment plan may include special therapies or medicines. Certain procedures or surgery may also be options. Talk about any questions you have with your provider.   When to seek medical advice  Call the healthcare provider right away if any of these occur:  Fever of 100.4 F (38 C) or higher, or as directed by your provider  Bladder pain or fullness  Belly swelling  Nausea or vomiting  Back pain  Weakness, dizziness, or fainting  [employer] last reviewed this educational content on 1/1/2020 2000-2021 [employer]. All rights reserved. This information is not intended as a substitute for professional medical care. Always follow your healthcare professional's instructions.        Your Health Risk Assessment indicates you feel you are not in good emotional health.    Recreation   Recreation is not limited to sports and team events. It includes any activity that provides relaxation, interest, enjoyment, and exercise. Recreation provides an outlet for physical, mental, and social energy. It can give a sense of worth and achievement. It can help you stay healthy.    Mental Exercise and Social Involvement  Mental and emotional health is as important as physical health. Keep in touch with friends and family. Stay as active as possible. Continue to learn and challenge yourself.   Things you can do to stay mentally active are:  Learn something new, like a foreign language or musical instrument.   Play SCRABBLE or do crossword puzzles. If you cannot find people to play these games with you at home, you can play them with others on your computer through the Internet.   Join a games club--anything from card games to chess or checkers or lawn bowling.   Start a new hobby.   Go back to school.  "  Volunteer.   Read.   Keep up with world events.    Depression and Suicide in Older Adults    Nearly 2 million older Americans have some type of depression. Some of them even take their own lives. Yet depression among older adults is often ignored. Learn the warning signs. You may help spare a loved one needless pain. You may also save a life.   What is depression?  Depression is a common and serious illness that affects the way you think and feel. It is not a normal part of aging, nor is it a sign of weakness, a character flaw, or something you can snap out of. Most people with depression need treatment to get better. The most common symptom is a feeling of deep sadness. People who are depressed also may seem tired and listless. And nothing seems to give them pleasure. It s normal to grieve or be sad sometimes. But sadness lessens or passes with time. Depression rarely goes away or improves on its own. A person with clinical depression can't \"snap out of it.\" Other symptoms of depression are:   Sleeping more or less than normal  Eating more or less than normal  Having headaches, stomachaches, or other pains that don t go away  Feeling nervous,  empty,  or worthless  Crying a great deal  Thinking or talking about suicide or death  Loss of interest in activities previously enjoyed  Social isolation  Feeling confused or forgetful  What causes it?  The causes of depression aren t fully known. But it is thought to result from a complex blend of these factors:   Biochemistry. Certain chemicals in the brain play a role.  Genes. Depression does run in families.  Life stress. Life stresses can also trigger depression in some people. Older adults often face many stressors, such as death of friends or a spouse, health problems, and financial concerns.  Chronic conditions. This includes conditions such as diabetes, heart disease, or cancer. These can cause symptoms of depression. Medicine side effects can cause changes in " thoughts and behaviors.  How you can help  Often, depressed people may not want to ask for help. When they do, they may be ignored. Or, they may receive the wrong treatment. You can help by showing parents and older friends love and support. If they seem depressed, don t lecture the person, ignore the symptoms, or discount the symptoms as a  normal  part of aging -which they are not. Get involved, listen, and show interest and support.   Help them understand that depression is a treatable illness. Tell them you can help them find the right treatment. Offer to go to their healthcare provider's appointment with them for support when the symptoms are discussed. With their approval, contact a local mental health center, social service agency, or hospital about services.   You can be an advocate for him or her at healthcare appointments. Many older adults have chronic illnesses that can cause symptoms of depression. Medicine side effects can change thoughts and behaviors. You can help make sure that the healthcare provider looks at all of these factors. He or she should refer your family member or friend to a mental healthcare provider when needed. in some cases, untreated depression can lead to a misdiagnosis. A person may be diagnosed with a brain disorder such as dementia. If the healthcare provider does not take the issue of depression seriously, help your family member or friend to find another provider.   Don't be afraid to ask  If you think an older person you care about could be suicidal, ask,  Have you thought about suicide?  Most people will tell you the truth. If they say  yes,  they may already have a plan for how and when they will attempt it. Find out as much as you can. The more detailed the plan, and the easier it is to carry out, the more danger the person is in right now. Tell the person you are there for them and do not want them to harm him or herself. Don't wait to get help for the person. Call the  person's healthcare provider, local hospital, or emergency services.   To learn more  National Suicide Prevention Lifeline (crisis hotline) 728-561-GYWM (050-528-9246)  National [place] of Mental Mhglhi274-169-1933zyb.[facility].nih.gov  National [place] on Mental Tdhzaiu207-514-6743qdt.[facility].org  Mental Health Vgzlkqw225-158-6219vit.[facility].org  National Suicide Zcofall245-PNVVMEV (465-423-2768)    Call 911  Never leave the person alone. A person who is actively suicidal needs psychiatric care right away. They will need constant supervision. Never leave the person out of sight. Call 911 or the national 24-hour suicide crisis hotline at 684-509-AIZK (521-397-2512). You can also take the person to the closest emergency room.   [employer] last reviewed this educational content on 5/1/2020 2000-2021 [employer]. All rights reserved. This information is not intended as a substitute for professional medical care. Always follow your healthcare professional's instructions.           Lung Cancer Screening   Frequently Asked Questions  If you are at high-risk for lung cancer, getting screened with low-dose computed tomography (LDCT) every year can help save your life. This handout offers answers to some of the most common questions about lung cancer screening. If you have other questions, please call 8-295-6-PCancer (1-681.193.4125).     What is it?  Lung cancer screening uses special X-ray technology to create an image of your lung tissue. The exam is quick and easy and takes less than 10 seconds. We don t give you any medicine or use any needles. You can eat before and after the exam. You don t need to change your clothes as long as the clothing on your chest doesn t contain metal. But, you do need to be able to hold your breath for at least 6 seconds during the exam.    What is the goal of lung cancer screening?  The goal of lung cancer screening is to save lives. Many times, lung cancer is not found until a  person starts having physical symptoms. Lung cancer screening can help detect lung cancer in the earliest stages when it may be easier to treat.    Who should be screened for lung cancer?  We suggest lung cancer screening for anyone who is at high-risk for lung cancer. You are in the high-risk group if you:     are between the ages of 55 and 79, and   have smoked at least 1 pack of cigarettes a day for 20 or more years, and   still smoke or have quit within the past 15 years.    However, if you have a new cough or shortness of breath, you should talk to your doctor before being screened.    Why does it matter if I have symptoms?  Certain symptoms can be a sign that you have a condition in your lungs that should be checked and treated by your doctor. These symptoms include fever, chest pain, a new or changing cough, shortness of breath that you have never felt before, coughing up blood or unexplained weight loss. Having any of these symptoms can greatly affect the results of lung cancer screening.       Should all smokers get an LDCT lung cancer screening exam?  It depends. Lung cancer screening is for a very specific group of men and women who have a history of heavy smoking over a long period of time (see  Who should be screened for lung cancer  above).  I am in the high-risk group, but have been diagnosed with cancer in the past. Is LDCT lung cancer screening right for me?  In some cases, you should not have LDCT lung screening, such as when your doctor is already following your cancer with CT scan studies. Your doctor will help you decide if LDCT lung screening is right for you.  Do I need to have a screening exam every year?  Yes. If you are in the high-risk group described earlier, you should get an LDCT lung cancer screening exam every year until you are 79, or are no longer willing or able to undergo screening and possible procedures to diagnose and treat lung cancer.  How effective is LDCT at preventing  death from lung cancer?  Studies have shown that LDCT lung cancer screening can lower the risk of death from lung cancer by 20 percent in people who are at high-risk.  What are the risks?  There are some risks and limitations of LDCT lung cancer screening. We want to make sure you understand the risks and benefits, so please let us know if you have any questions. Your doctor may want to talk with you more about these risks.   Radiation exposure: As with any exam that uses radiation, there is a very small increased risk of cancer. The amount of radiation in LDCT is small--about the same amount a person would get from a mammogram. Your doctor orders the exam when he or she feels the potential benefits outweigh the risks.   False negatives: No test is perfect, including LDCT. It is possible that you may have a medical condition, including lung cancer, that is not found during your exam. This is called a false negative result.   False positives and more testing: LDCT very often finds something in the lung that could be cancer, but in fact is not. This is called a false positive result. False positive tests often cause anxiety. To make sure these findings are not cancer, you may need to have more tests. These tests will be done only if you give us permission. Sometimes patients need a treatment that can have side effects, such as a biopsy. For more information on false positives, see  What can I expect from the results?    Findings not related to lung cancer: Your LDCT exam also takes pictures of areas of your body next to your lungs. In a very small number of cases, the CT scan will show an abnormal finding in one of these areas, such as your kidneys, adrenal glands, liver or thyroid. This finding may not be serious, but you may need more tests. Your doctor can help you decide what other tests you may need, if any.  What can I expect from the results?  About 1 out of 4 LDCT exams will find something that may need more  tests. Most of the time, these findings are lung nodules. Lung nodules are very small collections of tissue in the lung. These nodules are very common, and the vast majority--more than 97 percent--are not cancer (benign). Most are normal lymph nodes or small areas of scarring from past infections.  But, if a small lung nodule is found to be cancer, the cancer can be cured more than 90 percent of the time. To know if the nodule is cancer, we may need to get more images before your next yearly screening exam. If the nodule has suspicious features (for example, it is large, has an odd shape or grows over time), we will refer you to a specialist for further testing.  Will my doctor also get the results?  Yes. Your doctor will get a copy of your results.  Is it okay to keep smoking now that there s a cancer screening exam?  No. Tobacco is one of the strongest cancer-causing agents. It causes not only lung cancer, but other cancers and cardiovascular (heart) diseases as well. The damage caused by smoking builds over time. This means that the longer you smoke, the higher your risk of disease. While it is never too late to quit, the sooner you quit, the better.  Where can I find help to quit smoking?  The best way to prevent lung cancer is to stop smoking. If you have already quit smoking, congratulations and keep it up! For help on quitting smoking, please call [employer] at 3-646-QUITNOW (1-531.229.3791) or the American [employer] Society at 1-441.685.7495 to find local resources near you.  One-on-one health coaching:  If you d prefer to work individually with a health care provider on tobacco cessation, we offer:     Medication Therapy Management:  Our specially trained pharmacists work closely with you and your doctor to help you quit smoking.  Call [phone] or [phone] (toll free).

## 2022-10-04 DIAGNOSIS — E11.42 TYPE 2 DIABETES MELLITUS WITH DIABETIC POLYNEUROPATHY, WITH LONG-TERM CURRENT USE OF INSULIN (H): ICD-10-CM

## 2022-10-04 DIAGNOSIS — Z79.4 TYPE 2 DIABETES MELLITUS WITH DIABETIC POLYNEUROPATHY, WITH LONG-TERM CURRENT USE OF INSULIN (H): ICD-10-CM

## 2022-10-04 RX ORDER — METFORMIN HCL 500 MG
TABLET, EXTENDED RELEASE 24 HR ORAL
Qty: 180 TABLET | Refills: 0 | Status: SHIPPED | OUTPATIENT
Start: 2022-10-04 | End: 2023-03-27

## 2022-10-12 ENCOUNTER — TELEPHONE (OUTPATIENT)
Dept: ENDOCRINOLOGY | Facility: CLINIC | Age: 62
End: 2022-10-12

## 2022-10-12 NOTE — TELEPHONE ENCOUNTER
[facility] Call Center    Phone Message    May a detailed message be left on voicemail: yes     Reason for Call: Other: patient calling in.  She was alerted by her pharmacy that her medicare will not renew her Jen prescription unless she has a documented appointment by 10/25/2022 with Dr. Ramirez.  Please reach out to patient to discuss.     Action Taken: Other: Endo    Travel Screening: Not Applicable

## 2022-10-14 ENCOUNTER — VIRTUAL VISIT (OUTPATIENT)
Dept: ENDOCRINOLOGY | Facility: CLINIC | Age: 62
End: 2022-10-14
Payer: MEDICARE

## 2022-10-14 ENCOUNTER — TELEPHONE (OUTPATIENT)
Dept: ENDOCRINOLOGY | Facility: CLINIC | Age: 62
End: 2022-10-14

## 2022-10-14 DIAGNOSIS — E03.9 HYPOTHYROIDISM, UNSPECIFIED TYPE: ICD-10-CM

## 2022-10-14 DIAGNOSIS — E11.9 INSULIN-REQUIRING OR DEPENDENT TYPE II DIABETES MELLITUS (H): ICD-10-CM

## 2022-10-14 DIAGNOSIS — E78.5 HYPERLIPIDEMIA WITH TARGET LDL LESS THAN 100: ICD-10-CM

## 2022-10-14 DIAGNOSIS — Z79.4 INSULIN-REQUIRING OR DEPENDENT TYPE II DIABETES MELLITUS (H): ICD-10-CM

## 2022-10-14 DIAGNOSIS — Z79.4 TYPE 2 DIABETES MELLITUS WITH DIABETIC POLYNEUROPATHY, WITH LONG-TERM CURRENT USE OF INSULIN (H): Primary | ICD-10-CM

## 2022-10-14 DIAGNOSIS — E11.42 TYPE 2 DIABETES MELLITUS WITH DIABETIC POLYNEUROPATHY, WITH LONG-TERM CURRENT USE OF INSULIN (H): Primary | ICD-10-CM

## 2022-10-14 PROCEDURE — 99214 OFFICE O/P EST MOD 30 MIN: CPT | Mod: 95 | Performed by: INTERNAL MEDICINE

## 2022-10-14 RX ORDER — GLIPIZIDE 5 MG/1
5 TABLET, FILM COATED, EXTENDED RELEASE ORAL DAILY
Qty: 90 TABLET | Refills: 0 | Status: SHIPPED | OUTPATIENT
Start: 2022-10-14 | End: 2023-08-01
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
## 2022-10-14 NOTE — TELEPHONE ENCOUNTER
Labs in 5-6 weeks.  Follow up with CDE in 6 weeks for BG review. (5-6 weeks)     Follow up with endocrinology in 3-6 months.

## 2022-10-14 NOTE — PATIENT INSTRUCTIONS
[facility]  Dr Ramirez, Endocrinology Department    [facility]   [address] [facility]  [place] [address]  Appointment [phone]  Fax: [phone]  [place]: Monday - Thursday      To provide the best diabetic care, please bring your blood glucose meter to each and every visit with your Endocrinologist.  Your blood glucose meter/insulin pump will be downloaded at every appointment.    Please arrive 15 minutes before your scheduled appointment.  This will allow for your blood glucose meter/insulin pump to be downloaded.  If you are wearing DEXCOM please bring  or sharing code so that it can be downloaded.  If you are using freestyle genet personal sensors please bring the reader.  If you are using TANDEM insulin pump please have your username and password to get info from [employer] website.    Continue Metformin XR 1000 mg at night.  Restart Glipizide Xl 5 mg/day (only take if having food/breakfast)  Continue Trulicity 3.0 mg/week   Continue Levemire 25 units AM only(based on BG- follow your current scale)  Continue to use genet-scan often.    Labs in 5-6 weeks.  Follow up with CDE in 6 weeks for BG review. (5-6 weeks)    Follow up with endocrinology in 3-6 months.    Your provider has referred you to Diabetes Education: For all [facility]:  Phone [phone]; Fax [phone]  Please call and make the appointment.    For Thyroid: Recommend labs.  Dose adjustment based on that.    Recommend checking blood sugars before meals and at bedtime.    If Blood glucose are low more often-> 2-3 times/week- give us a call.  Make better food choices: reduce carbs, Reduce portion size, weight loss and exercise 3-4 times a week.    What is hypoglycemia:  Hypoglycemia is when blood sugar levels become too low - below 70 m/dl.      What causes hypoglycemia?  - using too much insulin  -taking too many diabetes pills  -not eating enough, or skipping meals or  snacks  -not eating enough carbohydrate with meals  -changing your exercise routine  -drinking alcohol in excess    It is also possible to have hypoglycemia even when you are carefully managing your blood sugar levels.    What does it feel like when blood sugars get too low?  You may feel:  - anxious  -confused  -dizzy  -hungry  -light-headed  -nervous  -shaky  -sleepy  -sweaty    You may have  -blurred or cloudy vision  -heart palpitations (heart skips a beat or races)  -tingling or numbness around the mouth and tongue  -tremors    What to do if you have symptoms of hypoglycmemia:  If you think your blood sugar is too low, check it with a glucose meter.  If its below 70 mg/dl, consume one of the following:  Fruit juice (1/2 cup)  Glucose tablets (15 grams)  Hard candy (5 to 7 pieces)  Honey or sugar (2 teaspoons)  Milk (1/2 cup)  Soft drink (non-diet, 1/2 cup)    Wait 15 minutes and check your blood glucose again.  IF it is still below 70 mg/dl, have another food item listed above. Wait another 15 minutes and repeat the blood glucose test.  Have a small meal or snack that contains some carbohydrate after your blood glucose rises above 70 mg/dl.    If you are at risk of hypoglycemia, always carry with you glucose tablets or one of the foods listed above.      To prevent Hypoglycemia:  Avoid situations that may cause hypoglycemia  Before making any change to your diet or exercise routine, discuss them with your healthcare provider  Keep a record of your blood glucose levels.  Include the time of day, diabetes medications, when you had your last meal or snack, and what you were doing at the time (e.g. Watching TV, gardening, jogging, etc).    Talk to your healthcare provider if your blood glucose levels are often low        Patient guide on hypoglycemia    [email or web address]
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
## 2022-10-17 DIAGNOSIS — E11.42 TYPE 2 DIABETES MELLITUS WITH DIABETIC POLYNEUROPATHY, WITH LONG-TERM CURRENT USE OF INSULIN (H): ICD-10-CM

## 2022-10-17 DIAGNOSIS — Z79.4 TYPE 2 DIABETES MELLITUS WITH DIABETIC POLYNEUROPATHY, WITH LONG-TERM CURRENT USE OF INSULIN (H): ICD-10-CM

## 2022-10-17 NOTE — TELEPHONE ENCOUNTER
PRESCRIPTIONS MUST BE WRITTEN THIS WAY TO MAKE THEM MEDICARE COMPLIANT.    Freestyle Jen 14 Day Sensors    SIG:  Change q 14 days  QTY:  2  Refills:  5    -Prescription must be written on or after the clinical note date and will only be able to be used for 6 months from the date of the clinical notes. (We will be requesting new clinical notes and prescriptions every 6 months to meet Medicare Guidelines.)    All Documents (including clinical notes) MUST be signed by the same doctor.    Please contact us at [phone] (this number is for clinics only) with any questions. Patients may call us at [phone].      Thank you,  [facility]

## 2022-10-18 NOTE — TELEPHONE ENCOUNTER
Spoke to patient, she is going to hold off on scheduling at this time. She may be moving to [place]. Pt will call back and update if and when she moves.

## 2022-10-21 DIAGNOSIS — E11.42 TYPE 2 DIABETES MELLITUS WITH DIABETIC POLYNEUROPATHY, WITH LONG-TERM CURRENT USE OF INSULIN (H): ICD-10-CM

## 2022-10-21 DIAGNOSIS — Z79.4 TYPE 2 DIABETES MELLITUS WITH DIABETIC POLYNEUROPATHY, WITH LONG-TERM CURRENT USE OF INSULIN (H): ICD-10-CM

## 2022-10-21 RX ORDER — FLASH GLUCOSE SENSOR
KIT MISCELLANEOUS
Qty: 2 EACH | Refills: 3 | Status: SHIPPED | OUTPATIENT
Start: 2022-10-21 | End: 2023-02-10

## 2022-10-21 NOTE — TELEPHONE ENCOUNTER
PRESCRIPTIONS MUST BE WRITTEN THIS WAY TO MAKE THEM MEDICARE COMPLIANT:    FREESTYLE NICK 14 DAY SENSORS  SIG: Change every 14 days  QTY: 2 REFILLS: 5    -Prescriptions must be written after the clinical note date and will only be able to be used for 6 months from the date of the clinial notes. (We will be requesteding new clinical notes and prescriptions every 6 months to meet Medicare Guidelines.    ALL DOCUMENTS (INCLUDING CLINICAL NOTES) MUST BE SIGNED BY THE SAME DOCTOR    Please contact us at [phone] (this number is for clinics only) with any questions. Patients may call us at [phone].    Sincerely,   [facility]

## 2022-11-10 DIAGNOSIS — F33.0 MAJOR DEPRESSIVE DISORDER, RECURRENT EPISODE, MILD (H): ICD-10-CM

## 2022-11-11 RX ORDER — CITALOPRAM HYDROBROMIDE 40 MG/1
TABLET ORAL
Qty: 90 TABLET | Refills: 0 | Status: SHIPPED | OUTPATIENT
Start: 2022-11-11 | End: 2023-03-03

## 2022-11-11 NOTE — TELEPHONE ENCOUNTER
Routing refill request to provider for review/approval because:  Labs out of range:    PHQ 1/28/2022 4/25/2022 8/30/2022   PHQ-9 Total Score 17 18 15   Q9: Thoughts of better off dead/self-harm past 2 weeks Not at all Not at all Not at all     Justice L. Phoenix, RN

## 2022-12-13 DIAGNOSIS — J45.30 MILD PERSISTENT ASTHMA, UNCOMPLICATED: ICD-10-CM

## 2022-12-13 NOTE — TELEPHONE ENCOUNTER
Mary Lou came in to the  requesting inhaler and also the steroid. She did not know the name of the medication.

## 2022-12-14 RX ORDER — ALBUTEROL SULFATE 90 UG/1
2 AEROSOL, METERED RESPIRATORY (INHALATION) EVERY 4 HOURS PRN
Qty: 18 G | Refills: 0 | Status: SHIPPED | OUTPATIENT
Start: 2022-12-14 | End: 2024-03-27

## 2023-01-04 ENCOUNTER — TELEPHONE (OUTPATIENT)
Dept: INTERNAL MEDICINE | Facility: CLINIC | Age: 63
End: 2023-01-04

## 2023-01-04 NOTE — TELEPHONE ENCOUNTER
Pt calling clinic for PCP advise. Pt in [place] for 3 weeks. Has delayed return to [place] due to ear infections. Pt having ear ache, post nasal drip, and sinus pain. Sx started around 12/21/22, pt was seen by a provider in [place], was prescribed amoxicillin. Reports did not work. Was seen again earlier this week was prescribed Cefadroxil 500 mg capsules BID (started Monday), and neomycin polymyxin B ear drops TID (started Sunday). Was advised to take Allegra D as well.     Pt reports she has been taking sudafed, d/t allegra D being more expensive. Pt seeking PCP recommendations on allegra D vs sudafed. Pt states she will go buy allegra D, but would still like PCP recommendations prior to flying back.     Routing to provider for review.     Advised pt that it may take a few days for sx to improve since starting antibiotics. Advised pt to call back if sx worsen or fail to improve. Pt verbalized agreement.     Kade Garcia, RN

## 2023-01-04 NOTE — TELEPHONE ENCOUNTER
Finish the antibioitcs as prescribed.  But if the sympotosm are not better with an antibiotic, it makes me wonder if it was a bacterial infection in the first place, she may have eustachian tube dysfunction from a viral URI.      She needs a an expectorant and a decongestant, regardless of the source.   No difference between regular sudafed and Allergra D 12 hour other than allegra D 12 hour is an extended release pseudoephedrine.   I generally prefer the extended release version once per day in the morning. Never take more than the 120 mg size.   Nvere take more than 120 mg at one dose (I.e. do NOT take the 240 mg size) to reduce chance of side effects.     Take the lowest dose needed to help symptoms.     So, she can take some version of pseudoephdrine, especailly take one within 2-3 hours of a plane trip.    OK to decongestant nasal spray if she is having a lot of sinus pressure, etc.      Expectorant (guaifenisin, Mucinex extended release), one or two tablets twice per day for the next 5-7 days.  This helps loosen the secretions to they can be passed more easily out of the body.     If you have dry nasal passages or frequent bloody noses during the winter time:  Saline nasal spray as often as needed for dry nose.       If she continues to have troubles these measures above, return to see me or go to Urgent Care

## 2023-01-04 NOTE — TELEPHONE ENCOUNTER
Called and relayed message to patient who requested it be sent as Schm[employer]zer message as well, message sent    Marcos Santana RN

## 2023-01-10 ENCOUNTER — NURSE TRIAGE (OUTPATIENT)
Dept: INTERNAL MEDICINE | Facility: CLINIC | Age: 63
End: 2023-01-10

## 2023-01-10 NOTE — TELEPHONE ENCOUNTER
Patient was called with provider's message. She agrees with this plan. She already had 2 rounds of Abx, last dose taken yesterday prescribed by an [place] doctor and antibiotic ear drops which she used for 7 days and can use up to 14 days. She is still using Allegra D and Flonase. Will add Meclizine and continue Tussin DM. She will call back by Friday or Monday if no improvement.

## 2023-01-10 NOTE — TELEPHONE ENCOUNTER
Sounds like eustachian tube dysfunction and dizziness related to the recent ear infection.    A very common symptoms fatre these types of infections, this should slowly improve over next several days.      Trial of over the counter meclizine 1/2 -1 tablet three times per day as needed to reduce th intensity of the dizziness episodes.  Beware, this may cause drowsiness.     Also use guaifenisin (mucinex tablets or plain robitussin liquid) as needed to help fluid drain from middle ear.     NSAIDs as needed.     No further antibiotics are likely needed.     If she wants her ear checked out, go to Urgent Care, but she probabnly wont need this.     If she is still having troubles next week, then we can see her.

## 2023-01-10 NOTE — TELEPHONE ENCOUNTER
"To PCP  Patient calling stating that she is back from [place]. States she is still congested in ear and finished antibiotics. States she has been dizzy related to this and has had balance issues. Protocol states to be seen in clinic within 3 days. Please advise.     Nurse Triage SBAR    Is this a 2nd Level Triage? YES, LICENSED PRACTITIONER REVIEW IS REQUIRED    Situation: Patient calling stating that she is back from [place]. States she is still congested in ear and finished antibiotics. States she has been dizzy related to this and has had balance issues    Background: Ear congestion post antibiotic therapy.    Assessment: Ear congestion with dizziness and balance concerns.     Protocol Recommended Disposition:   See in Office Within 3 Days    Recommendation: Please advise   Routed to provider    Does the patient meet one of the following criteria for ADS visit consideration? 16+ years old, with an [facility] PCP     TIP  Providers, please consider if this condition is appropriate for management at one of our Acute and Diagnostic Services sites.     If patient is a good candidate, please use dotphrase <dot>triageresponse and select Refer to ADS to document.        Reason for Disposition    Ear congestion present > 48 hours    Additional Information    Negative: Ear pain is main symptom    Negative: Hearing loss (complete or partial) is main symptom    Negative: Earwax is main concern    Negative: Has nasal allergies and they are acting up    Negative: Earache lasts > 1 hour    Negative: Pus or cloudy discharge from ear canal    Negative: Patient wants to be seen    Answer Assessment - Initial Assessment Questions  1. LOCATION: \"Which ear is involved?\"        Left ear  2. SENSATION: \"Describe how the ear feels.\" (e.g. stuffy, full, plugged).\"       Plugged, congested  3. ONSET:  \"When did the ear symptoms start?\"        Three week  4. PAIN: \"Do you also have an earache?\" If Yes, ask: \"How bad is it?\" (Scale 1-10; or mild, " "moderate, severe)      Intermittent, mild  5. CAUSE: \"What do you think is causing the ear congestion?\"      I have no idea  6. URI: \"Do you have a runny nose or cough?\"       Yes  7. NASAL ALLERGIES: \"Are there symptoms of hay fever, such as sneezing or a clear nasal discharge?\"      Sneezing, drainage is light yellow and clear, a lot  8. PREGNANCY: \"Is there any chance you are pregnant?\" \"When was your last menstrual period?\"      N/A    Protocols used: EAR - CONGESTION-A-OH    Roxy Phoenix RN on 1/10/2023 at 10:33 AM      "

## 2023-01-14 ENCOUNTER — HEALTH MAINTENANCE LETTER (OUTPATIENT)
Age: 63
End: 2023-01-14

## 2023-01-24 ENCOUNTER — TELEPHONE (OUTPATIENT)
Dept: ENDOCRINOLOGY | Facility: CLINIC | Age: 63
End: 2023-01-24
Payer: MEDICARE

## 2023-01-24 DIAGNOSIS — Z79.4 TYPE 2 DIABETES MELLITUS WITH DIABETIC POLYNEUROPATHY, WITH LONG-TERM CURRENT USE OF INSULIN (H): ICD-10-CM

## 2023-01-24 DIAGNOSIS — E11.42 TYPE 2 DIABETES MELLITUS WITH DIABETIC POLYNEUROPATHY, WITH LONG-TERM CURRENT USE OF INSULIN (H): ICD-10-CM

## 2023-01-31 RX ORDER — FLASH GLUCOSE SENSOR
KIT MISCELLANEOUS
Refills: 3 | OUTPATIENT
Start: 2023-01-31

## 2023-02-01 ENCOUNTER — MYC MEDICAL ADVICE (OUTPATIENT)
Dept: ENDOCRINOLOGY | Facility: CLINIC | Age: 63
End: 2023-02-01
Payer: MEDICARE

## 2023-02-01 DIAGNOSIS — Z79.4 TYPE 2 DIABETES MELLITUS WITH DIABETIC POLYNEUROPATHY, WITH LONG-TERM CURRENT USE OF INSULIN (H): Primary | ICD-10-CM

## 2023-02-01 DIAGNOSIS — E11.42 TYPE 2 DIABETES MELLITUS WITH DIABETIC POLYNEUROPATHY, WITH LONG-TERM CURRENT USE OF INSULIN (H): Primary | ICD-10-CM

## 2023-02-02 RX ORDER — DULAGLUTIDE 1.5 MG/.5ML
3 INJECTION, SOLUTION SUBCUTANEOUS
Qty: 4 ML | Refills: 1 | Status: SHIPPED | OUTPATIENT
Start: 2023-02-02 | End: 2023-08-01

## 2023-02-02 NOTE — TELEPHONE ENCOUNTER
Please advise if okay to prescribe 1.5 trulicity dosage- 3mg on back order.     Pt is due for follow up January-April

## 2023-02-02 NOTE — TELEPHONE ENCOUNTER
She was on Trulicity 3.0 mg/week   She can take 1.5 +1.5 = 3.0 mg/week if it is available.  Switch to Trulicity 3.0 mg/week once 3.0 mg is back in pharmacy.  Please send appropriate Rx.

## 2023-02-06 ENCOUNTER — LAB (OUTPATIENT)
Dept: LAB | Facility: CLINIC | Age: 63
End: 2023-02-06
Payer: MEDICARE

## 2023-02-06 DIAGNOSIS — I10 ESSENTIAL HYPERTENSION, BENIGN: ICD-10-CM

## 2023-02-06 DIAGNOSIS — E11.42 TYPE 2 DIABETES MELLITUS WITH DIABETIC POLYNEUROPATHY, WITH LONG-TERM CURRENT USE OF INSULIN (H): ICD-10-CM

## 2023-02-06 DIAGNOSIS — Z11.4 SCREENING FOR HIV (HUMAN IMMUNODEFICIENCY VIRUS): ICD-10-CM

## 2023-02-06 DIAGNOSIS — Z79.4 TYPE 2 DIABETES MELLITUS WITH DIABETIC POLYNEUROPATHY, WITH LONG-TERM CURRENT USE OF INSULIN (H): ICD-10-CM

## 2023-02-06 LAB
ANION GAP SERPL CALCULATED.3IONS-SCNC: 11 MMOL/L (ref 7–15)
BUN SERPL-MCNC: 16.8 MG/DL (ref 8–23)
CALCIUM SERPL-MCNC: 9.4 MG/DL (ref 8.8–10.2)
CHLORIDE SERPL-SCNC: 100 MMOL/L (ref 98–107)
CREAT SERPL-MCNC: 0.59 MG/DL (ref 0.51–0.95)
DEPRECATED HCO3 PLAS-SCNC: 27 MMOL/L (ref 22–29)
GFR SERPL CREATININE-BSD FRML MDRD: >90 ML/MIN/1.73M2
GLUCOSE SERPL-MCNC: 262 MG/DL (ref 70–99)
HBA1C MFR BLD: 8.7 % (ref 0–5.6)
HIV 1+2 AB+HIV1 P24 AG SERPL QL IA: NONREACTIVE
POTASSIUM SERPL-SCNC: 4.2 MMOL/L (ref 3.4–5.3)
SODIUM SERPL-SCNC: 138 MMOL/L (ref 136–145)
T4 FREE SERPL-MCNC: 1.82 NG/DL (ref 0.9–1.7)
TSH SERPL DL<=0.005 MIU/L-ACNC: 0.31 UIU/ML (ref 0.3–4.2)

## 2023-02-06 PROCEDURE — 80048 BASIC METABOLIC PNL TOTAL CA: CPT

## 2023-02-06 PROCEDURE — 84439 ASSAY OF FREE THYROXINE: CPT

## 2023-02-06 PROCEDURE — 84443 ASSAY THYROID STIM HORMONE: CPT

## 2023-02-06 PROCEDURE — 87389 HIV-1 AG W/HIV-1&-2 AB AG IA: CPT

## 2023-02-06 PROCEDURE — 36415 COLL VENOUS BLD VENIPUNCTURE: CPT

## 2023-02-06 PROCEDURE — 83036 HEMOGLOBIN GLYCOSYLATED A1C: CPT

## 2023-02-08 ENCOUNTER — VIRTUAL VISIT (OUTPATIENT)
Dept: ENDOCRINOLOGY | Facility: CLINIC | Age: 63
End: 2023-02-08
Payer: MEDICARE

## 2023-02-08 DIAGNOSIS — E11.22 TYPE 2 DIABETES MELLITUS WITH STAGE 3A CHRONIC KIDNEY DISEASE, WITH LONG-TERM CURRENT USE OF INSULIN (H): ICD-10-CM

## 2023-02-08 DIAGNOSIS — Z79.4 LONG TERM (CURRENT) USE OF INSULIN (H): ICD-10-CM

## 2023-02-08 DIAGNOSIS — E11.42 TYPE 2 DIABETES MELLITUS WITH DIABETIC POLYNEUROPATHY, WITH LONG-TERM CURRENT USE OF INSULIN (H): Primary | ICD-10-CM

## 2023-02-08 DIAGNOSIS — E03.9 HYPOTHYROIDISM, UNSPECIFIED TYPE: ICD-10-CM

## 2023-02-08 DIAGNOSIS — Z79.4 TYPE 2 DIABETES MELLITUS WITH STAGE 3A CHRONIC KIDNEY DISEASE, WITH LONG-TERM CURRENT USE OF INSULIN (H): ICD-10-CM

## 2023-02-08 DIAGNOSIS — E78.5 HYPERLIPIDEMIA LDL GOAL <130: ICD-10-CM

## 2023-02-08 DIAGNOSIS — E11.9 INSULIN-REQUIRING OR DEPENDENT TYPE II DIABETES MELLITUS (H): ICD-10-CM

## 2023-02-08 DIAGNOSIS — Z79.4 TYPE 2 DIABETES MELLITUS WITH DIABETIC POLYNEUROPATHY, WITH LONG-TERM CURRENT USE OF INSULIN (H): Primary | ICD-10-CM

## 2023-02-08 DIAGNOSIS — E08.41 DIABETIC MONONEUROPATHY ASSOCIATED WITH DIABETES MELLITUS DUE TO UNDERLYING CONDITION (H): ICD-10-CM

## 2023-02-08 DIAGNOSIS — N18.31 TYPE 2 DIABETES MELLITUS WITH STAGE 3A CHRONIC KIDNEY DISEASE, WITH LONG-TERM CURRENT USE OF INSULIN (H): ICD-10-CM

## 2023-02-08 DIAGNOSIS — F17.200 TOBACCO USE DISORDER: ICD-10-CM

## 2023-02-08 DIAGNOSIS — I10 ESSENTIAL HYPERTENSION, BENIGN: ICD-10-CM

## 2023-02-08 DIAGNOSIS — Z79.4 INSULIN-REQUIRING OR DEPENDENT TYPE II DIABETES MELLITUS (H): ICD-10-CM

## 2023-02-08 PROCEDURE — 95251 CONT GLUC MNTR ANALYSIS I&R: CPT | Mod: VID | Performed by: INTERNAL MEDICINE

## 2023-02-08 PROCEDURE — 99214 OFFICE O/P EST MOD 30 MIN: CPT | Mod: VID | Performed by: INTERNAL MEDICINE

## 2023-02-08 RX ORDER — INSULIN DETEMIR 100 [IU]/ML
INJECTION, SOLUTION SUBCUTANEOUS
Qty: 30 ML | Refills: 1 | Status: SHIPPED | OUTPATIENT
Start: 2023-02-08 | End: 2023-11-08

## 2023-02-08 RX ORDER — BLOOD-GLUCOSE SENSOR
1 EACH MISCELLANEOUS
Qty: 2 EACH | Refills: 5 | Status: SHIPPED | OUTPATIENT
Start: 2023-02-08 | End: 2024-03-27

## 2023-02-08 ASSESSMENT — ENCOUNTER SYMPTOMS
INSOMNIA: 1
DECREASED CONCENTRATION: 1
PANIC: 0
TINGLING: 0
NUMBNESS: 0
HEADACHES: 0
TROUBLE SWALLOWING: 0
SEIZURES: 0
NECK MASS: 0
SMELL DISTURBANCE: 0
SPEECH CHANGE: 0
PARALYSIS: 0
DEPRESSION: 0
TASTE DISTURBANCE: 0
DIZZINESS: 1
LOSS OF CONSCIOUSNESS: 0
SINUS CONGESTION: 1
NERVOUS/ANXIOUS: 1
HOARSE VOICE: 1
WEAKNESS: 0
MEMORY LOSS: 0
DISTURBANCES IN COORDINATION: 0
SORE THROAT: 0
TREMORS: 0
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
## 2023-02-08 NOTE — NURSING NOTE
Is the patient currently in the state of [place]? YES    Visit mode:VIDEO    If the visit is dropped, the patient can be reconnected by: VIDEO VISIT: Text to cell phone: [phone]    Will anyone else be joining the visit? NO      How would you like to obtain your AVS? MyChart    Are changes needed to the allergy or medication list? NO    Comments or concerns related to today's visit:
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
## 2023-02-08 NOTE — PATIENT INSTRUCTIONS
[facility]  Dr Ramirez, Endocrinology Department    [facility]   [address] [facility]  [place] [address]  Appointment [phone]  Fax: [phone]  [place]: Monday - Thursday      To provide the best diabetic care, please bring your blood glucose meter to each and every visit with your Endocrinologist.  Your blood glucose meter/insulin pump will be downloaded at every appointment.    Please arrive 15 minutes before your scheduled appointment.  This will allow for your blood glucose meter/insulin pump to be downloaded.  If you are wearing DEXCOM please bring  or sharing code so that it can be downloaded.  If you are using freestyle genet personal sensors please bring the reader.  If you are using TANDEM insulin pump please have your username and password to get info from Tandem website.    Thyroid:  Continue current dose of thyroid hormone replacement--levothyroxine take 150 mcg X 6 days a week and 75 mcg X 1 day a week.  Be consistent with medication.  Labs in 3 months.  Please make a lab appointment for blood work and follow up clinic appointment in 1 week after that to discuss results.  (Recommend in person clinic visit at that time)    Diabetes:  Continue metformin XR 1000 mg/day, glipizide XL 5 mg/day with food, Trulicity 3.0 mg once a week  Increase Levemir to 25 units/day  Continue to use genet-genet 3 sensor prescription sent.  Check cost with pharmacy  Labs in 3 months.  Please make a lab appointment for blood work and follow up clinic appointment in 1 week after that to discuss results.  Recommend checking blood sugars before meals and at bedtime.    If Blood glucose are low more often-> 2-3 times/week- give us a call.  Make better food choices: reduce carbs, Reduce portion size, weight loss and exercise 3-4 times a week.    What is hypoglycemia:  Hypoglycemia is when blood sugar levels become too low - below 70 m/dl.      What causes  hypoglycemia?  - using too much insulin  -taking too many diabetes pills  -not eating enough, or skipping meals or snacks  -not eating enough carbohydrate with meals  -changing your exercise routine  -drinking alcohol in excess    It is also possible to have hypoglycemia even when you are carefully managing your blood sugar levels.    What does it feel like when blood sugars get too low?  You may feel:  - anxious  -confused  -dizzy  -hungry  -light-headed  -nervous  -shaky  -sleepy  -sweaty    You may have  -blurred or cloudy vision  -heart palpitations (heart skips a beat or races)  -tingling or numbness around the mouth and tongue  -tremors    What to do if you have symptoms of hypoglycmemia:  If you think your blood sugar is too low, check it with a glucose meter.  If its below 70 mg/dl, consume one of the following:  Fruit juice (1/2 cup)  Glucose tablets (15 grams)  Hard candy (5 to 7 pieces)  Honey or sugar (2 teaspoons)  Milk (1/2 cup)  Soft drink (non-diet, 1/2 cup)    Wait 15 minutes and check your blood glucose again.  IF it is still below 70 mg/dl, have another food item listed above. Wait another 15 minutes and repeat the blood glucose test.  Have a small meal or snack that contains some carbohydrate after your blood glucose rises above 70 mg/dl.    If you are at risk of hypoglycemia, always carry with you glucose tablets or one of the foods listed above.      To prevent Hypoglycemia:  Avoid situations that may cause hypoglycemia  Before making any change to your diet or exercise routine, discuss them with your healthcare provider  Keep a record of your blood glucose levels.  Include the time of day, diabetes medications, when you had your last meal or snack, and what you were doing at the time (e.g. Watching TV, gardening, jogging, etc).    Talk to your healthcare provider if your blood glucose levels are often low        Patient guide on  hypoglycemia    http://www.hormone.org/Resources/upload/patient-guide-diagnosis-[email or web address]-management-[email or web address]

## 2023-02-10 ENCOUNTER — TELEPHONE (OUTPATIENT)
Dept: ENDOCRINOLOGY | Facility: CLINIC | Age: 63
End: 2023-02-10
Payer: MEDICARE

## 2023-02-10 DIAGNOSIS — Z79.4 TYPE 2 DIABETES MELLITUS WITH DIABETIC POLYNEUROPATHY, WITH LONG-TERM CURRENT USE OF INSULIN (H): ICD-10-CM

## 2023-02-10 DIAGNOSIS — E11.42 TYPE 2 DIABETES MELLITUS WITH DIABETIC POLYNEUROPATHY, WITH LONG-TERM CURRENT USE OF INSULIN (H): ICD-10-CM

## 2023-02-10 RX ORDER — FLASH GLUCOSE SENSOR
KIT MISCELLANEOUS
Qty: 2 EACH | Refills: 1 | Status: SHIPPED | OUTPATIENT
Start: 2023-02-10 | End: 2023-04-10

## 2023-02-10 NOTE — TELEPHONE ENCOUNTER
[facility] Call Center    Phone Message    May a detailed message be left on voicemail: yes     Reason for Call: Medication Refill Request    Has the patient contacted the pharmacy for the refill? Yes   Name of medication being requested: genet liang 2  Provider who prescribed the medication: Dr Smith  Pharmacy:      [address]  Date medication is needed: ASAP     Prescription that was sent to the pharmacy was not covered under insurance         Action Taken: Message routed to:  [facility]    Travel Screening: Not Applicable

## 2023-02-14 RX ORDER — FLASH GLUCOSE SENSOR
KIT MISCELLANEOUS
Qty: 2 EACH | Refills: 1 | OUTPATIENT
Start: 2023-02-14

## 2023-03-03 DIAGNOSIS — F33.0 MAJOR DEPRESSIVE DISORDER, RECURRENT EPISODE, MILD (H): ICD-10-CM

## 2023-03-03 RX ORDER — CITALOPRAM HYDROBROMIDE 40 MG/1
TABLET ORAL
Qty: 90 TABLET | Refills: 0 | Status: SHIPPED | OUTPATIENT
Start: 2023-03-03 | End: 2023-09-20

## 2023-03-07 DIAGNOSIS — F33.0 MAJOR DEPRESSIVE DISORDER, RECURRENT EPISODE, MILD (H): ICD-10-CM

## 2023-03-07 RX ORDER — CITALOPRAM HYDROBROMIDE 40 MG/1
TABLET ORAL
Qty: 90 TABLET | Refills: 0 | OUTPATIENT
Start: 2023-03-07

## 2023-03-24 ENCOUNTER — TELEPHONE (OUTPATIENT)
Dept: INTERNAL MEDICINE | Facility: CLINIC | Age: 63
End: 2023-03-24
Payer: MEDICARE

## 2023-03-24 DIAGNOSIS — Z79.4 TYPE 2 DIABETES MELLITUS WITH DIABETIC POLYNEUROPATHY, WITH LONG-TERM CURRENT USE OF INSULIN (H): ICD-10-CM

## 2023-03-24 DIAGNOSIS — E11.42 TYPE 2 DIABETES MELLITUS WITH DIABETIC POLYNEUROPATHY, WITH LONG-TERM CURRENT USE OF INSULIN (H): ICD-10-CM

## 2023-03-24 DIAGNOSIS — H69.93 DYSFUNCTION OF BOTH EUSTACHIAN TUBES: ICD-10-CM

## 2023-03-24 RX ORDER — FLUTICASONE PROPIONATE 50 MCG
2 SPRAY, SUSPENSION (ML) NASAL DAILY
Qty: 48 G | Refills: 1 | Status: SHIPPED | OUTPATIENT
Start: 2023-03-24 | End: 2024-04-03

## 2023-03-27 RX ORDER — METFORMIN HCL 500 MG
TABLET, EXTENDED RELEASE 24 HR ORAL
Qty: 180 TABLET | Refills: 0 | Status: SHIPPED | OUTPATIENT
Start: 2023-03-27 | End: 2023-07-26

## 2023-04-10 DIAGNOSIS — E11.42 TYPE 2 DIABETES MELLITUS WITH DIABETIC POLYNEUROPATHY, WITH LONG-TERM CURRENT USE OF INSULIN (H): ICD-10-CM

## 2023-04-10 DIAGNOSIS — Z79.4 TYPE 2 DIABETES MELLITUS WITH DIABETIC POLYNEUROPATHY, WITH LONG-TERM CURRENT USE OF INSULIN (H): ICD-10-CM

## 2023-04-10 RX ORDER — FLASH GLUCOSE SENSOR
KIT MISCELLANEOUS
Qty: 6 EACH | Refills: 1 | Status: SHIPPED | OUTPATIENT
Start: 2023-04-10 | End: 2023-04-14

## 2023-04-10 NOTE — TELEPHONE ENCOUNTER
Requested Prescriptions   Pending Prescriptions Disp Refills     Continuous Blood Gluc Sensor (FREESTYLE NICK 14 DAY SENSOR) MISC [Pharmacy Med Name: FREESTYLE NICK 14 DAY SENSO  MISC]  1     Sig: CHANGE EVERY 14 DAYS       There is no refill protocol information for this order

## 2023-04-14 ENCOUNTER — TELEPHONE (OUTPATIENT)
Dept: ENDOCRINOLOGY | Facility: CLINIC | Age: 63
End: 2023-04-14
Payer: MEDICARE

## 2023-04-14 DIAGNOSIS — E11.42 TYPE 2 DIABETES MELLITUS WITH DIABETIC POLYNEUROPATHY, WITH LONG-TERM CURRENT USE OF INSULIN (H): ICD-10-CM

## 2023-04-14 DIAGNOSIS — Z79.4 TYPE 2 DIABETES MELLITUS WITH DIABETIC POLYNEUROPATHY, WITH LONG-TERM CURRENT USE OF INSULIN (H): ICD-10-CM

## 2023-04-14 RX ORDER — FLASH GLUCOSE SENSOR
KIT MISCELLANEOUS
Qty: 2 EACH | Refills: 5 | Status: SHIPPED | OUTPATIENT
Start: 2023-04-14 | End: 2023-07-17

## 2023-04-14 NOTE — TELEPHONE ENCOUNTER
Updating Freestyle Jen 14 day sensor Rx for medicare compliance.     Anthony Rhodes, PharmD  4/14/23

## 2023-04-21 DIAGNOSIS — E03.9 HYPOTHYROIDISM, UNSPECIFIED TYPE: ICD-10-CM

## 2023-04-21 RX ORDER — LEVOTHYROXINE SODIUM 150 UG/1
TABLET ORAL
Qty: 90 TABLET | Refills: 1 | Status: SHIPPED | OUTPATIENT
Start: 2023-04-21 | End: 2023-08-01

## 2023-04-21 NOTE — TELEPHONE ENCOUNTER
"Requested Prescriptions   Pending Prescriptions Disp Refills     levothyroxine (SYNTHROID/LEVOTHROID) 150 MCG tablet [Pharmacy Med Name: LEVOTHYROXINE SODIUM 150MCG TABS] 90 tablet 1     Sig: TAKE ONE TABLET BY MOUTH ONCE DAILY 6 DAYS PER WEEK, AND ONE-HALF TABLET ONE DAY A WEEK       Thyroid Protocol Passed - 4/21/2023 12:20 PM        Passed - Patient is 12 years or older        Passed - Recent (12 mo) or future (30 days) visit within the authorizing provider's specialty     Patient has had an office visit with the authorizing provider or a provider within the authorizing providers department within the previous 12 mos or has a future within next 30 days. See \"Patient Info\" tab in inbasket, or \"Choose Columns\" in Meds & Orders section of the refill encounter.              Passed - Medication is active on med list        Passed - Normal TSH on file in past 12 months     Recent Labs   Lab Test 02/06/23  1323   TSH 0.31              Passed - No active pregnancy on record     If patient is pregnant or has had a positive pregnancy test, please check TSH.          Passed - No positive pregnancy test in past 12 months     If patient is pregnant or has had a positive pregnancy test, please check TSH.               "

## 2023-06-07 DIAGNOSIS — Z79.4 TYPE 2 DIABETES MELLITUS WITH DIABETIC POLYNEUROPATHY, WITH LONG-TERM CURRENT USE OF INSULIN (H): ICD-10-CM

## 2023-06-07 DIAGNOSIS — E11.42 TYPE 2 DIABETES MELLITUS WITH DIABETIC POLYNEUROPATHY, WITH LONG-TERM CURRENT USE OF INSULIN (H): ICD-10-CM

## 2023-06-07 DIAGNOSIS — E78.5 HYPERLIPIDEMIA LDL GOAL <100: ICD-10-CM

## 2023-06-08 RX ORDER — ROSUVASTATIN CALCIUM 10 MG/1
TABLET, COATED ORAL
Qty: 90 TABLET | Refills: 0 | Status: SHIPPED | OUTPATIENT
Start: 2023-06-08 | End: 2023-10-23

## 2023-06-08 NOTE — TELEPHONE ENCOUNTER
Prescription approved per [facility] Refill Protocol.  Deandra Gonzales, RN  [facility] Triage Nurse

## 2023-07-12 DIAGNOSIS — Z79.4 TYPE 2 DIABETES MELLITUS WITH DIABETIC POLYNEUROPATHY, WITH LONG-TERM CURRENT USE OF INSULIN (H): Primary | ICD-10-CM

## 2023-07-12 DIAGNOSIS — E11.42 TYPE 2 DIABETES MELLITUS WITH DIABETIC POLYNEUROPATHY, WITH LONG-TERM CURRENT USE OF INSULIN (H): Primary | ICD-10-CM

## 2023-07-12 RX ORDER — DULAGLUTIDE 3 MG/.5ML
INJECTION, SOLUTION SUBCUTANEOUS
Qty: 2 ML | Refills: 0 | Status: SHIPPED | OUTPATIENT
Start: 2023-07-12 | End: 2023-08-01

## 2023-07-12 NOTE — TELEPHONE ENCOUNTER
"Requested Prescriptions   Pending Prescriptions Disp Refills     TRULICITY 3 MG/0.5ML SOPN [Pharmacy Med Name: TRULICITY 3/0.5 SOLN PEN-INJ]  1     Sig: INJECT 3MG UNDER THE SKIN EVERY SEVEN DAYS       GLP-1 Agonists Protocol Failed - 7/12/2023 10:41 AM        Failed - HgbA1C in past 3 or 6 months     If HgbA1C is 8 or greater, it needs to be on file within the past 3 months.  If less than 8, must be on file within the past 6 months.     Recent Labs   Lab Test 02/06/23  1323   A1C 8.7*             Passed - Medication is active on med list        Passed - Patient is age 18 or older        Passed - No active pregnancy on record        Passed - Normal serum creatinine on file in past 12 months     Recent Labs   Lab Test 02/06/23  1323 09/04/16  1515 08/26/16  1652   CR 0.59   < >  --    CREAT  --   --  0.8    < > = values in this interval not displayed.       Ok to refill medication if creatinine is low          Passed - No positive pregnancy test in past 12 months        Passed - Recent (6 mo) or future (30 days) visit within the authorizing provider's specialty     Patient had office visit in the last 6 months or has a visit in the next 30 days with authorizing provider.  See \"Patient Info\" tab in inbasket, or \"Choose Columns\" in Meds & Orders section of the refill encounter.                 "

## 2023-07-17 DIAGNOSIS — E11.42 TYPE 2 DIABETES MELLITUS WITH DIABETIC POLYNEUROPATHY, WITH LONG-TERM CURRENT USE OF INSULIN (H): ICD-10-CM

## 2023-07-17 DIAGNOSIS — Z79.4 TYPE 2 DIABETES MELLITUS WITH DIABETIC POLYNEUROPATHY, WITH LONG-TERM CURRENT USE OF INSULIN (H): ICD-10-CM

## 2023-07-17 RX ORDER — FLASH GLUCOSE SENSOR
KIT MISCELLANEOUS
Qty: 2 EACH | Refills: 2 | Status: SHIPPED | OUTPATIENT
Start: 2023-07-17 | End: 2023-08-17

## 2023-07-17 NOTE — TELEPHONE ENCOUNTER
Requested Prescriptions   Pending Prescriptions Disp Refills     Continuous Blood Gluc Sensor (FREESTYLE NICK 14 DAY SENSOR) MISC [Pharmacy Med Name: FREESTYLE NICK 14 DAY SENSO  MISC]  5     Sig: CHANGE EVERY 14 DAYS       There is no refill protocol information for this order

## 2023-07-26 ENCOUNTER — TELEPHONE (OUTPATIENT)
Dept: ENDOCRINOLOGY | Facility: CLINIC | Age: 63
End: 2023-07-26
Payer: MEDICARE

## 2023-07-26 DIAGNOSIS — E11.42 TYPE 2 DIABETES MELLITUS WITH DIABETIC POLYNEUROPATHY, WITH LONG-TERM CURRENT USE OF INSULIN (H): ICD-10-CM

## 2023-07-26 DIAGNOSIS — Z79.4 TYPE 2 DIABETES MELLITUS WITH DIABETIC POLYNEUROPATHY, WITH LONG-TERM CURRENT USE OF INSULIN (H): ICD-10-CM

## 2023-07-26 RX ORDER — METFORMIN HCL 500 MG
1000 TABLET, EXTENDED RELEASE 24 HR ORAL
Qty: 180 TABLET | Refills: 0 | Status: SHIPPED | OUTPATIENT
Start: 2023-07-26 | End: 2023-10-23

## 2023-07-26 NOTE — TELEPHONE ENCOUNTER
Health Call Center    Phone Message    May a detailed message be left on voicemail: yes     Reason for Call: Medication Question or concern regarding medication   Prescription Clarification  Name of Medication: Metformin  Prescribing Provider: Dr Ramirez   Pharmacy: [address]  P: [phone]  F: [phone]     What on the order needs clarification? A new Rx is required, labs are scheduled for 7/27/23 - Can call patient with any questions.    Action Taken: Other: endo    Travel Screening: Not Applicable                                                                 .

## 2023-07-26 NOTE — TELEPHONE ENCOUNTER
Requested Prescriptions   Signed Prescriptions Disp Refills    metFORMIN (GLUCOPHAGE XR) 500 MG 24 hr tablet 180 tablet 0     Sig: Take 2 tablets (1,000 mg) by mouth daily (with dinner)       There is no refill protocol information for this order

## 2023-07-27 ENCOUNTER — LAB (OUTPATIENT)
Dept: LAB | Facility: CLINIC | Age: 63
End: 2023-07-27
Payer: MEDICARE

## 2023-07-27 DIAGNOSIS — E03.9 HYPOTHYROIDISM, UNSPECIFIED TYPE: ICD-10-CM

## 2023-07-27 DIAGNOSIS — E11.22 TYPE 2 DIABETES MELLITUS WITH DIABETIC CHRONIC KIDNEY DISEASE (H): Primary | ICD-10-CM

## 2023-07-27 DIAGNOSIS — Z79.4 TYPE 2 DIABETES MELLITUS WITH DIABETIC POLYNEUROPATHY, WITH LONG-TERM CURRENT USE OF INSULIN (H): ICD-10-CM

## 2023-07-27 DIAGNOSIS — E11.42 TYPE 2 DIABETES MELLITUS WITH DIABETIC POLYNEUROPATHY, WITH LONG-TERM CURRENT USE OF INSULIN (H): ICD-10-CM

## 2023-07-27 LAB
CHOLEST SERPL-MCNC: 177 MG/DL
HBA1C MFR BLD: 8.3 % (ref 0–5.6)
HDLC SERPL-MCNC: 52 MG/DL
LDLC SERPL CALC-MCNC: 80 MG/DL
NONHDLC SERPL-MCNC: 125 MG/DL
T4 FREE SERPL-MCNC: 2.18 NG/DL (ref 0.9–1.7)
TRIGL SERPL-MCNC: 223 MG/DL
TSH SERPL DL<=0.005 MIU/L-ACNC: 0.04 UIU/ML (ref 0.3–4.2)

## 2023-07-27 PROCEDURE — 99000 SPECIMEN HANDLING OFFICE-LAB: CPT

## 2023-07-27 PROCEDURE — 83036 HEMOGLOBIN GLYCOSYLATED A1C: CPT

## 2023-07-27 PROCEDURE — 84443 ASSAY THYROID STIM HORMONE: CPT

## 2023-07-27 PROCEDURE — 36415 COLL VENOUS BLD VENIPUNCTURE: CPT

## 2023-07-27 PROCEDURE — 84439 ASSAY OF FREE THYROXINE: CPT | Mod: 90

## 2023-07-27 PROCEDURE — 80061 LIPID PANEL: CPT

## 2023-07-28 LAB
CREAT UR-MCNC: 105 MG/DL
MICROALBUMIN UR-MCNC: 13 MG/L
MICROALBUMIN/CREAT UR: 12.38 MG/G CR (ref 0–25)

## 2023-07-28 PROCEDURE — 82570 ASSAY OF URINE CREATININE: CPT

## 2023-07-28 PROCEDURE — 82043 UR ALBUMIN QUANTITATIVE: CPT

## 2023-07-30 LAB — T4 FREE SERPL DIALY-MCNC: 3.2 NG/DL

## 2023-07-31 ENCOUNTER — MYC MEDICAL ADVICE (OUTPATIENT)
Dept: ENDOCRINOLOGY | Facility: CLINIC | Age: 63
End: 2023-07-31
Payer: MEDICARE

## 2023-08-01 ENCOUNTER — VIRTUAL VISIT (OUTPATIENT)
Dept: ENDOCRINOLOGY | Facility: CLINIC | Age: 63
End: 2023-08-01
Payer: MEDICARE

## 2023-08-01 DIAGNOSIS — Z79.4 TYPE 2 DIABETES MELLITUS WITH DIABETIC POLYNEUROPATHY, WITH LONG-TERM CURRENT USE OF INSULIN (H): Primary | ICD-10-CM

## 2023-08-01 DIAGNOSIS — N18.31 TYPE 2 DIABETES MELLITUS WITH STAGE 3A CHRONIC KIDNEY DISEASE, WITH LONG-TERM CURRENT USE OF INSULIN (H): ICD-10-CM

## 2023-08-01 DIAGNOSIS — E11.22 TYPE 2 DIABETES MELLITUS WITH STAGE 3A CHRONIC KIDNEY DISEASE, WITH LONG-TERM CURRENT USE OF INSULIN (H): ICD-10-CM

## 2023-08-01 DIAGNOSIS — E03.9 HYPOTHYROIDISM, UNSPECIFIED TYPE: ICD-10-CM

## 2023-08-01 DIAGNOSIS — E11.42 TYPE 2 DIABETES MELLITUS WITH DIABETIC POLYNEUROPATHY, WITH LONG-TERM CURRENT USE OF INSULIN (H): Primary | ICD-10-CM

## 2023-08-01 DIAGNOSIS — E11.9 INSULIN-REQUIRING OR DEPENDENT TYPE II DIABETES MELLITUS (H): ICD-10-CM

## 2023-08-01 DIAGNOSIS — E78.5 HYPERLIPIDEMIA LDL GOAL <100: ICD-10-CM

## 2023-08-01 DIAGNOSIS — Z79.4 INSULIN-REQUIRING OR DEPENDENT TYPE II DIABETES MELLITUS (H): ICD-10-CM

## 2023-08-01 DIAGNOSIS — Z79.4 TYPE 2 DIABETES MELLITUS WITH STAGE 3A CHRONIC KIDNEY DISEASE, WITH LONG-TERM CURRENT USE OF INSULIN (H): ICD-10-CM

## 2023-08-01 PROCEDURE — 95251 CONT GLUC MNTR ANALYSIS I&R: CPT | Performed by: INTERNAL MEDICINE

## 2023-08-01 PROCEDURE — 99214 OFFICE O/P EST MOD 30 MIN: CPT | Mod: VID | Performed by: INTERNAL MEDICINE

## 2023-08-01 RX ORDER — LEVOTHYROXINE SODIUM 150 UG/1
TABLET ORAL
Qty: 90 TABLET | Refills: 1 | Status: SHIPPED | OUTPATIENT
Start: 2023-08-01 | End: 2024-03-27

## 2023-08-01 RX ORDER — GLIPIZIDE 5 MG/1
5 TABLET, FILM COATED, EXTENDED RELEASE ORAL DAILY
Qty: 90 TABLET | Refills: 1 | Status: SHIPPED | OUTPATIENT
Start: 2023-08-01 | End: 2024-03-27

## 2023-08-01 RX ORDER — DULAGLUTIDE 3 MG/.5ML
3 INJECTION, SOLUTION SUBCUTANEOUS WEEKLY
Qty: 6 ML | Refills: 1 | Status: SHIPPED | OUTPATIENT
Start: 2023-08-01 | End: 2024-04-03 | Stop reason: DRUGHIGH
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
## 2023-08-01 NOTE — PATIENT INSTRUCTIONS
[facility]  Dr Ramirez, Endocrinology Department    [facility]   [id number]  [place] [address]  Appointment [phone]  Fax: [phone]  Elmer: Monday - Thursday      To provide the best diabetic care, please bring your blood glucose meter to each and every visit with your Endocrinologist.  Your blood glucose meter/insulin pump will be downloaded at every appointment.    Please arrive 15 minutes before your scheduled appointment.  This will allow for your blood glucose meter/insulin pump to be downloaded.  If you are wearing DEXCOM please bring  or sharing code so that it can be downloaded.  If you are using freestyle genet personal sensors please bring the reader.  If you are using TANDEM insulin pump please have your username and password to get info from Tandem website.    Thyroid:  Decrease dose of levothyroxine-- Take 150 mcg X 6 days a week and Skip X 1 day a week.  Labs in 3 months  Please make a lab appointment for blood work and follow up clinic appointment in 1 week after that to discuss results.    Diabetes:  Continue metformin XR 1000 mg/day, glipizide XL 5 mg/day with food, Trulicity 3.0 mg once a week  Continue Levemir 25 units/day  Continue to use genet-scan often  Labs and follow up in 3 months.  Please make a lab appointment for blood work and follow up clinic appointment in 1 week after that to discuss results.  (Recommend in person clinic visit)    Recommend checking blood sugars before meals and at bedtime.    If Blood glucose are low more often-> 2-3 times/week- give us a call.  Make better food choices: reduce carbs, Reduce portion size, weight loss and exercise 3-4 times a week.    What is hypoglycemia:  Hypoglycemia is when blood sugar levels become too low - below 70 m/dl.      What causes hypoglycemia?  - using too much insulin  -taking too many diabetes pills  -not eating enough, or skipping meals or  snacks  -not eating enough carbohydrate with meals  -changing your exercise routine  -drinking alcohol in excess    It is also possible to have hypoglycemia even when you are carefully managing your blood sugar levels.    What does it feel like when blood sugars get too low?  You may feel:  - anxious  -confused  -dizzy  -hungry  -light-headed  -nervous  -shaky  -sleepy  -sweaty    You may have  -blurred or cloudy vision  -heart palpitations (heart skips a beat or races)  -tingling or numbness around the mouth and tongue  -tremors    What to do if you have symptoms of hypoglycmemia:  If you think your blood sugar is too low, check it with a glucose meter.  If its below 70 mg/dl, consume one of the following:  Fruit juice (1/2 cup)  Glucose tablets (15 grams)  Hard candy (5 to 7 pieces)  Honey or sugar (2 teaspoons)  Milk (1/2 cup)  Soft drink (non-diet, 1/2 cup)    Wait 15 minutes and check your blood glucose again.  IF it is still below 70 mg/dl, have another food item listed above. Wait another 15 minutes and repeat the blood glucose test.  Have a small meal or snack that contains some carbohydrate after your blood glucose rises above 70 mg/dl.    If you are at risk of hypoglycemia, always carry with you glucose tablets or one of the foods listed above.      To prevent Hypoglycemia:  Avoid situations that may cause hypoglycemia  Before making any change to your diet or exercise routine, discuss them with your healthcare provider  Keep a record of your blood glucose levels.  Include the time of day, diabetes medications, when you had your last meal or snack, and what you were doing at the time (e.g. Watching TV, gardening, jogging, etc).    Talk to your healthcare provider if your blood glucose levels are often low        Patient guide on hypoglycemia    http://[email or web address].org/Resources/[email or web address]-guide-diagnosis-[email or web address]-management-[email or web address]

## 2023-08-17 DIAGNOSIS — E11.42 TYPE 2 DIABETES MELLITUS WITH DIABETIC POLYNEUROPATHY, WITH LONG-TERM CURRENT USE OF INSULIN (H): ICD-10-CM

## 2023-08-17 DIAGNOSIS — Z79.4 TYPE 2 DIABETES MELLITUS WITH DIABETIC POLYNEUROPATHY, WITH LONG-TERM CURRENT USE OF INSULIN (H): ICD-10-CM

## 2023-08-17 RX ORDER — FLASH GLUCOSE SENSOR
KIT MISCELLANEOUS
Qty: 2 EACH | Refills: 2 | Status: SHIPPED | OUTPATIENT
Start: 2023-08-17 | End: 2023-11-21

## 2023-08-17 NOTE — TELEPHONE ENCOUNTER
PRESCRIPTIONS MUST BE WRITTEN THIS WAY TO MAKE THEM MEDICARE COMPLIANT    FREESTYLE NICK 14 DAY SENSORS  SIG: Change every 14 days.  QTY: 2  REFILLS: 5    -Prescriptions must be written after the clinical note date and will only be able to be used for 6 months from the date of the clinical notes. All prescriptions must be from same provider who patient had visit with. (We will be requesting new clinical notes and prescriptions every 6 months to meet Medicare Guidelines.)    Please contact us at [phone] (this number is for clinics only) with any questions. Please only give [phone] to patients.    [facility]   [address] [place] [address]  Phone # [phone]  Fax # [phone]

## 2023-08-21 DIAGNOSIS — G89.29 OTHER CHRONIC PAIN: ICD-10-CM

## 2023-08-21 DIAGNOSIS — R53.81 PHYSICAL DECONDITIONING: ICD-10-CM

## 2023-08-21 DIAGNOSIS — M25.552 HIP PAIN, LEFT: ICD-10-CM

## 2023-08-21 DIAGNOSIS — M53.3 SI (SACROILIAC) JOINT DYSFUNCTION: ICD-10-CM

## 2023-08-22 ENCOUNTER — TRANSFERRED RECORDS (OUTPATIENT)
Dept: INTERNAL MEDICINE | Facility: CLINIC | Age: 63
End: 2023-08-22
Payer: MEDICARE

## 2023-08-22 LAB — RETINOPATHY: NEGATIVE

## 2023-08-22 RX ORDER — IBUPROFEN 800 MG/1
800 TABLET, FILM COATED ORAL EVERY 8 HOURS PRN
Qty: 90 TABLET | Refills: 2 | Status: SHIPPED | OUTPATIENT
Start: 2023-08-22 | End: 2024-03-27

## 2023-08-29 DIAGNOSIS — E11.42 TYPE 2 DIABETES MELLITUS WITH DIABETIC POLYNEUROPATHY, WITH LONG-TERM CURRENT USE OF INSULIN (H): ICD-10-CM

## 2023-08-29 DIAGNOSIS — Z79.4 TYPE 2 DIABETES MELLITUS WITH DIABETIC POLYNEUROPATHY, WITH LONG-TERM CURRENT USE OF INSULIN (H): ICD-10-CM

## 2023-08-29 RX ORDER — FLASH GLUCOSE SENSOR
KIT MISCELLANEOUS
Qty: 2 EACH | Refills: 2 | Status: CANCELLED | OUTPATIENT
Start: 2023-08-29

## 2023-08-29 NOTE — TELEPHONE ENCOUNTER
PRESCRIPTIONS MUST BE WRITTEN THIS WAY TO MAKE THEM MEDICARE COMPLIANT    FREESTYLE NICK 14 DAY SENSORS  SIG: Change every 14 days.  QTY: 2  REFILLS: 5    -Prescriptions must be written after the clinical note date and will only be able to be used for 6 months from the date of the clinical notes. All prescriptions must be from same provider who patient had visit with. (We will be requesting new clinical notes and prescriptions every 6 months to meet Medicare Guidelines.)    Please contact us at [phone] (this number is for clinics only) with any questions. Please only give [phone] to patients.    [facility]   [address] [place] [address]  Phone # [phone]  Fax # [phone]

## 2023-09-20 DIAGNOSIS — F33.0 MAJOR DEPRESSIVE DISORDER, RECURRENT EPISODE, MILD (H): ICD-10-CM

## 2023-09-20 DIAGNOSIS — E55.9 VITAMIN D DEFICIENCY: ICD-10-CM

## 2023-09-20 DIAGNOSIS — Z79.4 TYPE 2 DIABETES MELLITUS WITH DIABETIC POLYNEUROPATHY, WITH LONG-TERM CURRENT USE OF INSULIN (H): ICD-10-CM

## 2023-09-20 DIAGNOSIS — E11.42 TYPE 2 DIABETES MELLITUS WITH DIABETIC POLYNEUROPATHY, WITH LONG-TERM CURRENT USE OF INSULIN (H): ICD-10-CM

## 2023-09-20 NOTE — TELEPHONE ENCOUNTER
Medication Question or Refill    Contacts         Type Contact Phone/Fax    09/20/2023 10:01 AM CDT Phone (Incoming) Mary Lou Gil (Self) [phone] (M)            What medication are you calling about (include dose and sig)?: cholecalciferol (VITAMIN D3) 125 mcg (5000 units) capsule   &  citalopram (CELEXA) 40 MG tablet   &    Dulaglutide (TRULICITY) 3 MG/0.5ML SOPN       Preferred Pharmacy:     [facility] [address] [place] [address]  Phone: [phone] Fax: [phone]      Controlled Substance Agreement on file:   CSA -- Patient Level:    CSA: None found at the patient level.       Who prescribed the medication?: PCP    Do you need a refill? Yes    When did you use the medication last? N/A    Patient offered an appointment? No    Do you have any questions or concerns?  Yes: Patient is OUT of these medications and she works in [place], [place]      Could we send this information to you in [employer] or would you prefer to receive a phone call?:   Patient would prefer a phone call   Okay to leave a detailed message?: Yes at Cell number on file:    Telephone Information:   Mobile [phone]

## 2023-09-21 RX ORDER — CITALOPRAM HYDROBROMIDE 40 MG/1
40 TABLET ORAL EVERY MORNING
Qty: 90 TABLET | Refills: 0 | Status: SHIPPED | OUTPATIENT
Start: 2023-09-21 | End: 2024-01-30

## 2023-09-21 RX ORDER — DULAGLUTIDE 3 MG/.5ML
3 INJECTION, SOLUTION SUBCUTANEOUS WEEKLY
Qty: 6 ML | Refills: 1 | OUTPATIENT
Start: 2023-09-21

## 2023-09-21 NOTE — TELEPHONE ENCOUNTER
Received refill request for numerous medications.  I sent refills for citalopram and vitamin D.    Her endocrinologist is managing her diabetes and will defer the prescription for Dulaglutide to them    Noticed that the refill request for being sent to her [facility] pharmacy in [facility].  Need to confirm if the patient has moved or change addresses.

## 2023-10-23 ENCOUNTER — MYC REFILL (OUTPATIENT)
Dept: ENDOCRINOLOGY | Facility: CLINIC | Age: 63
End: 2023-10-23
Payer: MEDICARE

## 2023-10-23 DIAGNOSIS — Z79.4 TYPE 2 DIABETES MELLITUS WITH DIABETIC POLYNEUROPATHY, WITH LONG-TERM CURRENT USE OF INSULIN (H): ICD-10-CM

## 2023-10-23 DIAGNOSIS — E11.42 TYPE 2 DIABETES MELLITUS WITH DIABETIC POLYNEUROPATHY, WITH LONG-TERM CURRENT USE OF INSULIN (H): ICD-10-CM

## 2023-10-24 RX ORDER — METFORMIN HCL 500 MG
1000 TABLET, EXTENDED RELEASE 24 HR ORAL
Qty: 180 TABLET | Refills: 0 | Status: SHIPPED | OUTPATIENT
Start: 2023-10-24 | End: 2024-03-27

## 2023-10-24 NOTE — TELEPHONE ENCOUNTER
"Requested Prescriptions   Pending Prescriptions Disp Refills    metFORMIN (GLUCOPHAGE XR) 500 MG 24 hr tablet 180 tablet 0     Sig: Take 2 tablets (1,000 mg) by mouth daily (with dinner)       Biguanide Agents Passed - 10/23/2023  5:35 PM        Passed - Patient is age 10 or older        Passed - Patient has documented A1c within the specified period of time.     If HgbA1C is 8 or greater, it needs to be on file within the past 3 months.  If less than 8, must be on file within the past 6 months.     Recent Labs   Lab Test 07/27/23  1256   A1C 8.3*             Passed - Patient's CR is NOT>1.4 OR Patient's EGFR is NOT<45 within past 12 mos.     Recent Labs   Lab Test 02/06/23  1323 02/26/22  2247 01/07/21 2000   GFRESTIMATED >90   < > >90   GFRESTBLACK  --   --  >90    < > = values in this interval not displayed.       Recent Labs   Lab Test 02/06/23  1323   CR 0.59             Passed - Patient does NOT have a diagnosis of CHF.        Passed - Medication is active on med list        Passed - Patient is not pregnant        Passed - Patient has not had a positive pregnancy test within the past 12 mos.         Passed - Recent (6 mo) or future (30 days) visit within the authorizing provider's specialty     Patient had office visit in the last 6 months or has a visit in the next 30 days with authorizing provider or within the authorizing provider's specialty.  See \"Patient Info\" tab in inbasket, or \"Choose Columns\" in Meds & Orders section of the refill encounter.                 "

## 2023-10-28 NOTE — MR AVS SNAPSHOT
After Visit Summary   8/29/2017    Mary Lou Gil    MRN: [id number]           Patient Information     Date Of Birth          1960        Visit Information        Provider Department      8/29/2017 1:30 PM  DIABETIC ED RESOURCE [facility]        Care Instructions    My Diabetes Care Goals:    Monitoring: check blood sugar each day at different times  Recommend take as prescribed-   Levemir 25 units at bedtime, and Metformin 1000 mg in morning and evening, and Glipizide XL 2 tabs in morning    Follow up:  Follow-up diabetes education appointment scheduled on 9/26.      Bring blood glucose meter and logbook with you to all doctor and follow-up appointments.     [facility] for the [facility]:  For Your Diabetes Education and Nutrition Appointments Call:  [phone]   For Diabetes Education or Nutrition Related Questions:   Phone: [phone]  E-mail: [email or web address]  Fax: [phone]   If you need a medication refill please contact your pharmacy. Please allow 3 business days for your refills to be completed.    Instructions for emailing the Diabetes Educators    If you need to communicate a non-urgent message to a Diabetes Educator via email, please send to [email or web address].    Please follow the following email guidelines:    Subject line: Secure: your clinic name (example: Secure: [facility])  In the email please include: First name, middle initial, last name and date of birth.    We will be in touch with you within one (1) business day.             Follow-ups after your visit        Your next 10 appointments already scheduled     Sep 01, 2017  2:00 PM CDT   LAB with [facility]   [facility] ([facility])    [address] [address] [phone]           Patient must bring picture ID. Patient should be prepared to give a urine specimen   Please do not eat 10-12 hours before your appointment if you are coming in fasting for labs on lipids, cholesterol, or glucose (sugar). Pregnant women should follow their Care Team instructions. Water with medications is okay. Do not drink coffee or other fluids. If you have concerns about taking  your medications, please ask at office or if scheduling via [employer], send a message by clicking on Secure Messaging, Message Your Care Team.            Sep 06, 2017  2:00 PM CDT   [facility]avi [facility]   [facility]    [address] [place] [id number] [phone]           Bring a current list of meds and any records pertaining to this visit. For Physicals, please bring immunization records and any forms needing to be filled out. Please arrive 10 minutes early to complete paperwork.            Sep 26, 2017  1:30 PM CDT   Diabetic Education with  DIABETIC ED RESOURCE   [facility])    [address] [place] [id number] [phone]              Who to contact     If you have questions or need follow up information about today's clinic visit or your schedule please contact [facility] directly at [phone].  Normal or non-critical lab and imaging results will be communicated to you by MyChart, letter or phone within 4 business days after the clinic has received the results. If you do not hear from us within 7 days, please contact the clinic through MyChart or phone. If you have a critical or abnormal lab result, we will notify you by phone as soon as possible.  Submit refill requests through [employer] or call your pharmacy and they will forward the refill request to us. Please allow 3 business days for your refill to be completed.          Additional Information About Your Visit        MyChart Information     [employer]  gives you secure access to your electronic health record. If you see a primary care provider, you can also send messages to your care team and make appointments. If you have questions, please call your primary care clinic.  If you do not have a primary care provider, please call [phone] and they will assist you.        Care EveryWhere ID     This is your Care EveryWhere ID. This could be used by other organizations to access your [place] medical records  [id number]         Blood Pressure from Last 3 Encounters:   06/01/17 127/86   05/31/17 160/90   05/31/17 160/90    Weight from Last 3 Encounters:   06/01/17 79.2 kg (174 lb 9.6 oz)   05/31/17 79.4 kg (175 lb)   05/24/17 78.9 kg (174 lb)              Today, you had the following     No orders found for display       Primary Care Provider Office Phone # Fax #    Abilio Casiano [phone]       [address] [place] [address]        Equal Access to Services     BEN MOJICA : Hadii aad ku hadasho Soomaali, waaxda luqadaha, qaybta kaalmada adeegyada, marley moreno . So [facility] [phone].    ATENCIÓN: Si habla español, tiene a ybarra disposición servicios gratuitos de asistencia lingüística. Llame al [phone].    We comply with applicable federal civil rights laws and [place] laws. We do not discriminate on the basis of race, color, national origin, age, disability sex, sexual orientation or gender identity.            Thank you!     Thank you for choosing [facility]  for your care. Our goal is always to provide you with excellent care. Hearing back from our patients is one way we can continue to improve our services. Please take a few minutes to complete the written survey that you may receive in the mail after your visit with us. Thank you!             Your Updated Medication List - Protect others around you: Learn how to safely use, store and throw away your medicines at  www.disposemymeds.org.          This list is accurate as of: 8/29/17  2:36 PM.  Always use your most recent med list.                   Brand Name Dispense Instructions for use Diagnosis    acetaminophen 500 MG tablet    TYLENOL    180 tablet    Take 2 tablets (1,000 mg) by mouth every 8 hours    Spinal stenosis of lumbar region with neurogenic claudication, DDD (degenerative disc disease), lumbar, Facet arthropathy, lumbar, Other chronic pain       albuterol 108 (90 BASE) MCG/ACT Inhaler    PROAIR HFA/PROVENTIL HFA/VENTOLIN HFA    3 Inhaler    Inhale 2 puffs into the lungs every 6 hours as needed for shortness of breath / dyspnea or wheezing    Tobacco use disorder       BENADRYL PO      Take 25-50 mg by mouth See Admin Instructions 25 mg during the day if needed and 50 mg at bedtime for sleep as needed        blood glucose monitoring meter device kit     1 kit    by In Vitro route as needed    Type 2 diabetes, HbA1c goal < 7% (H)       blood glucose monitoring test strip    no brand specified    200 strip    1 strip by In Vitro route 3 times daily Uses Accuchek Ana Paula    Type 2 diabetes mellitus with hyperglycemia, with long-term current use of insulin (H)       cholecalciferol 5000 UNITS Caps     100 capsule    Take 1 capsule (5,000 Units) by mouth daily FOR VITAMIN D DEFICIENCY (LOW VITAMIN D),TAKE 2 CAPSULES DOROTHY FOR THE NEXT 4 WEEKS, THEN 1 CAPSULE DAILY, MUST TAKE WITH WITH FAT CONTAINING MEAL    Perimenopausal       citalopram 40 MG tablet    celeXA    90 tablet    Take 1 tablet (40 mg) by mouth daily TO CONTROL SYMPTOMS OF DEPRESSION    Major depressive disorder, recurrent episode, mild (H)       cyanocobalamin 1000 MCG/ML injection    VITAMIN B12    30 mL    Inject 1 mL (1,000 mcg) Subcutaneous every 14 days INDICATION: FOR VITAMIN B12 SUPPLEMENTATION    Encounter for long-term (current) use of medications       DULoxetine 60 MG EC capsule    CYMBALTA    120 capsule    Take 2 capsules (120 mg) by mouth  daily    Chronic pain syndrome, Perimenopausal       EPINEPHrine 0.3 MG/0.3ML injection 2-pack    EPIPEN/ADRENACLICK/or ANY BX GENERIC EQUIV    0.3 mL    Inject 0.3 mLs (0.3 mg) into the muscle once as needed    Anaphylactic reaction, subsequent encounter       fenofibrate 160 MG tablet     90 tablet    Take 1 tablet (160 mg) by mouth At Bedtime with food. INDICATION: TO LOWER CHOLESTEROL    Hyperlipidemia with target LDL less than 100       flunisolide HFA 80 MCG/ACT Aers oral inhaler    AEROSPAN    1 Inhaler    Inhale 2 puffs into the lungs 2 times daily INDICATION: ASTHMA CONTROLLER, ALWAYS RINSE MOUTH AFTER USE    Mild persistent asthma, uncomplicated       glipiZIDE 10 MG 24 hr tablet    glipiZIDE XL    180 tablet    Take 2 tablets (20 mg) by mouth daily INDICATION: TO TREAT DIABETES.    Type 2 diabetes mellitus with hyperglycemia (H), Insulin-requiring or dependent type II diabetes mellitus (H)       ibuprofen 800 MG tablet    ADVIL/MOTRIN    90 tablet    Take 1 tablet (800 mg) by mouth every 8 hours as needed for moderate pain or pain    Hip pain, left, Physical deconditioning, Other chronic pain, SI (sacroiliac) joint dysfunction       insulin detemir 100 UNIT/ML injection    LEVEMIR FLEXPEN/FLEXTOUCH    15 mL    Inject 25 Units Subcutaneous At Bedtime    Type 2 diabetes mellitus with hyperglycemia, with long-term current use of insulin (H)       levothyroxine 150 MCG tablet    SYNTHROID/LEVOTHROID    90 tablet    Take 1 tablet (150 mcg) by mouth daily INDICATION: THYROID SUPPLEMENTATION, TAKE FIRST THING IN THE MORNING ON AN EMPTY STOMACH,  NO FOOD FOR AN HOUR    Hypothyroidism, unspecified type       lidocaine 5 % ointment    XYLOCAINE    50 g    Apply a quarter size amount to painful areas up to 4 times per day as needed.    Bilateral carpal tunnel syndrome, Degeneration of lumbar intervertebral disc       lidocaine-prilocaine cream    EMLA    30 g    Apply dime to nickel size amount qid prn to low left  back    Muscle spasm       lisinopril-hydrochlorothiazide 20-25 MG per tablet    PRINZIDE/ZESTORETIC    90 tablet    Take 1 tablet by mouth daily INDICATION:TO LOWER BLOOD PRESSURE AND TO PRESERVE KIDNEY FUNCTION    Essential hypertension, benign       magnesium oxide 400 MG tablet    MAG-OX    180 tablet    Take 1 tablet (400 mg) by mouth 2 times daily INDICATION: MAGNESIUM SUPPLEMENT    Cramp of both lower extremities       metFORMIN 1000 MG tablet    GLUCOPHAGE    60 tablet    Take 1 tablet (1,000 mg) by mouth 2 times daily (with meals) INDICATION: TO TREAT DIABETES, CONTROL BLOOD SUGAR AND TO CORRECT METABOLISM    Type 2 diabetes mellitus with hyperglycemia, with long-term current use of insulin (H)       NEEDLES, ANY SIZE     1 Box    Inject 1 each Subcutaneous At Bedtime    Type 2 diabetes, HbA1c goal < 7% (H)       omeprazole 20 MG CR capsule    priLOSEC    90 capsule    Take 1 capsule (20 mg) by mouth 2 times daily INDICATION: TO CONTROL REFLUX SYMPTOMS    Gastroesophageal reflux disease with esophagitis       * order for DME     1 each    Single prong cane    Muscle spasm, Facet arthritis of lumbosacral region (H), Personal history of fall, Mobility impaired       * order for DME     1 each    Equipment being ordered: walker with seat and hand breaks    Degeneration of lumbar intervertebral disc, Muscle spasm, Facet arthritis of lumbosacral region (H), Personal history of fall, Mobility impaired       pravastatin 80 MG tablet    PRAVACHOL    90 tablet    Take 1 tablet (80 mg) by mouth daily INDICATION: TO LOWER CHOLESTEROL AND TO HELP  PREVENT HEART DISEASE    Hyperlipidemia with target LDL less than 100       predniSONE 20 MG tablet    DELTASONE    10 tablet    Take 1 tablet (20 mg) by mouth 2 times daily    Asthma exacerbation       pregabalin 150 MG capsule    LYRICA    30 capsule    Take 1 capsule (150 mg) by mouth daily    Chronic pain syndrome, Lateral epicondylitis of left elbow, Degeneration of  lumbar intervertebral disc       SUMAtriptan 100 MG tablet    IMITREX    18 tablet    Take 1 tablet (100 mg) by mouth every 8 hours as needed for migraine    Migraine without status migrainosus, not intractable, unspecified migraine type       thin lancets    NO BRAND SPECIFIED    3 Box    1 Device 2 times daily    Type 2 diabetes, HbA1c goal < 7% (H)       tiZANidine 2 MG tablet    ZANAFLEX    90 tablet    Take 1-2 tablets (2-4 mg) by mouth 3 times daily    Muscle spasm       traMADol 50 MG tablet    ULTRAM    165 tablet    Take 1tablet(s) every 6 hours as needed for pain. 5-6 per day. May dispense on/after 06/4/17 to start taking on/after 06/06/17. 30 day supply    Bilateral carpal tunnel syndrome, Degeneration of lumbar intervertebral disc       verapamil 240 MG CR tablet    CALAN-SR    30 tablet    Take 1 tablet (240 mg) by mouth At Bedtime INDICATION: TO LOWER BLOOD PRESSURE AND TO CONTROL MIGRAINE HEADACHES    Migraine without status migrainosus, not intractable, unspecified migraine type       vitamin C-electrolytes 1000mg vitamin C super orange drink mix    EMERGEN-C    90 packet    Mix 1 packet in 4-6oz water and take once daily, INDICATION: VITAMIN C SUPPLEMENTION    Scurvy, Low iron stores       * Notice:  This list has 2 medication(s) that are the same as other medications prescribed for you. Read the directions carefully, and ask your doctor or other care provider to review them with you.       - Patient with worsening orthopnea, cough, and leg swelling.   - pBNP 8960 with last office pBNP around 1K.  - However likely multifactorial due to elevated ESR/CRP and leukocytosis.   - Repeat echocardiogram with EF now <20%, grade II DD, elevated LA pressure, mod MR, mild AI, mild TR, and moderate pulmonary HTN.   - plan for repeat echo on monday to assess for EF improvement now that the pt is diuresed   - more euvolemic, transition to PO torsemide 20mg BID   - Patient also with long international flight 2 weeks ago.   - D-Dimer is negative.   - CT chest with Rt upper lobe opacities which may be infectious or inflammatory.  - If D-Dimer is negative, would check non-contrast CT to evaluate for any pneumonia.   - GDMT for HFrEF - continue toprol, valsartan, torsemide

## 2023-11-08 ENCOUNTER — TELEPHONE (OUTPATIENT)
Dept: ENDOCRINOLOGY | Facility: CLINIC | Age: 63
End: 2023-11-08
Payer: MEDICARE

## 2023-11-08 DIAGNOSIS — E11.42 TYPE 2 DIABETES MELLITUS WITH DIABETIC POLYNEUROPATHY, WITH LONG-TERM CURRENT USE OF INSULIN (H): ICD-10-CM

## 2023-11-08 DIAGNOSIS — Z79.4 TYPE 2 DIABETES MELLITUS WITH DIABETIC POLYNEUROPATHY, WITH LONG-TERM CURRENT USE OF INSULIN (H): ICD-10-CM

## 2023-11-08 DIAGNOSIS — I10 ESSENTIAL HYPERTENSION, BENIGN: ICD-10-CM

## 2023-11-08 RX ORDER — LISINOPRIL AND HYDROCHLOROTHIAZIDE 20; 25 MG/1; MG/1
1 TABLET ORAL EVERY MORNING
Qty: 90 TABLET | Refills: 0 | Status: SHIPPED | OUTPATIENT
Start: 2023-11-08 | End: 2024-02-12

## 2023-11-08 NOTE — TELEPHONE ENCOUNTER
[facility] Call Center    Phone Message    May a detailed message be left on voicemail: yes     Reason for Call: Medication Refill Request    Has the patient contacted the pharmacy for the refill? Yes   Name of medication being requested: insulin detemir (LEVEMIR FLEXTOUCH) 100 UNIT/ML pen [[id number]]  B-D U/F 31G X 8 MM insulin pen needle [[id number]]     Provider who prescribed the medication: Dr Ramirez  Pharmacy: [address]  Date medication is needed: asap       Action Taken: Other: endo    Travel Screening: Not Applicable

## 2023-11-08 NOTE — TELEPHONE ENCOUNTER
Medication Question or Refill        What medication are you calling about (include dose and sig)?: Pended    Preferred Pharmacy:     [address] [address]  Phone: [phone] Fax: [phone] Alternate Fax: [phone], [phone]      Controlled Substance Agreement on file:   CSA -- Patient Level:    CSA: None found at the patient level.       Who prescribed the medication?: PCP    Do you need a refill? Yes    When did you use the medication last? N/A    Patient offered an appointment? No    Do you have any questions or concerns?  Yes: Almost out      Could we send this information to you in [place] or would you prefer to receive a phone call?:   Patient would prefer a phone call   Okay to leave a detailed message?: Yes at Cell number on file:    Telephone Information:   Mobile [phone]

## 2023-11-10 ENCOUNTER — LAB (OUTPATIENT)
Dept: LAB | Facility: CLINIC | Age: 63
End: 2023-11-10
Payer: MEDICARE

## 2023-11-10 DIAGNOSIS — E03.9 HYPOTHYROIDISM, UNSPECIFIED TYPE: ICD-10-CM

## 2023-11-10 DIAGNOSIS — Z79.4 TYPE 2 DIABETES MELLITUS WITH DIABETIC POLYNEUROPATHY, WITH LONG-TERM CURRENT USE OF INSULIN (H): ICD-10-CM

## 2023-11-10 DIAGNOSIS — E11.42 TYPE 2 DIABETES MELLITUS WITH DIABETIC POLYNEUROPATHY, WITH LONG-TERM CURRENT USE OF INSULIN (H): ICD-10-CM

## 2023-11-10 LAB
HBA1C MFR BLD: 9.5 % (ref 0–5.6)
T4 FREE SERPL-MCNC: 1.55 NG/DL (ref 0.9–1.7)
TSH SERPL DL<=0.005 MIU/L-ACNC: 0.43 UIU/ML (ref 0.3–4.2)

## 2023-11-10 PROCEDURE — 83036 HEMOGLOBIN GLYCOSYLATED A1C: CPT

## 2023-11-10 PROCEDURE — 36415 COLL VENOUS BLD VENIPUNCTURE: CPT

## 2023-11-10 PROCEDURE — 84439 ASSAY OF FREE THYROXINE: CPT

## 2023-11-10 PROCEDURE — 84443 ASSAY THYROID STIM HORMONE: CPT

## 2023-11-21 DIAGNOSIS — Z79.4 TYPE 2 DIABETES MELLITUS WITH DIABETIC POLYNEUROPATHY, WITH LONG-TERM CURRENT USE OF INSULIN (H): ICD-10-CM

## 2023-11-21 DIAGNOSIS — E11.42 TYPE 2 DIABETES MELLITUS WITH DIABETIC POLYNEUROPATHY, WITH LONG-TERM CURRENT USE OF INSULIN (H): ICD-10-CM

## 2023-11-21 RX ORDER — FLASH GLUCOSE SENSOR
KIT MISCELLANEOUS
Qty: 2 EACH | Refills: 0 | Status: SHIPPED | OUTPATIENT
Start: 2023-11-21 | End: 2024-04-03 | Stop reason: ALTCHOICE

## 2023-11-21 NOTE — TELEPHONE ENCOUNTER
Requested Prescriptions   Pending Prescriptions Disp Refills    Continuous Blood Gluc Sensor (FREESTYLE NICK 14 DAY SENSOR) MISC [Pharmacy Med Name: FREESTYLE NICK 14 DAY SENSO  MISC]  2     Sig: CHANGE EVERY 14 DAYS       There is no refill protocol information for this order

## 2023-11-27 ENCOUNTER — TELEPHONE (OUTPATIENT)
Dept: ENDOCRINOLOGY | Facility: CLINIC | Age: 63
End: 2023-11-27

## 2023-11-27 DIAGNOSIS — Z79.4 TYPE 2 DIABETES MELLITUS WITH DIABETIC POLYNEUROPATHY, WITH LONG-TERM CURRENT USE OF INSULIN (H): ICD-10-CM

## 2023-11-27 DIAGNOSIS — E11.42 TYPE 2 DIABETES MELLITUS WITH DIABETIC POLYNEUROPATHY, WITH LONG-TERM CURRENT USE OF INSULIN (H): ICD-10-CM

## 2023-11-27 DIAGNOSIS — Z79.4 LONG TERM (CURRENT) USE OF INSULIN (H): ICD-10-CM

## 2023-11-27 RX ORDER — PEN NEEDLE, DIABETIC 31 GX5/16"
NEEDLE, DISPOSABLE MISCELLANEOUS
Qty: 100 EACH | Refills: 0 | Status: SHIPPED | OUTPATIENT
Start: 2023-11-27 | End: 2024-03-27 | Stop reason: ALTCHOICE

## 2023-11-27 RX ORDER — INSULIN DETEMIR 100 [IU]/ML
INJECTION, SOLUTION SUBCUTANEOUS
Qty: 30 ML | Refills: 0 | Status: SHIPPED | OUTPATIENT
Start: 2023-11-27 | End: 2024-04-03 | Stop reason: ALTCHOICE

## 2023-11-27 NOTE — TELEPHONE ENCOUNTER
VF called pt to check them in for virtual visit 11/27 2pm. Pt was not in [place]. 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 Informed pt they would need to be rescheduled for a time they are in [place].    Pt accepted this and requested refill of levemir and insulin needles until they can be seen again.

## 2023-11-27 NOTE — TELEPHONE ENCOUNTER
TALKED TO PT AND RESCHEDULED HER  11/27 APPT  WITH DR. ELEUTERIO PARADA  AND SENT MESSAGE TO [facility] TO MAKE SURE HER PRESCRIPTION IS BEING SENT TO [facility] IN [facility] LOCATION   .Yennifer Burgos on 11/27/2023 at 2:40 PM    
twins/Gestational Age less than 36 Weeks

## 2023-12-03 ENCOUNTER — HEALTH MAINTENANCE LETTER (OUTPATIENT)
Age: 63
End: 2023-12-03

## 2023-12-10 DIAGNOSIS — E11.42 TYPE 2 DIABETES MELLITUS WITH DIABETIC POLYNEUROPATHY, WITH LONG-TERM CURRENT USE OF INSULIN (H): ICD-10-CM

## 2023-12-10 DIAGNOSIS — Z79.4 TYPE 2 DIABETES MELLITUS WITH DIABETIC POLYNEUROPATHY, WITH LONG-TERM CURRENT USE OF INSULIN (H): ICD-10-CM

## 2023-12-11 RX ORDER — DULAGLUTIDE 3 MG/.5ML
3 INJECTION, SOLUTION SUBCUTANEOUS WEEKLY
Qty: 6 ML | Refills: 1 | OUTPATIENT
Start: 2023-12-11

## 2023-12-11 NOTE — TELEPHONE ENCOUNTER
"    Requested Prescriptions   Pending Prescriptions Disp Refills    Dulaglutide (TRULICITY) 3 MG/0.5ML SOPN 6 mL 1     Sig: Inject 3 mg Subcutaneous once a week       GLP-1 Agonists Protocol Failed - 12/11/2023 10:13 AM        Failed - Recent (6 mo) or future (30 days) visit within the authorizing provider's specialty     Patient had office visit in the last 6 months or has a visit in the next 30 days with authorizing provider.  See \"Patient Info\" tab in inbasket, or \"Choose Columns\" in Meds & Orders section of the refill encounter.            Passed - HgbA1C in past 3 or 6 months     If HgbA1C is 8 or greater, it needs to be on file within the past 3 months.  If less than 8, must be on file within the past 6 months.     Recent Labs   Lab Test 11/10/23  1314   A1C 9.5*             Passed - Medication is active on med list        Passed - Patient is age 18 or older        Passed - No active pregnancy on record        Passed - Normal serum creatinine on file in past 12 months     Recent Labs   Lab Test 02/06/23  1323 09/04/16  1515 08/26/16  1652   CR 0.59   < >  --    CREAT  --   --  0.8    < > = values in this interval not displayed.       Ok to refill medication if creatinine is low          Passed - No positive pregnancy test in past 12 months             "

## 2024-01-04 DIAGNOSIS — Z79.4 TYPE 2 DIABETES MELLITUS WITH DIABETIC POLYNEUROPATHY, WITH LONG-TERM CURRENT USE OF INSULIN (H): ICD-10-CM

## 2024-01-04 DIAGNOSIS — E11.42 TYPE 2 DIABETES MELLITUS WITH DIABETIC POLYNEUROPATHY, WITH LONG-TERM CURRENT USE OF INSULIN (H): ICD-10-CM

## 2024-01-04 NOTE — TELEPHONE ENCOUNTER
Patient calling questioning the status of below request, Patient states she has been out of medication.

## 2024-01-05 NOTE — TELEPHONE ENCOUNTER
Pt needs follow up. With labs prior. Pt needs to be in [place] if VV    Requested Prescriptions   Pending Prescriptions Disp Refills    Continuous Blood Gluc Sensor (FREESTYLE NICK 14 DAY SENSOR) MISC [Pharmacy Med Name: FREESTYLE NICK 14 DAY SENSO  MISC]  0     Sig: CHANGE EVERY 14 DAYS       There is no refill protocol information for this order

## 2024-01-09 RX ORDER — FLASH GLUCOSE SENSOR
KIT MISCELLANEOUS
Refills: 0 | OUTPATIENT
Start: 2024-01-09

## 2024-01-28 DIAGNOSIS — F33.0 MAJOR DEPRESSIVE DISORDER, RECURRENT EPISODE, MILD (H): ICD-10-CM

## 2024-01-29 NOTE — TELEPHONE ENCOUNTER
Patient calling clinic to request refill of Celexa.     Per chart review, LOV 9/2/22 and last script signed 9/21/2023 notes LAST REFILL WITHOUT A FOLLOW UP APPOINTMENT.     Relayed this information to patient and educated mental health medications typically require follow-up at least every 6 months. Patient is well over-due to be seen.    Patient states she comes to [place] for 1 week per month and is living in [place] the rest of the time. Patient is not interested in establishing with a closer PCP at this time.     Patient agrees to schedule annual exam appt with PCP,  requesting 2 month kristine refill of Celexa until upcoming appointment.     Future Appointments 1/29/2024 - 7/27/2024        Date Visit Type Length Department Provider     3/27/2024  9:30 AM PREVENTATIVE ADULT 30 min  INTERNAL MEDICINE Xavi Garcia MD    Location Instructions:     [facility] is in the [facility] at [address] in [place]. This just east of the [address] exit off of [address]. Free parking is available; access the lot from [address] [place] [address] or [facility].

## 2024-01-30 RX ORDER — CITALOPRAM HYDROBROMIDE 40 MG/1
TABLET ORAL
Qty: 60 TABLET | Refills: 0 | Status: SHIPPED | OUTPATIENT
Start: 2024-01-30 | End: 2024-03-27

## 2024-02-07 RX ORDER — FLASH GLUCOSE SENSOR
1 KIT MISCELLANEOUS DAILY
Qty: 2 EACH | Refills: 11 | OUTPATIENT
Start: 2024-02-07

## 2024-02-09 ENCOUNTER — TELEPHONE (OUTPATIENT)
Dept: ENDOCRINOLOGY | Facility: CLINIC | Age: 64
End: 2024-02-09
Payer: MEDICARE

## 2024-02-09 NOTE — TELEPHONE ENCOUNTER
I called pt back to discuss. Suggested to pt that she could try some other pharmacies to see if they have any in stock. Pt states there is only one other pharmacy in her area and they do not have it in stock either. Pt is going to try to call mail order pharmacy's to see if that is an option and let us know.

## 2024-02-09 NOTE — TELEPHONE ENCOUNTER
Health Call Center    Phone Message    May a detailed message be left on voicemail: yes     Reason for Call: Medication Question or concern regarding medication   Prescription Clarification  Name of Medication: Trulicity  Prescribing Provider: Dr. Ramirez   Pharmacy:   [facility] [address] [place] [address]      What on the order needs clarification? [facility] does not have Trulicity and patient wondering if there's another medication like it that she can take? She has not been taking Trulicity for a month or 2 now. Please call and advise.

## 2024-02-11 ENCOUNTER — HEALTH MAINTENANCE LETTER (OUTPATIENT)
Age: 64
End: 2024-02-11

## 2024-02-11 DIAGNOSIS — I10 ESSENTIAL HYPERTENSION, BENIGN: ICD-10-CM

## 2024-02-12 RX ORDER — LISINOPRIL AND HYDROCHLOROTHIAZIDE 20; 25 MG/1; MG/1
TABLET ORAL
Qty: 90 TABLET | Refills: 0 | Status: SHIPPED | OUTPATIENT
Start: 2024-02-12 | End: 2024-03-27

## 2024-03-23 ENCOUNTER — MEDICAL CORRESPONDENCE (OUTPATIENT)
Dept: HEALTH INFORMATION MANAGEMENT | Facility: CLINIC | Age: 64
End: 2024-03-23
Payer: MEDICARE

## 2024-03-24 NOTE — TELEPHONE ENCOUNTER
Routing refill request to provider for review/approval because:  PHQ 9 of 9  Clary Morillo RN           Spontaneous, unlabored and symmetrical

## 2024-03-25 NOTE — PROGRESS NOTES
Outcome for 03/25/24 12:32 PM: "[employer]" message sent  Sol Encinas LPN   Outcome for 04/01/24 8:41 AM: Data uploaded on [employer]  Sol Encinas LPN   Outcome for 04/02/24 8:58 AM: Data obtained via [employer] website  Sol Encinas LPN       Patient is showing 2/5 [place] met. A1c not in range, Current tobacco use, and Aspirin not prescribed   Sol Encinas LPN

## 2024-03-27 ENCOUNTER — OFFICE VISIT (OUTPATIENT)
Dept: INTERNAL MEDICINE | Facility: CLINIC | Age: 64
End: 2024-03-27
Payer: MEDICARE

## 2024-03-27 VITALS
OXYGEN SATURATION: 96 % | TEMPERATURE: 98.4 F | BODY MASS INDEX: 22.16 KG/M2 | SYSTOLIC BLOOD PRESSURE: 132 MMHG | HEIGHT: 65 IN | WEIGHT: 133 LBS | HEART RATE: 102 BPM | DIASTOLIC BLOOD PRESSURE: 84 MMHG

## 2024-03-27 DIAGNOSIS — R49.9 ABNORMAL VOICE: ICD-10-CM

## 2024-03-27 DIAGNOSIS — G89.29 CHRONIC LEFT SHOULDER PAIN: ICD-10-CM

## 2024-03-27 DIAGNOSIS — E03.9 HYPOTHYROIDISM, UNSPECIFIED TYPE: ICD-10-CM

## 2024-03-27 DIAGNOSIS — Z87.891 PERSONAL HISTORY OF TOBACCO USE: ICD-10-CM

## 2024-03-27 DIAGNOSIS — I10 ESSENTIAL HYPERTENSION, BENIGN: ICD-10-CM

## 2024-03-27 DIAGNOSIS — M75.02 ADHESIVE CAPSULITIS OF LEFT SHOULDER: ICD-10-CM

## 2024-03-27 DIAGNOSIS — Z12.31 VISIT FOR SCREENING MAMMOGRAM: ICD-10-CM

## 2024-03-27 DIAGNOSIS — E78.5 HYPERLIPIDEMIA LDL GOAL <100: ICD-10-CM

## 2024-03-27 DIAGNOSIS — M25.512 CHRONIC LEFT SHOULDER PAIN: ICD-10-CM

## 2024-03-27 DIAGNOSIS — Z79.4 TYPE 2 DIABETES MELLITUS WITH DIABETIC POLYNEUROPATHY, WITH LONG-TERM CURRENT USE OF INSULIN (H): ICD-10-CM

## 2024-03-27 DIAGNOSIS — R53.81 PHYSICAL DECONDITIONING: ICD-10-CM

## 2024-03-27 DIAGNOSIS — E11.42 TYPE 2 DIABETES MELLITUS WITH DIABETIC POLYNEUROPATHY, WITH LONG-TERM CURRENT USE OF INSULIN (H): ICD-10-CM

## 2024-03-27 DIAGNOSIS — G89.29 OTHER CHRONIC PAIN: ICD-10-CM

## 2024-03-27 DIAGNOSIS — F33.0 MAJOR DEPRESSIVE DISORDER, RECURRENT EPISODE, MILD (H): ICD-10-CM

## 2024-03-27 DIAGNOSIS — R13.10 DYSPHAGIA, UNSPECIFIED TYPE: ICD-10-CM

## 2024-03-27 DIAGNOSIS — T78.2XXD ANAPHYLACTIC REACTION, SUBSEQUENT ENCOUNTER: ICD-10-CM

## 2024-03-27 DIAGNOSIS — Z00.01 ENCOUNTER FOR ROUTINE ADULT MEDICAL EXAM WITH ABNORMAL FINDINGS: Primary | ICD-10-CM

## 2024-03-27 DIAGNOSIS — Z12.11 SCREEN FOR COLON CANCER: ICD-10-CM

## 2024-03-27 DIAGNOSIS — Z79.4 LONG TERM (CURRENT) USE OF INSULIN (H): ICD-10-CM

## 2024-03-27 DIAGNOSIS — Z23 HIGH PRIORITY FOR COVID-19 VACCINATION: ICD-10-CM

## 2024-03-27 DIAGNOSIS — Z12.4 CERVICAL CANCER SCREENING: ICD-10-CM

## 2024-03-27 DIAGNOSIS — M25.552 HIP PAIN, LEFT: ICD-10-CM

## 2024-03-27 DIAGNOSIS — E55.9 VITAMIN D DEFICIENCY: ICD-10-CM

## 2024-03-27 DIAGNOSIS — J45.30 MILD PERSISTENT ASTHMA, UNCOMPLICATED: ICD-10-CM

## 2024-03-27 DIAGNOSIS — M53.3 SI (SACROILIAC) JOINT DYSFUNCTION: ICD-10-CM

## 2024-03-27 LAB
ALT SERPL W P-5'-P-CCNC: 12 U/L (ref 0–50)
ANION GAP SERPL CALCULATED.3IONS-SCNC: 12 MMOL/L (ref 7–15)
AST SERPL W P-5'-P-CCNC: 14 U/L (ref 0–45)
BUN SERPL-MCNC: 17.6 MG/DL (ref 8–23)
CALCIUM SERPL-MCNC: 9.5 MG/DL (ref 8.8–10.2)
CHLORIDE SERPL-SCNC: 99 MMOL/L (ref 98–107)
CHOLEST SERPL-MCNC: 227 MG/DL
CREAT SERPL-MCNC: 0.6 MG/DL (ref 0.51–0.95)
DEPRECATED HCO3 PLAS-SCNC: 25 MMOL/L (ref 22–29)
EGFRCR SERPLBLD CKD-EPI 2021: >90 ML/MIN/1.73M2
ERYTHROCYTE [DISTWIDTH] IN BLOOD BY AUTOMATED COUNT: 12.7 % (ref 10–15)
FASTING STATUS PATIENT QL REPORTED: YES
GLUCOSE SERPL-MCNC: 403 MG/DL (ref 70–99)
HBA1C MFR BLD: 14.2 % (ref 0–5.6)
HCT VFR BLD AUTO: 46.8 % (ref 35–47)
HDLC SERPL-MCNC: 45 MG/DL
HGB BLD-MCNC: 15.7 G/DL (ref 11.7–15.7)
LDLC SERPL CALC-MCNC: 151 MG/DL
MCH RBC QN AUTO: 30.8 PG (ref 26.5–33)
MCHC RBC AUTO-ENTMCNC: 33.5 G/DL (ref 31.5–36.5)
MCV RBC AUTO: 92 FL (ref 78–100)
NONHDLC SERPL-MCNC: 182 MG/DL
PLATELET # BLD AUTO: 211 10E3/UL (ref 150–450)
POTASSIUM SERPL-SCNC: 4.3 MMOL/L (ref 3.4–5.3)
RBC # BLD AUTO: 5.09 10E6/UL (ref 3.8–5.2)
SODIUM SERPL-SCNC: 136 MMOL/L (ref 135–145)
T4 FREE SERPL-MCNC: 2.04 NG/DL (ref 0.9–1.7)
TRIGL SERPL-MCNC: 155 MG/DL
TSH SERPL DL<=0.005 MIU/L-ACNC: 4.41 UIU/ML (ref 0.3–4.2)
WBC # BLD AUTO: 8.6 10E3/UL (ref 4–11)

## 2024-03-27 PROCEDURE — 90480 ADMN SARSCOV2 VAC 1/ONLY CMP: CPT | Performed by: INTERNAL MEDICINE

## 2024-03-27 PROCEDURE — 84439 ASSAY OF FREE THYROXINE: CPT | Performed by: INTERNAL MEDICINE

## 2024-03-27 PROCEDURE — 80061 LIPID PANEL: CPT | Performed by: INTERNAL MEDICINE

## 2024-03-27 PROCEDURE — 80048 BASIC METABOLIC PNL TOTAL CA: CPT | Performed by: INTERNAL MEDICINE

## 2024-03-27 PROCEDURE — 99214 OFFICE O/P EST MOD 30 MIN: CPT | Mod: 25 | Performed by: INTERNAL MEDICINE

## 2024-03-27 PROCEDURE — 85027 COMPLETE CBC AUTOMATED: CPT | Performed by: INTERNAL MEDICINE

## 2024-03-27 PROCEDURE — 83036 HEMOGLOBIN GLYCOSYLATED A1C: CPT | Performed by: INTERNAL MEDICINE

## 2024-03-27 PROCEDURE — G0296 VISIT TO DETERM LDCT ELIG: HCPCS | Performed by: INTERNAL MEDICINE

## 2024-03-27 PROCEDURE — 84460 ALANINE AMINO (ALT) (SGPT): CPT | Performed by: INTERNAL MEDICINE

## 2024-03-27 PROCEDURE — 99396 PREV VISIT EST AGE 40-64: CPT | Mod: 25 | Performed by: INTERNAL MEDICINE

## 2024-03-27 PROCEDURE — 36415 COLL VENOUS BLD VENIPUNCTURE: CPT | Performed by: INTERNAL MEDICINE

## 2024-03-27 PROCEDURE — 91320 SARSCV2 VAC 30MCG TRS-SUC IM: CPT | Performed by: INTERNAL MEDICINE

## 2024-03-27 PROCEDURE — 84443 ASSAY THYROID STIM HORMONE: CPT | Performed by: INTERNAL MEDICINE

## 2024-03-27 PROCEDURE — 84450 TRANSFERASE (AST) (SGOT): CPT | Performed by: INTERNAL MEDICINE

## 2024-03-27 RX ORDER — ALBUTEROL SULFATE 90 UG/1
2 AEROSOL, METERED RESPIRATORY (INHALATION) EVERY 4 HOURS PRN
Qty: 18 G | Refills: 11 | Status: SHIPPED | OUTPATIENT
Start: 2024-03-27

## 2024-03-27 RX ORDER — BLOOD-GLUCOSE SENSOR
1 EACH MISCELLANEOUS
Qty: 2 EACH | Refills: 5 | Status: SHIPPED | OUTPATIENT
Start: 2024-03-27 | End: 2024-04-05

## 2024-03-27 RX ORDER — METFORMIN HCL 500 MG
1000 TABLET, EXTENDED RELEASE 24 HR ORAL
Qty: 180 TABLET | Refills: 0 | Status: SHIPPED | OUTPATIENT
Start: 2024-03-27 | End: 2024-06-20

## 2024-03-27 RX ORDER — IBUPROFEN 800 MG/1
800 TABLET, FILM COATED ORAL EVERY 8 HOURS PRN
Qty: 90 TABLET | Refills: 2 | Status: SHIPPED | OUTPATIENT
Start: 2024-03-27

## 2024-03-27 RX ORDER — TRAZODONE HYDROCHLORIDE 50 MG/1
TABLET, FILM COATED ORAL
Qty: 180 TABLET | Refills: 2 | Status: SHIPPED | OUTPATIENT
Start: 2024-03-27

## 2024-03-27 RX ORDER — ROSUVASTATIN CALCIUM 10 MG/1
10 TABLET, COATED ORAL DAILY
Qty: 90 TABLET | Refills: 3 | Status: SHIPPED | OUTPATIENT
Start: 2024-03-27

## 2024-03-27 RX ORDER — LISINOPRIL AND HYDROCHLOROTHIAZIDE 20; 25 MG/1; MG/1
TABLET ORAL
Qty: 90 TABLET | Refills: 0 | Status: SHIPPED | OUTPATIENT
Start: 2024-03-27

## 2024-03-27 RX ORDER — GLIPIZIDE 5 MG/1
5 TABLET, FILM COATED, EXTENDED RELEASE ORAL DAILY
Qty: 90 TABLET | Refills: 0 | Status: SHIPPED | OUTPATIENT
Start: 2024-03-27 | End: 2024-06-17

## 2024-03-27 RX ORDER — EPINEPHRINE 0.3 MG/.3ML
0.3 INJECTION SUBCUTANEOUS
Qty: 0.6 ML | Refills: 3 | Status: SHIPPED | OUTPATIENT
Start: 2024-03-27

## 2024-03-27 RX ORDER — LEVOTHYROXINE SODIUM 150 UG/1
TABLET ORAL
Qty: 90 TABLET | Refills: 1 | Status: SHIPPED | OUTPATIENT
Start: 2024-03-27 | End: 2024-09-30

## 2024-03-27 RX ORDER — DULAGLUTIDE 3 MG/.5ML
3 INJECTION, SOLUTION SUBCUTANEOUS WEEKLY
Qty: 6 ML | Refills: 1 | Status: CANCELLED | OUTPATIENT
Start: 2024-03-27

## 2024-03-27 RX ORDER — CITALOPRAM HYDROBROMIDE 40 MG/1
40 TABLET ORAL DAILY
Qty: 90 TABLET | Refills: 1 | Status: SHIPPED | OUTPATIENT
Start: 2024-03-27

## 2024-03-27 RX ORDER — PEN NEEDLE, DIABETIC 31 GX5/16"
NEEDLE, DISPOSABLE MISCELLANEOUS
Qty: 100 EACH | Refills: 0 | Status: CANCELLED | OUTPATIENT
Start: 2024-03-27

## 2024-03-27 SDOH — HEALTH STABILITY: PHYSICAL HEALTH: ON AVERAGE, HOW MANY DAYS PER WEEK DO YOU ENGAGE IN MODERATE TO STRENUOUS EXERCISE (LIKE A BRISK WALK)?: 0 DAYS

## 2024-03-27 ASSESSMENT — PATIENT HEALTH QUESTIONNAIRE - PHQ9
SUM OF ALL RESPONSES TO PHQ QUESTIONS 1-9: 9
10. IF YOU CHECKED OFF ANY PROBLEMS, HOW DIFFICULT HAVE THESE PROBLEMS MADE IT FOR YOU TO DO YOUR WORK, TAKE CARE OF THINGS AT HOME, OR GET ALONG WITH OTHER PEOPLE: SOMEWHAT DIFFICULT
SUM OF ALL RESPONSES TO PHQ QUESTIONS 1-9: 9

## 2024-03-27 ASSESSMENT — ASTHMA QUESTIONNAIRES
ACT_TOTALSCORE: 22
QUESTION_5 LAST FOUR WEEKS HOW WOULD YOU RATE YOUR ASTHMA CONTROL: WELL CONTROLLED
QUESTION_4 LAST FOUR WEEKS HOW OFTEN HAVE YOU USED YOUR RESCUE INHALER OR NEBULIZER MEDICATION (SUCH AS ALBUTEROL): NOT AT ALL
QUESTION_2 LAST FOUR WEEKS HOW OFTEN HAVE YOU HAD SHORTNESS OF BREATH: ONCE OR TWICE A WEEK
QUESTION_1 LAST FOUR WEEKS HOW MUCH OF THE TIME DID YOUR ASTHMA KEEP YOU FROM GETTING AS MUCH DONE AT WORK, SCHOOL OR AT HOME: NONE OF THE TIME
QUESTION_3 LAST FOUR WEEKS HOW OFTEN DID YOUR ASTHMA SYMPTOMS (WHEEZING, COUGHING, SHORTNESS OF BREATH, CHEST TIGHTNESS OR PAIN) WAKE YOU UP AT NIGHT OR EARLIER THAN USUAL IN THE MORNING: ONCE OR TWICE
ACT_TOTALSCORE: 22

## 2024-03-27 ASSESSMENT — SOCIAL DETERMINANTS OF HEALTH (SDOH): HOW OFTEN DO YOU GET TOGETHER WITH FRIENDS OR RELATIVES?: PATIENT DECLINED

## 2024-03-27 ASSESSMENT — PAIN SCALES - GENERAL: PAINLEVEL: MODERATE PAIN (5)

## 2024-03-27 NOTE — PROGRESS NOTES
Lung Cancer Screening Shared Decision Making Visit     Mary Lou Gil, a 63 year old female, is eligible for lung cancer screening    History   Smoking Status    Every Day    Packs/day: 1.50    Years: 30.00    Types: Other, Cigarettes    Start date: 6/27/1982   Smokeless Tobacco    Never       I have discussed with patient the risks and benefits of screening for lung cancer with low-dose CT.     The risks include:    radiation exposure: one low dose chest CT has as much ionizing radiation as about 15 chest x-rays, or 6 months of background radiation living in [place]      false positives: most findings/nodules are NOT cancer, but some might still require additional diagnostic evaluation, including biopsy    over-diagnosis: some slow growing cancers that might never have been clinically significant will be detected and treated unnecessarily     The benefit of early detection of lung cancer is contingent upon adherence to annual screening or more frequent follow up if indicated.     Furthermore, to benefit from screening, Mary Lou must be willing and able to undergo diagnostic procedures, if indicated. Although no specific guide is available for determining severity of comorbidities, it is reasonable to withhold screening in patients who have greater mortality risk from other diseases.     We did discuss that the best way to prevent lung cancer is to not smoke.    Some patients may value a numeric estimation of lung cancer risk when evaluating if lung cancer screening is right for them, here is one calculator:    ShouldIScreen  Answers submitted by the patient for this visit:  Patient Health Questionnaire (Submitted on 3/27/2024)  If you checked off any problems, how difficult have these problems made it for you to do your work, take care of things at home, or get along with other people?: Somewhat difficult  PHQ9 TOTAL SCORE: 9

## 2024-03-27 NOTE — COMMUNITY RESOURCES LIST (ENGLISH)
March 27, 2024           YOUR PERSONALIZED LIST OF SERVICES & PROGRAMS           BENEFITS NAVIGATION    Benefits Eligibility Screening      [facility] application assistance  [address] [place] [address] (Distance: 0.9 miles)  Phone: [phone]  Website: [email or web address]://www.[email or web address]/ph/public-health  Language: English  Fee: Free  Accessibility: Translation services, Ada accessible      [place] [employer]  Phone: [phone]  Website: https://www.[facility].org/about-us/assister-program/navigators/index.jsp  Language: English  Hours: Mon 8:00 AM - 4:00 PM Tue 8:00 AM - 4:00 PM Wed 8:00 AM - 4:00 PM Thu 8:00 AM - 4:00 PM      [employer] [place] - SNAP (formerly food stamps) Screening and Application help  Phone: [phone]  Website: https://www.imo.im.org/programs/[place]-food-helpline/  Language: English  Hours: Mon 10:00 AM - 5:00 PM Tue 10:00 AM - 5:00 PM Wed 10:00 AM - 5:00 PM Thu 10:00 AM - 5:00 PM Fri 10:00 AM - 5:00 PM  Fee: Free  Accessibility: Ada accessible, Blind accommodation, Deaf or hard of hearing, Translation services        FOOD ASSISTANCE    SNAP/WIC/Other Nutrition Benefits      [facility] application assistance  [address] [place] [address] (Distance: 0.9 miles)  Phone: [phone]  Website: https://www.[facility]/ph/public-health  Language: English  Fee: Free  Accessibility: Translation services, Ada accessible      [employer] [place] - SNAP (formerly food stamps) Screening and Application help  Phone: [phone] [phone]  Website: https://www.hungerSea's Food Cafe.org/programs/[place]-food-helpline/  Language: English  Hours: Mon 10:00 AM - 5:00 PM Tue 10:00 AM - 5:00 PM Wed 10:00 AM - 5:00 PM Thu 10:00 AM - 5:00 PM Fri 10:00 AM - 5:00 PM  Fee: Free  Accessibility: Ada accessible, Blind accommodation, Deaf or hard of hearing, Translation services      [employer]  [facility]  Phone: [phone]  Website: https://www.[employer]polices[employer].org/programs/market-sergio/  Language: English  Hours: Mon 10:00 AM - 5:00 PM Tue 10:00 AM - 5:00 PM Wed 10:00 AM - 5:00 PM Thu 10:00 AM - 5:00 PM Fri 10:00 AM - 5:00 PM  Fee: Self pay    Food Pantry      [facility]  [address] [place] [address] (Distance: 2.0 miles)  Phone: [phone]  Website: http://www.[facility]  Language: English, Mongolian  Fee: Free  Accessibility: Ada accessible      [facility] [employer] [place] [facility] in the [place] at [facility]  [address] [place] [address] (Distance: 2.6 miles)  Phone: [phone]  Website: https://www.goodinthehood.org/our-programs/feeding-the-future/food-in-the-[place]ad[place]/  Language: English  Fee: Free  Accessibility: Ada accessible      [facility] [employer]  Phone: ([phone]  Website: https://www.[facility].org  Language: English  Hours: Tue 9:00 AM - 4:00 PM Wed 9:00 AM - 4:00 PM Thu 9:00 AM - 4:00 PM Fri 9:00 AM - 4:00 PM  Fee: Free  Accessibility: Ada accessible, Blind accommodation, Deaf or hard of hearing, Translation services        SPORTS & RECREATION    Individual/Team Sports      [facility] Sports clubs and recreational activities - [facility]  [address] [place] [address] (Distance: 4.5 miles)  Language: English  Fee: Self pay, Sliding scale      [facility] Sports clubs and recreational activities - [facility]  [address] [place] [address] (Distance: 3.9 miles)  Language: English  Fee: Self pay, Sliding scale      [facility] - Adult Enrichment  Phone: [phone]  Website: https://Cmy[employer]asa/adults-seniors/adult-enrichment/  Language: English  Hours: Mon 7:30 AM - 4:00 PM Tue 7:30 AM - 4:00 PM Wed 7:30 AM - 4:00 PM Thu  7:30 AM - 4:00 PM Fri 7:30 AM - 4:00 PM    Exercise Classes/Groups      [facility] - Gym or workout facility - [facility]  [address] [place] [address] (Distance: 7.2 miles)  Phone: [phone]  Language: English, Kazakh  Fee: Free, Self pay  Accessibility: Translation services      [facility] - Gym or workout facility - [facility] - [facility]  [address] [place] [address] (Distance: 9.1 miles)  Language: English  Fee: Free, Self pay  Accessibility: Ada accessible      [facility] - Adult Enrichment  Phone: [phone]  Website: https://POSLavu/adults-seniors/adult-enrichment/  Language: English  Hours: Mon 7:30 AM - 4:00 PM Tue 7:30 AM - 4:00 PM Wed 7:30 AM - 4:00 PM Thu 7:30 AM - 4:00 PM Fri 7:30 AM - 4:00 PM               IMPORTANT NUMBERS & WEBSITES        Emergency Services  911  .   United Way  211 http://211unitedway.org  .   Poison Control  (973) 738-1074 http://[place]poison.org http://wis[place]poison.org  .     Suicide and Crisis Lifeline  988 http://988lifeline.org  .   Childhelp National Child Abuse Hotline  146.602.7368 http://Childhelphotline.org   .   National Sexual Assault Hotline  (400) 308-7213 (HOPE) http://Rainn.org   .     National Runaway Safeline  (615) 811-9968 (RUNAWAY) http://1800runaCandid io.org  .   Pregnancy & Postpartum Support  Call/text 714-856-9404  [place]: http://ppsupportmn.org  [place]: http://Reppify.com/wi  .   Substance Abuse National Helpline ([facility])  482-129-HELP (9031) http://Findtreatment.gov   .                DISCLAIMER: [employer] does not endorse any service providers mentioned in this resource list. [employer] does not guarantee that the services mentioned in this resource list will be available to you or will improve your health or  wellness.    [id number] [facility]

## 2024-03-27 NOTE — PATIENT INSTRUCTIONS
Referral to ENT specialist to investigate your abnormal swalloing and voice changes.   Call to make your own appointment there.     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   You will be contacted to make these appointments.      Referral for routine lung cancer screening.       Checking labs today, I will make recommendations based on the results.      Follow up with the Endocrinology clinic as planned next week.     Start using the Jen 3 continuous glucose monitor now so you have data for the Endo appointment.        Continue all other medications at the same doses.  Contact your usual pharmacy if you need refills.      Return to see me in 6 months, sooner if needed.  Use allyDVM or Call [phone] to schedule the appointment with me.         5 GOALS IN MANAGING DIABETES (i.e. to give the best chance to prevent diabetic complications and vascular disease):     1.  Aggressive diabetic management.  The target for A1C (3 months average blood sugar) is ideally below 6.5, preferably below 7.5    Your diabetes is probably NOT under good control as you report.      Lab Results   Component Value Date    A1C 9.5 11/10/2023    A1C 8.3 07/27/2023    A1C 8.7 02/06/2023    A1C 8.0 07/14/2022    A1C 9.2 01/28/2022    A1C 7.7 12/18/2020    A1C 11.2 05/27/2020    A1C 10.3 11/06/2019    A1C 11.0 08/06/2019    A1C 12.6 04/03/2019    A1C 8.9 06/26/2018    A1C 12.5 01/16/2018    A1C 12.4 09/01/2017    A1C 11.1 03/21/2017    A1C 11.1 11/10/2016    A1C 11.5 07/05/2016    A1C 7.2 08/31/2015    A1C 8.8 05/14/2015    A1C 9.0 03/02/2015    A1C 7.0 11/14/2013    A1C 7.9 10/07/2013    A1C 8.1 08/13/2013    A1C 9.0 06/18/2013    A1C 7.7 02/28/2013    A1C 6.5 04/17/2012    A1C 5.9 08/02/2011    A1C 5.7 11/23/2010    A1C 5.6  "07/06/2010    A1C 5.7 02/01/2010       2.  Aggressive blood pressure control, under 130/80 ideally.  Using medications if needed.    Your blood pressure is under good control    BP Readings from Last 4 Encounters:   03/27/24 132/84   09/02/22 133/81   03/14/22 125/79   02/27/22 97/72       3.  Aggressive LDL cholesterol (bad cholesterol) lowering as needed.  Your goal is an LDL under 100 for sure, preferably under 70.     *  All patients with diabetes are recommended to be on a \"statin\" cholesterol lowering medication regardless of the cholesterol levels to give the best chance at avoiding vascular disease.      New guidelines identify four high-risk groups who could benefit from statins:   *people with pre-existing heart disease, such as those who have had a heart attack;   *people ages 40 to 75 who have diabetes of any type  *patients ages 40 to 75 with at least a 7.5% risk of developing cardiovascular disease over the next decade, according to a formula described in the guidelines  *patients with the sort of super-high cholesterol that sometimes runs in families, as evidenced by an LDL of 190 milligrams per deciliter or higher    *  Your cholesterol levels are well controlled.    Recent Labs   Lab Test 07/27/23  1256 07/14/22  1133   CHOL 177 164   HDL 52 53   LDL 80 73   TRIG 223* 189*       4.  No smoking    5.  Consider daily preventative Aspirin once per day, every day over age 50 unless there is a specific reason that you cannot take aspirin.       *You should take Aspirin 81 mg once per day, unless you have a reason NOT to take aspirin (i.e. side effect, excessive bruising or bleeding, taking another \"blood tinning\" medication, etc.)       DIABETES REMINDERS:   1. Check your blood glucose regularly either with standard glucometer or personal continuous glucose monitor.    2) Your blood sugar goals:  before eating and  two hours after eating.  If using personal continuous glucose monitor, the " goal is Time in the Target range ( ) of 70-75% with very few (under 2%) Below target.    3) Always be prepared to treat low blood sugar symptoms should it happen. Keep a sugar-containing beverage or food nearby.  4) When to call your clinic:     Blood sugar over 400     If you have 2 to 3 low blood sugars (under 70) in a row    Low readings the same time of day several days in a row  5) When to call 911: If your low blood glucose symptoms do not get better with treatment, or if you/someone else is unable to give you treatment.  6) Work with a Certified Diabetes Educator to assist you with your diabetes management  7) Contact us if you have ANY questions about your medications or instructions, or have problems with getting prescriptions filled.       Lung Cancer Screening   Frequently Asked Questions  If you are at high-risk for lung cancer, getting screened with low-dose computed tomography (LDCT) every year can help save your life. This handout offers answers to some of the most common questions about lung cancer screening. If you have other questions, please call [phone] ([phone]).     What is it?  Lung cancer screening uses special X-ray technology to create an image of your lung tissue. The exam is quick and easy and takes less than 10 seconds. We don t give you any medicine or use any needles. You can eat before and after the exam. You don t need to change your clothes as long as the clothing on your chest doesn t contain metal. But, you do need to be able to hold your breath for at least 6 seconds during the exam.    What is the goal of lung cancer screening?  The goal of lung cancer screening is to save lives. Many times, lung cancer is not found until a person starts having physical symptoms. Lung cancer screening can help detect lung cancer in the earliest stages when it may be easier to treat.    Who should be screened for lung cancer?  We suggest lung cancer screening for anyone who  is at high-risk for lung cancer. You are in the high-risk group if you:     are between the ages of 55 and 79, and   have smoked at least 1 pack of cigarettes a day for 20 or more years, and   still smoke or have quit within the past 15 years.    However, if you have a new cough or shortness of breath, you should talk to your doctor before being screened.    Why does it matter if I have symptoms?  Certain symptoms can be a sign that you have a condition in your lungs that should be checked and treated by your doctor. These symptoms include fever, chest pain, a new or changing cough, shortness of breath that you have never felt before, coughing up blood or unexplained weight loss. Having any of these symptoms can greatly affect the results of lung cancer screening.       Should all smokers get an LDCT lung cancer screening exam?  It depends. Lung cancer screening is for a very specific group of men and women who have a history of heavy smoking over a long period of time (see  Who should be screened for lung cancer  above).  I am in the high-risk group, but have been diagnosed with cancer in the past. Is LDCT lung cancer screening right for me?  In some cases, you should not have LDCT lung screening, such as when your doctor is already following your cancer with CT scan studies. Your doctor will help you decide if LDCT lung screening is right for you.  Do I need to have a screening exam every year?  Yes. If you are in the high-risk group described earlier, you should get an LDCT lung cancer screening exam every year until you are 79, or are no longer willing or able to undergo screening and possible procedures to diagnose and treat lung cancer.  How effective is LDCT at preventing death from lung cancer?  Studies have shown that LDCT lung cancer screening can lower the risk of death from lung cancer by 20 percent in people who are at high-risk.  What are the risks?  There are some risks and limitations of LDCT lung  cancer screening. We want to make sure you understand the risks and benefits, so please let us know if you have any questions. Your doctor may want to talk with you more about these risks.   Radiation exposure: As with any exam that uses radiation, there is a very small increased risk of cancer. The amount of radiation in LDCT is small--about the same amount a person would get from a mammogram. Your doctor orders the exam when he or she feels the potential benefits outweigh the risks.   False negatives: No test is perfect, including LDCT. It is possible that you may have a medical condition, including lung cancer, that is not found during your exam. This is called a false negative result.   False positives and more testing: LDCT very often finds something in the lung that could be cancer, but in fact is not. This is called a false positive result. False positive tests often cause anxiety. To make sure these findings are not cancer, you may need to have more tests. These tests will be done only if you give us permission. Sometimes patients need a treatment that can have side effects, such as a biopsy. For more information on false positives, see  What can I expect from the results?    Findings not related to lung cancer: Your LDCT exam also takes pictures of areas of your body next to your lungs. In a very small number of cases, the CT scan will show an abnormal finding in one of these areas, such as your kidneys, adrenal glands, liver or thyroid. This finding may not be serious, but you may need more tests. Your doctor can help you decide what other tests you may need, if any.  What can I expect from the results?  About 1 out of 4 LDCT exams will find something that may need more tests. Most of the time, these findings are lung nodules. Lung nodules are very small collections of tissue in the lung. These nodules are very common, and the vast majority--more than 97 percent--are not cancer (benign). Most are normal  lymph nodes or small areas of scarring from past infections.  But, if a small lung nodule is found to be cancer, the cancer can be cured more than 90 percent of the time. To know if the nodule is cancer, we may need to get more images before your next yearly screening exam. If the nodule has suspicious features (for example, it is large, has an odd shape or grows over time), we will refer you to a specialist for further testing.  Will my doctor also get the results?  Yes. Your doctor will get a copy of your results.  Is it okay to keep smoking now that there s a cancer screening exam?  No. Tobacco is one of the strongest cancer-causing agents. It causes not only lung cancer, but other cancers and cardiovascular (heart) diseases as well. The damage caused by smoking builds over time. This means that the longer you smoke, the higher your risk of disease. While it is never too late to quit, the sooner you quit, the better.  Where can I find help to quit smoking?  The best way to prevent lung cancer is to stop smoking. If you have already quit smoking, congratulations and keep it up! For help on quitting smoking, please call [employer] at 8-414-QUITNOW (1-489.989.4007) or the American Cancer Society at 1-931.810.9210 to find local resources near you.  One-on-one health coaching:  If you d prefer to work individually with a health care provider on tobacco cessation, we offer:     Medication Therapy Management:  Our specially trained pharmacists work closely with you and your doctor to help you quit smoking.  Call [phone] or [phone] (toll free).

## 2024-03-27 NOTE — PROGRESS NOTES
Preventive Care Visit  [facility]  Xavi Garcia MD, Internal Medicine  Mar 27, 2024      Assessment & Plan       (Z00.01) Encounter for routine adult medical exam with abnormal findings  (primary encounter diagnosis)  Comment: Discussed cardiac disease risk factor modification including screening, preventing, and treating hypertension, elevated lipids, diabetes, and smoking cessation.    Discussed age appropriate cancer screening recommendations as dictated by age group and past medical history.    Recommended making better food choices as often as possible, including lower carb, lower fat, lower salt diet and moderation in any alcohol intake.    Recommended maintaining regular physical activity/exercise throughout their lifetime.  Recommended safety and injury prevention (I.e. seatbelt use, safety equipment like helmets when biking, etc).    Counseling:  Reviewed preventive health counseling, as reflected in patient instructions       Regular exercise       Healthy diet/nutrition       Vision screening       Hearing screening       Immunizations  Vaccinated for: Covid-19           Colorectal Cancer Screening    ATP III Guidelines  ICSI Preventive Guidelines   Plan: REVIEW OF HEALTH MAINTENANCE PROTOCOL ORDERS               (E11.42,  Z79.4) Type 2 diabetes mellitus with diabetic polyneuropathy, with long-term current use of insulin (H)    Comment: Needs better control.   She would tremendously benefit from use of continuous glucose monitor for closer monitoring and managemnet of her diabetes.     Has follow up appointment next week with Endocrinology.     I recommend a personal continuous glucose monitoring systems (such as Freestyle Jen or DexCom, etc.) as an alternative to the standard glucometer.  These systems allow for continuous glucose monitoring for 7-14 days and will provide much more data regarding glucose fluctuations, giving real time feedback on the effect of lifestyle  "and diet interventions, and medication effect.  I described the basics on how these devices operate and the potentially huge benefit that comes with closer observation of glucose levels.  Discussed the goal of \"time in the target range\"  between  approximately 75% or better with very few , if any, low glucose readings (below 2% ideally).     Plan: glipiZIDE (GLUCOTROL XL) 5 MG 24 hr tablet,         metFORMIN (GLUCOPHAGE XR) 500 MG 24 hr tablet,         rosuvastatin (CRESTOR) 10 MG tablet, REVIEW OF         HEALTH MAINTENANCE PROTOCOL ORDERS, BASIC         METABOLIC PANEL, T4, free, TSH, CBC with         platelets, Hemoglobin A1c, Lipid panel reflex         to direct LDL Fasting, AST, ALT, insulin pen         needle (31G X 5 MM) 31G X 5 MM miscellaneous,         DISCONTINUED: Continuous Blood Gluc Sensor         (FREESTYLE NICK 3 SENSOR) MISC            (F33.0) Major depressive disorder, recurrent episode, mild (H24)  Comment: This condition is currently controlled on the current medical regimen.  Continue current therapy.   She has not experienced any significant side effects of this medication.   Plan: citalopram (CELEXA) 40 MG tablet, traZODone         (DESYREL) 50 MG tablet, REVIEW OF HEALTH         MAINTENANCE PROTOCOL ORDERS            (E03.9) Hypothyroidism, unspecified type  Comment: recheck labs, titrate dose.   Plan: levothyroxine (SYNTHROID/LEVOTHROID) 150 MCG         tablet, REVIEW OF HEALTH MAINTENANCE PROTOCOL         ORDERS, T4, free, TSH            (I10) Essential hypertension, benign  Comment: This condition is currently controlled on the current medical regimen.  Continue current therapy.   Plan: lisinopril-hydrochlorothiazide (ZESTORETIC)         20-25 MG tablet, REVIEW OF HEALTH MAINTENANCE         PROTOCOL ORDERS, BASIC METABOLIC PANEL, CBC         with platelets            (E78.5) Hyperlipidemia LDL goal <100  Comment: This condition is currently controlled on the current medical regimen. "  Continue current therapy.  Discussed guidelines recommending a statin cholesterol lowering medication for any patient with either diabetes and/or vascular disease, aiming for a LDL goal under 100 for sure, ideally under 70, using whatever dose of statin tolerated.  Plan: rosuvastatin (CRESTOR) 10 MG tablet, REVIEW OF         HEALTH MAINTENANCE PROTOCOL ORDERS, Lipid panel        reflex to direct LDL Fasting, AST, ALT            (Z12.11) Screen for colon cancer  Comment: Discussed current colon cancer screening recommendations.    Colonoscopy as the gold standard for colon cancer screening as this gives opportunity to identify and remove precancerous polyps.  We can send a referral for this procedure as needed.     If colonoscopy not covered or the patient does not want to do this study and the patient is average risk for colon cancer, then I recommended either the ColoGuard stool test every 3 years or the FIT test annually.  I explained that a positive test for either of these tests does not necessarily mean colon cancer, it just means that they will need a more advanced test like colonoscopy.    Plan: REVIEW OF HEALTH MAINTENANCE PROTOCOL ORDERS            (Z12.31) Visit for screening mammogram  Comment:   Plan: MA SCREENING DIGITAL BILAT - Future  (s+30),         REVIEW OF HEALTH MAINTENANCE PROTOCOL ORDERS            (Z79.4) Long term (current) use of insulin (H)  Comment:   Plan: REVIEW OF HEALTH MAINTENANCE PROTOCOL ORDERS,         insulin pen needle (31G X 5 MM) 31G X 5 MM         miscellaneous            (J45.30) Mild persistent asthma, uncomplicated  Comment: This condition is currently controlled on the current medical regimen.  Continue current therapy.   Plan: REVIEW OF HEALTH MAINTENANCE PROTOCOL ORDERS,         albuterol (PROAIR HFA/PROVENTIL HFA/VENTOLIN         HFA) 108 (90 Base) MCG/ACT inhaler            (E55.9) Vitamin D deficiency  Comment:   Plan: REVIEW OF HEALTH MAINTENANCE PROTOCOL ORDERS,          cholecalciferol (VITAMIN D3) 125 mcg (5000         units) capsule            (G89.29) Other chronic pain  Comment:   Plan: REVIEW OF HEALTH MAINTENANCE PROTOCOL ORDERS,         ibuprofen (ADVIL/MOTRIN) 800 MG tablet            (R53.81) Physical deconditioning  Comment:   Plan: REVIEW OF HEALTH MAINTENANCE PROTOCOL ORDERS,         ibuprofen (ADVIL/MOTRIN) 800 MG tablet            (M53.3) SI (sacroiliac) joint dysfunction  Comment:   Plan: REVIEW OF HEALTH MAINTENANCE PROTOCOL ORDERS,         ibuprofen (ADVIL/MOTRIN) 800 MG tablet            (M25.552) Hip pain, left  Comment:   Plan: REVIEW OF HEALTH MAINTENANCE PROTOCOL ORDERS,         ibuprofen (ADVIL/MOTRIN) 800 MG tablet            (Z87.891) Personal history of tobacco use  Comment: recommend lung cancer screenign via low kb [facility]  Plan: REVIEW OF HEALTH MAINTENANCE PROTOCOL ORDERS,         Prof fee: Shared Decision Making for Lung         Cancer Screening, CT Chest Lung Cancer Scrn Low        Dose wo, SMOKING CESSATION COUNSELING 3-10 MIN            (Z00.01) Encounter for routine adult medical exam with abnormal findings  Comment:   Plan: REVIEW OF HEALTH MAINTENANCE PROTOCOL ORDERS            (M25.512,  G89.29) Chronic left shoulder pain  Comment: reduced range of motion, liekly adhesive capsulitis.   Plan: Physical Therapy  Referral, Orthopedic         Referral            (M75.02) Adhesive capsulitis of left shoulder  Comment: referral to PT  Plan: Physical Therapy  Referral, Orthopedic         Referral            (T78.2XXD) Anaphylactic reaction, subsequent encounter  Comment:   Plan: REVIEW OF HEALTH MAINTENANCE PROTOCOL ORDERS,         EPINEPHrine (ANY BX GENERIC EQUIV) 0.3 MG/0.3ML        injection 2-pack            (Z23) High priority for COVID-19 vaccination  Comment:   Plan: COVID-19 12+ (2023-24) (PFIZER), REVIEW OF         HEALTH MAINTENANCE PROTOCOL ORDERS            (R13.10) Dysphagia, unspecified  type  Comment:   Plan: Adult ENT  Referral            (R49.9) Abnormal voice  Comment:   Plan: Adult ENT  Referral               Referral to ENT specialist to investigate your abnormal swalloing and voice changes.   Call to make your own appointment there.     [facility] ([facility] [address] [place] [address]   Phone: [phone]   Fax: [phone]        Referral for physical therapy and for sports medicine to look at your frozen shoulder on the left.   You will be contacted to make these appointments.      Referral for routine lung cancer screening.       Checking labs today, I will make recommendations based on the results.      Follow up with the Endocrinology clinic as planned next week.     Start using the [employer] 3 continuous glucose monitor now so you have data for the Endo appointment.        Continue all other medications at the same doses.  Contact your usual pharmacy if you need refills.      Return to see me in 6 months, sooner if needed.  Use "[employer] or Call [phone] to schedule the appointment with me.          Patient has been advised of split billing requirements and indicates understanding: Yes          Nicotine/Tobacco Cessation  She reports that she has been smoking other and cigarettes. She started smoking about 41 years ago. She has a 45 pack-year smoking history. She has never used smokeless tobacco.  Nicotine/Tobacco Cessation Plan  Information offered: Patient not interested at this time      Counseling  Appropriate preventive services were discussed with this patient, including applicable screening as appropriate for fall prevention, nutrition, physical activity, Tobacco-use cessation, weight loss and cognition.  Checklist reviewing preventive services available has been given to the patient.  The patient's PHQ-9 score is consistent with mild depression. She was provided with information regarding depression.            Bart Barreto is a 63 year old, presenting for the following:  Physical        3/27/2024     9:28 AM   Additional Questions   Roomed by Ruba ELISE CMA     Health Care Directive  Patient does not have a Health Care Directive or Living Will: Discussed advance care planning with patient; however, patient declined at this time.    HPI      1.)  Diabetes:  In regards specifically to the patient's diabetes, they reports no unusual symptoms.  Medication compliance: good  Diabetic diet compliance: good    Patient reports no significant episodes of hypo- or hyperglycemia    Diabetic complications: neuropathy     Most  recent labs:    Lab Results   Component Value Date    A1C 14.2 03/27/2024    A1C 9.5 11/10/2023    A1C 8.3 07/27/2023    A1C 8.7 02/06/2023    A1C 8.0 07/14/2022    A1C 9.2 01/28/2022    A1C 7.7 12/18/2020    A1C 11.2 05/27/2020    A1C 10.3 11/06/2019    A1C 11.0 08/06/2019    A1C 12.6 04/03/2019    A1C 8.9 06/26/2018    A1C 12.5 01/16/2018    A1C 12.4 09/01/2017    A1C 11.1 03/21/2017    A1C 11.1 11/10/2016    A1C 11.5 07/05/2016    A1C 7.2 08/31/2015    A1C 8.8 05/14/2015    A1C 9.0 03/02/2015    A1C 7.0 11/14/2013    A1C 7.9 10/07/2013    A1C 8.1 08/13/2013    A1C 9.0 06/18/2013    A1C 7.7 02/28/2013    A1C 6.5 04/17/2012    A1C 5.9 08/02/2011    A1C 5.7 11/23/2010    A1C 5.6 07/06/2010    A1C 5.7 02/01/2010          2.)  Hypertension:  History of hypertension, on medication.  No reported side effects from medications.    Reviewed last 6 BP readings in chart:  BP Readings from Last 6 Encounters:   03/27/24 132/84   09/02/22 133/81   03/14/22 125/79   02/27/22 97/72   02/09/22 (!) 140/83   12/07/21 (!) 135/104     No active cardiac complaints or symptoms with exercise.       3.)  History of hypothyroidism.  On replacement therapy.  She has not experienced any significant side effects of this medication.   The patient denies of fatigue, weight changes, heat/cold intolerance, hair changes,  nail changes, bowel changes.     Latest labs reviewed:    Lab Results   Component Value Date    TSH 4.41 03/27/2024    TSH 0.43 11/10/2023    TSH 0.04 07/27/2023    TSH 0.31 02/06/2023    TSH 0.03 07/14/2022    TSH 10.36 01/28/2022    TSH 0.70 12/18/2020    TSH 0.38 05/27/2020    TSH 1.83 11/06/2019    TSH 2.75 04/03/2019    TSH 1.21 06/26/2018    TSH 1.14 01/16/2018    TSH 43.86 09/01/2017    TSH 36.52 03/21/2017    TSH 5.93 11/10/2016    TSH 0.29 07/05/2016    TSH 1.30 03/04/2016    TSH 24.49 11/25/2015    TSH 0.06 08/31/2015    TSH 0.17 05/14/2015    TSH 3.23 03/02/2015    TSH 0.34 11/14/2013    TSH 3.00 08/13/2013    TSH 0.12 06/18/2013    TSH 59.20 02/28/2013    TSH 0.40 04/17/2012    TSH 6.44 01/05/2012    TSH 0.17 08/02/2011    TSH 0.02 07/07/2010    TSH 0.08 02/02/2010         4.)  Depression:  Has known history of depression.  Has been on medication for this.  The patient does not report any significant side effects of this medication.  The prior symptoms leading to the original diagnosis and decision to start medication therapy are better.     Appetite is stable.  Sleeping patterns are stable.    No reported thoughts of suicide or homicide.        8/30/2022     7:20 PM 3/27/2024     9:23 AM 4/3/2024     2:53 PM   PHQ   PHQ-9 Total Score 15 9 18   Q9: Thoughts of better off dead/self-harm past 2 weeks Not at all Not at all Not at all        5.)  Asthma stable.  Has not had any recent breathing troubles beyond usual baseline.  Has not any recent acute respiratory events.  Using medication as directed with reported side effects        8/30/2022     7:24 PM 8/30/2022     7:25 PM 3/27/2024     9:30 AM   ACT Total Scores   ACT TOTAL SCORE (Goal Greater than or Equal to 20)  25 22   In the past 12 months, how many times did you visit the emergency room for your asthma without being admitted to the hospital?  0 0   In the past 12 months, how many times were you hospitalized overnight because of your asthma?  0 0    C-ACT Total Score 27     In the past 12 months, how many times did you visit the emergency room for your asthma without being admitted to the hospital? 0     In the past 12 months, how many times were you hospitalized overnight because of your asthma? 0            6.) reports changes in her voivce and senation of food swallwoing difficulties over pat few months.         3/27/2024   General Health   How would you rate your overall physical health? (!) POOR   Feel stress (tense, anxious, or unable to sleep) To some extent   (!) STRESS CONCERN      3/27/2024   Nutrition   Diet: Diabetic         3/27/2024   Exercise   Days per week of moderate/strenous exercise 0 days   (!) EXERCISE CONCERN      3/27/2024   Social Factors   Frequency of gathering with friends or relatives Patient declined   Worry food won't last until get money to buy more Yes   Food not last or not have enough money for food? Yes   Do you have housing?  Yes   Are you worried about losing your housing? No   Lack of transportation? No   Unable to get utilities (heat,electricity)? No   (!) FOOD SECURITY CONCERN PRESENT      10/5/2021   Fall Risk   Fallen 2 or more times in the past year? No          3/27/2024   Activities of Daily Living- Home Safety   Needs help with the following daily activites Housework    Laundry   Safety concerns in the home None of the above         3/27/2024   Dental   Dentist two times every year? (!) NO         3/27/2024   Hearing Screening   Hearing concerns? (!) I FEEL THAT PEOPLE ARE MUMBLING OR NOT SPEAKING CLEARLY.    (!) I NEED TO ASK PEOPLE TO SPEAK UP OR REPEAT THEMSELVES.    (!) IT'S HARD TO FOLLOW A CONVERSATION IN A NOISY RESTAURANT OR CROWDED ROOM.    (!) TROUBLE UNDERSTANDING SOFT OR WHISPERED SPEECH.         3/27/2024   Driving Risk Screening   Patient/family members have concerns about driving No         3/27/2024   General Alertness/Fatigue Screening   Have you been more tired than usual lately? (!) YES          3/27/2024   Urinary Incontinence Screening   Bothered by leaking urine in past 6 months Yes         3/27/2024   TB Screening   Were you born outside of the [place]? No       Today's PHQ-9 Score:       4/3/2024     2:53 PM   PHQ-9 SCORE   PHQ-9 Total Score 18         3/27/2024   Substance Use   Alcohol more than 3/day or more than 7/wk Not Applicable   Do you have a current opioid prescription? No   How severe/bad is pain from 1 to 10? 6/10   Do you use any other substances recreationally? No     Social History     Tobacco Use    Smoking status: Every Day     Packs/day: 1.50     Years: 30.00     Additional pack years: 0.00     Total pack years: 45.00     Types: Other, Cigarettes     Start date: 6/27/1982    Smokeless tobacco: Never    Tobacco comments:     started at age 19 and has quit for 10 years    Vaping Use    Vaping Use: Never used   Substance Use Topics    Alcohol use: No    Drug use: No             3/27/2024   Breast Cancer Screening   Family history of breast, colon, or ovarian cancer? Yes         3/27/2024   LAST FHS-7 RESULTS   1st degree relative breast or ovarian cancer Unknown   Any relative bilateral breast cancer No   Any male have breast cancer No   Any ONE woman have BOTH breast AND ovarian cancer No   Any woman with breast cancer before 50yrs No   2 or more relatives with breast AND/OR ovarian cancer Yes   2 or more relatives with breast AND/OR bowel cancer No        Mammogram Screening - Mammogram every 1-2 years updated in Health Maintenance based on mutual decision making      History of abnormal Pap smear: NO - age 30-65 PAP every 5 years with negative HPV co-testing recommended        5/12/2014    12:00 AM 7/21/2011     4:36 PM   PAP / HPV   PAP (Historical) NIL  NIL      ASCVD Risk   The 10-year ASCVD risk score (Josefa ASHRAF, et al., 2019) is: 23.5%    Values used to calculate the score:      Age: 63 years      Sex: Female      Is Non- : No      Diabetic: Yes       Tobacco smoker: Yes      Systolic Blood Pressure: 132 mmHg      Is BP treated: Yes      HDL Cholesterol: 45 mg/dL      Total Cholesterol: 227 mg/dL            Reviewed and updated as needed this visit by Provider                      **I reviewed the information recorded in the patient's EPIC chart (including but not limited to medical history, surgical history, family history, problem list, medication list, and allergy list) and updated the information as indicated based on the patients reported information.         Current providers sharing in care for this patient include:  Patient Care Team:  Xavi Garcia MD as PCP - General (Internal Medicine)  Xavi Garcia MD as Assigned PCP  Nina Silverman RD as Diabetes Educator (Dietitian, Registered)  Susan Ramirez MD as Assigned Endocrinology Provider    The following health maintenance items are reviewed in Epic and correct as of today:  Health Maintenance   Topic Date Due    URINE DRUG SCREEN  Never done    PAP  05/12/2017    LUNG CANCER SCREENING  08/26/2017    COLORECTAL CANCER SCREENING  12/11/2017    ASTHMA ACTION PLAN  03/14/2019    DIABETIC FOOT EXAM  04/03/2020    MAMMO SCREENING  11/08/2021    RSV VACCINE (Pregnancy & 60+) (1 - 1-dose 60+ series) Never done    INFLUENZA VACCINE (1) 09/01/2023    MICROALBUMIN  07/28/2024    EYE EXAM  08/22/2024    ASTHMA CONTROL TEST  09/27/2024    PHQ-9  10/03/2024    NICOTINE/TOBACCO CESSATION COUNSELING Q 1 YR  03/27/2025    MEDICARE ANNUAL WELLNESS VISIT  03/27/2025    A1C  03/27/2025    BMP  03/27/2025    LIPID  03/27/2025    TSH W/FREE T4 REFLEX  03/27/2025    ANNUAL REVIEW OF HM ORDERS  03/27/2025    Pneumococcal Vaccine: Pediatrics (0 to 5 Years) and At-Risk Patients (6 to 64 Years) (3 of 3 - PPSV23 or PCV20) 06/27/2027    ADVANCE CARE PLANNING  09/09/2027    DTAP/TDAP/TD IMMUNIZATION (3 - Td or Tdap) 12/18/2030    HEPATITIS C SCREENING  Completed    HIV SCREENING  Completed    DEPRESSION  "ACTION PLAN  Completed    MIGRAINE ACTION PLAN  Completed    ZOSTER IMMUNIZATION  Completed    COVID-19 Vaccine  Completed    IPV IMMUNIZATION  Aged Out    HPV IMMUNIZATION  Aged Out    MENINGITIS IMMUNIZATION  Aged Out    RSV MONOCLONAL ANTIBODY  Aged Out         Review of Systems  Constitutional, HEENT, cardiovascular, pulmonary, gi and gu systems are negative, except as otherwise noted.     Objective    Exam  /84   Pulse 102   Temp 98.4  F (36.9  C) (Oral)   Ht 1.638 m (5' 4.5\")   Wt 60.3 kg (133 lb)   LMP  (LMP Unknown)   SpO2 96%   Breastfeeding No   BMI 22.48 kg/m     Estimated body mass index is 22.48 kg/m  as calculated from the following:    Height as of this encounter: 1.638 m (5' 4.5\").    Weight as of this encounter: 60.3 kg (133 lb).    Physical Exam  GENERAL alert and no distress  EYES:  Normal sclera,conjunctiva, EOMI  HENT: oral and posterior pharynx without lesions or erythema, facies symmetric  NECK: Neck supple. No LAD, without thyroidmegaly.  RESP: Clear to ausculation bilaterally without wheezes or crackles. Normal BS in all fields.  CV: RRR normal S1S2 without murmurs, rubs or gallops.  LYMPH: no cervical lymph adenopathy appreciated  MS: extremities- no gross deformities of the visible extremities noted,   Reduced range of motion of left shoulderf, uable to lift over head easily.   Specific testing rotator cuff muscles only partially produces pain.   EXT:  no lower extremity edema  PSYCH: Alert and oriented times 3; speech- coherent  SKIN:  No obvious significant skin lesions on visible portions of face         3/27/2024   Mini Cog   Clock Draw Score 2 Normal   3 Item Recall 3 objects recalled   Mini Cog Total Score 5              Discussed that additional charges may be applied for addressing concerns at today's visit above and beyond what is covered by the \"routine physical exam\" diagnosis code.  Patient verbalized understanding and would like additional concerns addressed " today.    Spent greater than 25 minutes above and beyond routine Health Care Maintenence issues on the above mentioned additional problem(s).       Signed Electronically by: Xavi Garcia MD

## 2024-04-03 ENCOUNTER — VIRTUAL VISIT (OUTPATIENT)
Dept: ENDOCRINOLOGY | Facility: CLINIC | Age: 64
End: 2024-04-03
Payer: MEDICARE

## 2024-04-03 VITALS — WEIGHT: 133 LBS | BODY MASS INDEX: 22.48 KG/M2

## 2024-04-03 DIAGNOSIS — E03.9 HYPOTHYROIDISM, UNSPECIFIED TYPE: ICD-10-CM

## 2024-04-03 DIAGNOSIS — Z79.4 TYPE 2 DIABETES MELLITUS WITH DIABETIC POLYNEUROPATHY, WITH LONG-TERM CURRENT USE OF INSULIN (H): Primary | ICD-10-CM

## 2024-04-03 DIAGNOSIS — E11.42 TYPE 2 DIABETES MELLITUS WITH DIABETIC POLYNEUROPATHY, WITH LONG-TERM CURRENT USE OF INSULIN (H): Primary | ICD-10-CM

## 2024-04-03 PROCEDURE — 99214 OFFICE O/P EST MOD 30 MIN: CPT | Mod: 95 | Performed by: PHYSICIAN ASSISTANT

## 2024-04-03 ASSESSMENT — PATIENT HEALTH QUESTIONNAIRE - PHQ9: SUM OF ALL RESPONSES TO PHQ QUESTIONS 1-9: 18

## 2024-04-03 NOTE — PROGRESS NOTES
Virtual Visit Details    Type of service:  Video Visit     Originating Location (pt. Location): Home    Distant Location (provider location):  Off-site  Platform used for Video Visit: Live Mobile    Depression Response    Patient completed the PHQ-9 assessment for depression and scored >9? Yes  Question 9 on the PHQ-9 was positive for suicidality? No  Does patient have current mental health provider? Yes    Is this a virtual visit? Yes   Does patient have suicidal ideation (positive question 9)? No - offer to place Mental Health Referral.  Patient declined referral/not needed    I personally notified the following: visit provider

## 2024-04-03 NOTE — PATIENT INSTRUCTIONS
Mary Lou it was nice meeting with you today.    Please  the Lantus as soon as possible. Again, you are to start at 25 unit(s) every morning. Keep monitoring your blood sugars with the Jen 3 sensor. Please let me know if your blood sugars don't start to improve.    Also, the test that we discussed was a c peptide. I will place the order today. It will be drawn at your next lab test.    Thanks,  Yanna

## 2024-04-03 NOTE — PROGRESS NOTES
Assessment/Plan :   Type 2 DM, uncontrolled. Mary Lou's blood sugars are very elevated. She knew that her blood sugars were not going to be great but she is disappointed that they are so elevated. She needs to get back on insulin, today. I will send in a new prescription for Lantus 25 unit(s) daily. She has already restarted metformin and glipizide. I will also send in a new prescription for Trulicity 0.75 mg weekly. Since she has been out of the Trulicity for a few months, we should start at the low dose. I do want to check a c peptide level. I am worried that Mary Lou may no longer be making insulin. We need to follow-up in 1 month. I want to make sure that her blood sugars are improving. If she has any problems picking up the Lantus, she is to reach out to our office.  Hypothyroidism. We discussed her recent laboratory results. The levels are off but I do not see any reason to make adjustments to her levothyroxine today. She is to continue with 150 mcg daily. We will repeat testing in follow-up.     Due to the COVID 19 pandemic this visit was a telephone/video visit in order to help prevent spread of infection in this patient and the general population. The patient gave verbal consent for the visit today. I have independently reviewed and interpreted labs, imaging as indicated.       Distant Location (provider location):  Off-site  Mode of Communication:  Video Conference via [place] [employer]  Chart review/prep time 5   Joined the call at 4/3/2024, 2:57:10 pm.  Left the call at 4/3/2024, 3:07:10 pm.  You were on the call for 10 minutes .  14 minutes spent on the date of the encounter doing chart review, history and exam, documentation and further activities as noted above.      Chief complaint:  Mary Lou is a 63 year old female who returns for follow-up of Type 2 DM.    I have reviewed Care Everywhere including [facility], [facility], [facility], [facility], [place], [facility], [facility] , [facility], [place] lab  reports, imaging reports and provider notes as indicated.      HISTORY OF PRESENT ILLNESS  Mary Lou is not doing well. She has been working in [place] while she is still living in [place]. The constant traveling back and forth has been difficult. She was recently taking metformin XR 2000 mg daily, glipizide XL 5 mg daily and Levemir 25 unit(s) daily. She had also been taking Trulicity 3 mg weekly but the pharmacy has been out of the medication for the last few months. About a week or two ago, her last Levemir pen jammed. She knows that it is important to take insulin but she didn't have time to  another prescription. She has also not been taking metformin or glipizide, consistently. However, she was still shocked to learn that her A1C had increased to 14.2%.    Mary Lou has not been monitoring her blood sugars consistently. She was worried about hyperglycemia because she has been very thirsty and she has been urinating every 30 minutes. She has also been having facial and muscle twitches. She thought that it may be due to her thyroid level being off. She denies any problems with blurred vision or worsening numbness/tingling.     Mary Lou was diagnosed with diabetes at the age of 46. She was started on oral medications at the time of diagnosis and then started on insulin in 2016. She admits that she has struggled with glucose control since diagnosis. However, she felt like things were going well when she was taking metformin, glipizide, Trulicity, and Levemir. Her diabetes is complicated by hyperlipidemia, HTN, and hypothyroidism.    Endocrine relevant labs are as follows:   Latest Reference Range & Units 03/27/24 11:01   Hemoglobin A1C 0.0 - 5.6 % 14.2 (H)   (H): Data is abnormally high   Latest Reference Range & Units 11/10/23 13:14   Hemoglobin A1C 0.0 - 5.6 % 9.5 (H)   (H): Data is abnormally high   Latest Reference Range & Units 07/28/23 11:13   Albumin Urine mg/g Cr 0.00 - 25.00 mg/g Cr 12.38       Latest Reference Range & Units 07/28/23 11:13   Albumin Urine mg/L mg/L 13.0      Latest Reference Range & Units 03/27/24 11:01   T4 Free 0.90 - 1.70 ng/dL 2.04 (H)   (H): Data is abnormally high   Latest Reference Range & Units 03/27/24 11:01   TSH 0.30 - 4.20 uIU/mL 4.41 (H)   (H): Data is abnormally high    REVIEW OF SYSTEMS    Endocrine: positive for thyroid disorder and diabetes  Skin: negative  Eyes: negative for, visual blurring, redness, tearing  Ears/Nose/Throat: negative  Respiratory: No shortness of breath, dyspnea on exertion, cough, or hemoptysis  Cardiovascular: negative for, irregular heart beat, chest pain, lower extremity edema, and exercise intolerance  Gastrointestinal: negative for, nausea, vomiting, constipation, and diarrhea  Genitourinary: negative for, nocturia, dysuria, frequency, and urgency  Musculoskeletal: positive for cramping and muscle twitching, negative for, muscular weakness, and foot pain  Neurologic: negative for, local weakness, numbness or tingling of hands, and numbness or tingling of feet  Psychiatric: negative  Hematologic/Lymphatic/Immunologic: negative    Past Medical History  Past Medical History:   Diagnosis Date    Anemia, unspecified 02/01/2010    Degeneration of lumbar intervertebral disc 07/11/2016    Depressive disorder     Diabetes mellitus (H) 02/01/2010    Dx in 2006;  insulin started 7/9/15    Essential hypertension, benign 02/01/2010    Hyperlipidaemia     Hyperlipidemia LDL goal < 100     Migraine     Other chronic pain     back, legs and feet    Spinal stenosis     Tobacco use disorder 02/01/2010    Uncomplicated asthma     ? with allergic reactions?    Unspecified hypothyroidism 02/01/2010       Medications  Current Outpatient Medications   Medication Sig Dispense Refill    albuterol (PROAIR HFA/PROVENTIL HFA/VENTOLIN HFA) 108 (90 Base) MCG/ACT inhaler Inhale 2 puffs into the lungs every 4 hours as needed for shortness of breath or wheezing 18 g 11     cholecalciferol (VITAMIN D3) 125 mcg (5000 units) capsule Take 1 capsule (125 mcg) by mouth daily 90 capsule 3    citalopram (CELEXA) 40 MG tablet Take 1 tablet (40 mg) by mouth daily 90 tablet 1    Continuous Blood Gluc Sensor (FREESTYLE NICK 3 SENSOR) [facility] 1 each every 14 days Use 1 sensor every 14 days. Use to read blood sugars per 's instructions. 2 each 5    DiphenhydrAMINE HCl (BENADRYL PO) Take 25-50 mg by mouth See Admin Instructions 25 mg during the day if needed and 50 mg at bedtime for sleep as needed      Dulaglutide (TRULICITY) 3 MG/0.5ML SOPN Inject 3 mg Subcutaneous once a week 6 mL 1    EPINEPHrine (ANY BX GENERIC EQUIV) 0.3 MG/0.3ML injection 2-pack Inject 0.3 mLs (0.3 mg) into the muscle once as needed for anaphylaxis (or allergic reaction.) 0.6 mL 3    glipiZIDE (GLUCOTROL XL) 5 MG 24 hr tablet Take 1 tablet (5 mg) by mouth daily with food 90 tablet 0    ibuprofen (ADVIL/MOTRIN) 800 MG tablet Take 1 tablet (800 mg) by mouth every 8 hours as needed for moderate pain 90 tablet 2    insulin detemir (LEVEMIR FLEXTOUCH) 100 UNIT/ML pen INJECT 25 UNITS UNDER THE SKIN  EVERY MORNING 30 mL 0    insulin pen needle (31G X 5 MM) 31G X 5 MM miscellaneous Use ONE pen needles daily or as directed. 100 each 3    levothyroxine (SYNTHROID/LEVOTHROID) 150 MCG tablet TAKE ONE TABLET BY MOUTH ONCE DAILY 6 DAYS PER WEEK, AND skip ONE DAY A WEEK 90 tablet 1    lisinopril-hydrochlorothiazide (ZESTORETIC) 20-25 MG tablet TAKE ONE TABLET BY MOUTH EVERY MORNING.NTBS 90 tablet 0    metFORMIN (GLUCOPHAGE XR) 500 MG 24 hr tablet Take 2 tablets (1,000 mg) by mouth daily (with dinner) 180 tablet 0    rosuvastatin (CRESTOR) 10 MG tablet Take 1 tablet (10 mg) by mouth daily 90 tablet 3    traZODone (DESYREL) 50 MG tablet TAKE ONE OR TWO TABLETS BY MOUTH EVERY NIGHT AT BEDTIME AS NEEDED FOR SLEEP 180 tablet 2    albuterol (PROVENTIL) (2.5 MG/3ML) 0.083% neb solution Take 1 vial (2.5 mg) by nebulization every 6 hours  as needed for shortness of breath / dyspnea or wheezing (Patient not taking: Reported on 3/27/2024) 1 Box 5    blood glucose (NO BRAND SPECIFIED) test strip Use to test blood sugar prn to calibrate up to one times daily (Patient not taking: Reported on 3/27/2024) 100 strip 0    blood glucose calibration (NO BRAND SPECIFIED) solution Check glucometer accuracy once per month; To accompany: Blood Glucose Monitor Brands: per insurance. (Patient not taking: Reported on 3/27/2024) 1 Bottle 3    blood glucose monitoring (NO BRAND SPECIFIED) meter device kit Use to test blood sugar as directed. Preferred blood glucose meter OR supplies to accompany: Blood Glucose Monitor Brands: per insurance. (Patient not taking: Reported on 3/27/2024) 1 kit 0    Continuous Blood Gluc  (FREESTYLE NICK 14 DAY READER) ARSLAN 1 each daily For nick personal continuous glucose monitor system. Sensors are changed every 14 days( 2 sensors--> 1 month). 1 reader. (Patient not taking: Reported on 3/27/2024) 1 each 0    Continuous Blood Gluc Sensor (FREESTYLE NICK 14 DAY SENSOR) MISC CHANGE EVERY 14 DAYS (Patient not taking: Reported on 3/27/2024) 2 each 0    CONTOUR NEXT TEST test strip USE TO TEST BLOOD SUGAR 3 TIMES A DAY OR AS DIRECTED (Patient not taking: Reported on 3/27/2024) 200 strip 3    Cyanocobalamin (B-12) 1000 MCG SUBL Place 1,000 mcg under the tongue daily (Patient not taking: Reported on 3/27/2024) 90 tablet 3    fluticasone (FLONASE) 50 MCG/ACT nasal spray Spray 2 sprays into both nostrils daily (Patient not taking: Reported on 3/27/2024) 48 g 1    thin (NO BRAND SPECIFIED) lancets Check glucose 3 times per day; To accompany: Blood Glucose Monitor Brands: per insurance. (Patient not taking: Reported on 3/27/2024) 200 each 11    UltiCare Alcohol Swabs 70 % PADS USE TO SWAB AREA OF INJECTION/LANCET AS DIRECTED (Patient not taking: Reported on 3/27/2024) 100 each 3       Allergies  Allergies   Allergen Reactions    Gabapentin  Itching, Rash and Swelling     swelling    Nicotine Polacrilex Anaphylaxis     PROBABLY REACTION TO VINYL IN NICOTINE PATCH   PROBABLY REACTION TO VINYL IN NICOTINE PATCH    PROBABLY REACTION TO VINYL IN NICOTINE PATCH      PROBABLY REACTION TO VINYL IN NICOTINE PATCH PROBABLY REACTION TO VINYL IN NICOTINE PATCH    Vinyl Ether Swelling and Cough     Vinyl causes throat and lip swelling, cough and headache    Acetaminophen Itching    Norvasc [Amlodipine Besylate] GI Disturbance     constipation    Percocet [Oxycodone-Acetaminophen] Itching    Sulfamethoxazole-Trimethoprim Itching and Other (See Comments)    Oxycodone Itching       Family History  family history includes Aneurysm in her mother; C.A.D. in her mother and sister; Cerebrovascular Disease in her mother; Diabetes in her mother, sister, sister, sister, and sister; Hypertension in her mother, sister, sister, sister, and sister; Thyroid Disease in her sister; Unknown/Adopted in her brother, brother, and sister.    Social History  Social History     Tobacco Use    Smoking status: Every Day     Packs/day: 1.50     Years: 30.00     Additional pack years: 0.00     Total pack years: 45.00     Types: Other, Cigarettes     Start date: 6/27/1982    Smokeless tobacco: Never    Tobacco comments:     started at age 19 and has quit for 10 years    Vaping Use    Vaping Use: Never used   Substance Use Topics    Alcohol use: No    Drug use: No       Physical Exam  Body mass index is 22.48 kg/m .  GENERAL: no distress  SKIN: Visible skin clear. No significant rash, abnormal pigmentation or lesions.  EYES: Eyes grossly normal to inspection.  No discharge or erythema, or obvious scleral/conjunctival abnormalities.  NECK: visible goiter is not present; no visible neck masses  RESP: No audible wheeze, cough, or visible cyanosis.  No visible retractions or increased work of breathing.    NEURO: Awake, alert, responds appropriately to questions.  Mentation and speech  fluent.  PSYCH:affect normal and appearance well-groomed.      DATA REVIEW  FreeStyle LibreView  Time in target range 0%  Very High 100%  Current Ave  mg/dl

## 2024-04-03 NOTE — NURSING NOTE
Is the patient currently in the state of [place]? YES    Visit mode:VIDEO    If the visit is dropped, the patient can be reconnected by: VIDEO VISIT: Text to cell phone:   Telephone Information:   Mobile [phone]       Will anyone else be joining the visit? NO  (If patient encounters technical issues they should call [phone]    How would you like to obtain your AVS? MyChart    Are changes needed to the allergy or medication list? No    Are refills needed on medications prescribed by this physician? NO    Reason for visit: RECHECK and Video Visit    Jayna CAR

## 2024-04-03 NOTE — LETTER
4/3/2024         RE: Mary Lou Gil  [id number]  [facility][1] [id number]        Dear Colleague,    Thank you for referring your patient, Mary Lou Gil, to the [facility]. Please see a copy of my visit note below.    Outcome for 03/25/24 12:32 PM: Notifo message sent  Sol Encinas LPN   Outcome for 04/01/24 8:41 AM: Data uploaded on [employer]  Sol Encinas LPN   Outcome for 04/02/24 8:58 AM: Data obtained via [employer] website  Sol Encinas LPN       Patient is showing 2/5 MNCM met. A1c not in range, Current tobacco use, and Aspirin not prescribed   Sol Encinas LPN                Virtual Visit Details    Type of service:  Video Visit     Originating Location (pt. Location): Home    Distant Location (provider location):  Off-site  Platform used for Video Visit: [facility]    Depression Response    Patient completed the PHQ-9 assessment for depression and scored >9? Yes  Question 9 on the PHQ-9 was positive for suicidality? No  Does patient have current mental health provider? Yes    Is this a virtual visit? Yes   Does patient have suicidal ideation (positive question 9)? No - offer to place Mental Health Referral.  Patient declined referral/not needed    I personally notified the following: visit provider             Assessment/Plan :   Type 2 DM, uncontrolled. Mary Lou's blood sugars are very elevated. She knew that her blood sugars were not going to be great but she is disappointed that they are so elevated. She needs to get back on insulin, today. I will send in a new prescription for Lantus 25 unit(s) daily. She has already restarted metformin and glipizide. I will also send in a new prescription for Trulicity 0.75 mg weekly. Since she has been out of the Trulicity for a few months, we should start at the low dose. I do want to check a c peptide level. I am worried that Mary Lou may no longer be making insulin. We need to follow-up in 1 month. I want to make sure that her  blood sugars are improving. If she has any problems picking up the Lantus, she is to reach out to our office.  Hypothyroidism. We discussed her recent laboratory results. The levels are off but I do not see any reason to make adjustments to her levothyroxine today. She is to continue with 150 mcg daily. We will repeat testing in follow-up.     Due to the COVID 19 pandemic this visit was a telephone/video visit in order to help prevent spread of infection in this patient and the general population. The patient gave verbal consent for the visit today. I have independently reviewed and interpreted labs, imaging as indicated.       Distant Location (provider location):  Off-site  Mode of Communication:  Video Conference via [employer]  Chart review/prep time 5   Joined the call at 4/3/2024, 2:57:10 pm.  Left the call at 4/3/2024, 3:07:10 pm.  You were on the call for 10 minutes .  14 minutes spent on the date of the encounter doing chart review, history and exam, documentation and further activities as noted above.      Chief complaint:  Mary Lou is a 63 year old female who returns for follow-up of Type 2 DM.    I have reviewed Care Everywhere including [facility], [facility], [facility], [facility] [place], [facility], [facility] , [facility], [place] lab reports, imaging reports and provider notes as indicated.      HISTORY OF PRESENT ILLNESS  Mary Lou is not doing well. She has been working in [place] while she is still living in [place]. The constant traveling back and forth has been difficult. She was recently taking metformin XR 2000 mg daily, glipizide XL 5 mg daily and Levemir 25 unit(s) daily. She had also been taking Trulicity 3 mg weekly but the pharmacy has been out of the medication for the last few months. About a week or two ago, her last Levemir pen jammed. She knows that it is important to take insulin but she didn't have time to  another prescription. She has also not been taking  metformin or glipizide, consistently. However, she was still shocked to learn that her A1C had increased to 14.2%.    Mary Lou has not been monitoring her blood sugars consistently. She was worried about hyperglycemia because she has been very thirsty and she has been urinating every 30 minutes. She has also been having facial and muscle twitches. She thought that it may be due to her thyroid level being off. She denies any problems with blurred vision or worsening numbness/tingling.     Mary Lou was diagnosed with diabetes at the age of 46. She was started on oral medications at the time of diagnosis and then started on insulin in 2016. She admits that she has struggled with glucose control since diagnosis. However, she felt like things were going well when she was taking metformin, glipizide, Trulicity, and Levemir. Her diabetes is complicated by hyperlipidemia, HTN, and hypothyroidism.    Endocrine relevant labs are as follows:   Latest Reference Range & Units 03/27/24 11:01   Hemoglobin A1C 0.0 - 5.6 % 14.2 (H)   (H): Data is abnormally high   Latest Reference Range & Units 11/10/23 13:14   Hemoglobin A1C 0.0 - 5.6 % 9.5 (H)   (H): Data is abnormally high   Latest Reference Range & Units 07/28/23 11:13   Albumin Urine mg/g Cr 0.00 - 25.00 mg/g Cr 12.38      Latest Reference Range & Units 07/28/23 11:13   Albumin Urine mg/L mg/L 13.0      Latest Reference Range & Units 03/27/24 11:01   T4 Free 0.90 - 1.70 ng/dL 2.04 (H)   (H): Data is abnormally high   Latest Reference Range & Units 03/27/24 11:01   TSH 0.30 - 4.20 uIU/mL 4.41 (H)   (H): Data is abnormally high    REVIEW OF SYSTEMS    Endocrine: positive for thyroid disorder and diabetes  Skin: negative  Eyes: negative for, visual blurring, redness, tearing  Ears/Nose/Throat: negative  Respiratory: No shortness of breath, dyspnea on exertion, cough, or hemoptysis  Cardiovascular: negative for, irregular heart beat, chest pain, lower extremity edema, and exercise  intolerance  Gastrointestinal: negative for, nausea, vomiting, constipation, and diarrhea  Genitourinary: negative for, nocturia, dysuria, frequency, and urgency  Musculoskeletal: positive for cramping and muscle twitching, negative for, muscular weakness, and foot pain  Neurologic: negative for, local weakness, numbness or tingling of hands, and numbness or tingling of feet  Psychiatric: negative  Hematologic/Lymphatic/Immunologic: negative    Past Medical History  Past Medical History:   Diagnosis Date     Anemia, unspecified 02/01/2010     Degeneration of lumbar intervertebral disc 07/11/2016     Depressive disorder      Diabetes mellitus (H) 02/01/2010    Dx in 2006;  insulin started 7/9/15     Essential hypertension, benign 02/01/2010     Hyperlipidaemia      Hyperlipidemia LDL goal < 100      Migraine      Other chronic pain     back, legs and feet     Spinal stenosis      Tobacco use disorder 02/01/2010     Uncomplicated asthma     ? with allergic reactions?     Unspecified hypothyroidism 02/01/2010       Medications  Current Outpatient Medications   Medication Sig Dispense Refill     albuterol (PROAIR HFA/PROVENTIL HFA/VENTOLIN HFA) 108 (90 Base) MCG/ACT inhaler Inhale 2 puffs into the lungs every 4 hours as needed for shortness of breath or wheezing 18 g 11     cholecalciferol (VITAMIN D3) 125 mcg (5000 units) capsule Take 1 capsule (125 mcg) by mouth daily 90 capsule 3     citalopram (CELEXA) 40 MG tablet Take 1 tablet (40 mg) by mouth daily 90 tablet 1     Continuous Blood Gluc Sensor (FREESTYLE NICK 3 SENSOR) MISC 1 each every 14 days Use 1 sensor every 14 days. Use to read blood sugars per 's instructions. 2 each 5     DiphenhydrAMINE HCl (BENADRYL PO) Take 25-50 mg by mouth See Admin Instructions 25 mg during the day if needed and 50 mg at bedtime for sleep as needed       Dulaglutide (TRULICITY) 3 MG/0.5ML SOPN Inject 3 mg Subcutaneous once a week 6 mL 1     EPINEPHrine (ANY BX GENERIC  EQUIV) 0.3 MG/0.3ML injection 2-pack Inject 0.3 mLs (0.3 mg) into the muscle once as needed for anaphylaxis (or allergic reaction.) 0.6 mL 3     glipiZIDE (GLUCOTROL XL) 5 MG 24 hr tablet Take 1 tablet (5 mg) by mouth daily with food 90 tablet 0     ibuprofen (ADVIL/MOTRIN) 800 MG tablet Take 1 tablet (800 mg) by mouth every 8 hours as needed for moderate pain 90 tablet 2     insulin detemir (LEVEMIR FLEXTOUCH) 100 UNIT/ML pen INJECT 25 UNITS UNDER THE SKIN  EVERY MORNING 30 mL 0     insulin pen needle (31G X 5 MM) 31G X 5 MM miscellaneous Use ONE pen needles daily or as directed. 100 each 3     levothyroxine (SYNTHROID/LEVOTHROID) 150 MCG tablet TAKE ONE TABLET BY MOUTH ONCE DAILY 6 DAYS PER WEEK, AND skip ONE DAY A WEEK 90 tablet 1     lisinopril-hydrochlorothiazide (ZESTORETIC) 20-25 MG tablet TAKE ONE TABLET BY MOUTH EVERY MORNING.NTBS 90 tablet 0     metFORMIN (GLUCOPHAGE XR) 500 MG 24 hr tablet Take 2 tablets (1,000 mg) by mouth daily (with dinner) 180 tablet 0     rosuvastatin (CRESTOR) 10 MG tablet Take 1 tablet (10 mg) by mouth daily 90 tablet 3     traZODone (DESYREL) 50 MG tablet TAKE ONE OR TWO TABLETS BY MOUTH EVERY NIGHT AT BEDTIME AS NEEDED FOR SLEEP 180 tablet 2     albuterol (PROVENTIL) (2.5 MG/3ML) 0.083% neb solution Take 1 vial (2.5 mg) by nebulization every 6 hours as needed for shortness of breath / dyspnea or wheezing (Patient not taking: Reported on 3/27/2024) 1 Box 5     blood glucose (NO BRAND SPECIFIED) test strip Use to test blood sugar prn to calibrate up to one times daily (Patient not taking: Reported on 3/27/2024) 100 strip 0     blood glucose calibration (NO BRAND SPECIFIED) solution Check glucometer accuracy once per month; To accompany: Blood Glucose Monitor Brands: per insurance. (Patient not taking: Reported on 3/27/2024) 1 Bottle 3     blood glucose monitoring (NO BRAND SPECIFIED) meter device kit Use to test blood sugar as directed. Preferred blood glucose meter OR supplies to  accompany: Blood Glucose Monitor Brands: per insurance. (Patient not taking: Reported on 3/27/2024) 1 kit 0     Continuous Blood Gluc  (FREESTYLE NICK 14 DAY READER) ARSLAN 1 each daily For nick personal continuous glucose monitor system. Sensors are changed every 14 days( 2 sensors--> 1 month). 1 reader. (Patient not taking: Reported on 3/27/2024) 1 each 0     Continuous Blood Gluc Sensor (FREESTYLE NICK 14 DAY SENSOR) MISC CHANGE EVERY 14 DAYS (Patient not taking: Reported on 3/27/2024) 2 each 0     CONTOUR NEXT TEST test strip USE TO TEST BLOOD SUGAR 3 TIMES A DAY OR AS DIRECTED (Patient not taking: Reported on 3/27/2024) 200 strip 3     Cyanocobalamin (B-12) 1000 MCG SUBL Place 1,000 mcg under the tongue daily (Patient not taking: Reported on 3/27/2024) 90 tablet 3     fluticasone (FLONASE) 50 MCG/ACT nasal spray Spray 2 sprays into both nostrils daily (Patient not taking: Reported on 3/27/2024) 48 g 1     thin (NO BRAND SPECIFIED) lancets Check glucose 3 times per day; To accompany: Blood Glucose Monitor Brands: per insurance. (Patient not taking: Reported on 3/27/2024) 200 each 11     UltiCare Alcohol Swabs 70 % PADS USE TO SWAB AREA OF INJECTION/LANCET AS DIRECTED (Patient not taking: Reported on 3/27/2024) 100 each 3       Allergies  Allergies   Allergen Reactions     Gabapentin Itching, Rash and Swelling     swelling     Nicotine Polacrilex Anaphylaxis     PROBABLY REACTION TO VINYL IN NICOTINE PATCH   PROBABLY REACTION TO VINYL IN NICOTINE PATCH    PROBABLY REACTION TO VINYL IN NICOTINE PATCH      PROBABLY REACTION TO VINYL IN NICOTINE PATCH PROBABLY REACTION TO VINYL IN NICOTINE PATCH     Vinyl Ether Swelling and Cough     Vinyl causes throat and lip swelling, cough and headache     Acetaminophen Itching     Norvasc [Amlodipine Besylate] GI Disturbance     constipation     Percocet [Oxycodone-Acetaminophen] Itching     Sulfamethoxazole-Trimethoprim Itching and Other (See Comments)     Oxycodone  Itching       Family History  family history includes Aneurysm in her mother; C.A.D. in her mother and sister; Cerebrovascular Disease in her mother; Diabetes in her mother, sister, sister, sister, and sister; Hypertension in her mother, sister, sister, sister, and sister; Thyroid Disease in her sister; Unknown/Adopted in her brother, brother, and sister.    Social History  Social History     Tobacco Use     Smoking status: Every Day     Packs/day: 1.50     Years: 30.00     Additional pack years: 0.00     Total pack years: 45.00     Types: Other, Cigarettes     Start date: 6/27/1982     Smokeless tobacco: Never     Tobacco comments:     started at age 19 and has quit for 10 years    Vaping Use     Vaping Use: Never used   Substance Use Topics     Alcohol use: No     Drug use: No       Physical Exam  Body mass index is 22.48 kg/m .  GENERAL: no distress  SKIN: Visible skin clear. No significant rash, abnormal pigmentation or lesions.  EYES: Eyes grossly normal to inspection.  No discharge or erythema, or obvious scleral/conjunctival abnormalities.  NECK: visible goiter is not present; no visible neck masses  RESP: No audible wheeze, cough, or visible cyanosis.  No visible retractions or increased work of breathing.    NEURO: Awake, alert, responds appropriately to questions.  Mentation and speech fluent.  PSYCH:affect normal and appearance well-groomed.      DATA REVIEW  FreeStyle LibreView  Time in target range 0%  Very High 100%  Current Ave  mg/dl        Again, thank you for allowing me to participate in the care of your patient.        Sincerely,        Yanna Schmid PA-C

## 2024-04-04 ENCOUNTER — TELEPHONE (OUTPATIENT)
Dept: ENDOCRINOLOGY | Facility: CLINIC | Age: 64
End: 2024-04-04
Payer: MEDICARE

## 2024-04-04 DIAGNOSIS — E11.42 TYPE 2 DIABETES MELLITUS WITH DIABETIC POLYNEUROPATHY, WITH LONG-TERM CURRENT USE OF INSULIN (H): ICD-10-CM

## 2024-04-04 DIAGNOSIS — Z79.4 TYPE 2 DIABETES MELLITUS WITH DIABETIC POLYNEUROPATHY, WITH LONG-TERM CURRENT USE OF INSULIN (H): ICD-10-CM

## 2024-04-04 NOTE — TELEPHONE ENCOUNTER
This message is to inform you that we are in need of Medicare compliant prescriptions for Freestyle Jen 3.     Prescription must be written by: Yanna Schmid.  Must include full supply name: Freestyle Jen 3 Sensor  Quantity: 6  Refills: 5  SIG: Change every 14 days    As a reminder, Medicare requires patients to be seen every 3 months for insulin supplies and every 6 months for CGMs. The provider who sees the patient must be the provider on the prescription, must be written on the day of or after office visit date and office visit must include notes about diabetes and insulin regimen. The Diabetes Care Services team at [facility] may request new prescriptions before renewal dates of the prescription is filled over the allowable amount. Writing the order to match what is above will help ensure we will only need to request every 3 or 6 months.    Thank you!    [facility]  Diabetes Care Services Team  [address] [place] [address]  Provider Phone: [phone]  Patient Line: [phone]  Fax: [phone]  E-mail: [email or web address]

## 2024-04-05 RX ORDER — BLOOD-GLUCOSE SENSOR
1 EACH MISCELLANEOUS
Qty: 6 EACH | Refills: 5 | Status: SHIPPED | OUTPATIENT
Start: 2024-04-05

## 2024-04-08 ENCOUNTER — TELEPHONE (OUTPATIENT)
Dept: ENDOCRINOLOGY | Facility: CLINIC | Age: 64
End: 2024-04-08
Payer: MEDICARE

## 2024-04-08 NOTE — TELEPHONE ENCOUNTER
4/8 Called and left voicemail, provided phone number [phone] to schedule a follow up in person on 5/24 at 830 with jacky huston arrive at 810.    [facility]   Orthopedics, Podiatry, Sports Medicine, Ent ,Eye , Audiology, Adult Endocrine & Diabetes, Nutrition & Medication Therapy Management Specialties   [facility]        ----- Message from Yun Bosch CMA sent at 4/8/2024  1:33 PM CDT -----  Please offer 5/24/24 at 8:30am arrive at 8:10am.    Thank you,  Yun Bosch CMA  Adult Endocrinology  [facility]    ----- Message -----  From: Margarita Lubin RN  Sent: 4/4/2024   9:54 AM CDT  To: [facility]      ----- Message -----  From: Lyubov Doyle  Sent: 4/4/2024   9:51 AM CDT  To: [facility] Endocrinology [facility]    These were my scheduling instructions for jacky huston patient:      Return in about 4 weeks (around 5/1/2024) for Follow up.     What day and time can I schedule this patient?    Thanks   Lyubov Brennan   Orthopedics, Podiatry, Sports Medicine, Ent ,Eye , Audiology, Adult Endocrine & Diabetes, Nutrition & Medication Therapy Management Specialties   [facility]

## 2024-04-12 NOTE — TELEPHONE ENCOUNTER
4/12 2nd attempt.  Called and left voicemail, provided phone number [phone] to schedule a follow up in person on 5/24 at 830 with jacky huston arrive at 810.     [facility]   Orthopedics, Podiatry, Sports Medicine, Ent ,Eye , Audiology, Adult Endocrine & Diabetes, Nutrition & Medication Therapy Management Specialties   [facility]

## 2024-04-23 ENCOUNTER — MYC MEDICAL ADVICE (OUTPATIENT)
Dept: ENDOCRINOLOGY | Facility: CLINIC | Age: 64
End: 2024-04-23
Payer: MEDICARE

## 2024-04-23 DIAGNOSIS — Z79.4 TYPE 2 DIABETES MELLITUS WITH DIABETIC POLYNEUROPATHY, WITH LONG-TERM CURRENT USE OF INSULIN (H): Primary | ICD-10-CM

## 2024-04-23 DIAGNOSIS — E11.42 TYPE 2 DIABETES MELLITUS WITH DIABETIC POLYNEUROPATHY, WITH LONG-TERM CURRENT USE OF INSULIN (H): Primary | ICD-10-CM

## 2024-05-03 DIAGNOSIS — J30.9 ALLERGIC RHINITIS, UNSPECIFIED SEASONALITY, UNSPECIFIED TRIGGER: Primary | ICD-10-CM

## 2024-05-03 RX ORDER — FLUTICASONE PROPIONATE 50 MCG
1-2 SPRAY, SUSPENSION (ML) NASAL DAILY PRN
Qty: 16 G | Refills: 11 | Status: SHIPPED | OUTPATIENT
Start: 2024-05-03

## 2024-05-03 RX ORDER — FLUTICASONE PROPIONATE 50 MCG
1 SPRAY, SUSPENSION (ML) NASAL DAILY
COMMUNITY
End: 2024-05-03

## 2024-05-06 ENCOUNTER — ANCILLARY PROCEDURE (OUTPATIENT)
Dept: GENERAL RADIOLOGY | Facility: CLINIC | Age: 64
End: 2024-05-06
Attending: FAMILY MEDICINE
Payer: MEDICARE

## 2024-05-06 ENCOUNTER — OFFICE VISIT (OUTPATIENT)
Dept: ORTHOPEDICS | Facility: CLINIC | Age: 64
End: 2024-05-06
Attending: INTERNAL MEDICINE
Payer: MEDICARE

## 2024-05-06 VITALS
WEIGHT: 133 LBS | HEIGHT: 65 IN | BODY MASS INDEX: 22.16 KG/M2 | SYSTOLIC BLOOD PRESSURE: 150 MMHG | DIASTOLIC BLOOD PRESSURE: 88 MMHG

## 2024-05-06 DIAGNOSIS — M75.02 ADHESIVE CAPSULITIS OF LEFT SHOULDER: ICD-10-CM

## 2024-05-06 DIAGNOSIS — M25.512 CHRONIC LEFT SHOULDER PAIN: ICD-10-CM

## 2024-05-06 DIAGNOSIS — G89.29 CHRONIC LEFT SHOULDER PAIN: ICD-10-CM

## 2024-05-06 PROCEDURE — 73030 X-RAY EXAM OF SHOULDER: CPT | Mod: TC | Performed by: RADIOLOGY

## 2024-05-06 PROCEDURE — 99214 OFFICE O/P EST MOD 30 MIN: CPT | Performed by: FAMILY MEDICINE

## 2024-05-06 RX ORDER — CYCLOBENZAPRINE HCL 5 MG
5 TABLET ORAL 2 TIMES DAILY PRN
Qty: 40 TABLET | Refills: 0 | Status: SHIPPED | OUTPATIENT
Start: 2024-05-06

## 2024-05-06 RX ORDER — DICLOFENAC SODIUM 75 MG/1
75 TABLET, DELAYED RELEASE ORAL 2 TIMES DAILY PRN
Qty: 60 TABLET | Refills: 0 | Status: SHIPPED | OUTPATIENT
Start: 2024-05-06 | End: 2024-07-24
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
## 2024-05-06 NOTE — PATIENT INSTRUCTIONS
1. Chronic left shoulder pain    2. Adhesive capsulitis of left shoulder      -Patient has chronic left shoulder pain and loss of range of motion due to a frozen shoulder  -X-rays taken in office today show very minimal degenerative changes of the left shoulder  -Patient will start home exercise program since she is living in [place] helping a family member currently  -Patient will start diclofenac 75 mg twice a day as needed for pain.  Patient will start Flexeril 5 to 10 mg at bedtime for overnight pain  -Patient will follow-up if pain does not improve.  To consider possible glenohumeral cortisone injection if indicated.  -Patient currently has significantly uncontrolled diabetes which she is working on getting under control.  We would like to see her A1c and fasting sugars much improved before considering a cortisone injections and this will also raise her blood sugar  -Call direct clinic number [[phone]] at any time with questions or concerns.    Albert Yeo MD CAQSM  [facility]  [facility] [place]
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
## 2024-05-22 DIAGNOSIS — Z79.4 TYPE 2 DIABETES MELLITUS WITH DIABETIC POLYNEUROPATHY, WITH LONG-TERM CURRENT USE OF INSULIN (H): ICD-10-CM

## 2024-05-22 DIAGNOSIS — E11.42 TYPE 2 DIABETES MELLITUS WITH DIABETIC POLYNEUROPATHY, WITH LONG-TERM CURRENT USE OF INSULIN (H): ICD-10-CM

## 2024-05-22 RX ORDER — DULAGLUTIDE 0.75 MG/.5ML
INJECTION, SOLUTION SUBCUTANEOUS
Qty: 2 ML | Refills: 0 | Status: SHIPPED | OUTPATIENT
Start: 2024-05-22 | End: 2024-08-14

## 2024-05-23 NOTE — TELEPHONE ENCOUNTER
Prescription approved per [facility] Refill Protocol.  Melba Balbuena RN on 5/22/2024 at 8:54 PM

## 2024-06-17 DIAGNOSIS — Z79.4 TYPE 2 DIABETES MELLITUS WITH DIABETIC POLYNEUROPATHY, WITH LONG-TERM CURRENT USE OF INSULIN (H): ICD-10-CM

## 2024-06-17 DIAGNOSIS — E11.42 TYPE 2 DIABETES MELLITUS WITH DIABETIC POLYNEUROPATHY, WITH LONG-TERM CURRENT USE OF INSULIN (H): ICD-10-CM

## 2024-06-17 RX ORDER — GLIPIZIDE 5 MG/1
5 TABLET, FILM COATED, EXTENDED RELEASE ORAL DAILY
Qty: 90 TABLET | Refills: 0 | Status: SHIPPED | OUTPATIENT
Start: 2024-06-17

## 2024-06-17 NOTE — TELEPHONE ENCOUNTER
Prescription approved per [facility] Refill Protocol.  Jane Avelar RN  [facility]

## 2024-06-20 DIAGNOSIS — Z79.4 TYPE 2 DIABETES MELLITUS WITH DIABETIC POLYNEUROPATHY, WITH LONG-TERM CURRENT USE OF INSULIN (H): ICD-10-CM

## 2024-06-20 DIAGNOSIS — E11.42 TYPE 2 DIABETES MELLITUS WITH DIABETIC POLYNEUROPATHY, WITH LONG-TERM CURRENT USE OF INSULIN (H): ICD-10-CM

## 2024-06-20 RX ORDER — METFORMIN HCL 500 MG
1000 TABLET, EXTENDED RELEASE 24 HR ORAL
Qty: 180 TABLET | Refills: 0 | Status: SHIPPED | OUTPATIENT
Start: 2024-06-20 | End: 2024-08-14

## 2024-07-19 DIAGNOSIS — M75.02 ADHESIVE CAPSULITIS OF LEFT SHOULDER: ICD-10-CM

## 2024-07-24 RX ORDER — DICLOFENAC SODIUM 75 MG/1
75 TABLET, DELAYED RELEASE ORAL 2 TIMES DAILY PRN
Qty: 60 TABLET | Refills: 0 | Status: SHIPPED | OUTPATIENT
Start: 2024-07-24

## 2024-08-08 NOTE — PROGRESS NOTES
Outcome for 08/08/24 2:40 PM: [employer] message sent  Jyotsna Garg MA  Outcome for 08/12/24 3:06 PM: Data obtained via [employer] website- Data from 7/17/24-7/30/24. Pt states she has not been checking BG since 7/30/24.   Jyotsna Garg MA    Patient is showing 1/5 [place]CM met. BP out range, A1c not in range, Current tobacco use, and Aspirin not prescribed   Jyotsna Garg MA

## 2024-08-12 ENCOUNTER — TELEPHONE (OUTPATIENT)
Dept: ENDOCRINOLOGY | Facility: CLINIC | Age: 64
End: 2024-08-12
Payer: MEDICARE

## 2024-08-12 NOTE — TELEPHONE ENCOUNTER
Spoke to patient. Pt has not been checking BG since 7/30/24. Jen data from 7/17/24-7/30/24 obtained. Jyotsna Garg CMA on 8/12/2024 at 3:09 PM

## 2024-08-13 ENCOUNTER — LAB (OUTPATIENT)
Dept: LAB | Facility: CLINIC | Age: 64
End: 2024-08-13
Payer: MEDICARE

## 2024-08-13 DIAGNOSIS — Z79.4 TYPE 2 DIABETES MELLITUS WITH DIABETIC POLYNEUROPATHY, WITH LONG-TERM CURRENT USE OF INSULIN (H): ICD-10-CM

## 2024-08-13 DIAGNOSIS — E11.22 TYPE 2 DIABETES MELLITUS WITH DIABETIC CHRONIC KIDNEY DISEASE (H): Primary | ICD-10-CM

## 2024-08-13 DIAGNOSIS — E11.42 TYPE 2 DIABETES MELLITUS WITH DIABETIC POLYNEUROPATHY, WITH LONG-TERM CURRENT USE OF INSULIN (H): ICD-10-CM

## 2024-08-13 LAB
CREAT UR-MCNC: 117 MG/DL
FASTING STATUS PATIENT QL REPORTED: YES
GLUCOSE SERPL-MCNC: 359 MG/DL (ref 70–99)
MICROALBUMIN UR-MCNC: 52.1 MG/L
MICROALBUMIN/CREAT UR: 44.53 MG/G CR (ref 0–25)

## 2024-08-13 PROCEDURE — 84681 ASSAY OF C-PEPTIDE: CPT

## 2024-08-13 PROCEDURE — 82043 UR ALBUMIN QUANTITATIVE: CPT

## 2024-08-13 PROCEDURE — 82570 ASSAY OF URINE CREATININE: CPT

## 2024-08-13 PROCEDURE — 36415 COLL VENOUS BLD VENIPUNCTURE: CPT

## 2024-08-13 PROCEDURE — 82947 ASSAY GLUCOSE BLOOD QUANT: CPT

## 2024-08-14 ENCOUNTER — VIRTUAL VISIT (OUTPATIENT)
Dept: ENDOCRINOLOGY | Facility: CLINIC | Age: 64
End: 2024-08-14
Payer: MEDICARE

## 2024-08-14 DIAGNOSIS — E11.42 TYPE 2 DIABETES MELLITUS WITH DIABETIC POLYNEUROPATHY, WITH LONG-TERM CURRENT USE OF INSULIN (H): ICD-10-CM

## 2024-08-14 DIAGNOSIS — Z79.4 TYPE 2 DIABETES MELLITUS WITH DIABETIC POLYNEUROPATHY, WITH LONG-TERM CURRENT USE OF INSULIN (H): ICD-10-CM

## 2024-08-14 DIAGNOSIS — E11.22 TYPE 2 DIABETES MELLITUS WITH STAGE 3A CHRONIC KIDNEY DISEASE, WITH LONG-TERM CURRENT USE OF INSULIN (H): Primary | ICD-10-CM

## 2024-08-14 DIAGNOSIS — Z79.4 TYPE 2 DIABETES MELLITUS WITH STAGE 3A CHRONIC KIDNEY DISEASE, WITH LONG-TERM CURRENT USE OF INSULIN (H): Primary | ICD-10-CM

## 2024-08-14 DIAGNOSIS — N18.31 TYPE 2 DIABETES MELLITUS WITH STAGE 3A CHRONIC KIDNEY DISEASE, WITH LONG-TERM CURRENT USE OF INSULIN (H): Primary | ICD-10-CM

## 2024-08-14 LAB — C PEPTIDE SERPL-MCNC: 2 NG/ML (ref 0.9–6.9)

## 2024-08-14 PROCEDURE — 99214 OFFICE O/P EST MOD 30 MIN: CPT | Mod: 95 | Performed by: PHYSICIAN ASSISTANT

## 2024-08-14 RX ORDER — DULAGLUTIDE 0.75 MG/.5ML
0.75 INJECTION, SOLUTION SUBCUTANEOUS
Qty: 2 ML | Refills: 0 | Status: SHIPPED | OUTPATIENT
Start: 2024-08-14 | End: 2024-09-09

## 2024-08-14 RX ORDER — METFORMIN HCL 500 MG
1000 TABLET, EXTENDED RELEASE 24 HR ORAL
Qty: 180 TABLET | Refills: 0 | Status: SHIPPED | OUTPATIENT
Start: 2024-08-14

## 2024-08-14 ASSESSMENT — PAIN SCALES - GENERAL: PAINLEVEL: MODERATE PAIN (5)

## 2024-08-14 NOTE — PROGRESS NOTES
Virtual Visit Details    Type of service:  Video Visit     Originating Location (pt. Location): Home    Distant Location (provider location):  On-site  Platform used for Video Visit: Etta

## 2024-08-14 NOTE — NURSING NOTE
Is the patient currently in the state of [place]? YES    Visit mode:VIDEO    If the visit is dropped, the patient can be reconnected by: VIDEO VISIT: Text to cell phone:   Telephone Information:   Mobile [phone]       Will anyone else be joining the visit? NO  (If patient encounters technical issues they should call [phone])    How would you like to obtain your AVS? MyChart    Are changes needed to the allergy or medication list? No    Are refills needed on medications prescribed by this physician? YES    Reason for visit: Follow Up    Patrizia CAR

## 2024-08-14 NOTE — PROGRESS NOTES
Assessment/Plan :   Type 2 DM. Mary Lou has continued to work on improving her blood sugars. She is frustrated that she was not able to consistently take Trulicity. She had not had any adverse effects and she would like to restart the medication, if possible. We reviewed her recent Jen reports and her blood sugars are improving but still too elevated. I would like her to get back on the Trulicity and I would like to increase to 1.5 mg weekly in about a month. I really feel like this will help improve her overall control. I will hold off on making other adjustments to her medications, at this time. We also discussed how hyperglycemia can affect vision and neuropathy. If she has any problems with the Trulicity, she is to contact our office and we will follow-up in 3-4 mos.     Due to the COVID 19 pandemic this visit was a telephone/video visit in order to help prevent spread of infection in this patient and the general population. The patient gave verbal consent for the visit today. I have independently reviewed and interpreted labs, imaging as indicated.       Distant Location (provider location):  Off-site  Mode of Communication:  Video Conference via [employer]  Chart review/prep time 5    Joined the call at 8/14/2024, 10:33:39 am.  Left the call at 8/14/2024, 10:45:38 am.  You were on the call for 11 minutes 58 seconds .  20 minutes spent on the date of the encounter doing chart review, history and exam, documentation and further activities as noted above.      Chief complaint:  Mary Lou is a 64 year old female who returns for follow-up of Type 2 DM.    I have reviewed Care Everywhere including [facility], [facility], [facility], [facility], [facility], [facility], [facility] , [facility], [place] lab reports, imaging reports and provider notes as indicated.      HISTORY OF PRESENT ILLNESS  Mary Lou has continued to work on getting her blood sugars under better control. She has struggled to fill the Trulicity  prescription, consistently. Her pharmacy did not always have the medication in stock, so she has been off of it for the last month. She has continued to take metformin 2000 mg daily, Lantus 25 unit(s) daily, and glipizide 5 mg daily. She is still struggling with post-prandial spikes. She feels like this is mostly due to poor dietary choices. She has not been able to afford the keto diet food that she prefers. She has had to eat more carb heavy meals, and she knows that that leads to spikes.    Mary Lou has not had any problems with severe hyperglycemia and/or hypoglycemia. She has had a few episodes where she felt like her blood sugars were dropping too low. She had a small snack and her blood sugars improved. She has changed her sensor to alert when her blood sugar drops to 90 mg/dl, because that is when she starts to feel shaky. Mary Lou has also had some issues with blurred vision and numbness/tingling in her right arm, when her blood sugars spike.     Mary Lou was diagnosed with diabetes at the age of 46. She was started on oral medications at the time of diagnosis and then started on insulin in 2016. She admits that she has struggled with glucose control since diagnosis. However, she felt like things were going well when she was taking metformin, glipizide, Trulicity, and Levemir. Her diabetes is complicated by hyperlipidemia, HTN, and hypothyroidism. She does have a history of microalbuminuria, as well.    Endocrine relevant labs are as follows:   Latest Reference Range & Units 03/27/24 11:01   Hemoglobin A1C 0.0 - 5.6 % 14.2 (H)   (H): Data is abnormally high   Latest Reference Range & Units 08/13/24 10:13   Albumin Urine mg/g Cr 0.00 - 25.00 mg/g Cr 44.53 (H)   (H): Data is abnormally high   Latest Reference Range & Units 08/13/24 10:13   Albumin Urine mg/L mg/L 52.1     REVIEW OF SYSTEMS    Endocrine: positive for diabetes  Skin: negative  Eyes: negative for, visual blurring, redness,  tearing  Ears/Nose/Throat: negative  Respiratory: No shortness of breath, dyspnea on exertion, cough, or hemoptysis  Cardiovascular: negative for, chest pain, dyspnea on exertion, lower extremity edema, and exercise intolerance  Gastrointestinal: negative for, nausea, vomiting, constipation, and diarrhea  Genitourinary: negative for, nocturia, dysuria, frequency, and urgency  Musculoskeletal: negative for, muscular weakness, nocturnal cramping, and foot pain  Neurologic: positive for numbness or tingling of hands and numbness or tingling of feet  Psychiatric: negative  Hematologic/Lymphatic/Immunologic: negative    Past Medical History  Past Medical History:   Diagnosis Date    Anemia, unspecified 02/01/2010    Degeneration of lumbar intervertebral disc 07/11/2016    Depressive disorder     Diabetes mellitus (H) 02/01/2010    Dx in 2006;  insulin started 7/9/15    Essential hypertension, benign 02/01/2010    Hyperlipidaemia     Hyperlipidemia LDL goal < 100     Migraine     Other chronic pain     back, legs and feet    Spinal stenosis     Tobacco use disorder 02/01/2010    Uncomplicated asthma     ? with allergic reactions?    Unspecified hypothyroidism 02/01/2010       Medications  Current Outpatient Medications   Medication Sig Dispense Refill    albuterol (PROAIR HFA/PROVENTIL HFA/VENTOLIN HFA) 108 (90 Base) MCG/ACT inhaler Inhale 2 puffs into the lungs every 4 hours as needed for shortness of breath or wheezing 18 g 11    albuterol (PROVENTIL) (2.5 MG/3ML) 0.083% neb solution Take 1 vial (2.5 mg) by nebulization every 6 hours as needed for shortness of breath / dyspnea or wheezing (Patient not taking: Reported on 3/27/2024) 1 Box 5    cholecalciferol (VITAMIN D3) 125 mcg (5000 units) capsule Take 1 capsule (125 mcg) by mouth daily 90 capsule 3    citalopram (CELEXA) 40 MG tablet Take 1 tablet (40 mg) by mouth daily 90 tablet 1    Continuous Blood Gluc Sensor (FREESTYLE NICK 3 SENSOR) MISC 1 each every 14 days  Use 1 sensor every 14 days. Use to read blood sugars per 's instructions. 6 each 5    cyclobenzaprine (FLEXERIL) 5 MG tablet Take 1 tablet (5 mg) by mouth 2 times daily as needed for muscle spasms 40 tablet 0    diclofenac (VOLTAREN) 75 MG EC tablet TAKE ONE TABLET BY MOUTH TWICE A DAY AS NEEDED FOR MODERATE PAIN 60 tablet 0    DiphenhydrAMINE HCl (BENADRYL PO) Take 25-50 mg by mouth See Admin Instructions 25 mg during the day if needed and 50 mg at bedtime for sleep as needed      EPINEPHrine (ANY BX GENERIC EQUIV) 0.3 MG/0.3ML injection 2-pack Inject 0.3 mLs (0.3 mg) into the muscle once as needed for anaphylaxis (or allergic reaction.) 0.6 mL 3    fluticasone (FLONASE) 50 MCG/ACT nasal spray Spray 1-2 sprays into both nostrils daily as needed for rhinitis or allergies 16 g 11    glipiZIDE (GLUCOTROL XL) 5 MG 24 hr tablet TAKE 1 TABLET (5 MG) BY MOUTH DAILY WITH FOOD 90 tablet 0    ibuprofen (ADVIL/MOTRIN) 800 MG tablet Take 1 tablet (800 mg) by mouth every 8 hours as needed for moderate pain 90 tablet 2    insulin glargine (LANTUS PEN) 100 UNIT/ML pen Inject 25 Units Subcutaneous at bedtime 30 mL 3    insulin pen needle (31G X 5 MM) 31G X 5 MM miscellaneous Use ONE pen needles daily or as directed. 100 each 3    levothyroxine (SYNTHROID/LEVOTHROID) 150 MCG tablet TAKE ONE TABLET BY MOUTH ONCE DAILY 6 DAYS PER WEEK, AND skip ONE DAY A WEEK 90 tablet 1    lisinopril-hydrochlorothiazide (ZESTORETIC) 20-25 MG tablet TAKE ONE TABLET BY MOUTH EVERY MORNING.NTBS 90 tablet 0    metFORMIN (GLUCOPHAGE XR) 500 MG 24 hr tablet TAKE 2 TABLETS (1,000 MG) BY MOUTH DAILY (WITH DINNER) 180 tablet 0    rosuvastatin (CRESTOR) 10 MG tablet Take 1 tablet (10 mg) by mouth daily 90 tablet 3    traZODone (DESYREL) 50 MG tablet TAKE ONE OR TWO TABLETS BY MOUTH EVERY NIGHT AT BEDTIME AS NEEDED FOR SLEEP 180 tablet 2    TRULICITY 0.75 MG/0.5ML pen INJECT 0.75 MG UNDER THE SKIN EVERY SEVEN DAYS 2 mL 0    UltiCare Alcohol Swabs  70 % PADS USE TO SWAB AREA OF INJECTION/LANCET AS DIRECTED (Patient not taking: Reported on 3/27/2024) 100 each 3       Allergies  Allergies   Allergen Reactions    Gabapentin Itching, Rash and Swelling     swelling    Nicotine Polacrilex Anaphylaxis     PROBABLY REACTION TO VINYL IN NICOTINE PATCH   PROBABLY REACTION TO VINYL IN NICOTINE PATCH    PROBABLY REACTION TO VINYL IN NICOTINE PATCH      PROBABLY REACTION TO VINYL IN NICOTINE PATCH PROBABLY REACTION TO VINYL IN NICOTINE PATCH    Vinyl Ether Swelling and Cough     Vinyl causes throat and lip swelling, cough and headache    Acetaminophen Itching    Norvasc [Amlodipine Besylate] GI Disturbance     constipation    Percocet [Oxycodone-Acetaminophen] Itching    Sulfamethoxazole-Trimethoprim Itching and Other (See Comments)    Oxycodone Itching       Family History  family history includes Aneurysm in her mother; C.A.D. in her mother and sister; Cerebrovascular Disease in her mother; Diabetes in her mother, sister, sister, sister, and sister; Hypertension in her mother, sister, sister, sister, and sister; Thyroid Disease in her sister; Unknown/Adopted in her brother, brother, and sister.    Social History  Social History     Tobacco Use    Smoking status: Every Day     Current packs/day: 1.50     Average packs/day: 1.5 packs/day for 42.1 years (63.2 ttl pk-yrs)     Types: Other, Cigarettes     Start date: 6/27/1982    Smokeless tobacco: Never    Tobacco comments:     started at age 19 and has quit for 10 years    Vaping Use    Vaping status: Never Used   Substance Use Topics    Alcohol use: No    Drug use: No       Physical Exam  There is no height or weight on file to calculate BMI.  GENERAL: no distress  SKIN: Visible skin clear. No significant rash, abnormal pigmentation or lesions.  EYES: Eyes grossly normal to inspection.  No discharge or erythema, or obvious scleral/conjunctival abnormalities.  NECK: visible goiter is not present; no visible neck  masses  RESP: No audible wheeze, cough, or visible cyanosis.  No visible retractions or increased work of breathing.    NEURO: Awake, alert, responds appropriately to questions.  Mentation and speech fluent.  PSYCH:affect normal and appearance well-groomed.      DATA REVIEW  FreeStyle LibreView  Time in target range 8%  High 32%, Very High 60%  Current Ave  mg/dl

## 2024-08-15 ENCOUNTER — MEDICAL CORRESPONDENCE (OUTPATIENT)
Dept: HEALTH INFORMATION MANAGEMENT | Facility: CLINIC | Age: 64
End: 2024-08-15
Payer: MEDICARE

## 2024-08-15 ENCOUNTER — TELEPHONE (OUTPATIENT)
Dept: ENDOCRINOLOGY | Facility: CLINIC | Age: 64
End: 2024-08-15
Payer: MEDICARE

## 2024-08-15 ENCOUNTER — TELEPHONE (OUTPATIENT)
Dept: INTERNAL MEDICINE | Facility: CLINIC | Age: 64
End: 2024-08-15
Payer: MEDICARE

## 2024-08-15 NOTE — TELEPHONE ENCOUNTER
Patient requesting interpretation of 8/13/24 Albumin urine.    Can we leave a detailed message on this number? YES  Phone number patient can be reached at: Home number on file [phone] (home)    Abigail Goncalves RN  [facility] Triage

## 2024-08-15 NOTE — TELEPHONE ENCOUNTER
Left Voicemail (1st Attempt) for the patient to call back and schedule the following:    Appointment type: return  Provider: jacky huston  Return date: 12/14/2024  Specialty phone number: [phone]   Additonal Notes: Return in about 4 months (around 12/14/2024) for Follow up     [facility]   Orthopedics, Podiatry, Sports Medicine, Ent ,Eye , Audiology, Adult Endocrine & Diabetes, Nutrition & Medication Therapy Management Specialties   [facility]

## 2024-08-20 ENCOUNTER — TELEPHONE (OUTPATIENT)
Dept: INTERNAL MEDICINE | Facility: CLINIC | Age: 64
End: 2024-08-20
Payer: MEDICARE

## 2024-08-20 NOTE — TELEPHONE ENCOUNTER
Called and spoke to the patient. Relayed message from the provider below. Patient clarified she never tested positive for COVID, she was only exposed. Patient appreciates the phone call back and had no additional questions at this time.     Deandra Gonzales RN

## 2024-08-20 NOTE — TELEPHONE ENCOUNTER
By now, she should be fine to return to work and is no longer contagious as long as she has gone 1-2 days without a fever despite fever reducing medicine, and improving symptoms, but not necessarily completely back to normal.  Lingering symptoms from COVID infection such as a cough may persist for days to weeks after the acute infection has resolved.  These lingering symptoms are not indication of active infection or contagiousness.  A negative COVID test is not necessary to return to work.    She can wear a mask for the next few days if she wants to be extra cautious.  She should wait 3 months before receiving another COVID vaccination for maximal efficacy of the vaccine.

## 2024-08-20 NOTE — TELEPHONE ENCOUNTER
Called and spoke to the patient. Relayed message from the provider below. Patient expressed understanding and had no additional questions at this time.     Deandra Gonzales RN

## 2024-08-20 NOTE — TELEPHONE ENCOUNTER
Pt called with covid questions; answered. Pt had exposure to someone else who had covid exposure and was day 8/10 with COVID. Pt is planning to go back to work tomorrow 8/21/24. She is planning to wear a mask but is concerned about possible transmission due to working with immunocompromised population. One of the people she will be working around that a trach and an autoimmune disease.    Pt does not have any covid symptoms at this time but she is worried about possible exposure and transmission. She is unsure if the person who told her that they had covid exposure, has COVID symptoms.     Pt advised to test for covid, wear a mask and follow CDC guidelines for isolation if symptomatic.     Routing to provider; please review and update plan of care as needed.

## 2024-08-20 NOTE — TELEPHONE ENCOUNTER
Her elevated albumin level in the urine is a very common finding in diabetics, especially if the diabetes control has been less than good.  Her specific result is not concerning at this level, however it makes that much more important to strive for better diabetes control.    There is no specific treatment at this time other than aggressive management of her diabetes.    She should discuss this further with her endocrinologist.    Continue all recommendations regarding her diabetes as given by her endocrinologist and diabetes specialist.

## 2024-08-21 NOTE — TELEPHONE ENCOUNTER
Left Voicemail (2nd Attempt) for the patient to call back and schedule the following:    Appointment type: return  Provider: jacky huston  Return date: 12/14/2024  Specialty phone number: [phone]   Additonal Notes: Return in about 4 months (around 12/14/2024) for Follow up     [facility]   Orthopedics, Podiatry, Sports Medicine, Ent ,Eye , Audiology, Adult Endocrine & Diabetes, Nutrition & Medication Therapy Management Specialties   [facility]

## 2024-09-04 DIAGNOSIS — Z79.4 TYPE 2 DIABETES MELLITUS WITH DIABETIC POLYNEUROPATHY, WITH LONG-TERM CURRENT USE OF INSULIN (H): ICD-10-CM

## 2024-09-04 DIAGNOSIS — E11.42 TYPE 2 DIABETES MELLITUS WITH DIABETIC POLYNEUROPATHY, WITH LONG-TERM CURRENT USE OF INSULIN (H): ICD-10-CM

## 2024-09-05 ENCOUNTER — TELEPHONE (OUTPATIENT)
Dept: ENDOCRINOLOGY | Facility: CLINIC | Age: 64
End: 2024-09-05
Payer: MEDICARE

## 2024-09-05 DIAGNOSIS — Z79.4 TYPE 2 DIABETES MELLITUS WITH STAGE 3A CHRONIC KIDNEY DISEASE, WITH LONG-TERM CURRENT USE OF INSULIN (H): Primary | ICD-10-CM

## 2024-09-05 DIAGNOSIS — E11.22 TYPE 2 DIABETES MELLITUS WITH STAGE 3A CHRONIC KIDNEY DISEASE, WITH LONG-TERM CURRENT USE OF INSULIN (H): Primary | ICD-10-CM

## 2024-09-05 DIAGNOSIS — N18.31 TYPE 2 DIABETES MELLITUS WITH STAGE 3A CHRONIC KIDNEY DISEASE, WITH LONG-TERM CURRENT USE OF INSULIN (H): Primary | ICD-10-CM

## 2024-09-05 NOTE — TELEPHONE ENCOUNTER
This message is to inform you that we are in need of Medicare compliant prescription for Jen 3 PLUS Sensor.  The Freestyle Jen 3 PLUS Sensor can be worn for 15 days instead of 14 days - due to Medicare billing, we will be switching all Jen 3 Sensor patients over to the Jen 3 PLUS Sensor (the Jen 3 PLUS sensor is compatible with the current Jen 3 [place]).     Prescription must be written by: Yanna Schmid     Must include full supply name: Freestyle Jen 3 Plus Sensor  Quantity: 2  Refills: 5  SIG: Change every 15 days  (needs to be manually entered- not a pre-entered SIG)     As a reminder, Medicare requires patients to be seen every 3 months for insulin supplies and every 6 months for CGMs. The provider who sees the patient must be the provider on the prescription, must be written on the day of or after office visit date and office visit must include notes about diabetes and insulin regimen. The Diabetes Care Services team at [facility] may request new prescriptions before renewal dates of the prescription is filled over the allowable amount. Writing the order to match what is above will help ensure we will only need to request every 3 or 6 months.     Thank you!     [facility]  Diabetes Care Services Team  [address] [place] [address]  Provider Phone: [phone]  Patient Line: [phone]  Fax: [phone]  E-mail: [email or web address]

## 2024-09-06 RX ORDER — BLOOD-GLUCOSE SENSOR
EACH MISCELLANEOUS
Qty: 2 EACH | Refills: 5 | Status: SHIPPED | OUTPATIENT
Start: 2024-09-06

## 2024-09-09 NOTE — TELEPHONE ENCOUNTER
dulaglutide (TRULICITY) 0.75 MG/0.5ML pen 2 mL 0 8/14/2024     Last Office Visit : 8/14/24  Future Office visit:  0    Routing refill request to provider for review/approval because:  Last note states ..I would like her to get back on the Trulicity and I would like to increase to 1.5 mg weekly in about a month

## 2024-09-17 ENCOUNTER — NURSE TRIAGE (OUTPATIENT)
Dept: NURSING | Facility: CLINIC | Age: 64
End: 2024-09-17
Payer: MEDICARE

## 2024-09-18 NOTE — TELEPHONE ENCOUNTER
"Nurse Triage SBAR    Is this a 2nd Level Triage? YES, LICENSED PRACTITIONER REVIEW IS REQUIRED    Situation: Hypoglycemia    Background: Patient has type 2 diabetes, applied new sensor. She treats diabetes with trulicity, lantus, metformin, and glipizide.    Assessment: Patient reports difficulty keeping blood glucose up tonight since receiving an alert that glucose was 80 at 1726. It was 62 at 2153 and now 70 at 2205. She has had apple juice and granola bar at 1830, brownie at 2130, and currently eating cereal with milk. She reports feeling \"brain fog and lethargic\", sweaty, and anxious. She denies seizures, confusion, severe weakness, or vomiting. Blood glucose has been <70 for more than 30 minutes. She is inquiring if she should hold Metformin and Lantus (25 units) tonight.     Protocol Recommended Disposition:   Go to ED Now    Recommendation: Page on-call     Paged to provider    Provider consult indicated.     Reason for page: Medication Question     Voice mail left to Dr. Rodriguez by Answering Service at 2210 and called again at 2227.    No response from Dr. Rodriguez.     Answering service called back-up provider  at 2231.     Provider, Dr. Bravo, returning page to Nurse Advisors at 2231    Provider recommended plan of care:   -Hold Metformin and Lantus tonight  -If continuing to feel symptomatic or blood glucose continues to hang around 70, go to ED    Provider Recommendation Follow Up:   Unable to reach patient/caregiver. Left message to return call to [address] [place] [address]. Upon return call please notify caller of provider's recommendations.        Jayna Vera RN  [facility] Nurse Advisor    Reason for Disposition   [1] Low blood sugar symptoms persist > 30 minutes AND [2] using low blood sugar Care Advice    Additional Information   Negative: Unconscious or difficult to awaken   Negative: Seizure occurs   Negative: Acting confused (e.g., disoriented, slurred speech)   Negative: Very weak " (e.g., can't stand)   Negative: Sounds like a life-threatening emergency to the triager   Negative: [1] Vomiting AND [2] signs of dehydration (e.g., very dry mouth, lightheaded, dark urine)    Protocols used: Diabetes - Low Blood Sugar-A-AH

## 2024-09-18 NOTE — TELEPHONE ENCOUNTER
Patient is returning call.  She reports that her blood glucose while waiting were 57 and 65.     Relayed the following information:      Patient plans to go to the ED.    Angie Pinedo RN, BSN Nurse Triage Advisor 9/17/2024 10:45 PM

## 2024-09-30 DIAGNOSIS — E03.9 HYPOTHYROIDISM, UNSPECIFIED TYPE: ICD-10-CM

## 2024-09-30 RX ORDER — LEVOTHYROXINE SODIUM 150 UG/1
TABLET ORAL
Qty: 90 TABLET | Refills: 0 | Status: SHIPPED | OUTPATIENT
Start: 2024-09-30

## 2024-10-30 DIAGNOSIS — E10.42 DIABETIC POLYNEUROPATHY ASSOCIATED WITH TYPE 1 DIABETES MELLITUS (H): Primary | ICD-10-CM

## 2024-11-01 DIAGNOSIS — M75.02 ADHESIVE CAPSULITIS OF LEFT SHOULDER: ICD-10-CM

## 2024-11-04 ENCOUNTER — ALLIED HEALTH/NURSE VISIT (OUTPATIENT)
Dept: INTERNAL MEDICINE | Facility: CLINIC | Age: 64
End: 2024-11-04
Payer: MEDICARE

## 2024-11-04 VITALS — SYSTOLIC BLOOD PRESSURE: 118 MMHG | DIASTOLIC BLOOD PRESSURE: 70 MMHG

## 2024-11-04 DIAGNOSIS — Z01.30 BP CHECK: Primary | ICD-10-CM

## 2024-11-04 PROCEDURE — 99207 PR NO CHARGE NURSE ONLY: CPT | Performed by: INTERNAL MEDICINE

## 2024-11-04 RX ORDER — DULAGLUTIDE 1.5 MG/.5ML
1.5 INJECTION, SOLUTION SUBCUTANEOUS
Qty: 4 ML | Refills: 1 | Status: SHIPPED | OUTPATIENT
Start: 2024-11-04

## 2024-11-04 NOTE — PROGRESS NOTES
Mary Lou Gil was evaluated at [facility] on November 4, 2024 at which time her blood pressure was:    BP Readings from Last 1 Encounters:   11/04/24 118/70     No data recorded      Reviewed lifestyle modifications for blood pressure control and reduction: including making healthy food choices, managing weight, getting regular exercise, smoking cessation, reducing alcohol consumption, monitoring blood pressure regularly.     Symptoms: Dizziness    BP Goal:< 140/90 mmHg    BP Assessment:  BP at goal    Potential Reasons for BP too high: NA - Not applicable    BP Follow-Up Plan: Recheck BP in 6 months at pharmacy    Recommendation to Provider: BP checked at pharmacy and noted to be at goal of <140/90.   Recommended patient continue current therapy and follow-up in 6 months at the pharmacy. Patient had mentioned some dizzy spells and we spoke about getting a blood pressure machine to monitor readings at home.       Note completed by: Ilya Cortez RPH

## 2024-11-05 RX ORDER — CYCLOBENZAPRINE HCL 5 MG
5 TABLET ORAL 2 TIMES DAILY PRN
Qty: 40 TABLET | Refills: 0 | Status: SHIPPED | OUTPATIENT
Start: 2024-11-05

## 2024-11-05 NOTE — TELEPHONE ENCOUNTER
Medication Request for: Cyclobenzaprine 5 mg            Prescription last written on 5/6/24 by Dr. Yeo   Sig: take twice daily prn    Last Fill Quantity: 40 with # refills: 0     Last Office Visit by provider: 5/6/24  Future Office Visit:   5/6/24    Please advise regarding refill request.     MANOLO Arnold RN

## 2025-01-23 DIAGNOSIS — I10 ESSENTIAL HYPERTENSION, BENIGN: ICD-10-CM

## 2025-01-23 RX ORDER — LISINOPRIL AND HYDROCHLOROTHIAZIDE 20; 25 MG/1; MG/1
TABLET ORAL
Qty: 90 TABLET | Refills: 0 | Status: SHIPPED | OUTPATIENT
Start: 2025-01-23

## 2025-03-21 ENCOUNTER — TELEPHONE (OUTPATIENT)
Dept: ENDOCRINOLOGY | Facility: CLINIC | Age: 65
End: 2025-03-21
Payer: MEDICARE

## 2025-03-21 NOTE — TELEPHONE ENCOUNTER
Prior Authorization Approval    Medication: TRULICITY 1.5 MG/0.5ML SC SOAJ  Authorization Effective Date: 2/19/2025  Authorization Expiration Date: 3/21/2026  Approved Dose/Quantity:    Reference #: Key: [id number]   Insurance Company: [employer] Non-Specialty PA's - Phone [phone] Fax [phone]  Expected CoPay: $    CoPay Card Available: No    Financial Assistance Needed:    Which Pharmacy is filling the prescription: [facility] [address]  Pharmacy Notified: RUFINA  Patient Notified: RUFINA

## 2025-03-21 NOTE — TELEPHONE ENCOUNTER
PA Initiation - Key: [id number]    Medication: TRULICITY 1.5 MG/0.5ML SC SOAJ  Insurance Company: [employer] Non-Specialty PA's - Phone [phone] Fax [phone]  Pharmacy Filling the Rx: [place], [place] - [address] [place] [address]  Filling Pharmacy Phone: [phone]  Filling Pharmacy Fax: [phone]  Start Date: 3/21/2025

## 2025-03-24 NOTE — LETTER
[facility]  [address], [address]  [place], [place]  [address]                                            TEL:[phone]  FAX:[phone]      Mary Louyayo Gil  [address]   [facility] [address]      May 14, 2016    Dear Mary Lou Ramirez received a refill request from your pharmacy for Glipizide, however, we were only able to provide a one time fill because you are due for an appointment with Dr. Worley. Please call [phone] to schedule an appointment before you are due for your next refill. You can see Madai Garcia NP in [place] ([phone]) if she has earlier openings.      Sincerely,  Tamara RN - Triage Nurse    
Simple: Patient demonstrates quick and easy understanding

## 2025-03-30 ENCOUNTER — HEALTH MAINTENANCE LETTER (OUTPATIENT)
Age: 65
End: 2025-03-30

## 2025-04-08 DIAGNOSIS — I10 ESSENTIAL HYPERTENSION, BENIGN: ICD-10-CM

## 2025-04-09 RX ORDER — LISINOPRIL AND HYDROCHLOROTHIAZIDE 20; 25 MG/1; MG/1
TABLET ORAL
Qty: 90 TABLET | Refills: 0 | Status: SHIPPED | OUTPATIENT
Start: 2025-04-09

## 2025-04-11 NOTE — LETTER
8/14/2024      Mary Lou Gil  [address]  [facility] [address]      Dear Colleague,    Thank you for referring your patient, Mary Lou Gil, to the [facility]. Please see a copy of my visit note below.    Outcome for 08/08/24 2:40 PM: [employer] message sent  Jyotsna Garg MA  Outcome for 08/12/24 3:06 PM: Data obtained via [employer] website- Data from 7/17/24-7/30/24. Pt states she has not been checking BG since 7/30/24.   Jyotsna Garg MA    Patient is showing 1/5 MNCM met. BP out range, A1c not in range, Current tobacco use, and Aspirin not prescribed   Jyotsna Garg MA            Assessment/Plan :   Type 2 DM. Mary Lou has continued to work on improving her blood sugars. She is frustrated that she was not able to consistently take Trulicity. She had not had any adverse effects and she would like to restart the medication, if possible. We reviewed her recent Jen reports and her blood sugars are improving but still too elevated. I would like her to get back on the Trulicity and I would like to increase to 1.5 mg weekly in about a month. I really feel like this will help improve her overall control. I will hold off on making other adjustments to her medications, at this time. We also discussed how hyperglycemia can affect vision and neuropathy. If she has any problems with the Trulicity, she is to contact our office and we will follow-up in 3-4 mos.     Due to the COVID 19 pandemic this visit was a telephone/video visit in order to help prevent spread of infection in this patient and the general population. The patient gave verbal consent for the visit today. I have independently reviewed and interpreted labs, imaging as indicated.       Distant Location (provider location):  Off-site  Mode of Communication:  Video Conference via apomio  Chart review/prep time 5    Joined the call at 8/14/2024, 10:33:39 am.  Left the call at 8/14/2024, 10:45:38 am.  You  were on the call for 11 minutes 58 seconds .  20 minutes spent on the date of the encounter doing chart review, history and exam, documentation and further activities as noted above.      Chief complaint:  Mary Lou is a 64 year old female who returns for follow-up of Type 2 DM.    I have reviewed Care Everywhere including [facility], [facility], [facility] [facility], [place], [facility], [facility] , [facility], [place] lab reports, imaging reports and provider notes as indicated.      HISTORY OF PRESENT ILLNESS  Mary Lou has continued to work on getting her blood sugars under better control. She has struggled to fill the Trulicity prescription, consistently. Her pharmacy did not always have the medication in stock, so she has been off of it for the last month. She has continued to take metformin 2000 mg daily, Lantus 25 unit(s) daily, and glipizide 5 mg daily. She is still struggling with post-prandial spikes. She feels like this is mostly due to poor dietary choices. She has not been able to afford the keto diet food that she prefers. She has had to eat more carb heavy meals, and she knows that that leads to spikes.    Mary Lou has not had any problems with severe hyperglycemia and/or hypoglycemia. She has had a few episodes where she felt like her blood sugars were dropping too low. She had a small snack and her blood sugars improved. She has changed her sensor to alert when her blood sugar drops to 90 mg/dl, because that is when she starts to feel shaky. Mary Lou has also had some issues with blurred vision and numbness/tingling in her right arm, when her blood sugars spike.     Mary Lou was diagnosed with diabetes at the age of 46. She was started on oral medications at the time of diagnosis and then started on insulin in 2016. She admits that she has struggled with glucose control since diagnosis. However, she felt like things were going well when she was taking metformin, glipizide, Trulicity, and Levemir.  Her diabetes is complicated by hyperlipidemia, HTN, and hypothyroidism. She does have a history of microalbuminuria, as well.    Endocrine relevant labs are as follows:   Latest Reference Range & Units 03/27/24 11:01   Hemoglobin A1C 0.0 - 5.6 % 14.2 (H)   (H): Data is abnormally high   Latest Reference Range & Units 08/13/24 10:13   Albumin Urine mg/g Cr 0.00 - 25.00 mg/g Cr 44.53 (H)   (H): Data is abnormally high   Latest Reference Range & Units 08/13/24 10:13   Albumin Urine mg/L mg/L 52.1     REVIEW OF SYSTEMS    Endocrine: positive for diabetes  Skin: negative  Eyes: negative for, visual blurring, redness, tearing  Ears/Nose/Throat: negative  Respiratory: No shortness of breath, dyspnea on exertion, cough, or hemoptysis  Cardiovascular: negative for, chest pain, dyspnea on exertion, lower extremity edema, and exercise intolerance  Gastrointestinal: negative for, nausea, vomiting, constipation, and diarrhea  Genitourinary: negative for, nocturia, dysuria, frequency, and urgency  Musculoskeletal: negative for, muscular weakness, nocturnal cramping, and foot pain  Neurologic: positive for numbness or tingling of hands and numbness or tingling of feet  Psychiatric: negative  Hematologic/Lymphatic/Immunologic: negative    Past Medical History  Past Medical History:   Diagnosis Date     Anemia, unspecified 02/01/2010     Degeneration of lumbar intervertebral disc 07/11/2016     Depressive disorder      Diabetes mellitus (H) 02/01/2010    Dx in 2006;  insulin started 7/9/15     Essential hypertension, benign 02/01/2010     Hyperlipidaemia      Hyperlipidemia LDL goal < 100      Migraine      Other chronic pain     back, legs and feet     Spinal stenosis      Tobacco use disorder 02/01/2010     Uncomplicated asthma     ? with allergic reactions?     Unspecified hypothyroidism 02/01/2010       Medications  Current Outpatient Medications   Medication Sig Dispense Refill     albuterol (PROAIR HFA/PROVENTIL HFA/VENTOLIN  HFA) 108 (90 Base) MCG/ACT inhaler Inhale 2 puffs into the lungs every 4 hours as needed for shortness of breath or wheezing 18 g 11     albuterol (PROVENTIL) (2.5 MG/3ML) 0.083% neb solution Take 1 vial (2.5 mg) by nebulization every 6 hours as needed for shortness of breath / dyspnea or wheezing (Patient not taking: Reported on 3/27/2024) 1 Box 5     cholecalciferol (VITAMIN D3) 125 mcg (5000 units) capsule Take 1 capsule (125 mcg) by mouth daily 90 capsule 3     citalopram (CELEXA) 40 MG tablet Take 1 tablet (40 mg) by mouth daily 90 tablet 1     Continuous Blood Gluc Sensor ([employer] NICK 3 SENSOR) [facility] 1 each every 14 days Use 1 sensor every 14 days. Use to read blood sugars per 's instructions. 6 each 5     cyclobenzaprine (FLEXERIL) 5 MG tablet Take 1 tablet (5 mg) by mouth 2 times daily as needed for muscle spasms 40 tablet 0     diclofenac (VOLTAREN) 75 MG EC tablet TAKE ONE TABLET BY MOUTH TWICE A DAY AS NEEDED FOR MODERATE PAIN 60 tablet 0     DiphenhydrAMINE HCl (BENADRYL PO) Take 25-50 mg by mouth See Admin Instructions 25 mg during the day if needed and 50 mg at bedtime for sleep as needed       EPINEPHrine (ANY BX GENERIC EQUIV) 0.3 MG/0.3ML injection 2-pack Inject 0.3 mLs (0.3 mg) into the muscle once as needed for anaphylaxis (or allergic reaction.) 0.6 mL 3     fluticasone (FLONASE) 50 MCG/ACT nasal spray Spray 1-2 sprays into both nostrils daily as needed for rhinitis or allergies 16 g 11     glipiZIDE (GLUCOTROL XL) 5 MG 24 hr tablet TAKE 1 TABLET (5 MG) BY MOUTH DAILY WITH FOOD 90 tablet 0     ibuprofen (ADVIL/MOTRIN) 800 MG tablet Take 1 tablet (800 mg) by mouth every 8 hours as needed for moderate pain 90 tablet 2     insulin glargine (LANTUS PEN) 100 UNIT/ML pen Inject 25 Units Subcutaneous at bedtime 30 mL 3     insulin pen needle (31G X 5 MM) 31G X 5 MM miscellaneous Use ONE pen needles daily or as directed. 100 each 3     levothyroxine (SYNTHROID/LEVOTHROID) 150 MCG tablet  TAKE ONE TABLET BY MOUTH ONCE DAILY 6 DAYS PER WEEK, AND skip ONE DAY A WEEK 90 tablet 1     lisinopril-hydrochlorothiazide (ZESTORETIC) 20-25 MG tablet TAKE ONE TABLET BY MOUTH EVERY MORNING.NTBS 90 tablet 0     metFORMIN (GLUCOPHAGE XR) 500 MG 24 hr tablet TAKE 2 TABLETS (1,000 MG) BY MOUTH DAILY (WITH DINNER) 180 tablet 0     rosuvastatin (CRESTOR) 10 MG tablet Take 1 tablet (10 mg) by mouth daily 90 tablet 3     traZODone (DESYREL) 50 MG tablet TAKE ONE OR TWO TABLETS BY MOUTH EVERY NIGHT AT BEDTIME AS NEEDED FOR SLEEP 180 tablet 2     TRULICITY 0.75 MG/0.5ML pen INJECT 0.75 MG UNDER THE SKIN EVERY SEVEN DAYS 2 mL 0     UltiCare Alcohol Swabs 70 % PADS USE TO SWAB AREA OF INJECTION/LANCET AS DIRECTED (Patient not taking: Reported on 3/27/2024) 100 each 3       Allergies  Allergies   Allergen Reactions     Gabapentin Itching, Rash and Swelling     swelling     Nicotine Polacrilex Anaphylaxis     PROBABLY REACTION TO VINYL IN NICOTINE PATCH   PROBABLY REACTION TO VINYL IN NICOTINE PATCH    PROBABLY REACTION TO VINYL IN NICOTINE PATCH      PROBABLY REACTION TO VINYL IN NICOTINE PATCH PROBABLY REACTION TO VINYL IN NICOTINE PATCH     Vinyl Ether Swelling and Cough     Vinyl causes throat and lip swelling, cough and headache     Acetaminophen Itching     Norvasc [Amlodipine Besylate] GI Disturbance     constipation     Percocet [Oxycodone-Acetaminophen] Itching     Sulfamethoxazole-Trimethoprim Itching and Other (See Comments)     Oxycodone Itching       Family History  family history includes Aneurysm in her mother; C.A.D. in her mother and sister; Cerebrovascular Disease in her mother; Diabetes in her mother, sister, sister, sister, and sister; Hypertension in her mother, sister, sister, sister, and sister; Thyroid Disease in her sister; Unknown/Adopted in her brother, brother, and sister.    Social History  Social History     Tobacco Use     Smoking status: Every Day     Current packs/day: 1.50     Average  packs/day: 1.5 packs/day for 42.1 years (63.2 ttl pk-yrs)     Types: Other, Cigarettes     Start date: 6/27/1982     Smokeless tobacco: Never     Tobacco comments:     started at age 19 and has quit for 10 years    Vaping Use     Vaping status: Never Used   Substance Use Topics     Alcohol use: No     Drug use: No       Physical Exam  There is no height or weight on file to calculate BMI.  GENERAL: no distress  SKIN: Visible skin clear. No significant rash, abnormal pigmentation or lesions.  EYES: Eyes grossly normal to inspection.  No discharge or erythema, or obvious scleral/conjunctival abnormalities.  NECK: visible goiter is not present; no visible neck masses  RESP: No audible wheeze, cough, or visible cyanosis.  No visible retractions or increased work of breathing.    NEURO: Awake, alert, responds appropriately to questions.  Mentation and speech fluent.  PSYCH:affect normal and appearance well-groomed.      DATA REVIEW  FreeStyle LibreView  Time in target range 8%  High 32%, Very High 60%  Current Ave  mg/dl        Virtual Visit Details    Type of service:  Video Visit     Originating Location (pt. Location): Home    Distant Location (provider location):  On-site  Platform used for Video Visit: Etta      Again, thank you for allowing me to participate in the care of your patient.        Sincerely,        Yanna Schmid PA-C   -Per ortho  -Pain control.

## 2025-04-26 ENCOUNTER — APPOINTMENT (OUTPATIENT)
Dept: CT IMAGING | Facility: CLINIC | Age: 65
End: 2025-04-26
Attending: STUDENT IN AN ORGANIZED HEALTH CARE EDUCATION/TRAINING PROGRAM
Payer: COMMERCIAL

## 2025-04-26 ENCOUNTER — HOSPITAL ENCOUNTER (EMERGENCY)
Facility: CLINIC | Age: 65
Discharge: HOME | End: 2025-04-27
Attending: STUDENT IN AN ORGANIZED HEALTH CARE EDUCATION/TRAINING PROGRAM | Admitting: STUDENT IN AN ORGANIZED HEALTH CARE EDUCATION/TRAINING PROGRAM
Payer: COMMERCIAL

## 2025-04-26 VITALS
BODY MASS INDEX: 21.53 KG/M2 | DIASTOLIC BLOOD PRESSURE: 88 MMHG | WEIGHT: 134 LBS | TEMPERATURE: 97.7 F | HEART RATE: 75 BPM | OXYGEN SATURATION: 99 % | RESPIRATION RATE: 16 BRPM | SYSTOLIC BLOOD PRESSURE: 156 MMHG | HEIGHT: 66 IN

## 2025-04-26 DIAGNOSIS — K20.90 ESOPHAGITIS: ICD-10-CM

## 2025-04-26 DIAGNOSIS — R91.1 PULMONARY NODULE: ICD-10-CM

## 2025-04-26 DIAGNOSIS — I70.90 ATHEROSCLEROSIS OF ARTERIES: ICD-10-CM

## 2025-04-26 DIAGNOSIS — K86.9 LESION OF PANCREAS: ICD-10-CM

## 2025-04-26 LAB
ALBUMIN SERPL BCG-MCNC: 4 G/DL (ref 3.5–5.2)
ALP SERPL-CCNC: 68 U/L (ref 40–150)
ALT SERPL W P-5'-P-CCNC: 13 U/L (ref 0–50)
ANION GAP SERPL CALCULATED.3IONS-SCNC: 14 MMOL/L (ref 7–15)
AST SERPL W P-5'-P-CCNC: 14 U/L (ref 0–45)
ATRIAL RATE - MUSE: 84 BPM
BASOPHILS # BLD AUTO: 0.1 10E3/UL (ref 0–0.2)
BASOPHILS NFR BLD AUTO: 1 %
BILIRUB SERPL-MCNC: 0.2 MG/DL
BUN SERPL-MCNC: 20.2 MG/DL (ref 8–23)
CALCIUM SERPL-MCNC: 9.2 MG/DL (ref 8.8–10.4)
CHLORIDE SERPL-SCNC: 102 MMOL/L (ref 98–107)
CREAT SERPL-MCNC: 0.59 MG/DL (ref 0.51–0.95)
DIASTOLIC BLOOD PRESSURE - MUSE: NORMAL MMHG
EGFRCR SERPLBLD CKD-EPI 2021: >90 ML/MIN/1.73M2
EOSINOPHIL # BLD AUTO: 0.3 10E3/UL (ref 0–0.7)
EOSINOPHIL NFR BLD AUTO: 3 %
ERYTHROCYTE [DISTWIDTH] IN BLOOD BY AUTOMATED COUNT: 13.6 % (ref 10–15)
GLUCOSE SERPL-MCNC: 224 MG/DL (ref 70–99)
HCO3 SERPL-SCNC: 20 MMOL/L (ref 22–29)
HCT VFR BLD AUTO: 45.8 % (ref 35–47)
HGB BLD-MCNC: 15.6 G/DL (ref 11.7–15.7)
HOLD SPECIMEN: NORMAL
HOLD SPECIMEN: NORMAL
IMM GRANULOCYTES # BLD: 0 10E3/UL
IMM GRANULOCYTES NFR BLD: 0 %
INTERPRETATION ECG - MUSE: NORMAL
LIPASE SERPL-CCNC: 43 U/L (ref 13–60)
LYMPHOCYTES # BLD AUTO: 3.2 10E3/UL (ref 0.8–5.3)
LYMPHOCYTES NFR BLD AUTO: 32 %
MCH RBC QN AUTO: 30.2 PG (ref 26.5–33)
MCHC RBC AUTO-ENTMCNC: 34.1 G/DL (ref 31.5–36.5)
MCV RBC AUTO: 89 FL (ref 78–100)
MONOCYTES # BLD AUTO: 0.6 10E3/UL (ref 0–1.3)
MONOCYTES NFR BLD AUTO: 5 %
NEUTROPHILS # BLD AUTO: 6 10E3/UL (ref 1.6–8.3)
NEUTROPHILS NFR BLD AUTO: 59 %
NRBC # BLD AUTO: 0 10E3/UL
NRBC BLD AUTO-RTO: 0 /100
P AXIS - MUSE: 69 DEGREES
PLATELET # BLD AUTO: 235 10E3/UL (ref 150–450)
POTASSIUM SERPL-SCNC: 3.7 MMOL/L (ref 3.4–5.3)
PR INTERVAL - MUSE: 152 MS
PROT SERPL-MCNC: 7.1 G/DL (ref 6.4–8.3)
QRS DURATION - MUSE: 88 MS
QT - MUSE: 406 MS
QTC - MUSE: 479 MS
R AXIS - MUSE: 22 DEGREES
RBC # BLD AUTO: 5.17 10E6/UL (ref 3.8–5.2)
SODIUM SERPL-SCNC: 136 MMOL/L (ref 135–145)
SYSTOLIC BLOOD PRESSURE - MUSE: NORMAL MMHG
T AXIS - MUSE: 64 DEGREES
TROPONIN T SERPL HS-MCNC: 8 NG/L
VENTRICULAR RATE- MUSE: 84 BPM
WBC # BLD AUTO: 10.2 10E3/UL (ref 4–11)

## 2025-04-26 PROCEDURE — 36415 COLL VENOUS BLD VENIPUNCTURE: CPT | Performed by: STUDENT IN AN ORGANIZED HEALTH CARE EDUCATION/TRAINING PROGRAM

## 2025-04-26 PROCEDURE — 96374 THER/PROPH/DIAG INJ IV PUSH: CPT | Mod: 59

## 2025-04-26 PROCEDURE — 74177 CT ABD & PELVIS W/CONTRAST: CPT

## 2025-04-26 PROCEDURE — 250N000009 HC RX 250: Performed by: STUDENT IN AN ORGANIZED HEALTH CARE EDUCATION/TRAINING PROGRAM

## 2025-04-26 PROCEDURE — 96375 TX/PRO/DX INJ NEW DRUG ADDON: CPT | Mod: 59

## 2025-04-26 PROCEDURE — 93005 ELECTROCARDIOGRAM TRACING: CPT

## 2025-04-26 PROCEDURE — 99285 EMERGENCY DEPT VISIT HI MDM: CPT | Mod: 25

## 2025-04-26 PROCEDURE — 250N000011 HC RX IP 250 OP 636: Performed by: STUDENT IN AN ORGANIZED HEALTH CARE EDUCATION/TRAINING PROGRAM

## 2025-04-26 PROCEDURE — 85025 COMPLETE CBC W/AUTO DIFF WBC: CPT | Performed by: STUDENT IN AN ORGANIZED HEALTH CARE EDUCATION/TRAINING PROGRAM

## 2025-04-26 PROCEDURE — 84484 ASSAY OF TROPONIN QUANT: CPT | Performed by: STUDENT IN AN ORGANIZED HEALTH CARE EDUCATION/TRAINING PROGRAM

## 2025-04-26 PROCEDURE — 83690 ASSAY OF LIPASE: CPT | Performed by: STUDENT IN AN ORGANIZED HEALTH CARE EDUCATION/TRAINING PROGRAM

## 2025-04-26 PROCEDURE — 80053 COMPREHEN METABOLIC PANEL: CPT | Performed by: STUDENT IN AN ORGANIZED HEALTH CARE EDUCATION/TRAINING PROGRAM

## 2025-04-26 RX ORDER — HYDROMORPHONE HYDROCHLORIDE 1 MG/ML
0.5 INJECTION, SOLUTION INTRAMUSCULAR; INTRAVENOUS; SUBCUTANEOUS ONCE
Status: COMPLETED | OUTPATIENT
Start: 2025-04-26 | End: 2025-04-26

## 2025-04-26 RX ORDER — IOPAMIDOL 755 MG/ML
80 INJECTION, SOLUTION INTRAVASCULAR ONCE
Status: COMPLETED | OUTPATIENT
Start: 2025-04-26 | End: 2025-04-26

## 2025-04-26 RX ORDER — ONDANSETRON 2 MG/ML
4 INJECTION INTRAMUSCULAR; INTRAVENOUS ONCE
Status: COMPLETED | OUTPATIENT
Start: 2025-04-26 | End: 2025-04-26

## 2025-04-26 RX ADMIN — IOPAMIDOL 80 ML: 755 INJECTION, SOLUTION INTRAVENOUS at 22:06

## 2025-04-26 RX ADMIN — HYDROMORPHONE HYDROCHLORIDE 0.5 MG: 1 INJECTION, SOLUTION INTRAMUSCULAR; INTRAVENOUS; SUBCUTANEOUS at 21:46

## 2025-04-26 RX ADMIN — PANTOPRAZOLE SODIUM 80 MG: 40 INJECTION, POWDER, FOR SOLUTION INTRAVENOUS at 23:41

## 2025-04-26 RX ADMIN — ONDANSETRON 4 MG: 2 INJECTION, SOLUTION INTRAMUSCULAR; INTRAVENOUS at 21:47

## 2025-04-26 RX ADMIN — SODIUM CHLORIDE 72 ML: 9 INJECTION, SOLUTION INTRAVENOUS at 22:06

## 2025-04-26 ASSESSMENT — COLUMBIA-SUICIDE SEVERITY RATING SCALE - C-SSRS
1. IN THE PAST MONTH, HAVE YOU WISHED YOU WERE DEAD OR WISHED YOU COULD GO TO SLEEP AND NOT WAKE UP?: NO
2. HAVE YOU ACTUALLY HAD ANY THOUGHTS OF KILLING YOURSELF IN THE PAST MONTH?: NO
6. HAVE YOU EVER DONE ANYTHING, STARTED TO DO ANYTHING, OR PREPARED TO DO ANYTHING TO END YOUR LIFE?: YES

## 2025-04-26 ASSESSMENT — ACTIVITIES OF DAILY LIVING (ADL)
ADLS_ACUITY_SCORE: 41

## 2025-04-27 LAB — TROPONIN T SERPL HS-MCNC: 9 NG/L

## 2025-04-27 PROCEDURE — 84484 ASSAY OF TROPONIN QUANT: CPT | Performed by: STUDENT IN AN ORGANIZED HEALTH CARE EDUCATION/TRAINING PROGRAM

## 2025-04-27 PROCEDURE — 36415 COLL VENOUS BLD VENIPUNCTURE: CPT | Performed by: STUDENT IN AN ORGANIZED HEALTH CARE EDUCATION/TRAINING PROGRAM

## 2025-04-27 RX ORDER — PANTOPRAZOLE SODIUM 40 MG/1
40 TABLET, DELAYED RELEASE ORAL 2 TIMES DAILY
Qty: 60 TABLET | Refills: 0 | Status: SHIPPED | OUTPATIENT
Start: 2025-04-27 | End: 2025-05-27

## 2025-04-27 ASSESSMENT — ACTIVITIES OF DAILY LIVING (ADL): ADLS_ACUITY_SCORE: 41

## 2025-04-27 NOTE — ED TRIAGE NOTES
Pt BIBA from home. Pt w/ chronic low back pain d/t spinal stenosis. Today had sudden onset 9/10 upper back pain - between the shoulder blades. Feeling tense w/ shallow breathing d/t pain. Took 2x 800mg ibuprofen, 1 tab @ 1800 and 1 tab @ 1845     Triage Assessment (Adult)       Row Name 04/26/25 2040          Triage Assessment    Airway WDL WDL        Respiratory WDL    Respiratory WDL X;all     Rhythm/Pattern, Respiratory shallow        Skin Circulation/Temperature WDL    Skin Circulation/Temperature WDL WDL        Cardiac WDL    Cardiac WDL WDL        Peripheral/Neurovascular WDL    Peripheral Neurovascular WDL WDL        Cognitive/Neuro/Behavioral WDL    Cognitive/Neuro/Behavioral WDL WDL

## 2025-04-27 NOTE — DISCHARGE INSTRUCTIONS
CT scan chest 12 months for follow up nodules  CT pancreas 3-6 months for follow up lesion    You had calcifications of your aorta and cardiac vessels. Please follow up with your primary care doctor -- you may need a new consultation with cardiology.     You should hear from the gastroenterologist about an appointment for your esophagus inflammation.

## 2025-04-27 NOTE — ED PROVIDER NOTES
"  Emergency Department Note      History of Present Illness     Chief Complaint   Back Pain      HPI   Mary Lou Gil is a 64 year old female with a history of type 2 diabetes mellitus, hyperlipidemia and hypertension presenting with back pain. The patient reports developed new, sharp, upper back pain between her shoulder blades suddenly onset at 1800 today; she was winding down for the night, cleaning her home and putting her kittens away. She has chronic lower back pain at baseline, however, her pain today is different. She tried using a heating pad with no relief. At , she took 800 mg of ibuprofen with no relief and after 45 minutes, she took another dose. Patient denies shortness of breath, nausea, pleuritic chest pain, urinary frequency, dysuria   She is not on blood thinners. She is a smoker.    Independent Historian   None    Review of External Notes   None    Past Medical History     Medical History and Problem List   Anemia  Degeneration of lumbar intervertebral disc  Depressive disorder  Type 2 diabetes mellitus   Essential hypertension  Hyperlipidemia   Migraine  Chronic pain  Spinal stenosis  Tobacco use disorder  Uncomplicated asthma  Unspecified hypothyroidism    Medications   Albuterol  Citalopram   Dulaglutide  Fluticasone   Glipizide   Insulin glargine  Levothyroxine   Lisinopril-hydrochlorothiazide   Metformin   Rosuvastatin  Trazodone     Surgical History   Appendectomy      Thyroidectomy   Hysterectomy and ablation  T & A    Physical Exam     Patient Vitals for the past 24 hrs:   BP Temp Temp src Pulse Resp SpO2 Height Weight   25 2230 (!) 156/88 -- -- 75 -- 99 % -- --   257 -- 97.7  F (36.5  C) Temporal -- -- -- -- --   25 (!) 156/87 -- Temporal 98 16 98 % 1.676 m (5' 6\") 60.8 kg (134 lb)     Physical Exam  General: Awake, alert, in no acute distress   HEENT: Atraumatic   EOM normal   External ears normal   Trachea midline  Neck: Supple, normal ROM  CV: " Regular rate, regular rhythm   No lower extremity edema  2+ radial and DP pulses  PULM: Breath sounds normal bilaterally  No wheezes or rales  ABD: Soft, non-tender, non-distended  Normal bowel sounds   No rebound or guarding   MSK: No gross deformities  Mild tenderness over left parathoracic musculature  NEURO: Alert, no focal deficits  Skin: Warm, dry and intact      Diagnostics     Lab Results   Labs Ordered and Resulted from Time of ED Arrival to Time of ED Departure   COMPREHENSIVE METABOLIC PANEL - Abnormal       Result Value    Sodium 136      Potassium 3.7      Carbon Dioxide (CO2) 20 (*)     Anion Gap 14      Urea Nitrogen 20.2      Creatinine 0.59      GFR Estimate >90      Calcium 9.2      Chloride 102      Glucose 224 (*)     Alkaline Phosphatase 68      AST 14      ALT 13      Protein Total 7.1      Albumin 4.0      Bilirubin Total 0.2     TROPONIN T, HIGH SENSITIVITY - Normal    Troponin T, High Sensitivity 8     LIPASE - Normal    Lipase 43     TROPONIN T, HIGH SENSITIVITY - Normal    Troponin T, High Sensitivity 9     CBC WITH PLATELETS AND DIFFERENTIAL    WBC Count 10.2      RBC Count 5.17      Hemoglobin 15.6      Hematocrit 45.8      MCV 89      MCH 30.2      MCHC 34.1      RDW 13.6      Platelet Count 235      % Neutrophils 59      % Lymphocytes 32      % Monocytes 5      % Eosinophils 3      % Basophils 1      % Immature Granulocytes 0      NRBCs per 100 WBC 0      Absolute Neutrophils 6.0      Absolute Lymphocytes 3.2      Absolute Monocytes 0.6      Absolute Eosinophils 0.3      Absolute Basophils 0.1      Absolute Immature Granulocytes 0.0      Absolute NRBCs 0.0         Imaging   CT Aortic Survey w Contrast   Final Result   IMPRESSION:   1.  No thoracoabdominal aortic aneurysm or dissection.   2.  Moderate to severe multivessel aortic arch stenosis, most notably affecting the proximal left common carotid artery and brachiocephalic artery bifurcation (into the right common carotid and right  subclavian arteries).   3.  5 mm lung nodule in the left lower lobe; according to 2017 Fleischner Society recommendations, consider optional follow-up with non-contrast chest CT in 12 months if high-risk for primary lung malignancy (smoking history, radon/asbestos exposure, or    first-degree relative with lung cancer).     4.  Multivessel coronary artery disease.   5.  Several indeterminate subcentimeter pancreatic head lesions. These are probably below resolution of MRI. Recommend follow-up pancreatic mass protocol CT in 3-6 months.             EKG   ECG taken at 2126, ECG read at 2153  Sinus rhythm   Normal ECG    No significant change as compared to prior, dated 02/26/22.  Rate 84 bpm. OH interval 152 ms. QRS duration 88 ms. QT/QTc 406/479 ms. P-R-T axes 69 22 64.    Independent Interpretation   None    ED Course      Medications Administered   Medications   HYDROmorphone (PF) (DILAUDID) injection 0.5 mg (0.5 mg Intravenous $Given 4/26/25 2146)   ondansetron (ZOFRAN) injection 4 mg (4 mg Intravenous $Given 4/26/25 2147)   iopamidol (ISOVUE-370) solution 80 mL (80 mLs Intravenous $Given 4/26/25 2206)   sodium chloride 0.9 % bag for CT scan flush (72 mLs Intravenous $Given 4/26/25 2206)   pantoprazole (PROTONIX) IV push injection 80 mg (80 mg Intravenous $Given 4/26/25 2341)       Procedures   Procedures     Discussion of Management   None    ED Course   ED Course as of 04/27/25 0230   Sat Apr 26, 2025 2111 I obtained the history and examined the patient as noted above.     Sun Apr 27, 2025   0046 I rechecked the patient and explained findings.        Additional Documentation  None    Medical Decision Making / Diagnosis     CMS Diagnoses: None    MIPS       None    MDM   Mary Lou Gil is a 64 year old female who presents with the above history.  Concern for ACS, aortic dissection, biliary or pancreatic pathology.  Aortic survey is fortunately negative for aortic dissection.  She does have multiple imaging  abnormalities.  Labs as above are reassuring with troponin negative x 2.  EKG is nonischemic.    I suspect her pain is related to the esophagus given that I cannot reproduce it on exam well.  could be either esophagitis versus Braxton's esophagus versus other.  Given Protonix in the ED, will start on twice daily Protonix for presumed severe esophagitis.  Instructed against NSAID use.  Counseled on smoking cessation.  Referred to GI.    Also has noted coronary artery calcifications and aortic calcifications.  She may need risk stratification with either vascular and/or cardiology but will defer this decision to her primary care doctor.  Incidental pulmonary nodules with needed follow-up at 12 months, incidental pancreatic lesion was needed follow-up in 3 to 6 months.  Discussed all findings with patient.  She is feeling improved with the above interventions, will discharge home with close PCP follow-up.  Knows to return to the ED if at any point worsening, particularly developing chest pain or shortness of breath.    Disposition   The patient was discharged.     Diagnosis     ICD-10-CM    1. Esophagitis  K20.90 Adult GI  Referral - Consult Only      2. Pulmonary nodule  R91.1       3. Lesion of pancreas  K86.9       4. Atherosclerosis of arteries  I70.90            Discharge Medications   Discharge Medication List as of 4/27/2025  1:05 AM        START taking these medications    Details   pantoprazole (PROTONIX) 40 MG EC tablet Take 1 tablet (40 mg) by mouth 2 times daily., Disp-60 tablet, R-0, E-Prescribe           Scribe Disclosure:  Christy ROWE, am serving as a scribe at 9:21 PM on 4/26/2025 to document services personally performed by Pilar Diaz DO based on my observations and the provider's statements to me.        Pilar Diaz DO  04/27/25 [id number]

## 2025-04-28 ENCOUNTER — PATIENT OUTREACH (OUTPATIENT)
Dept: INTERNAL MEDICINE | Facility: CLINIC | Age: 65
End: 2025-04-28
Payer: COMMERCIAL

## 2025-04-30 NOTE — TELEPHONE ENCOUNTER
"Transitions of Care Outreach  Chief Complaint   Patient presents with    Hospital F/U       Most Recent Admission Date: 4/26/2025   Most Recent Admission Diagnosis:  Esophagitis - K20.90  Pulmonary nodule - R91.1  Lesion of pancreas - K86.9  Atherosclerosis of arteries - I70.90    Most Recent Discharge Date: 4/27/2025   Most Recent Discharge Diagnosis: Esophagitis - K20.90  Pulmonary nodule - R91.1  Lesion of pancreas - K86.9  Atherosclerosis of arteries - I70.90     Transitions of Care Assessment    Discharge Assessment  How are you doing now that you are home?: \"I am feeling fine. I am taking the medication they prescribed for acid reflux. The upper back pain went away several hours after I received the antiacid and pain medication in the ER.\"  How are your symptoms? (Red Flag symptoms escalate to triage hotline per guidelines): Improved  Do you know how to contact your clinic care team if you have future questions or changes to your health status? : Yes  Does the patient have their discharge instructions? : Yes  Does the patient have questions regarding their discharge instructions? : No  Were you started on any new medications or were there changes to any of your previous medications? : Yes  Does the patient have all of their medications?: Yes  Do you have questions regarding any of your medications? : No  Do you have all of your needed medical supplies or equipment (DME)?  (i.e. oxygen tank, CPAP, cane, etc.): Yes    Follow up Plan     Discharge Follow-Up  Discharge follow up appointment scheduled in alignment with recommended follow up timeframe or Transitions of Risk Category? (Low = within 30 days; Moderate= within 14 days; High= within 7 days): Yes  Discharge Follow Up Appointment Date: 05/08/25  Discharge Follow Up Appointment Scheduled with?: Specialty Care Provider    Future Appointments   Date Time Provider Department Center   5/8/2025  9:30 AM Aye Garcia APRN CNP [facility]   5/13/2025  " 3:30 PM Xavi Garcia MD [facility]   9/15/2025  1:30 PM [facility] Ramirez [facility]       Outpatient Plan as outlined on AVS reviewed with patient.    For any urgent concerns, please contact our 24 hour nurse triage line: [phone] ([phone])       Deandra Gonzales RN

## 2025-05-05 NOTE — PROGRESS NOTES
Mary Lou Gil is a 64 year old patient who is being evaluated via a billable virtual visit.       How would you like to obtain your AVS? MyChart  If the video visit is dropped, the invitation should be resent by: Text to cell phone: [phone]  Will anyone else be joining your video visit? No    Video-Visit Details     Type of service:  Video Visit     Video Start Time (time video started): 9: 32     Video End Time (time video stopped): 9: 47    Additional 25 minutes on the date of service was spent performing the following:   -Preparing to see the patient (eg, review of tests,providers progress notes, medical/surgical history,etc) ;  -Ordering medications, tests, or procedures;   -Documenting clinical information in the electronic or other health record.  Total time:      Physician has received verbal consent for a Video Visit from the patient? Yes     Originating Location (pt. Location): Home    Distant Location (provider location):  Off-site    Platform used for Video Visit: [address] [address]  Phone: [phone]    Patient:  Mary Lou Gil, Date of birth 1960  Date of Visit:  5/8/25    GASTROENTEROLOGY NEW PATIENT VIDEO VISIT    CC/REFERRING MD:    Xavi Garcia  REASON FOR CONSULTATION:   Referred for Consult (Esophagitis)      HISTORY OF PRESENT ILLNESS:  Mary Lou Gil is a 64 year old female who was referred to GI clinic for a consultation on esophagitis.  Patient stated she was seen in the emergency room on 4/26 for acute pain between her shoulder blades.  She took 2 doses of 800 mg of ibuprofen with no relief.  Had negative evaluation for aortic dissection, PE, or cardiac etiology. Questionable mild wall thickening and surrounding inflammation of the GE junction and proximal stomach. Patient was started on pantoprazole 40 mg twice a day, but she misunderstood instructions and has been  taking it daily.  Patient stated she was surprised to hear about possible esophagitis diagnosis as she has no abdominal or chest discomfort, acid reflux, nausea, indigestion, or previous history of gastrointestinal disease.  She admits to problems with swallowing since her thyroidectomy.  Said that she is choking frequently on foods, liquids and pills.  At times, she gags or coughs and ends up throwing up.  Denies odynophagia.  No weight loss.  Patient reported ongoing issues with constipation, but mentioned that since she started eating activity a yogurt every day, her bowel movements had improved.  no hematochezia or melena.  Noted that CT of abdomen showed several sub centimeter pancreatic lesions. Normal lipase.  Patient reported family history of pancreatic cancer in her mother in her 70s.    Wt Readings from Last 10 Encounters:   04/26/25 60.8 kg (134 lb)   05/06/24 60.3 kg (133 lb)   04/03/24 60.3 kg (133 lb)   03/27/24 60.3 kg (133 lb)   09/02/22 57.6 kg (127 lb)   03/14/22 61.7 kg (136 lb)   02/26/22 63.5 kg (140 lb)   02/09/22 64.8 kg (142 lb 12.8 oz)   12/07/21 66.7 kg (147 lb)   10/13/21 66.7 kg (147 lb)       PREVIOUS ENDOSCOPY:  6/4/2014 EGD  Normal esophagus.  Medium possible paraesophageal hernia  Normal duodenum    PERTINENT IMAGING STUDIES WERE REVIEWED IN EMR  4/26/2025 CT aortic survey  FINDINGS:   CT ANGIOGRAM CHEST, ABDOMEN, AND PELVIS: Mild thoracic aortic atherosclerosis without thoracic aortic aneurysm or acute aortic syndrome, such as dissection. There is severe stenosis of the proximal left common carotid artery at its origin from the aortic   arch (axial series 6, image 36 and coronal series 9 image 67) and mild to moderate stenosis of the brachiocephalic artery bifurcation (series 6, image 28 and series 9 image 70). Patent vertebral arteries and relatively normal left common carotid artery   distal to the site of stenosis. Moderate degree of aortoiliac atherosclerosis without abdominal  aortic aneurysm or dissection. The major named branches of the abdominal aorta are patent without significant stenosis.     LUNGS AND PLEURA: Emphysema without airspace opacity. Several bilateral pulmonary micronodules; the largest and most suspicious measures 5 mm in the left lower lobe (series 7, image 212 with several additional nodules also annotated on series 7). No pleural effusion or pneumothorax. Normal airways.     MEDIASTINUM/AXILLAE: No mediastinal hemorrhage. Normal heart size without pericardial effusion. Minimally opacified pulmonary arteries limits evaluation for incidental pulmonary emboli, but no large or central emboli are observed. Atrophic versus   surgically absent thyroid gland. No enlarged lymph nodes in the chest.     CORONARY ARTERY CALCIFICATION: Involving proximal LAD, mid LAD, and proximal RCA.   This study is performed with arterial phase of contrast timing as this is tailored for aortic evaluation. This reduces the sensitivity for bowel and solid organ evaluation, which are better evaluated in a venous phase.   HEPATOBILIARY: Normal.     PANCREAS: Several subcentimeter low-density indeterminate pancreatic head foci, some of which may represent invaginated retroperitoneal fat or volume averaging with adjacent bowel.     SPLEEN: Normal.     ADRENAL GLANDS: Normal.     KIDNEYS/BLADDER: Normal.     BOWEL: Mild wall thickening and surrounding inflammation of the GE junction and proximal stomach. This is nonspecific and can be seen in the setting of vomiting or GERD, among other possibilities. No bowel distention. Nonvisualized appendix which may be atrophic or surgically absent, but there is no inflammation in its expected location.     LYMPH NODES: Normal.   PELVIC ORGANS: Normal.   MUSCULOSKELETAL: No acute or aggressive osseous abnormalities.                                                                      IMPRESSION:  1.  No thoracoabdominal aortic aneurysm or dissection.  2.   Moderate to severe multivessel aortic arch stenosis, most notably affecting the proximal left common carotid artery and brachiocephalic artery bifurcation (into the right common carotid and right subclavian arteries).  3.  5 mm lung nodule in the left lower lobe; according to 2017 Fleischner Society recommendations, consider optional follow-up with non-contrast chest CT in 12 months if high-risk for primary lung malignancy (smoking history, radon/asbestos exposure, or   first-degree relative with lung cancer).    4.  Multivessel coronary artery disease.  5.  Several indeterminate subcentimeter pancreatic head lesions. These are probably below resolution of MRI. Recommend follow-up pancreatic mass protocol CT in 3-6 months.        HISTORY:   has a past medical history of Anemia, unspecified (2010), Degeneration of lumbar intervertebral disc (2016), Depressive disorder, Diabetes mellitus (H) (2010), Essential hypertension, benign (2010), Hyperlipidaemia, Hyperlipidemia LDL goal < 100, Migraine, Other chronic pain, Spinal stenosis, Tobacco use disorder (2010), Uncomplicated asthma, and Unspecified hypothyroidism (2010).     has a past surgical history that includes REMOVE TONSILS/ADENOIDS,<11 Y/O; APPENDECTOMY ();  DELIVERY ONLY (); LIGATE FALLOPIAN TUBE,POSTPARTUM (); thyroidectomy  (); HYSTEROSCOPY, SURGICAL; W/ ENDOMETRIAL ABLATION, ANY METHOD (); Operative hysteroscopy with morcellator (N/A, 2014); Abdomen surgery; and biopsy.     reports that she has been smoking other and cigarettes. She started smoking about 42 years ago. She has a 64.3 pack-year smoking history. She has never used smokeless tobacco. She reports that she does not drink alcohol and does not use drugs.    family history includes Aneurysm in her mother; C.A.D. in her mother and sister; Cerebrovascular Disease in her mother; Diabetes in her mother, sister, sister, sister, and  sister; Hypertension in her mother, sister, sister, sister, and sister; Thyroid Disease in her sister; Unknown/Adopted in her brother, brother, and sister.    ALLERGIES:     Allergies   Allergen Reactions    Gabapentin Itching, Rash and Swelling     swelling    Nicotine Polacrilex Anaphylaxis     PROBABLY REACTION TO VINYL IN NICOTINE PATCH   PROBABLY REACTION TO VINYL IN NICOTINE PATCH    PROBABLY REACTION TO VINYL IN NICOTINE PATCH      PROBABLY REACTION TO VINYL IN NICOTINE PATCH PROBABLY REACTION TO VINYL IN NICOTINE PATCH    Vinyl Ether Swelling and Cough     Vinyl causes throat and lip swelling, cough and headache    Acetaminophen Itching    Norvasc [Amlodipine Besylate] GI Disturbance     constipation    Percocet [Oxycodone-Acetaminophen] Itching    Sulfamethoxazole-Trimethoprim Itching and Other (See Comments)    Oxycodone Itching       PERTINENT MEDICATIONS:    Current Outpatient Medications:     albuterol (PROAIR HFA/PROVENTIL HFA/VENTOLIN HFA) 108 (90 Base) MCG/ACT inhaler, Inhale 2 puffs into the lungs every 4 hours as needed for shortness of breath or wheezing, Disp: 18 g, Rfl: 11    albuterol (PROVENTIL) (2.5 MG/3ML) 0.083% neb solution, Take 1 vial (2.5 mg) by nebulization every 6 hours as needed for shortness of breath / dyspnea or wheezing (Patient not taking: Reported on 3/27/2024), Disp: 1 Box, Rfl: 5    cholecalciferol (VITAMIN D3) 125 mcg (5000 units) capsule, Take 1 capsule (125 mcg) by mouth daily, Disp: 90 capsule, Rfl: 3    citalopram (CELEXA) 40 MG tablet, TAKE 1 TABLET (40 MG) BY MOUTH DAILY, Disp: 90 tablet, Rfl: 0    Continuous Blood Gluc Sensor (FREESTYLE NICK 3 SENSOR) [facility] [place] 1 each every 14 days Use 1 sensor every 14 days. Use to read blood sugars per 's instructions., Disp: 6 each, Rfl: 5    Continuous Glucose Sensor (FREESTYLE NICK 3 PLUS SENSOR) MISC, Change every 15 days., Disp: 2 each, Rfl: 5    cyclobenzaprine (FLEXERIL) 5 MG tablet, TAKE ONE TABLET BY MOUTH TWICE A  DAY AS NEEDED FOR MUSCLE SPASMS, Disp: 40 tablet, Rfl: 0    diclofenac (VOLTAREN) 75 MG EC tablet, TAKE ONE TABLET BY MOUTH TWICE A DAY AS NEEDED FOR MODERATE PAIN, Disp: 60 tablet, Rfl: 0    DiphenhydrAMINE HCl (BENADRYL PO), Take 25-50 mg by mouth See Admin Instructions 25 mg during the day if needed and 50 mg at bedtime for sleep as needed, Disp: , Rfl:     dulaglutide (TRULICITY) 1.5 MG/0.5ML pen, Inject 1.5 mg subcutaneously every 7 days., Disp: 6 mL, Rfl: 1    dulaglutide (TRULICITY) 1.5 MG/0.5ML pen, Inject 1.5 mg subcutaneously every 7 days., Disp: 2 mL, Rfl: 1    EPINEPHrine (ANY BX GENERIC EQUIV) 0.3 MG/0.3ML injection 2-pack, Inject 0.3 mLs (0.3 mg) into the muscle once as needed for anaphylaxis (or allergic reaction.), Disp: 0.6 mL, Rfl: 3    fluticasone (FLONASE) 50 MCG/ACT nasal spray, Spray 1-2 sprays into both nostrils daily as needed for rhinitis or allergies, Disp: 16 g, Rfl: 11    glipiZIDE (GLUCOTROL XL) 5 MG 24 hr tablet, TAKE 1 TABLET (5 MG) BY MOUTH DAILY WITH FOOD, Disp: 90 tablet, Rfl: 0    ibuprofen (ADVIL/MOTRIN) 800 MG tablet, Take 1 tablet (800 mg) by mouth every 8 hours as needed for moderate pain, Disp: 90 tablet, Rfl: 2    insulin glargine (LANTUS PEN) 100 UNIT/ML pen, Inject 25 Units Subcutaneous at bedtime, Disp: 30 mL, Rfl: 3    insulin pen needle (31G X 5 MM) 31G X 5 MM miscellaneous, Use ONE pen needles daily or as directed., Disp: 100 each, Rfl: 3    levothyroxine (SYNTHROID/LEVOTHROID) 150 MCG tablet, TAKE ONE TABLET BY MOUTH ONCE DAILY 6 DAYS PER WEEK, AND SKIP ONE DAY A WEEK, Disp: 90 tablet, Rfl: 0    lisinopril-hydrochlorothiazide (ZESTORETIC) 20-25 MG tablet, TAKE ONE TABLET BY MOUTH EVERY MORNING LAST REFILL WITHOUT A FOLLOW UP APPOINTMENT, Disp: 90 tablet, Rfl: 0    metFORMIN (GLUCOPHAGE XR) 500 MG 24 hr tablet, Take 2 tablets (1,000 mg) by mouth daily (with dinner), Disp: 180 tablet, Rfl: 0    pantoprazole (PROTONIX) 40 MG EC tablet, Take 1 tablet (40 mg) by mouth 2  times daily., Disp: 60 tablet, Rfl: 0    rosuvastatin (CRESTOR) 10 MG tablet, Take 1 tablet (10 mg) by mouth daily, Disp: 90 tablet, Rfl: 3    traZODone (DESYREL) 50 MG tablet, TAKE ONE OR TWO TABLETS BY MOUTH EVERY NIGHT AT BEDTIME AS NEEDED FOR SLEEP, Disp: 180 tablet, Rfl: 2    UltiCare Alcohol Swabs 70 % PADS, USE TO SWAB AREA OF INJECTION/LANCET AS DIRECTED (Patient not taking: Reported on 3/27/2024), Disp: 100 each, Rfl: 3      ROS: 10pt ROS performed and otherwise negative.    PHYSICAL EXAMINATION:  Wt:   Wt Readings from Last 2 Encounters:   04/26/25 60.8 kg (134 lb)   05/06/24 60.3 kg (133 lb)      Physical Exam  Vitals reviewed: There were no vitals taken for this visit.   Constitutional: aaox3, cooperative, pleasant, not dyspneic/diaphoretic, no acute distress  Eyes: Sclera anicteric/injected  Respiratory: Unlabored breathing, speaking in full sentences.   Psych: Normal affect, speech is clear and appropriate.Neatly groomed    RECENT LABS:   Recent Labs   Lab Test 04/26/25 2136 03/27/24  1101   WBC 10.2 8.6   HGB 15.6 15.7   HCT 45.8 46.8    211     Recent Labs   Lab Test 04/26/25 2136 03/27/24  1101   ALT 13 12   AST 14 14     Recent Labs   Lab Test 04/26/25 2136 03/27/24  1101   CR 0.59 0.60     TSH   Date Value Ref Range Status   03/27/2024 4.41 (H) 0.30 - 4.20 uIU/mL Final   07/14/2022 0.03 (L) 0.40 - 4.00 mU/L Final   12/18/2020 0.70 0.40 - 4.00 mU/L Final         ASSESSMENT/PLAN:    ICD-10-CM    1. Abnormal CT scan, esophagus  R93.3 Adult GI Clinic Follow-Up Order (Blank)      2. Dysphagia, unspecified type  R13.10 XR Esophagram     pantoprazole (PROTONIX) 40 MG EC tablet      3. Lesion of pancreas  K86.9 Adult GI Clinic Follow-Up Order (Blank)     MR Abdomen MRCP w/o & w Contrast      4. Chronic idiopathic constipation  K59.04       5. Family history of pancreatic cancer  Z80.0 MR Abdomen MRCP w/o & w Contrast         Mary Lou Gil is a 64 year old female who presents to GI clinic for  a consultation on possible esophagitis and pancreatic lesions, which were incidental findings on CT scan.  Patient was seen in the emergency room on 4/26 for acute thoracic back pain, located between shoulder blades.  CT was ordered as an evaluation for aortic dissection or PEs, which was negative although there was moderate to severe aortic arch stenosis. Noted several indeterminate subcentimeter pancreatic head lesions. Mild wall thickening and surrounding inflammation of the GE junction and proximal stomach.   Normal lipase and liver function test.  Patient denies any problems with acid reflux.  Stated that she never had any GI issues.  Reported family history of pancreatic cancer in her mother.   Noted a finding of a moderate size hiatal hernia on EGD in 2014.  Discussed differential diagnosis.  Ordered x-ray esophagram and MRCP for further evaluation.  May consider ordering upper GI endoscopy.  Patient encouraged to continue pantoprazole 40 mg daily.  Patient verbalized understanding and appreciation of care provided. Stated that all of the questions were answered to her/his satisfaction.  Follow up in 2 months  This note was created with Dragon voice recognition software. Inadvertent minor typographic errors may occur in transcription. Feel free to contact the provider if you have any questions.  I sincerely appreciate an opportunity to provide consultation for this pleasant patient.    STORMY Wilkins  [facility],  Gastroenterology,  [place], [place]

## 2025-05-08 ENCOUNTER — VIRTUAL VISIT (OUTPATIENT)
Dept: GASTROENTEROLOGY | Facility: CLINIC | Age: 65
End: 2025-05-08
Attending: STUDENT IN AN ORGANIZED HEALTH CARE EDUCATION/TRAINING PROGRAM
Payer: COMMERCIAL

## 2025-05-08 DIAGNOSIS — R93.3 ABNORMAL CT SCAN, ESOPHAGUS: Primary | ICD-10-CM

## 2025-05-08 DIAGNOSIS — K86.9 LESION OF PANCREAS: ICD-10-CM

## 2025-05-08 DIAGNOSIS — R13.10 DYSPHAGIA, UNSPECIFIED TYPE: ICD-10-CM

## 2025-05-08 DIAGNOSIS — K59.04 CHRONIC IDIOPATHIC CONSTIPATION: ICD-10-CM

## 2025-05-08 DIAGNOSIS — F33.0 MAJOR DEPRESSIVE DISORDER, RECURRENT EPISODE, MILD: ICD-10-CM

## 2025-05-08 DIAGNOSIS — Z80.0 FAMILY HISTORY OF PANCREATIC CANCER: ICD-10-CM

## 2025-05-08 RX ORDER — PANTOPRAZOLE SODIUM 40 MG/1
40 TABLET, DELAYED RELEASE ORAL 2 TIMES DAILY
Qty: 30 TABLET | Refills: 1 | Status: SHIPPED | OUTPATIENT
Start: 2025-05-08

## 2025-05-08 NOTE — PATIENT INSTRUCTIONS
"It was a pleasure taking care of you today.  I've included a brief summary of our discussion and care plan from today's visit below.  Please review this information with your primary care provider.  ______________________________________________________________________    My recommendations are summarized as follows:    1.  As we discussed today, I placed an order for x-ray esophagram for evaluation of possible esophagitis or any other abnormalities of your esophagus.  They will contact you to schedule the test.  If the test comes back abnormal, we may consider ordering upper GI endoscopy.    2.  Continue pantoprazole 40 mg once a day, taking the medication 30 to 60 minutes before breakfast.  We will discontinue pantoprazole if your x-ray esophagram comes back normal.  Avoid taking NSAIDs (naproxen, ibuprofen, Aleve, diclofenac, or similar medications) as they can cause injury to mucosa of the gastrointestinal tract.    3.  I also ordered MRI of your pancreas for evaluation of the incidentally found lesions on CT scan.  Someone will call you to schedule the scan.    4.  Below, I placed more information on gastroesophageal reflux disease.  Please review at your convenience.    Return to GI Clinic in 2 months to review your progress.    ______________________________________________________________________  Gastroesophageal reflux  Gastroesophageal reflux, also called \"acid reflux,\" occurs when the stomach contents back up into the esophagus and/or mouth. Occasional reflux is normal and can happen in healthy infants, children, and adults, most often after eating a large meal. Most episodes are brief and do not cause bothersome symptoms or complications.   By contrast, people with gastroesophageal reflux disease (GERD) experience bothersome symptoms or damage to the esophagus as a result of acid reflux. Symptoms of GERD can include heartburn, regurgitation, and difficulty or pain with swallowing.  The main cause of " GERD is a transient relaxation or weakening of the lower esophageal sphincter (LES) which allows regurgitation of gastric acid and other gastric contents, including bile, back into the esophagus. The esophageal lining is susceptible to irritation by acid because it does not have the thick mucus protection of the stomach. Some people with GERD do not experience heartburn but may have burning sensation in the mouth, a feeling that food is stuck at any level of the esophagus, or hoarseness in the morning.  There are a number of predisposing factors associated with GERD, including a hiatal hernia, cigarette smoking, alcohol use, being overweight or obese, and pregnancy. Foods such as citrus fruits, chocolate, caffeinated drinks, fried foods, garlic, onions, spicy foods, and tomato-based foods, such as chili, pizza, and spaghetti sauce, are associated with heartburn symptoms. Consumption of large high-fat meals requires prolonged gastric passage times and the increased stomach pressure may lead to movement of hydrochloric acid from the stomach into the esophagus. Additionally, lying prone after a meal promotes backflow of stomach contents and the development of symptoms.      Lifestyle modifications for gastroesophageal reflux disease (GERD).   1. Change your eating habits.  -- It's best to eat several small meals instead of two or three large meals.  -- After you eat, wait 2 to 3 hours before you lie down. Late-night snacks aren't a good idea.   -- Chocolate, mint, and alcohol can make GERD worse. They relax the valve between the esophagus and the stomach.  -- Spicy foods, foods that have a lot of acid (like tomatoes and oranges), foods with high fat content, and coffee can make GERD symptoms worse in some people. If your symptoms are worse after you eat certain foods, try to avoid them.     2. Do not smoke or chew tobacco. Saliva helps to neutralize refluxed acid, and smoking reduces the amount of saliva in the mouth  and throat. Smoking also lowers the pressure in the lower esophageal sphincter and provokes coughing, causing frequent episodes of acid reflux in the esophagus.     3. If you have GERD symptoms at night, raise the head of your bed 6 in. (15 cm) to 8 in. (20 cm) by putting the frame on blocks or placing a foam wedge under the head of your mattress. (Adding extra pillows does not work.)    4. Avoid or reduce pressure on your stomach. Don't wear tight clothing around your middle.    5. Lose weight if you need to. Losing just 5 to 10 pounds can help.    6.Try diaphragmatic (belly) breathing. Research has indicated that people with GERD who practice belly breathing after eating reduce how often they experience acid reflux. Diaphragmatic breathing will reduce pressure within the stomach and increases tone of lower esophageal sphincter.  Practice these breathing exercises 10 times each. Try to do your exercises 3 to 4 times each day. You can lie on your back or sit up straight in a chair to do these exercises.  ?Diaphragmatic breathing :  Place 1 hand over your abdomen and the other on your chest.  Slowly take a deep breath in through your nose. When you do this, think about your breath moving the hand on your abdomen out. This pulls more air into your lungs. The hand on your chest should not move very much if you are breathing the right way.  Breathe out slowly through pursed lips. Gently press on your belly as you breathe out. This will push up on your diaphragm to help get your air out.  Repeat.       ____________________________________________________________________  Please see below for any additional questions and scheduling guidelines.    Sign up for [employer]: [employer] patient portal serves as a secure platform for accessing your medical records from the [facility]. Additionally, [employer] facilitates easy, timely, and secure messaging with your care team. If you have not signed up, you may do so by using  the provided code or calling [phone].    Coordinating your care after your visit:  There are multiple options for scheduling your follow-up care based on your provider's recommendation.    How do I schedule a follow-up clinic appointment:   After your appointment, you may receive scheduling assistance with the Clinic Coordinators by having a seat in the waiting room and a Clinic Coordinator will call you up to schedule.  Virtual visits or after you leave the clinic:  Your provider has placed a follow-up order in the [employer] portal for scheduling your return appointment. A member of the scheduling team will contact you to schedule.  [employer] Scheduling: Timely scheduling through [employer] is advised to ensure appointment availability.   Call to schedule: You may schedule your follow-up appointment(s) by calling [phone], option 1.    How do I schedule my endoscopy or colonoscopy procedure:  If a procedure, such as a colonoscopy or upper endoscopy was ordered by your provider, the scheduling team will contact you to schedule this procedure. Or you may choose to call to schedule at   [phone], option 2.  Please allow 20-30 minutes when scheduling a procedure.    How do I get my blood work done? To get your blood work done, you need to schedule a lab appointment at an [facility]. There are multiple ways to schedule:   At the clinic: The Clinic Coordinator you meet after your visit can help you schedule a lab appointment.   [facility] scheduling: [facility] offers online lab scheduling at all [facility] laboratory locations.   Call to schedule: You can call [phone] to schedule your lab appointment.    How do I schedule my imaging study: To schedule imaging studies, such as CT scans, ultrasounds, MRIs, or X-rays, contact Imaging Services at [phone].    How do I schedule a referral to another doctor: If your provider recommended a referral to another specialist(s), the referral order  was placed by your provider. You will receive a phone call to schedule this referral, or you may choose to call the number attached to the referral to self-schedule.    For Post-Visit Question(s):  For any inquiries following today's visit:  Please utilize [employer] messaging and allow 48 hours for reply or contact the Call Center during normal business hours at [phone], option 3.  For Emergent After-hours questions, contact the On-Call GI Fellow through the [facility] at [phone].  In addition, you may contact your Nurse directly using the provided contact information.    Test Results:  Test results will be accessible via [employer] in compliance with the 21st Century Cures Act. This means that your results will be available to you at the same time as your provider. Often you may see your results before your provider does. Results are reviewed by staff within two weeks with communication follow-up. Results may be released in the patient portal prior to your care team review.    Prescription Refill(s):  Medication prescribed by your provider will be addressed during your visit. For future refills, please coordinate with your pharmacy. If you have not had a recent clinic visit or routine labs, for your safety, your provider may not be able to refill your prescription.     Sincerely,  STORMY Wilkins,  [facility],  Division of Gastroenterology   ([facility])

## 2025-05-08 NOTE — TELEPHONE ENCOUNTER
Clinic RN: Please investigate patient's chart or contact patient if the information cannot be found because patient should have run out of this medication on 2/2025. Confirm patient is taking this medication as prescribed. Document findings and route refill encounter to provider for approval or denial.

## 2025-05-09 ENCOUNTER — TELEPHONE (OUTPATIENT)
Dept: ENDOCRINOLOGY | Facility: CLINIC | Age: 65
End: 2025-05-09
Payer: COMMERCIAL

## 2025-05-10 ASSESSMENT — ASTHMA QUESTIONNAIRES
QUESTION_5 LAST FOUR WEEKS HOW WOULD YOU RATE YOUR ASTHMA CONTROL: WELL CONTROLLED
ACT_TOTALSCORE: 22
QUESTION_3 LAST FOUR WEEKS HOW OFTEN DID YOUR ASTHMA SYMPTOMS (WHEEZING, COUGHING, SHORTNESS OF BREATH, CHEST TIGHTNESS OR PAIN) WAKE YOU UP AT NIGHT OR EARLIER THAN USUAL IN THE MORNING: NOT AT ALL
QUESTION_1 LAST FOUR WEEKS HOW MUCH OF THE TIME DID YOUR ASTHMA KEEP YOU FROM GETTING AS MUCH DONE AT WORK, SCHOOL OR AT HOME: NONE OF THE TIME
QUESTION_4 LAST FOUR WEEKS HOW OFTEN HAVE YOU USED YOUR RESCUE INHALER OR NEBULIZER MEDICATION (SUCH AS ALBUTEROL): ONCE A WEEK OR LESS
QUESTION_2 LAST FOUR WEEKS HOW OFTEN HAVE YOU HAD SHORTNESS OF BREATH: ONCE OR TWICE A WEEK

## 2025-05-11 ENCOUNTER — HEALTH MAINTENANCE LETTER (OUTPATIENT)
Age: 65
End: 2025-05-11

## 2025-05-12 NOTE — TELEPHONE ENCOUNTER
Sent mc to check on how pt is taking.   Jane Avelar, RN  [facility] RN Triage Team

## 2025-05-13 ENCOUNTER — VIRTUAL VISIT (OUTPATIENT)
Dept: INTERNAL MEDICINE | Facility: CLINIC | Age: 65
End: 2025-05-13
Payer: COMMERCIAL

## 2025-05-13 DIAGNOSIS — R13.10 DYSPHAGIA, UNSPECIFIED TYPE: ICD-10-CM

## 2025-05-13 DIAGNOSIS — Q25.1 STENOSIS OF AORTIC ARCH: ICD-10-CM

## 2025-05-13 DIAGNOSIS — F33.0 MAJOR DEPRESSIVE DISORDER, RECURRENT EPISODE, MILD: ICD-10-CM

## 2025-05-13 DIAGNOSIS — E11.42 TYPE 2 DIABETES MELLITUS WITH DIABETIC POLYNEUROPATHY, WITH LONG-TERM CURRENT USE OF INSULIN (H): Primary | ICD-10-CM

## 2025-05-13 DIAGNOSIS — E11.22 TYPE 2 DIABETES MELLITUS WITH STAGE 3A CHRONIC KIDNEY DISEASE, WITH LONG-TERM CURRENT USE OF INSULIN (H): ICD-10-CM

## 2025-05-13 DIAGNOSIS — Z12.11 SCREEN FOR COLON CANCER: ICD-10-CM

## 2025-05-13 DIAGNOSIS — Z79.4 TYPE 2 DIABETES MELLITUS WITH DIABETIC POLYNEUROPATHY, WITH LONG-TERM CURRENT USE OF INSULIN (H): Primary | ICD-10-CM

## 2025-05-13 DIAGNOSIS — Z12.31 VISIT FOR SCREENING MAMMOGRAM: ICD-10-CM

## 2025-05-13 DIAGNOSIS — Z79.4 TYPE 2 DIABETES MELLITUS WITH STAGE 3A CHRONIC KIDNEY DISEASE, WITH LONG-TERM CURRENT USE OF INSULIN (H): ICD-10-CM

## 2025-05-13 DIAGNOSIS — E78.5 HYPERLIPIDEMIA LDL GOAL <100: ICD-10-CM

## 2025-05-13 DIAGNOSIS — I10 ESSENTIAL HYPERTENSION, BENIGN: ICD-10-CM

## 2025-05-13 DIAGNOSIS — I25.10 CORONARY ARTERY CALCIFICATION SEEN ON CAT SCAN: ICD-10-CM

## 2025-05-13 DIAGNOSIS — J45.30 MILD PERSISTENT ASTHMA, UNCOMPLICATED: ICD-10-CM

## 2025-05-13 DIAGNOSIS — Z72.0 TOBACCO ABUSE DISORDER: ICD-10-CM

## 2025-05-13 DIAGNOSIS — E03.9 HYPOTHYROIDISM, UNSPECIFIED TYPE: ICD-10-CM

## 2025-05-13 DIAGNOSIS — K21.00 GASTROESOPHAGEAL REFLUX DISEASE WITH ESOPHAGITIS WITHOUT HEMORRHAGE: ICD-10-CM

## 2025-05-13 DIAGNOSIS — N18.31 TYPE 2 DIABETES MELLITUS WITH STAGE 3A CHRONIC KIDNEY DISEASE, WITH LONG-TERM CURRENT USE OF INSULIN (H): ICD-10-CM

## 2025-05-13 DIAGNOSIS — Z79.4 LONG TERM (CURRENT) USE OF INSULIN (H): ICD-10-CM

## 2025-05-13 PROCEDURE — 98006 SYNCH AUDIO-VIDEO EST MOD 30: CPT | Performed by: INTERNAL MEDICINE

## 2025-05-13 PROCEDURE — 1125F AMNT PAIN NOTED PAIN PRSNT: CPT | Mod: 95 | Performed by: INTERNAL MEDICINE

## 2025-05-13 RX ORDER — LISINOPRIL AND HYDROCHLOROTHIAZIDE 20; 25 MG/1; MG/1
1 TABLET ORAL EVERY MORNING
Qty: 90 TABLET | Refills: 1 | Status: SHIPPED | OUTPATIENT
Start: 2025-05-13

## 2025-05-13 RX ORDER — TRAZODONE HYDROCHLORIDE 50 MG/1
50-100 TABLET ORAL
Qty: 180 TABLET | Refills: 1 | Status: SHIPPED | OUTPATIENT
Start: 2025-05-13

## 2025-05-13 RX ORDER — ASPIRIN 81 MG/1
81 TABLET ORAL DAILY
Qty: 90 TABLET | Refills: 3 | Status: SHIPPED | OUTPATIENT
Start: 2025-05-13

## 2025-05-13 RX ORDER — CITALOPRAM HYDROBROMIDE 40 MG/1
40 TABLET ORAL DAILY
Qty: 90 TABLET | Refills: 1 | Status: SHIPPED | OUTPATIENT
Start: 2025-05-13

## 2025-05-13 RX ORDER — ROSUVASTATIN CALCIUM 20 MG/1
20 TABLET, COATED ORAL DAILY
Qty: 90 TABLET | Refills: 1 | Status: SHIPPED | OUTPATIENT
Start: 2025-05-13

## 2025-05-13 RX ORDER — ALCOHOL ANTISEPTIC PADS
PADS, MEDICATED (EA) TOPICAL
Qty: 100 EACH | Refills: 3 | Status: CANCELLED | OUTPATIENT
Start: 2025-05-13

## 2025-05-13 RX ORDER — ALBUTEROL SULFATE 90 UG/1
2 INHALANT RESPIRATORY (INHALATION) EVERY 4 HOURS PRN
Qty: 18 G | Refills: 11 | Status: SHIPPED | OUTPATIENT
Start: 2025-05-13

## 2025-05-13 RX ORDER — LEVOTHYROXINE SODIUM 150 UG/1
TABLET ORAL
Qty: 90 TABLET | Refills: 1 | Status: SHIPPED | OUTPATIENT
Start: 2025-05-13

## 2025-05-13 RX ORDER — HYDROCHLOROTHIAZIDE 12.5 MG/1
CAPSULE ORAL
Qty: 2 EACH | Refills: 5 | Status: SHIPPED | OUTPATIENT
Start: 2025-05-13

## 2025-05-13 NOTE — PATIENT INSTRUCTIONS
Make sure that you continue to take your diabetes medicines specifically glargine insulin injection once daily, Trulicity injections once per week.    Use the continuous glucose monitor to monitor your diabetes progress.     Goal for diabetes is to keep the glucose time in target range between  approximately 70% of the time or better with few, if any regular low readings.       You may not need to take glipizide at this time because your blood glucose levels seem to be in a good range without taking glipizide regularly.      Continue pantoprazole 40 mg once daily to manage esophagitis and gastroesophageal reflux.    Obtain the MRI of the pancreas as recommended by the gastroenterology staff to further evaluate the small calcification seen on the CT scan.    Obtain the upper GI x-ray series as recommended by the gastroenterology staff to evaluate your esophagus and stomach.    CT scan showed calcification in your coronary arteries, and a narrowing of the aorta after leaves the heart.    Referral to cardiology clinic to discuss the coronary artery calcification and the stenosis of the aortic arch/carotid artery.  [employer] will call you to coordinate your care as prescribed by your provider. If you don't hear from a representative within 2 business days, please call [phone]     Please resume the rosuvastatin that I gave you last year to help lower cholesterol and reduce the risk of future vascular events.    Start daily preventative aspirin 81 mg once daily to help reduce the risk of future vascular events.    Continue citalopram to help control mood.    Use albuterol inhaler as needed for any breathing or wheezing issues, especially this season with allergies.     Return to see me in 6 weeks, schedule a follow up visit sooner if needed for anything else.  Use [employer] or Call [phone] to schedule the appointment with me.,fl

## 2025-05-13 NOTE — PROGRESS NOTES
Mary Lou is a 64 year old who is being evaluated via a billable video visit.    How would you like to obtain your AVS? MyChart  If the video visit is dropped, the invitation should be resent by: Text to cell phone: [phone]  Will anyone else be joining your video visit? No      Assessment & Plan     (E11.42,  Z79.4) Type 2 diabetes mellitus with diabetic polyneuropathy, with long-term current use of insulin (H)  (primary encounter diagnosis)  Comment: Patient was all her medicines for a few months resulting in very poor glycemic control.  She recently has resumed all medicine and reports her blood glucoses are between 90 and 150 when checked with a standard glucometer.  Resume testing with a continuous glucose monitor which is her preference.  Goal for diabetes is to keep the glucose time in target range between  approximately 70% of the time or better with few, if any regular low readings.     Since she has not been taking glipizide and her numbers appear to be in good space, we will hold off on represcribing glipizide for now.  Return visit in 4 to 6 weeks with labs beforehand.  Discussed importance of aggressive management of diabetes, including aggressive low cabr diet (one of the most powerful ways to control type II DM), performing regular glucose monitoring, (either with standard glucometer, or preferably personal continuous glucose monitor), medication compliance, regular laboratory monitoring at least every 6 months (or possibly more often if diabetes not at goal), and attending regular follow up appointments as ordered.      Plan: REVIEW OF HEALTH MAINTENANCE PROTOCOL ORDERS,         lisinopril-hydrochlorothiazide (ZESTORETIC)         20-25 MG tablet, rosuvastatin (CRESTOR) 20 MG         tablet, Albumin Random Urine Quantitative with         Creat Ratio, Lipid panel reflex to direct LDL         Fasting, CBC with platelets, TSH with free T4         reflex, Comprehensive metabolic panel,          Hemoglobin A1c, Continuous Glucose Sensor         (FREESTYLE NICK 3 PLUS SENSOR) MISC            (I10) Essential hypertension, benign  Comment: Resume lisinopril, recheck blood pressure at next clinic visit.  Plan: REVIEW OF HEALTH MAINTENANCE PROTOCOL ORDERS,         lisinopril-hydrochlorothiazide (ZESTORETIC)         20-25 MG tablet            (E78.5) Hyperlipidemia LDL goal <100  Comment: Started on rosuvastatin when I last saw her in March 2024.  She took the 10 mg dose without difficulties or side effects.  She simply ran out of it when the refill stopped and did not contact us.  Resume rosuvastatin, increase dose to 20 mg once daily for maximal risk factor modification.  Plan: REVIEW OF HEALTH MAINTENANCE PROTOCOL ORDERS,         rosuvastatin (CRESTOR) 20 MG tablet            (E03.9) Hypothyroidism, unspecified type  Comment: Continue levothyroxine at her current dose, recheck labs at next appointment titrate dose accordingly.  Plan: levothyroxine (SYNTHROID/LEVOTHROID) 150 MCG         tablet            (Z79.4) Long term (current) use of insulin (H)  Comment:   Plan: REVIEW OF HEALTH MAINTENANCE PROTOCOL ORDERS,         Albumin Random Urine Quantitative with Creat         Ratio, Lipid panel reflex to direct LDL         Fasting, CBC with platelets, TSH with free T4         reflex, Comprehensive metabolic panel,         Hemoglobin A1c, Continuous Glucose Sensor         (FREESTYLE NICK 3 PLUS SENSOR) MISC            (Q25.1) Stenosis of aortic arch  Comment: Referral to cardiology to evaluate the CT angiogram findings.  She may need referral to a cardiovascular surgeon.  Plan: Adult Cardiology Eval Paul Referral            (I25.10) Coronary artery calcification seen on CAT scan  Comment: At great risk for coronary artery disease given her multiple risk factors including diabetes, hypertension, hyperlipidemia, and unfortunately smoking.  Discussed secondary risk factor modification and recommended continuing  aggressive management of these items.   Referral to cardiology for further evaluation and management.  Continue aggressive diabetes management.  Continue statin daily.  Continue aggressive hypertension management.  Start daily aspirin.  Plan: aspirin 81 MG EC tablet, Adult Cardiology Markie Miller Referral            (K21.00) Gastroesophageal reflux disease with esophagitis without hemorrhage  Comment: It greatly improved with pantoprazole.  Continue all plans made by gastroenterology.  Plan: REVIEW OF HEALTH MAINTENANCE PROTOCOL ORDERS            (R13.10) Dysphagia, unspecified type  Comment:   Plan:     (E11.22,  N18.31,  Z79.4) Type 2 diabetes mellitus with stage 3a chronic kidney disease, with long-term current use of insulin (H)  Comment:   Plan: REVIEW OF HEALTH MAINTENANCE PROTOCOL ORDERS,         Continuous Glucose Sensor (FREESTYLE NICK 3         PLUS SENSOR) MISC            (J45.30) Mild persistent asthma, uncomplicated  Comment: This condition is currently controlled on the current medical regimen.  Continue current therapy.   Plan: albuterol (PROAIR HFA/PROVENTIL HFA/VENTOLIN         HFA) 108 (90 Base) MCG/ACT inhaler            (F33.0) Major depressive disorder, recurrent episode, mild  Comment: This condition is currently controlled on the current medical regimen.  Continue current therapy.   Plan: citalopram (CELEXA) 40 MG tablet, traZODone         (DESYREL) 50 MG tablet            (Z12.11) Screen for colon cancer  Comment: I discussed current recommendations for routine colon cancer screening starting at age 45 (age 35 for family history of colon cancer).  Recommending colonoscopy as gold standard test, but the patient is declining to do colonoscopy at this time.   Discussed ColoGuard stool test for routine colon cancer screening, and will order it if covered by their insurance.   If result Negative, repeat ColoGuard testing every 3 years (or eventually consider colonoscopy)  If result  Positive, does not necessarily mean colon cancer, but means they need colonoscopy.    Plan: REVIEW OF HEALTH MAINTENANCE PROTOCOL ORDERS,         CLARENCE([employer])            (Z12.31) Visit for screening mammogram  Comment:   Plan: MA Screening Bilateral w/ Philip, REVIEW OF         HEALTH MAINTENANCE PROTOCOL ORDERS            (Z72.0) Tobacco abuse disorder  Comment: Encouraged her to discontinue any and all smoking is strong as I possibly could without being obnoxious.  We reviewed the many detrimental effects of smoking on her vascular health and pulmonary health.  Recommended Chantix if she can tolerate it.  She preferred to discuss this at her next visit.  Plan:            MED REC REQUIRED  Post Medication Reconciliation Status: discharge medications reconciled, continue medications without change  Nicotine/Tobacco Cessation  She reports that she has been smoking other and cigarettes. She started smoking about 42 years ago. She has a 64.3 pack-year smoking history. She has never used smokeless tobacco.  Nicotine/Tobacco Cessation Plan  Information offered: Patient not interested at this time            Bart Barreto is a 64 year old, presenting for the following health issues:  Hospital F/U        5/13/2025     1:08 PM   Additional Questions   Roomed by Ruba ELISE CMA       Video Start Time:     HPI      ED/UC Followup:    Facility: [facility]  Date of visit: 4-26-25  Reason for visit: upper back pain    Visit DX:  Esophagitis   Pulmonary nodule  Lesion of pancreas   Atherosclerosis of arteries     Current Status: feeling good now-tired every now and then    1.) seen emergency room for chest pain ultimately ended up being esophagitis.  Reviewed medical records in the emergency room including imaging labs and provider notes.    Given pantoprazole with a great improvement in her symptoms.  Incidental findings identified included    Calcifications at the head of the pancreas open on clear  "nature, severe coronary artery calcification seen on the CT scan, and stenosis of the aortic arch/internal carotid artery origin.    Follow-up appoint with gastroenterology provider recommended decreasing pantoprazole to 40 mg once daily, MRCP of pancreas for further clarification of the CT changes, and plans for an upper GI series and then follow-up in 2 months.    2.)  The patient lost her insurance last summer and last fall and was without all of her her medicines for several months.  She has recently regained insurance and started taking Trulicity and Lantus insulin daily.  Previously on glipizide but she admits she has not been taking it regularly at all.  She normally uses a continuous glucose monitor and would like to use a continuous glucose monitor to monitor her diabetes but was told she cannot receive until she was seen by endocrinology staff.  Checking glucoses at home using a standard glucometer reporting blood glucoses  most of the time , With no readings above 200.    3.) resumed her lisinopril for hypertension.  Does not check blood pressures at home.    4.) history of anxiety, taking citalopram.  She is running out of refills would like medicines sent to the pharmacy.    5.) unfortunate still smoking.  Does have a history of asthma especially worse in the spring allergy season.  Needs her albuterol inhaler refilled.    **I reviewed the information recorded in the patient's EPIC chart (including but not limited to medical history, surgical history, family history, problem list, medication list, and allergy list) and updated the information as indicated based on the patients reported information.         Review of Systems  Constitutional, HEENT, cardiovascular, pulmonary, gi and gu systems are negative, except as otherwise noted.      Objective    Vitals - Patient Reported  Weight (Patient Reported): 60.3 kg (133 lb)  Height (Patient Reported): 167.6 cm (5' 6\")  BMI (Based on Pt Reported Ht/Wt): " 21.47  Pain Score: Mild Pain (3) (pain increases with movement)  Pain Loc: Mid Back      Vitals:  No vitals were obtained today due to virtual visit.    Physical Exam   GENERAL: alert and no distress  EYES: Eyes grossly normal to inspection.  No discharge or erythema, or obvious scleral/conjunctival abnormalities.  RESP: No audible wheeze, cough, or visible cyanosis.    SKIN: Visible skin clear. No significant rash, abnormal pigmentation or lesions.  NEURO: Cranial nerves grossly intact.  Mentation and speech appropriate for age.  PSYCH: Appropriate affect, tone, and pace of words          Video-Visit Details    Type of service:  Video Visit     Joined the call at 5/13/2025, 3:52:17 pm.  Left the call at 5/13/2025, 4:22:01 pm.  You were on the call for 29 minutes 44 seconds.    Additional 10 minutes spent on chart review, charting, coordination of care    Originating Location (pt. Location): Home    Distant Location (provider location):  On-site  Platform used for Video Visit: Etta    The longitudinal plan of care for the diagnosis(es)/condition(s) as documented were addressed during this visit. Due to the added complexity in care, I will continue to support Mary Lou in the subsequent management and with ongoing continuity of care.      Signed Electronically by: Xavi Garcia MD

## 2025-05-14 ENCOUNTER — PATIENT OUTREACH (OUTPATIENT)
Dept: CARE COORDINATION | Facility: CLINIC | Age: 65
End: 2025-05-14
Payer: COMMERCIAL

## 2025-05-14 ENCOUNTER — TELEPHONE (OUTPATIENT)
Dept: INTERNAL MEDICINE | Facility: CLINIC | Age: 65
End: 2025-05-14
Payer: COMMERCIAL

## 2025-05-14 DIAGNOSIS — E11.22 TYPE 2 DIABETES MELLITUS WITH STAGE 3A CHRONIC KIDNEY DISEASE, WITH LONG-TERM CURRENT USE OF INSULIN (H): ICD-10-CM

## 2025-05-14 DIAGNOSIS — N18.31 TYPE 2 DIABETES MELLITUS WITH STAGE 3A CHRONIC KIDNEY DISEASE, WITH LONG-TERM CURRENT USE OF INSULIN (H): ICD-10-CM

## 2025-05-14 DIAGNOSIS — E11.42 TYPE 2 DIABETES MELLITUS WITH DIABETIC POLYNEUROPATHY, WITH LONG-TERM CURRENT USE OF INSULIN (H): ICD-10-CM

## 2025-05-14 DIAGNOSIS — Z79.4 TYPE 2 DIABETES MELLITUS WITH DIABETIC POLYNEUROPATHY, WITH LONG-TERM CURRENT USE OF INSULIN (H): ICD-10-CM

## 2025-05-14 DIAGNOSIS — Z79.4 TYPE 2 DIABETES MELLITUS WITH STAGE 3A CHRONIC KIDNEY DISEASE, WITH LONG-TERM CURRENT USE OF INSULIN (H): ICD-10-CM

## 2025-05-14 DIAGNOSIS — Z79.4 LONG TERM (CURRENT) USE OF INSULIN (H): ICD-10-CM

## 2025-05-14 RX ORDER — CITALOPRAM HYDROBROMIDE 40 MG/1
40 TABLET ORAL DAILY
Qty: 90 TABLET | Refills: 0 | OUTPATIENT
Start: 2025-05-14

## 2025-05-14 NOTE — TELEPHONE ENCOUNTER
Retail Pharmacy Prior Authorization Team   Phone: [phone]    Prior Authorization Approval    Medication: INSULIN PEN NEEDLE 31G X 5 MM MISC  Authorization Effective Date: 4/14/2025  Authorization Expiration Date: 5/14/2026   Insurance Company: [employer] - Phone [phone] Fax [phone]  Which Pharmacy is filling the prescription: [facility] [address]  Pharmacy Notified: YES  Patient Notified: YES (faxed approval letter to pharmacy and notified patient via [employer] message)

## 2025-05-14 NOTE — TELEPHONE ENCOUNTER
Prior Authorization Retail Medication Request    Medication/Dose: Freestyle Jen 3 Plus Sensors not covered        Thank you,  Cristhian Traore, PharmD  [facility]  Ph: [phone]

## 2025-05-15 ENCOUNTER — ANCILLARY PROCEDURE (OUTPATIENT)
Dept: MAMMOGRAPHY | Facility: CLINIC | Age: 65
End: 2025-05-15
Attending: INTERNAL MEDICINE
Payer: COMMERCIAL

## 2025-05-15 DIAGNOSIS — Z12.31 VISIT FOR SCREENING MAMMOGRAM: ICD-10-CM

## 2025-05-15 DIAGNOSIS — E11.42 TYPE 2 DIABETES MELLITUS WITH DIABETIC POLYNEUROPATHY, WITH LONG-TERM CURRENT USE OF INSULIN (H): ICD-10-CM

## 2025-05-15 DIAGNOSIS — Z79.4 LONG TERM (CURRENT) USE OF INSULIN (H): ICD-10-CM

## 2025-05-15 DIAGNOSIS — Z79.4 TYPE 2 DIABETES MELLITUS WITH DIABETIC POLYNEUROPATHY, WITH LONG-TERM CURRENT USE OF INSULIN (H): ICD-10-CM

## 2025-05-15 NOTE — TELEPHONE ENCOUNTER
insulin pen needle (31G X 5 MM) 31G X 5 MM miscellaneous 100 each 0 5/9/2025 -- No   Sig: Use ONE pen needles daily or as directed.   Addressed in a different encounter.

## 2025-05-16 NOTE — TELEPHONE ENCOUNTER
Prior Authorization Approval    Medication: FREESTYLE NICK 3 PLUS SENSOR MISC  Authorization Effective Date: 4/16/2025  Authorization Expiration Date: 5/16/2027  Approved Dose/Quantity:   Reference #:     Insurance Company: [employer] - Phone [phone] Fax [phone]  Expected CoPay: $    CoPay Card Available:      Financial Assistance Needed:   Which Pharmacy is filling the prescription: [facility] [address]  Pharmacy Notified: YES  Patient Notified: **Instructed pharmacy to notify patient when script is ready to /ship.**

## 2025-05-16 NOTE — TELEPHONE ENCOUNTER
PA Initiation    Medication: FREESTYLE NICK 3 PLUS SENSOR MISC  Insurance Company: [employer]Special[employer] - Phone [phone] Fax [phone]  Pharmacy Filling the Rx: [place], [place] - [address] [place] [address]  Filling Pharmacy Phone: [phone]  Filling Pharmacy Fax: [phone]  Start Date: 5/16/2025

## 2025-05-19 ENCOUNTER — TELEPHONE (OUTPATIENT)
Dept: GASTROENTEROLOGY | Facility: CLINIC | Age: 65
End: 2025-05-19

## 2025-05-19 ENCOUNTER — OFFICE VISIT (OUTPATIENT)
Dept: CARDIOLOGY | Facility: CLINIC | Age: 65
End: 2025-05-19
Payer: COMMERCIAL

## 2025-05-19 ENCOUNTER — TELEPHONE (OUTPATIENT)
Dept: OTHER | Facility: CLINIC | Age: 65
End: 2025-05-19

## 2025-05-19 VITALS
HEART RATE: 51 BPM | OXYGEN SATURATION: 98 % | BODY MASS INDEX: 21.05 KG/M2 | HEIGHT: 66 IN | SYSTOLIC BLOOD PRESSURE: 102 MMHG | DIASTOLIC BLOOD PRESSURE: 69 MMHG | WEIGHT: 131 LBS

## 2025-05-19 DIAGNOSIS — I25.10 CORONARY ARTERY CALCIFICATION: Primary | ICD-10-CM

## 2025-05-19 DIAGNOSIS — J43.2 CENTRILOBULAR EMPHYSEMA (H): ICD-10-CM

## 2025-05-19 DIAGNOSIS — I65.22 STENOSIS OF LEFT CAROTID ARTERY: ICD-10-CM

## 2025-05-19 DIAGNOSIS — I65.22 STENOSIS OF LEFT CAROTID ARTERY: Primary | ICD-10-CM

## 2025-05-19 DIAGNOSIS — E11.42 TYPE 2 DIABETES MELLITUS WITH DIABETIC POLYNEUROPATHY, WITH LONG-TERM CURRENT USE OF INSULIN (H): ICD-10-CM

## 2025-05-19 DIAGNOSIS — I10 PRIMARY HYPERTENSION: ICD-10-CM

## 2025-05-19 DIAGNOSIS — I77.1 BRACHIOCEPHALIC ARTERY STENOSIS, RIGHT: ICD-10-CM

## 2025-05-19 DIAGNOSIS — Z79.4 TYPE 2 DIABETES MELLITUS WITH DIABETIC POLYNEUROPATHY, WITH LONG-TERM CURRENT USE OF INSULIN (H): ICD-10-CM

## 2025-05-19 DIAGNOSIS — E78.5 HYPERLIPIDEMIA LDL GOAL <70: ICD-10-CM

## 2025-05-19 DIAGNOSIS — I73.9 PERIPHERAL ARTERIAL DISEASE: ICD-10-CM

## 2025-05-19 PROCEDURE — 3078F DIAST BP <80 MM HG: CPT | Performed by: INTERNAL MEDICINE

## 2025-05-19 PROCEDURE — 3074F SYST BP LT 130 MM HG: CPT | Performed by: INTERNAL MEDICINE

## 2025-05-19 PROCEDURE — 99204 OFFICE O/P NEW MOD 45 MIN: CPT | Performed by: INTERNAL MEDICINE

## 2025-05-19 RX ORDER — HYDROCHLOROTHIAZIDE 12.5 MG/1
CAPSULE ORAL
Qty: 6 EACH | Refills: 3 | Status: SHIPPED | OUTPATIENT
Start: 2025-05-19

## 2025-05-19 NOTE — TELEPHONE ENCOUNTER
Referral received via [employer] on 5/19/25.    Referred by Brenden Jose MD for left carotid artery stenosis    Previous imaging completed (pertinent to referral):      Routing to scheduling to coordinate the following:  Bilateral carotid US  NEW VASCULAR PATIENT consult with Vascular Medicine  Please schedule this at next available    Appt note:  Referred by Brenden Jose MD for left carotid artery stenosis    Abigail HOWARD, RN    [facility]  Office: [phone]  Fax: [phone]

## 2025-05-19 NOTE — LETTER
5/19/2025    Xavi Garcia MD  [address] [facility] [address]    RE: Mary Lou Gil       Dear Colleague,     I had the pleasure of seeing Mary Lou Gil in the [facility].  History of Present Illness-  Mary Lou RODRIGUEZ, a 64-year-old female, was referred by Dr. Garcia for coronary artery calcification was found incidentally on a CT chest and abdomen done for back pain.      Patient presented with severe back pain that led to a visit to the emergency room on April 26, 2025. The pain was described as sharp and located below the shoulder blades, lasting for a couple of days. During the emergency room visit, a CT scan was performed, which ruled out any dissection but revealed atherosclerosis of the abdominal aorta, severe stenosis at the origin of the common carotid artery and brachiocephalic bifurcation, and some coronary artery calcification. The back pain was attributed to an issue with the esophagus according the patient and resolved with NSAIDs, and Mary Lou reported frequent choking, which she believes it may be related to scar tissue from a previous thyroid removal. She is scheduled for an esophagram and MRI of the abdomen to further investigate these findings.    From cardiac perspective, she denies any chest pain or shortness of breath.  She walks with a cane.  No orthopnea or PND.  She is a long-term smoker, 1 pack/day.  She has tried quitting before but not successful.  Chantix was not helpful.  She is allergic to nicotine patch.    Mary Lou has a history of high cholesterol, with a total cholesterol level of 227 mg/dL and an LDL level of 151 mg/dL as of March 2024. She was previously on rosuvastatin, which was not taken consistently, and has since been restarted on a 20 mg daily dose. She has a history of thyroid removal and is currently on levothyroxine. Her last thyroid-stimulating hormone level in March 2024 was 4.4.     Mary Lou also has a history of diabetes, with an A1c  of 14.7% in March 2024, for which she is on insulin and Trulicity. She has resumed a keto diet to help manage her diabetes. She uses a cane for mobility and has a history of smoking, having smoked for approximately 30-40 years, with a period of cessation.     She has been diagnosed with COPD on recent CT chest, likely related to her smoking history. Her EKG in the emergency room showed normal sinus rhythm, and troponin levels were negative. Her last thyroid level check in March 2024 was 4.4.     No family history of premature CAD.      Physical Exam  - HEENT: No carotid bruits.  - CARDIOVASCULAR: Normal sinus rhythm, no murmurs.  - LUNGS: Clear breath sounds, no wheezes.    Results  - CT scan (April 26, 2025): No dissection; atherosclerosis of the abdominal aorta; possible severe stenosis at the origin of the common carotid artery and brachiocephalic bifurcation; coronary artery calcification; emphysema changes.  - EKG: Normal sinus rhythm without ischemic changes.  - Troponins: Negative.  - Cholesterol (March 202 4): Total cholesterol 227 mg/dL,  mg/dL, HDL 45 mg/dL, triglycerides 155 mg/dL.  - Potassium (April 20255): Normal at 3.7 mmol/L.  - Creatinine: Normal.  - Hemoglobin A1c (March 2024): 14.2%.  - Thyroid (March 2024): 4.4.      Assessment & Plan  Coronary artery calcification:  - Incidental finding of coronary artery calcification likely related to smoking and other risk factors like diabetes and hypertension.  I reviewed the cardiac portion of the CT chest images myself.  - Although she is asymptomatic, given evidence of calcification, will suggest a Lexiscan nuclear stress test to ensure heart health and rule out ischemia. Continue rosuvastatin 20 mg daily and baby aspirin to prevent heart attack or stroke.    Stenosis of left carotid artery:  - Stenosis observed in the left common carotid artery, which may lead to stroke or other complications.  Patient not symptomatic.  - Referral to vascular  surgery for further evaluation.    Brachiocephalic artery stenosis, right:  - Stenosis at the bifurcation of the brachiocephalic artery noted.  - Referral to vascular surgery for further evaluation.    Hyperlipidemia LDL goal <70:  - High cholesterol levels with LDL at 151 in March 2025.  - Continue rosuvastatin 20 mg daily. Ensure LDL is below  70.  Suggest follow-up with Dr. Garcia in few weeks.  He should repeat lipid profile and ensure optimal diabetes control.  Patient understands that and will contact him.    Centrilobular emphysema (H):  - CT scan showed emphysema changes, likely smoking-related.  - Discuss smoking cessation options with Dr. Garcia.    Type 2 diabetes mellitus with diabetic polyneuropathy, with long-term current use of insulin (H):  - Diabetes managed with insulin and Trulicity. A1c was 14.2 in March 2025.  - Follow-up with Dr. Garcia in June for A1c check. Continue insulin and Trulicity. Follow keto diet.  Further plan based on Dr. Garcia's recommendations.    Primary hypertension:  - Hypertension managed with lisinopril hydrochlorothiazide.  - Continue current medication regimen.    Incidental pancreatic lesions on recent CT chest and abdomen, has been referred for an MRI abdomen by her PMD as well as esophagogram.  Defer to PMD for this    Plan  Continue aggressive risk factor modification  Smoking cessation discussed with patient has limitations as noted above  Continue rosuvastatin and follow-up with Dr. Garica for repeat lipid profile and target LDL of less than 70.  If required rosuvastatin dose can be increased.  Lexiscan stress nuclear study to rule out ischemia  Continue baby aspirin  Suggest vascular surgery follow-up and consultation for common carotid and brachiocephalic artery stenosis    Further recommendation based on results of the Lexiscan stress nuclear study.  If negative for ischemia, can follow-up with PMD for management of risk factors and other medical issues.    At today's  visit, I reviewed the emergency room note from April, reviewed the CT chest and abdomen images focused on cardiac and some vascular structures, labs reviewed.  Today's medical decision making is high complexity.  EKG done in the emergency room also reviewed personally by me.      Sincerely,    Brenden Jose MD      Orders this Visit:  Orders Placed This Encounter   Procedures     Vascular Surgery Referral     No orders of the defined types were placed in this encounter.    There are no discontinued medications.      Encounter Diagnoses   Name Primary?     Coronary artery calcification Yes     Stenosis of left carotid artery      Brachiocephalic artery stenosis, right      Peripheral arterial disease      Hyperlipidemia LDL goal <70      Centrilobular emphysema (H)      Type 2 diabetes mellitus with diabetic polyneuropathy, with long-term current use of insulin (H)      Primary hypertension        CURRENT MEDICATIONS:  Current Outpatient Medications   Medication Sig Dispense Refill     albuterol (PROAIR HFA/PROVENTIL HFA/VENTOLIN HFA) 108 (90 Base) MCG/ACT inhaler Inhale 2 puffs into the lungs every 4 hours as needed for shortness of breath or wheezing. 18 g 11     aspirin 81 MG EC tablet Take 1 tablet (81 mg) by mouth daily. 90 tablet 3     citalopram (CELEXA) 40 MG tablet Take 1 tablet (40 mg) by mouth daily. 90 tablet 1     Continuous Glucose Sensor (FREESTYLE NICK 3 PLUS SENSOR) MISC Change every 15 days. 6 each 3     DiphenhydrAMINE HCl (BENADRYL PO) Take 25-50 mg by mouth See Admin Instructions 25 mg during the day if needed and 50 mg at bedtime for sleep as needed       dulaglutide (TRULICITY) 1.5 MG/0.5ML pen Inject 1.5 mg subcutaneously every 7 days. 6 mL 1     EPINEPHrine (ANY BX GENERIC EQUIV) 0.3 MG/0.3ML injection 2-pack Inject 0.3 mLs (0.3 mg) into the muscle once as needed for anaphylaxis (or allergic reaction.) 0.6 mL 3     insulin glargine (LANTUS PEN) 100 UNIT/ML pen Inject 25 Units Subcutaneous at  "bedtime 30 mL 3     insulin pen needle (31G X 5 MM) 31G X 5 MM miscellaneous Use ONE pen needles daily or as directed. 100 each 0     levothyroxine (SYNTHROID/LEVOTHROID) 150 MCG tablet TAKE ONE TABLET BY MOUTH ONCE DAILY 6 DAYS PER WEEK, AND skip ONE DAY A WEEK 90 tablet 1     lisinopril-hydrochlorothiazide (ZESTORETIC) 20-25 MG tablet Take 1 tablet by mouth every morning. 90 tablet 1     pantoprazole (PROTONIX) 40 MG EC tablet Take 1 tablet (40 mg) by mouth 2 times daily. Taking once daily (Patient taking differently: Take 40 mg by mouth daily. Taking once daily) 30 tablet 1     rosuvastatin (CRESTOR) 20 MG tablet Take 1 tablet (20 mg) by mouth daily. 90 tablet 1     traZODone (DESYREL) 50 MG tablet Take 1-2 tablets ( mg) by mouth nightly as needed for sleep. 180 tablet 1       ALLERGIES     Allergies   Allergen Reactions     Gabapentin Itching, Rash and Swelling     swelling     Nicotine Polacrilex Anaphylaxis     PROBABLY REACTION TO VINYL IN NICOTINE PATCH   PROBABLY REACTION TO VINYL IN NICOTINE PATCH    PROBABLY REACTION TO VINYL IN NICOTINE PATCH      PROBABLY REACTION TO VINYL IN NICOTINE PATCH PROBABLY REACTION TO VINYL IN NICOTINE PATCH     Vinyl Ether Swelling and Cough     Vinyl causes throat and lip swelling, cough and headache     Acetaminophen Itching     Norvasc [Amlodipine Besylate] GI Disturbance     constipation     Percocet [Oxycodone-Acetaminophen] Itching     Sulfamethoxazole-Trimethoprim Itching and Other (See Comments)     Oxycodone Itching       PAST MEDICAL, SURGICAL, FAMILY, SOCIAL HISTORY:  History was reviewed and updated as needed, see medical record.    Review of Systems:  A 12-point review of systems was completed, see medical record for detailed review of systems information.    Physical Exam:  Vitals: /69   Pulse 51   Ht 1.676 m (5' 6\")   Wt 59.4 kg (131 lb)   LMP  (LMP Unknown)   SpO2 98%   BMI 21.14 kg/m            Recent Lab Results:  LIPID RESULTS:  Lab " Results   Component Value Date    CHOL 227 (H) 03/27/2024    CHOL 194 11/06/2019    HDL 45 (L) 03/27/2024    HDL 40 (L) 11/06/2019     (H) 03/27/2024     (H) 11/06/2019    TRIG 155 (H) 03/27/2024    TRIG 219 (H) 11/06/2019    CHOLHDLRATIO 5.0 08/31/2015       LIVER ENZYME RESULTS:  Lab Results   Component Value Date    AST 14 04/26/2025    AST 16 12/18/2020    ALT 13 04/26/2025    ALT 26 12/18/2020       CBC RESULTS:  Lab Results   Component Value Date    WBC 10.2 04/26/2025    WBC 10.3 01/07/2021    RBC 5.17 04/26/2025    RBC 5.31 (H) 01/07/2021    HGB 15.6 04/26/2025    HGB 15.9 (H) 01/07/2021    HCT 45.8 04/26/2025    HCT 46.9 01/07/2021    MCV 89 04/26/2025    MCV 88 01/07/2021    MCH 30.2 04/26/2025    MCH 29.9 01/07/2021    MCHC 34.1 04/26/2025    MCHC 33.9 01/07/2021    RDW 13.6 04/26/2025    RDW 13.2 01/07/2021     04/26/2025     01/07/2021       BMP RESULTS:  Lab Results   Component Value Date     04/26/2025     01/07/2021    POTASSIUM 3.7 04/26/2025    POTASSIUM 3.3 (L) 02/26/2022    POTASSIUM 3.0 (L) 01/07/2021    CHLORIDE 102 04/26/2025    CHLORIDE 104 02/26/2022    CHLORIDE 103 01/07/2021    CO2 20 (L) 04/26/2025    CO2 21 02/26/2022    CO2 24 01/07/2021    ANIONGAP 14 04/26/2025    ANIONGAP 9 02/26/2022    ANIONGAP 9 01/07/2021     (H) 04/26/2025     (H) 02/26/2022     (H) 02/26/2022     (H) 01/07/2021    BUN 20.2 04/26/2025    BUN 17 02/26/2022    BUN 22 01/07/2021    CR 0.59 04/26/2025    CR 0.60 01/07/2021    GFRESTIMATED >90 04/26/2025    GFRESTIMATED >90 01/07/2021    GFRESTBLACK >90 01/07/2021    GEOVANNA 9.2 04/26/2025    GEOVANNA 9.4 01/07/2021        A1C RESULTS:  Lab Results   Component Value Date    A1C 14.2 (H) 03/27/2024    A1C 7.7 (H) 12/18/2020       INR RESULTS:  Lab Results   Component Value Date    INR 0.90 08/12/2014           CC  Xavi Garcia MD  [address] [place] [id number]                    Thank you  for allowing me to participate in the care of your patient.      Sincerely,     Brenden Jose MD     [facility]  cc:   Xavi Garcia MD  [address] [place] [id number]

## 2025-05-19 NOTE — TELEPHONE ENCOUNTER
Left voicemail for patient to call back to schedule appointment(s), provided telephone number for patient to call back to schedule.    Bilateral carotid US  NEW VASCULAR PATIENT consult with Vascular Medicine  Please schedule this at next available     Appt note:  Referred by Brenden Jose MD for left carotid artery stenosis

## 2025-05-19 NOTE — PROGRESS NOTES
History of Present Illness-  Mary Lou RODRIGUEZ, a 64-year-old female, was referred by Dr. Garcia for coronary artery calcification was found incidentally on a CT chest and abdomen done for back pain.      Patient presented with severe back pain that led to a visit to the emergency room on April 26, 2025. The pain was described as sharp and located below the shoulder blades, lasting for a couple of days. During the emergency room visit, a CT scan was performed, which ruled out any dissection but revealed atherosclerosis of the abdominal aorta, severe stenosis at the origin of the common carotid artery and brachiocephalic bifurcation, and some coronary artery calcification. The back pain was attributed to an issue with the esophagus according the patient and resolved with NSAIDs, and Mary Lou reported frequent choking, which she believes it may be related to scar tissue from a previous thyroid removal. She is scheduled for an esophagram and MRI of the abdomen to further investigate these findings.    From cardiac perspective, she denies any chest pain or shortness of breath.  She walks with a cane.  No orthopnea or PND.  She is a long-term smoker, 1 pack/day.  She has tried quitting before but not successful.  Chantix was not helpful.  She is allergic to nicotine patch.    Mary Lou has a history of high cholesterol, with a total cholesterol level of 227 mg/dL and an LDL level of 151 mg/dL as of March 2024. She was previously on rosuvastatin, which was not taken consistently, and has since been restarted on a 20 mg daily dose. She has a history of thyroid removal and is currently on levothyroxine. Her last thyroid-stimulating hormone level in March 2024 was 4.4.     Mary Lou also has a history of diabetes, with an A1c of 14.7% in March 2024, for which she is on insulin and Trulicity. She has resumed a keto diet to help manage her diabetes. She uses a cane for mobility and has a history of smoking, having smoked for  approximately 30-40 years, with a period of cessation.     She has been diagnosed with COPD on recent CT chest, likely related to her smoking history. Her EKG in the emergency room showed normal sinus rhythm, and troponin levels were negative. Her last thyroid level check in March 2024 was 4.4.     No family history of premature CAD.      Physical Exam  - HEENT: No carotid bruits.  - CARDIOVASCULAR: Normal sinus rhythm, no murmurs.  - LUNGS: Clear breath sounds, no wheezes.    Results  - CT scan (April 26, 2025): No dissection; atherosclerosis of the abdominal aorta; possible severe stenosis at the origin of the common carotid artery and brachiocephalic bifurcation; coronary artery calcification; emphysema changes.  - EKG: Normal sinus rhythm without ischemic changes.  - Troponins: Negative.  - Cholesterol (March 202 4): Total cholesterol 227 mg/dL,  mg/dL, HDL 45 mg/dL, triglycerides 155 mg/dL.  - Potassium (April 20255): Normal at 3.7 mmol/L.  - Creatinine: Normal.  - Hemoglobin A1c (March 2024): 14.2%.  - Thyroid (March 2024): 4.4.      Assessment & Plan  Coronary artery calcification:  - Incidental finding of coronary artery calcification likely related to smoking and other risk factors like diabetes and hypertension.  I reviewed the cardiac portion of the CT chest images myself.  - Although she is asymptomatic, given evidence of calcification, will suggest a Lexiscan nuclear stress test to ensure heart health and rule out ischemia. Continue rosuvastatin 20 mg daily and baby aspirin to prevent heart attack or stroke.    Stenosis of left carotid artery:  - Stenosis observed in the left common carotid artery, which may lead to stroke or other complications.  Patient not symptomatic.  - Referral to vascular surgery for further evaluation.    Brachiocephalic artery stenosis, right:  - Stenosis at the bifurcation of the brachiocephalic artery noted.  - Referral to vascular surgery for further  evaluation.    Hyperlipidemia LDL goal <70:  - High cholesterol levels with LDL at 151 in March 2025.  - Continue rosuvastatin 20 mg daily. Ensure LDL is below  70.  Suggest follow-up with Dr. Garcia in few weeks.  He should repeat lipid profile and ensure optimal diabetes control.  Patient understands that and will contact him.    Centrilobular emphysema (H):  - CT scan showed emphysema changes, likely smoking-related.  - Discuss smoking cessation options with Dr. Garcia.    Type 2 diabetes mellitus with diabetic polyneuropathy, with long-term current use of insulin (H):  - Diabetes managed with insulin and Trulicity. A1c was 14.2 in March 2025.  - Follow-up with Dr. Garcia in June for A1c check. Continue insulin and Trulicity. Follow keto diet.  Further plan based on Dr. Garcia's recommendations.    Primary hypertension:  - Hypertension managed with lisinopril hydrochlorothiazide.  - Continue current medication regimen.    Incidental pancreatic lesions on recent CT chest and abdomen, has been referred for an MRI abdomen by her PMD as well as esophagogram.  Defer to PMD for this    Plan  Continue aggressive risk factor modification  Smoking cessation discussed with patient has limitations as noted above  Continue rosuvastatin and follow-up with Dr. Garcia for repeat lipid profile and target LDL of less than 70.  If required rosuvastatin dose can be increased.  Lexiscan stress nuclear study to rule out ischemia  Continue baby aspirin  Suggest vascular surgery follow-up and consultation for common carotid and brachiocephalic artery stenosis    Further recommendation based on results of the Lexiscan stress nuclear study.  If negative for ischemia, can follow-up with PMD for management of risk factors and other medical issues.    At today's visit, I reviewed the emergency room note from April, reviewed the CT chest and abdomen images focused on cardiac and some vascular structures, labs reviewed.  Today's medical decision  making is high complexity.  EKG done in the emergency room also reviewed personally by me.      Sincerely,    Brenden Jose MD      Orders this Visit:  Orders Placed This Encounter   Procedures    Vascular Surgery Referral     No orders of the defined types were placed in this encounter.    There are no discontinued medications.      Encounter Diagnoses   Name Primary?    Coronary artery calcification Yes    Stenosis of left carotid artery     Brachiocephalic artery stenosis, right     Peripheral arterial disease     Hyperlipidemia LDL goal <70     Centrilobular emphysema (H)     Type 2 diabetes mellitus with diabetic polyneuropathy, with long-term current use of insulin (H)     Primary hypertension        CURRENT MEDICATIONS:  Current Outpatient Medications   Medication Sig Dispense Refill    albuterol (PROAIR HFA/PROVENTIL HFA/VENTOLIN HFA) 108 (90 Base) MCG/ACT inhaler Inhale 2 puffs into the lungs every 4 hours as needed for shortness of breath or wheezing. 18 g 11    aspirin 81 MG EC tablet Take 1 tablet (81 mg) by mouth daily. 90 tablet 3    citalopram (CELEXA) 40 MG tablet Take 1 tablet (40 mg) by mouth daily. 90 tablet 1    Continuous Glucose Sensor (FREESTYLE NICK 3 PLUS SENSOR) MISC Change every 15 days. 6 each 3    DiphenhydrAMINE HCl (BENADRYL PO) Take 25-50 mg by mouth See Admin Instructions 25 mg during the day if needed and 50 mg at bedtime for sleep as needed      dulaglutide (TRULICITY) 1.5 MG/0.5ML pen Inject 1.5 mg subcutaneously every 7 days. 6 mL 1    EPINEPHrine (ANY BX GENERIC EQUIV) 0.3 MG/0.3ML injection 2-pack Inject 0.3 mLs (0.3 mg) into the muscle once as needed for anaphylaxis (or allergic reaction.) 0.6 mL 3    insulin glargine (LANTUS PEN) 100 UNIT/ML pen Inject 25 Units Subcutaneous at bedtime 30 mL 3    insulin pen needle (31G X 5 MM) 31G X 5 MM miscellaneous Use ONE pen needles daily or as directed. 100 each 0    levothyroxine (SYNTHROID/LEVOTHROID) 150 MCG tablet TAKE ONE TABLET BY  "MOUTH ONCE DAILY 6 DAYS PER WEEK, AND skip ONE DAY A WEEK 90 tablet 1    lisinopril-hydrochlorothiazide (ZESTORETIC) 20-25 MG tablet Take 1 tablet by mouth every morning. 90 tablet 1    pantoprazole (PROTONIX) 40 MG EC tablet Take 1 tablet (40 mg) by mouth 2 times daily. Taking once daily (Patient taking differently: Take 40 mg by mouth daily. Taking once daily) 30 tablet 1    rosuvastatin (CRESTOR) 20 MG tablet Take 1 tablet (20 mg) by mouth daily. 90 tablet 1    traZODone (DESYREL) 50 MG tablet Take 1-2 tablets ( mg) by mouth nightly as needed for sleep. 180 tablet 1       ALLERGIES     Allergies   Allergen Reactions    Gabapentin Itching, Rash and Swelling     swelling    Nicotine Polacrilex Anaphylaxis     PROBABLY REACTION TO VINYL IN NICOTINE PATCH   PROBABLY REACTION TO VINYL IN NICOTINE PATCH    PROBABLY REACTION TO VINYL IN NICOTINE PATCH      PROBABLY REACTION TO VINYL IN NICOTINE PATCH PROBABLY REACTION TO VINYL IN NICOTINE PATCH    Vinyl Ether Swelling and Cough     Vinyl causes throat and lip swelling, cough and headache    Acetaminophen Itching    Norvasc [Amlodipine Besylate] GI Disturbance     constipation    Percocet [Oxycodone-Acetaminophen] Itching    Sulfamethoxazole-Trimethoprim Itching and Other (See Comments)    Oxycodone Itching       PAST MEDICAL, SURGICAL, FAMILY, SOCIAL HISTORY:  History was reviewed and updated as needed, see medical record.    Review of Systems:  A 12-point review of systems was completed, see medical record for detailed review of systems information.    Physical Exam:  Vitals: /69   Pulse 51   Ht 1.676 m (5' 6\")   Wt 59.4 kg (131 lb)   LMP  (LMP Unknown)   SpO2 98%   BMI 21.14 kg/m            Recent Lab Results:  LIPID RESULTS:  Lab Results   Component Value Date    CHOL 227 (H) 03/27/2024    CHOL 194 11/06/2019    HDL 45 (L) 03/27/2024    HDL 40 (L) 11/06/2019     (H) 03/27/2024     (H) 11/06/2019    TRIG 155 (H) 03/27/2024    TRIG 219 " (H) 11/06/2019    CHOLHDLRATIO 5.0 08/31/2015       LIVER ENZYME RESULTS:  Lab Results   Component Value Date    AST 14 04/26/2025    AST 16 12/18/2020    ALT 13 04/26/2025    ALT 26 12/18/2020       CBC RESULTS:  Lab Results   Component Value Date    WBC 10.2 04/26/2025    WBC 10.3 01/07/2021    RBC 5.17 04/26/2025    RBC 5.31 (H) 01/07/2021    HGB 15.6 04/26/2025    HGB 15.9 (H) 01/07/2021    HCT 45.8 04/26/2025    HCT 46.9 01/07/2021    MCV 89 04/26/2025    MCV 88 01/07/2021    MCH 30.2 04/26/2025    MCH 29.9 01/07/2021    MCHC 34.1 04/26/2025    MCHC 33.9 01/07/2021    RDW 13.6 04/26/2025    RDW 13.2 01/07/2021     04/26/2025     01/07/2021       BMP RESULTS:  Lab Results   Component Value Date     04/26/2025     01/07/2021    POTASSIUM 3.7 04/26/2025    POTASSIUM 3.3 (L) 02/26/2022    POTASSIUM 3.0 (L) 01/07/2021    CHLORIDE 102 04/26/2025    CHLORIDE 104 02/26/2022    CHLORIDE 103 01/07/2021    CO2 20 (L) 04/26/2025    CO2 21 02/26/2022    CO2 24 01/07/2021    ANIONGAP 14 04/26/2025    ANIONGAP 9 02/26/2022    ANIONGAP 9 01/07/2021     (H) 04/26/2025     (H) 02/26/2022     (H) 02/26/2022     (H) 01/07/2021    BUN 20.2 04/26/2025    BUN 17 02/26/2022    BUN 22 01/07/2021    CR 0.59 04/26/2025    CR 0.60 01/07/2021    GFRESTIMATED >90 04/26/2025    GFRESTIMATED >90 01/07/2021    GFRESTBLACK >90 01/07/2021    GEOVANNA 9.2 04/26/2025    GEOVANNA 9.4 01/07/2021        A1C RESULTS:  Lab Results   Component Value Date    A1C 14.2 (H) 03/27/2024    A1C 7.7 (H) 12/18/2020       INR RESULTS:  Lab Results   Component Value Date    INR 0.90 08/12/2014           CC  Xavi Garcia MD  [address] [place] [id number]

## 2025-05-19 NOTE — TELEPHONE ENCOUNTER
The pantoprazole 40mg tablet Rx from 05/08/25 says to take 1 tablet twice daily with a note that the patient is taking once daily. Do you want us to continue dispensing and labeling as BID? Or change to every day to match what the patient is actually doing?    Thank you,  Cristhian Traore, PharmD  [facility]  Ph: [phone]

## 2025-05-20 ENCOUNTER — RESULTS FOLLOW-UP (OUTPATIENT)
Dept: GASTROENTEROLOGY | Facility: CLINIC | Age: 65
End: 2025-05-20

## 2025-05-20 ENCOUNTER — HOSPITAL ENCOUNTER (OUTPATIENT)
Dept: MRI IMAGING | Facility: CLINIC | Age: 65
Discharge: HOME | End: 2025-05-20
Attending: NURSE PRACTITIONER
Payer: COMMERCIAL

## 2025-05-20 ENCOUNTER — HOSPITAL ENCOUNTER (OUTPATIENT)
Dept: GENERAL RADIOLOGY | Facility: CLINIC | Age: 65
Discharge: HOME | End: 2025-05-20
Attending: NURSE PRACTITIONER
Payer: COMMERCIAL

## 2025-05-20 DIAGNOSIS — Z80.0 FAMILY HISTORY OF PANCREATIC CANCER: ICD-10-CM

## 2025-05-20 DIAGNOSIS — K86.9 LESION OF PANCREAS: ICD-10-CM

## 2025-05-20 DIAGNOSIS — R13.10 DYSPHAGIA, UNSPECIFIED TYPE: ICD-10-CM

## 2025-05-20 PROCEDURE — 74183 MRI ABD W/O CNTR FLWD CNTR: CPT

## 2025-05-20 PROCEDURE — 255N000002 HC RX 255 OP 636: Performed by: NURSE PRACTITIONER

## 2025-05-20 PROCEDURE — 250N000013 HC RX MED GY IP 250 OP 250 PS 637: Performed by: NURSE PRACTITIONER

## 2025-05-20 PROCEDURE — 74221 X-RAY XM ESOPHAGUS 2CNTRST: CPT

## 2025-05-20 PROCEDURE — A9585 GADOBUTROL INJECTION: HCPCS | Performed by: NURSE PRACTITIONER

## 2025-05-20 RX ORDER — GADOBUTROL 604.72 MG/ML
6 INJECTION INTRAVENOUS ONCE
Status: COMPLETED | OUTPATIENT
Start: 2025-05-20 | End: 2025-05-20

## 2025-05-20 RX ADMIN — ANTACID/ANTIFLATULENT 4 G: 380; 550; 10; 10 GRANULE, EFFERVESCENT ORAL at 13:43

## 2025-05-20 RX ADMIN — GADOBUTROL 6 ML: 604.72 INJECTION INTRAVENOUS at 12:51

## 2025-05-22 DIAGNOSIS — R13.10 DYSPHAGIA, UNSPECIFIED TYPE: ICD-10-CM

## 2025-05-22 RX ORDER — PANTOPRAZOLE SODIUM 40 MG/1
40 TABLET, DELAYED RELEASE ORAL DAILY
Qty: 30 TABLET | Refills: 5 | Status: SHIPPED | OUTPATIENT
Start: 2025-05-22

## 2025-05-28 ENCOUNTER — RESULTS FOLLOW-UP (OUTPATIENT)
Dept: CARDIOLOGY | Facility: CLINIC | Age: 65
End: 2025-05-28

## 2025-05-28 ENCOUNTER — HOSPITAL ENCOUNTER (OUTPATIENT)
Dept: CARDIOLOGY | Facility: CLINIC | Age: 65
Discharge: HOME | End: 2025-05-28
Attending: INTERNAL MEDICINE
Payer: COMMERCIAL

## 2025-05-28 VITALS
SYSTOLIC BLOOD PRESSURE: 92 MMHG | BODY MASS INDEX: 21.05 KG/M2 | OXYGEN SATURATION: 98 % | WEIGHT: 131 LBS | DIASTOLIC BLOOD PRESSURE: 60 MMHG | HEART RATE: 79 BPM | HEIGHT: 66 IN

## 2025-05-28 VITALS — HEART RATE: 81 BPM | SYSTOLIC BLOOD PRESSURE: 100 MMHG | DIASTOLIC BLOOD PRESSURE: 64 MMHG

## 2025-05-28 DIAGNOSIS — I65.22 STENOSIS OF LEFT CAROTID ARTERY: ICD-10-CM

## 2025-05-28 DIAGNOSIS — I25.10 CORONARY ARTERY CALCIFICATION: Primary | ICD-10-CM

## 2025-05-28 DIAGNOSIS — I25.10 CORONARY ARTERY CALCIFICATION: ICD-10-CM

## 2025-05-28 LAB
CV BLOOD PRESSURE: 60 MMHG
NUC STRESS EJECTION FRACTION: 63 %
STRESS ECHO TARGET HR: 158
STRESS/REST PERFUSION RATIO: 1.35

## 2025-05-28 PROCEDURE — A9502 TC99M TETROFOSMIN: HCPCS | Performed by: INTERNAL MEDICINE

## 2025-05-28 PROCEDURE — 93017 CV STRESS TEST TRACING ONLY: CPT

## 2025-05-28 PROCEDURE — 250N000011 HC RX IP 250 OP 636: Mod: JZ | Performed by: INTERNAL MEDICINE

## 2025-05-28 PROCEDURE — 343N000001 HC RX 343 MED OP 636: Performed by: INTERNAL MEDICINE

## 2025-05-28 RX ORDER — NITROGLYCERIN 0.4 MG/1
0.4 TABLET SUBLINGUAL EVERY 5 MIN PRN
Status: ACTIVE | OUTPATIENT
Start: 2025-05-28 | End: 2025-05-28

## 2025-05-28 RX ORDER — LIDOCAINE 40 MG/G
CREAM TOPICAL
Status: DISCONTINUED | OUTPATIENT
Start: 2025-05-28 | End: 2025-05-29 | Stop reason: HOSPADM

## 2025-05-28 RX ORDER — CAFFEINE 200 MG
200 TABLET ORAL
Status: ACTIVE | OUTPATIENT
Start: 2025-05-28 | End: 2025-05-28

## 2025-05-28 RX ORDER — SODIUM CHLORIDE 9 MG/ML
INJECTION, SOLUTION INTRAVENOUS CONTINUOUS PRN
Status: ACTIVE | OUTPATIENT
Start: 2025-05-28 | End: 2025-05-28

## 2025-05-28 RX ORDER — AMINOPHYLLINE 25 MG/ML
50-100 INJECTION, SOLUTION INTRAVENOUS
Status: DISCONTINUED | OUTPATIENT
Start: 2025-05-28 | End: 2025-05-29 | Stop reason: HOSPADM

## 2025-05-28 RX ORDER — ALBUTEROL SULFATE 90 UG/1
2 INHALANT RESPIRATORY (INHALATION) EVERY 5 MIN PRN
Status: DISCONTINUED | OUTPATIENT
Start: 2025-05-28 | End: 2025-05-29 | Stop reason: HOSPADM

## 2025-05-28 RX ORDER — REGADENOSON 0.08 MG/ML
0.4 INJECTION, SOLUTION INTRAVENOUS ONCE
Status: COMPLETED | OUTPATIENT
Start: 2025-05-28 | End: 2025-05-28

## 2025-05-28 RX ORDER — LORAZEPAM 0.5 MG/1
0.5 TABLET ORAL EVERY 30 MIN PRN
Status: DISCONTINUED | OUTPATIENT
Start: 2025-05-28 | End: 2025-05-29 | Stop reason: HOSPADM

## 2025-05-28 RX ORDER — DIAZEPAM 5 MG/1
5 TABLET ORAL EVERY 30 MIN PRN
Status: DISCONTINUED | OUTPATIENT
Start: 2025-05-28 | End: 2025-05-29 | Stop reason: HOSPADM

## 2025-05-28 RX ORDER — CAFFEINE CITRATE 20 MG/ML
60 SOLUTION INTRAVENOUS
Status: ACTIVE | OUTPATIENT
Start: 2025-05-28 | End: 2025-05-28

## 2025-05-28 RX ADMIN — TETROFOSMIN 3.33 MILLICURIE: 1.38 INJECTION, POWDER, LYOPHILIZED, FOR SOLUTION INTRAVENOUS at 12:42

## 2025-05-28 RX ADMIN — REGADENOSON 0.4 MG: 0.08 INJECTION, SOLUTION INTRAVENOUS at 14:10

## 2025-05-28 RX ADMIN — TETROFOSMIN 10.58 MILLICURIE: 1.38 INJECTION, POWDER, LYOPHILIZED, FOR SOLUTION INTRAVENOUS at 14:12
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
## 2025-06-18 ENCOUNTER — HOSPITAL ENCOUNTER (OUTPATIENT)
Dept: ULTRASOUND IMAGING | Facility: CLINIC | Age: 65
Discharge: HOME | End: 2025-06-18
Attending: INTERNAL MEDICINE
Payer: COMMERCIAL

## 2025-06-18 ENCOUNTER — OFFICE VISIT (OUTPATIENT)
Dept: OTHER | Facility: CLINIC | Age: 65
End: 2025-06-18
Attending: INTERNAL MEDICINE
Payer: COMMERCIAL

## 2025-06-18 VITALS
WEIGHT: 130 LBS | DIASTOLIC BLOOD PRESSURE: 76 MMHG | BODY MASS INDEX: 20.98 KG/M2 | OXYGEN SATURATION: 99 % | SYSTOLIC BLOOD PRESSURE: 131 MMHG | HEART RATE: 69 BPM

## 2025-06-18 DIAGNOSIS — I65.22 STENOSIS OF LEFT CAROTID ARTERY: ICD-10-CM

## 2025-06-18 DIAGNOSIS — R09.89 ABNORMAL PULSE: ICD-10-CM

## 2025-06-18 DIAGNOSIS — Z72.0 TOBACCO USE: ICD-10-CM

## 2025-06-18 DIAGNOSIS — E11.9 TYPE 2 DIABETES, HBA1C GOAL < 7% (H): ICD-10-CM

## 2025-06-18 DIAGNOSIS — E78.5 HYPERLIPIDEMIA LDL GOAL <70: Primary | ICD-10-CM

## 2025-06-18 PROCEDURE — 3078F DIAST BP <80 MM HG: CPT | Performed by: INTERNAL MEDICINE

## 2025-06-18 PROCEDURE — G2211 COMPLEX E/M VISIT ADD ON: HCPCS | Performed by: INTERNAL MEDICINE

## 2025-06-18 PROCEDURE — 99205 OFFICE O/P NEW HI 60 MIN: CPT | Performed by: INTERNAL MEDICINE

## 2025-06-18 PROCEDURE — 99406 BEHAV CHNG SMOKING 3-10 MIN: CPT | Performed by: INTERNAL MEDICINE

## 2025-06-18 PROCEDURE — 93880 EXTRACRANIAL BILAT STUDY: CPT

## 2025-06-18 PROCEDURE — 3074F SYST BP LT 130 MM HG: CPT | Performed by: INTERNAL MEDICINE

## 2025-06-18 PROCEDURE — 99417 PROLNG OP E/M EACH 15 MIN: CPT | Performed by: INTERNAL MEDICINE

## 2025-06-18 PROCEDURE — G0463 HOSPITAL OUTPT CLINIC VISIT: HCPCS | Performed by: INTERNAL MEDICINE

## 2025-06-18 NOTE — PROGRESS NOTES
[facility]        Patient is here for a consult to discuss Carotid stenosis.     Pt is currently taking Aspirin and Statin.    /76 (BP Location: Left arm, Patient Position: Chair, Cuff Size: Adult Regular)   Pulse 69   Wt 130 lb (59 kg)   LMP  (LMP Unknown)   SpO2 99%   BMI 20.98 kg/m      The provider has been notified that the patient has no concerns.     Questions patient would like addressed today are: N/A.    Refills are needed: N/A    Patient has been identified as a fall risk.    Interventions were completed as noted below:  []Exam tables/chairs remained in the lowest position.   []Patient remained in wheelchair.    []Provider requested patient in exam chair.  Patient required assist and use of transfer belt.  [x]Exam room door remained open unless with a provider.  [x]A bell provided to patient to notify staff if assistance was needed.  [x]Provider notified patient was identified as a fall risk.     Charleen Kovacs MA

## 2025-06-19 ENCOUNTER — TELEPHONE (OUTPATIENT)
Dept: OTHER | Facility: CLINIC | Age: 65
End: 2025-06-19
Payer: COMMERCIAL

## 2025-06-19 DIAGNOSIS — I65.22 STENOSIS OF LEFT CAROTID ARTERY: Primary | ICD-10-CM

## 2025-06-19 DIAGNOSIS — E78.5 HYPERLIPIDEMIA LDL GOAL <70: ICD-10-CM

## 2025-06-19 DIAGNOSIS — E11.9 TYPE 2 DIABETES, HBA1C GOAL < 7% (H): ICD-10-CM

## 2025-06-19 DIAGNOSIS — R09.89 ABNORMAL PULSE: ICD-10-CM

## 2025-06-19 NOTE — TELEPHONE ENCOUNTER
Routing to scheduling to coordinate the following:    Fasting labs  CTA head neck  ARUNA exercise US  BLE arterial US  In person follow up 2 weeks after tests  Please schedule this in 2 weeks      Appt note: Follow up to 6/18/25    Abigail HOWARD RN    [facility]  Office: [phone]  Fax: [phone]

## 2025-06-24 NOTE — TELEPHONE ENCOUNTER
7/15 is fine for ultrasound    Abigail HOWARD, RN    [facility]  Office: [phone]  Fax: [phone]

## 2025-06-24 NOTE — TELEPHONE ENCOUNTER
Writer spoke to patient scheduled her labs on 6-28-25.She is scheduled for her CT on 7-3-25. is not able to find US in the time frame that was requested at either [facility] or [place].Patient is wondering if it is ok to schedule at the first available opening at [facility] on 7-15-25?Or does she need to be transferred to central scheduling to see if they can find a sooner opening?

## 2025-06-25 NOTE — PROGRESS NOTES
Mary Lou Gil is a 64 year old patient who is being evaluated via a billable virtual visit.       How would you like to obtain your AVS? MyChart  If the video visit is dropped, the invitation should be resent by: Text to cell phone: [phone]  Will anyone else be joining your video visit? No    Video-Visit Details     Type of service:  Video Visit     Video Start Time (time video started): 1102     Video End Time (time video stopped): 1112    Additional 35 minutes on the date of service was spent performing the following:   -Preparing to see the patient (eg, review of tests,providers progress notes, medical/surgical history,etc) ;  -Ordering medications, tests, or procedures;   -Documenting clinical information in the electronic or other health record.  Total time: 45 min     Physician has received verbal consent for a Video Visit from the patient? Yes     Originating Location (pt. Location): Home    Distant Location (provider location):  Off-site    Platform used for Video Visit: [address] [address]  Phone: [phone]    Patient:  Mary Lou Gil, Date of birth 1960  Date of Visit:  7/9/25     GASTROENTEROLOGY RETURN PATIENT VIDEO VISIT    CC/REFERRING MD:    Xavi Garcia    REASON FOR CONSULTATION:   Referred for Follow Up (Abnormal CT scan, esophagus/Dysphagia, unspecified type/Lesion of pancreas/Chronic idiopathic constipation/Family history of pancreatic cancer /)      HISTORY OF PRESENT ILLNESS:  Mary Lou Gil is a 65 year old female who presents to GI clinic for a follow up. Patient was seen after ER visit for mid back pain, between shoulder blades. Questionable esophagitis on CT.  She reported ongoing problems with swallowing since her thyroidectomy. Said that she is choking sometimes on foods, liquids and pills.  A trial of pantoprazole 40 mg daily was prescribed. Ordered x-ray esophagram that came back  normal.  Patient denies any odynophagia, nausea, vomiting, abdominal pain, or changes in appetite.    - Patient also reported chronic issues with intermittent constipation, but mentioned that since she started eating Activia yogurt every day, her bowel movements had improved.  No hematochezia or melena.      Overall, patient stated she is feeling well.  No recurrence of posterior chest pain.  Denies cough.  No shortness of breath.  She is currently undergoing evaluation with cardiovascular provider.  She had several tests completed, pending scheduling for Lexiscan and vascular surgeon consultation.    Wt Readings from Last 10 Encounters:   06/18/25 59 kg (130 lb)   05/28/25 59.4 kg (131 lb)   05/19/25 59.4 kg (131 lb)   04/26/25 60.8 kg (134 lb)   05/06/24 60.3 kg (133 lb)   04/03/24 60.3 kg (133 lb)   03/27/24 60.3 kg (133 lb)   09/02/22 57.6 kg (127 lb)   03/14/22 61.7 kg (136 lb)   02/26/22 63.5 kg (140 lb)       PREVIOUS ENDOSCOPY:  6/4/2014 EGD  Normal esophagus.  Medium possible paraesophageal hernia  Normal duodenum     PERTINENT IMAGING STUDIES WERE REVIEWED IN EMR  5/20/2025 MRCP  FINDINGS:      MRCP: Normal gallbladder. Normal intra and extrahepatic bile ducts without stricture, intraluminal filling defect, or mass.     LIVER: Normal.     PANCREAS: Normal course and caliber main pancreatic duct. There are a few tiny pancreatic cystic lesions with the largest measuring 6 x 5 mm in the uncinate process (series 3001, image 15). Few other tiny cystic lesions measuring under 5 mm are evident   in the proximal pancreas (series 6001, images 11-13) and in the pancreatic tail (image 24). No solid pancreatic lesion. Nothing for pancreatitis     ADDITIONAL FINDINGS: The spleen and adrenal glands are normal. No suspicious renal lesion or hydronephrosis. Tiny simple renal cortical cysts are benign and warrant no follow-up. The bowel is nondistended. No abdominal lymphadenopathy or ascites. Normal   caliber abdominal  aorta. No aggressive osseous lesion.                                                                    IMPRESSION:  1.  Few tiny pancreatic cystic lesions measuring up to 6 mm are favored to represent side branch intraductal papillary mucinous neoplasms (IPMNs).   2.  Normal gallbladder and bile ducts.    4/26/2025 CT aortic survey  FINDINGS:   CT ANGIOGRAM CHEST, ABDOMEN, AND PELVIS: Mild thoracic aortic atherosclerosis without thoracic aortic aneurysm or acute aortic syndrome, such as dissection. There is severe stenosis of the proximal left common carotid artery at its origin from the aortic   arch (axial series 6, image 36 and coronal series 9 image 67) and mild to moderate stenosis of the brachiocephalic artery bifurcation (series 6, image 28 and series 9 image 70). Patent vertebral arteries and relatively normal left common carotid artery   distal to the site of stenosis. Moderate degree of aortoiliac atherosclerosis without abdominal aortic aneurysm or dissection. The major named branches of the abdominal aorta are patent without significant stenosis.     LUNGS AND PLEURA: Emphysema without airspace opacity. Several bilateral pulmonary micronodules; the largest and most suspicious measures 5 mm in the left lower lobe (series 7, image 212 with several additional nodules also annotated on series 7). No pleural effusion or pneumothorax. Normal airways.     MEDIASTINUM/AXILLAE: No mediastinal hemorrhage. Normal heart size without pericardial effusion. Minimally opacified pulmonary arteries limits evaluation for incidental pulmonary emboli, but no large or central emboli are observed. Atrophic versus   surgically absent thyroid gland. No enlarged lymph nodes in the chest.     CORONARY ARTERY CALCIFICATION: Involving proximal LAD, mid LAD, and proximal RCA.   This study is performed with arterial phase of contrast timing as this is tailored for aortic evaluation. This reduces the sensitivity for bowel and solid  organ evaluation, which are better evaluated in a venous phase.   HEPATOBILIARY: Normal.     PANCREAS: Several subcentimeter low-density indeterminate pancreatic head foci, some of which may represent invaginated retroperitoneal fat or volume averaging with adjacent bowel.     SPLEEN: Normal.     ADRENAL GLANDS: Normal.     KIDNEYS/BLADDER: Normal.     BOWEL: Mild wall thickening and surrounding inflammation of the GE junction and proximal stomach. This is nonspecific and can be seen in the setting of vomiting or GERD, among other possibilities. No bowel distention. Nonvisualized appendix which may be atrophic or surgically absent, but there is no inflammation in its expected location.     LYMPH NODES: Normal.   PELVIC ORGANS: Normal.   MUSCULOSKELETAL: No acute or aggressive osseous abnormalities.                                                                      IMPRESSION:  1.  No thoracoabdominal aortic aneurysm or dissection.  2.  Moderate to severe multivessel aortic arch stenosis, most notably affecting the proximal left common carotid artery and brachiocephalic artery bifurcation (into the right common carotid and right subclavian arteries).  3.  5 mm lung nodule in the left lower lobe; according to 2017 Fleischner Society recommendations, consider optional follow-up with non-contrast chest CT in 12 months if high-risk for primary lung malignancy (smoking history, radon/asbestos exposure, or   first-degree relative with lung cancer).    4.  Multivessel coronary artery disease.  5.  Several indeterminate subcentimeter pancreatic head lesions. These are probably below resolution of MRI. Recommend follow-up pancreatic mass protocol CT in 3-6 months.  PERTINENT IMAGING STUDIES WERE REVIEWED IN EMR    HISTORY:   has a past medical history of Anemia, unspecified (02/01/2010), Arthritis (7/11/2016), Coronary artery calcification seen on CAT scan (5/13/2025), Degeneration of lumbar intervertebral disc  (2016), Depressive disorder, Diabetes mellitus (H) (2010), Essential hypertension, benign (2010), Hyperlipidaemia, Hyperlipidemia LDL goal < 100, Migraine, Other chronic pain, Spinal stenosis, Tobacco use disorder (2010), Uncomplicated asthma, and Unspecified hypothyroidism (2010).     has a past surgical history that includes REMOVE TONSILS/ADENOIDS,<13 Y/O; APPENDECTOMY ();  DELIVERY ONLY (); LIGATE FALLOPIAN TUBE,POSTPARTUM (); thyroidectomy  (); HYSTEROSCOPY, SURGICAL; W/ ENDOMETRIAL ABLATION, ANY METHOD (); Operative hysteroscopy with morcellator (N/A, 2014); Abdomen surgery; and biopsy.     reports that she has been smoking other and cigarettes. She started smoking about 43 years ago. She has a 64.5 pack-year smoking history. She has never used smokeless tobacco. She reports that she does not drink alcohol and does not use drugs.    family history includes Aneurysm in her mother; C.A.D. in her mother and sister; Cerebrovascular Disease in her mother; Diabetes in her mother, sister, sister, sister, sister, sister, and sister; Hypertension in her mother, sister, sister, sister, sister, sister, and sister; Thyroid Disease in her sister and sister; Unknown/Adopted in her brother, brother, brother, sister, and sister.    ALLERGIES:     Allergies   Allergen Reactions    Gabapentin Itching, Rash and Swelling     swelling    Nicotine Polacrilex Anaphylaxis     PROBABLY REACTION TO VINYL IN NICOTINE PATCH   PROBABLY REACTION TO VINYL IN NICOTINE PATCH    PROBABLY REACTION TO VINYL IN NICOTINE PATCH      PROBABLY REACTION TO VINYL IN NICOTINE PATCH PROBABLY REACTION TO VINYL IN NICOTINE PATCH    Vinyl Ether Swelling and Cough     Vinyl causes throat and lip swelling, cough and headache    Acetaminophen Itching    Norvasc [Amlodipine Besylate] GI Disturbance     constipation    Percocet [Oxycodone-Acetaminophen] Itching    Sulfamethoxazole-Trimethoprim  Itching and Other (See Comments)    Oxycodone Itching       PERTINENT MEDICATIONS:    Current Outpatient Medications:     albuterol (PROAIR HFA/PROVENTIL HFA/VENTOLIN HFA) 108 (90 Base) MCG/ACT inhaler, Inhale 2 puffs into the lungs every 4 hours as needed for shortness of breath or wheezing., Disp: 18 g, Rfl: 11    aspirin 81 MG EC tablet, Take 1 tablet (81 mg) by mouth daily., Disp: 90 tablet, Rfl: 3    citalopram (CELEXA) 40 MG tablet, Take 1 tablet (40 mg) by mouth daily., Disp: 90 tablet, Rfl: 1    Continuous Glucose Sensor (FREESTYLE NICK 3 PLUS SENSOR) MISC, Change every 15 days., Disp: 6 each, Rfl: 3    DiphenhydrAMINE HCl (BENADRYL PO), Take 25-50 mg by mouth See Admin Instructions 25 mg during the day if needed and 50 mg at bedtime for sleep as needed, Disp: , Rfl:     dulaglutide (TRULICITY) 1.5 MG/0.5ML pen, Inject 1.5 mg subcutaneously every 7 days., Disp: 6 mL, Rfl: 1    EPINEPHrine (ANY BX GENERIC EQUIV) 0.3 MG/0.3ML injection 2-pack, Inject 0.3 mLs (0.3 mg) into the muscle once as needed for anaphylaxis (or allergic reaction.), Disp: 0.6 mL, Rfl: 3    insulin glargine (LANTUS PEN) 100 UNIT/ML pen, Inject 25 Units Subcutaneous at bedtime, Disp: 30 mL, Rfl: 3    insulin pen needle (31G X 5 MM) 31G X 5 MM miscellaneous, Use ONE pen needles daily or as directed., Disp: 100 each, Rfl: 0    levothyroxine (SYNTHROID/LEVOTHROID) 150 MCG tablet, TAKE ONE TABLET BY MOUTH ONCE DAILY 6 DAYS PER WEEK, AND skip ONE DAY A WEEK, Disp: 90 tablet, Rfl: 1    lisinopril-hydrochlorothiazide (ZESTORETIC) 20-25 MG tablet, Take 1 tablet by mouth every morning., Disp: 90 tablet, Rfl: 1    pantoprazole (PROTONIX) 40 MG EC tablet, Take 1 tablet (40 mg) by mouth daily. Taking once daily, Disp: 30 tablet, Rfl: 5    rosuvastatin (CRESTOR) 20 MG tablet, Take 1 tablet (20 mg) by mouth daily., Disp: 90 tablet, Rfl: 1    traZODone (DESYREL) 50 MG tablet, Take 1-2 tablets ( mg) by mouth nightly as needed for sleep., Disp: 180  tablet, Rfl: 1      ROS: 10pt ROS performed and otherwise negative.    PHYSICAL EXAMINATION:  Wt:   Wt Readings from Last 2 Encounters:   06/18/25 59 kg (130 lb)   05/28/25 59.4 kg (131 lb)      Physical Exam  Vitals reviewed: LMP  (LMP Unknown)    Constitutional: aaox3, cooperative, pleasant, not dyspneic/diaphoretic, no acute distress  Eyes: Sclera anicteric/injected  Respiratory: Unlabored breathing, speaking in full sentences.   Psych: Normal affect, speech is clear and appropriate.Neatly groomed    RECENT LABS:   Recent Labs   Lab Test 06/28/25  0951 04/26/25 2136   WBC 9.9 10.2   HGB 14.6 15.6   HCT 42.0 45.8    235     Recent Labs   Lab Test 06/28/25 0951 04/26/25 2136   ALT 13 13   AST 19 14     Recent Labs   Lab Test 06/28/25 0951 04/26/25 2136   CR 0.80 0.59     TSH   Date Value Ref Range Status   06/28/2025 0.18 (L) 0.30 - 4.20 uIU/mL Final   07/14/2022 0.03 (L) 0.40 - 4.00 mU/L Final   12/18/2020 0.70 0.40 - 4.00 mU/L Final         ASSESSMENT/PLAN:    ICD-10-CM    1. Pharyngeal dysphagia  R13.13       2. Abnormal CT scan, esophagus  R93.3 Adult GI Clinic Follow-Up Order (Blank)      3. Lesion of pancreas  K86.9 Adult GI Clinic Follow-Up Order (Blank)     CT Pancreas wo & w Contrast      4. Family history of pancreatic cancer  Z80.0          Mary Lou Gil is a 65 year old female who presents to GI clinic for a follow up.  The patient was seen in the emergency room on 4/26 on acute pain between her shoulder blade.  CT scan of chest was questionable for possible esophagitis, but patient is not symptomatic.  No odynophagia, acid reflux, nausea, vomiting, abdominal pain, weight loss, or changes in appetite.  She reported having some dysphagia since her thyroid surgery.  X-ray esophagram came back unremarkable.  No indications for EGD. A trial of pantoprazole was prescribed for one month. Will discontinue the medication.  Patient did not have any recurrence of pain or choking since her last ER  visit.  I do not believe that her pain was caused by any gastrointestinal issues. No further upper GI evaluation is needed at this time as the patient is asymptomatic.    - Noted that CT of abdomen showed several sub centimeter pancreatic lesions. Normal lipase.  Patient reported family history of pancreatic cancer in her mother in her 70s. MRCP was ordered and revealed several small cystic lesions, likely IPMNs, up to 6 mm in size. No ducts dilation or signs of obstruction. Normal gallbladder and bile ducts. Per guidelines, a follow up CT or MRI/MRCP could be ordered in 6 months and then, every 2 years if no change.   CT of pancreas was ordered to get completed in Jan. 2026.     - Patient was seen by a cardiovascular disease provider as there were signs of  atherosclerosis of the abdominal aorta, severe stenosis at the origin of the common carotid artery and brachiocephalic bifurcation, and some coronary artery calcification on CT. Lexiscan ordered. Referred to a vascular surgeon for a consultation for common carotid and brachiocephalic artery stenosis.    Patient verbalized understanding and appreciation of care provided. Stated that all of the questions were answered to her/his satisfaction.  Follow up in 6 months  This note was created with Dragon voice recognition software. Inadvertent minor typographic errors may occur in transcription. Feel free to contact the provider if you have any questions.  I sincerely appreciate an opportunity to provide consultation for this pleasant patient.    STORMY Wilkins  [facility],  Gastroenterology,  [place], [place]

## 2025-06-28 ENCOUNTER — LAB (OUTPATIENT)
Dept: LAB | Facility: CLINIC | Age: 65
End: 2025-06-28
Payer: COMMERCIAL

## 2025-06-28 DIAGNOSIS — I65.22 STENOSIS OF LEFT CAROTID ARTERY: ICD-10-CM

## 2025-06-28 DIAGNOSIS — Z79.4 LONG TERM (CURRENT) USE OF INSULIN (H): ICD-10-CM

## 2025-06-28 DIAGNOSIS — E11.42 TYPE 2 DIABETES MELLITUS WITH DIABETIC POLYNEUROPATHY, WITH LONG-TERM CURRENT USE OF INSULIN (H): ICD-10-CM

## 2025-06-28 DIAGNOSIS — E78.5 HYPERLIPIDEMIA LDL GOAL <70: ICD-10-CM

## 2025-06-28 DIAGNOSIS — Z79.4 TYPE 2 DIABETES MELLITUS WITH DIABETIC POLYNEUROPATHY, WITH LONG-TERM CURRENT USE OF INSULIN (H): ICD-10-CM

## 2025-06-28 LAB
ALBUMIN SERPL BCG-MCNC: 4 G/DL (ref 3.5–5.2)
ALP SERPL-CCNC: 82 U/L (ref 40–150)
ALT SERPL W P-5'-P-CCNC: 13 U/L (ref 0–50)
ANION GAP SERPL CALCULATED.3IONS-SCNC: 13 MMOL/L (ref 7–15)
APO A-I SERPL-MCNC: <6 MG/DL
AST SERPL W P-5'-P-CCNC: 19 U/L (ref 0–45)
BILIRUB SERPL-MCNC: 0.4 MG/DL
BUN SERPL-MCNC: 17 MG/DL (ref 8–23)
CALCIUM SERPL-MCNC: 9.7 MG/DL (ref 8.8–10.4)
CHLORIDE SERPL-SCNC: 102 MMOL/L (ref 98–107)
CHOLEST SERPL-MCNC: 168 MG/DL
CREAT SERPL-MCNC: 0.8 MG/DL (ref 0.51–0.95)
CREAT UR-MCNC: 158 MG/DL
CRP SERPL HS-MCNC: 2.96 MG/L
EGFRCR SERPLBLD CKD-EPI 2021: 82 ML/MIN/1.73M2
ERYTHROCYTE [DISTWIDTH] IN BLOOD BY AUTOMATED COUNT: 14.2 % (ref 10–15)
EST. AVERAGE GLUCOSE BLD GHB EST-MCNC: 237 MG/DL
FASTING STATUS PATIENT QL REPORTED: YES
FASTING STATUS PATIENT QL REPORTED: YES
GLUCOSE SERPL-MCNC: 260 MG/DL (ref 70–99)
HBA1C MFR BLD: 9.9 % (ref 0–5.6)
HCO3 SERPL-SCNC: 25 MMOL/L (ref 22–29)
HCT VFR BLD AUTO: 42 % (ref 35–47)
HDLC SERPL-MCNC: 45 MG/DL
HGB BLD-MCNC: 14.6 G/DL (ref 11.7–15.7)
LDLC SERPL CALC-MCNC: 88 MG/DL
MCH RBC QN AUTO: 30.6 PG (ref 26.5–33)
MCHC RBC AUTO-ENTMCNC: 34.8 G/DL (ref 31.5–36.5)
MCV RBC AUTO: 88 FL (ref 78–100)
MICROALBUMIN UR-MCNC: 32.7 MG/L
MICROALBUMIN/CREAT UR: 20.7 MG/G CR (ref 0–25)
NONHDLC SERPL-MCNC: 123 MG/DL
PLATELET # BLD AUTO: 270 10E3/UL (ref 150–450)
POTASSIUM SERPL-SCNC: 3.7 MMOL/L (ref 3.4–5.3)
PROT SERPL-MCNC: 7.3 G/DL (ref 6.4–8.3)
RBC # BLD AUTO: 4.77 10E6/UL (ref 3.8–5.2)
SODIUM SERPL-SCNC: 140 MMOL/L (ref 135–145)
T4 FREE SERPL-MCNC: 1.55 NG/DL (ref 0.9–1.7)
TRIGL SERPL-MCNC: 173 MG/DL
TSH SERPL DL<=0.005 MIU/L-ACNC: 0.18 UIU/ML (ref 0.3–4.2)
WBC # BLD AUTO: 9.9 10E3/UL (ref 4–11)

## 2025-06-28 PROCEDURE — 84443 ASSAY THYROID STIM HORMONE: CPT

## 2025-06-28 PROCEDURE — 83036 HEMOGLOBIN GLYCOSYLATED A1C: CPT

## 2025-06-28 PROCEDURE — 80061 LIPID PANEL: CPT

## 2025-06-28 PROCEDURE — 36415 COLL VENOUS BLD VENIPUNCTURE: CPT

## 2025-06-28 PROCEDURE — 82043 UR ALBUMIN QUANTITATIVE: CPT

## 2025-06-28 PROCEDURE — 80061 LIPID PANEL: CPT | Mod: 90

## 2025-06-28 PROCEDURE — 80053 COMPREHEN METABOLIC PANEL: CPT

## 2025-06-28 PROCEDURE — 85027 COMPLETE CBC AUTOMATED: CPT

## 2025-06-28 PROCEDURE — 99000 SPECIMEN HANDLING OFFICE-LAB: CPT

## 2025-06-28 PROCEDURE — 82570 ASSAY OF URINE CREATININE: CPT

## 2025-06-28 PROCEDURE — 86141 C-REACTIVE PROTEIN HS: CPT

## 2025-06-28 PROCEDURE — 84439 ASSAY OF FREE THYROXINE: CPT

## 2025-06-28 PROCEDURE — 83695 ASSAY OF LIPOPROTEIN(A): CPT

## 2025-06-28 PROCEDURE — 83704 LIPOPROTEIN BLD QUAN PART: CPT | Mod: 90

## 2025-07-01 ENCOUNTER — RESULTS FOLLOW-UP (OUTPATIENT)
Dept: OTHER | Facility: CLINIC | Age: 65
End: 2025-07-01

## 2025-07-01 LAB
CHOLEST SERPL-MCNC: 172 MG/DL
HDL SERPL QN: 8.4 NM
HDL SERPL-SCNC: 36.4 UMOL/L
HDLC SERPL-MCNC: 45 MG/DL
HLD.LARGE SERPL-SCNC: <2.8 UMOL/L
LDL SERPL QN: 20.6 NM
LDL SERPL-SCNC: 1455 NMOL/L
LDL SMALL SERPL-SCNC: 786 NMOL/L
LDLC SERPL CALC-MCNC: 90 MG/DL
PATHOLOGY STUDY: ABNORMAL
TRIGL SERPL-MCNC: 186 MG/DL
VLDL LARGE SERPL-SCNC: 2.8 NMOL/L
VLDL SERPL QN: 47.6 NM

## 2025-07-09 ENCOUNTER — VIRTUAL VISIT (OUTPATIENT)
Dept: GASTROENTEROLOGY | Facility: CLINIC | Age: 65
End: 2025-07-09
Attending: NURSE PRACTITIONER
Payer: COMMERCIAL

## 2025-07-09 DIAGNOSIS — R93.3 ABNORMAL CT SCAN, ESOPHAGUS: ICD-10-CM

## 2025-07-09 DIAGNOSIS — R13.13 PHARYNGEAL DYSPHAGIA: Primary | ICD-10-CM

## 2025-07-09 DIAGNOSIS — Z80.0 FAMILY HISTORY OF PANCREATIC CANCER: ICD-10-CM

## 2025-07-09 DIAGNOSIS — K86.9 LESION OF PANCREAS: ICD-10-CM

## 2025-07-09 PROCEDURE — 98006 SYNCH AUDIO-VIDEO EST MOD 30: CPT | Performed by: NURSE PRACTITIONER

## 2025-07-09 NOTE — PATIENT INSTRUCTIONS
It was a pleasure taking care of you today.  I've included a brief summary of our discussion and care plan from today's visit below.  Please review this information with your primary care provider.  ______________________________________________________________________    My recommendations are summarized as follows:    Your barium x-ray esophagram came back unremarkable.  No indication for upper GI endoscopy at this time.  Please discontinue pantoprazole.    2.  Try a high-fiber diet or a fiber supplement for constipation.  Below, I placed more information on fiber.    3.  Plan to have CT scan of pancreas in 6 months (December/January ) as a follow-up on small lesions found on MRI.     Return to GI Clinic in 6 to 7 months to review your progress.    ______________________________________________________________________  A high-fiber diet is a commonly recommended treatment for digestive problems, such as constipation, diarrhea, diverticulosis, irritable bowel syndrome, and hemorrhoids.   Most dietary fiber is not digested or absorbed, so it stays within the intestine where it modulates digestion of other foods and affects the consistency of stool. There are two types of fiber, each of which is thought to have its own benefits:  ?Soluble fiber consists of a group of substances that is made of carbohydrates and dissolves in water. Examples of foods that contain soluble fiber include fruits, oats, barley, and legumes (peas and beans).  ?Insoluble fiber comes from plant cell walls and does not dissolve in water. Examples of foods that contain insoluble fiber include wheat, rye, and other grains, brown rice, quinoa, leafy greens, nuts, seeds, and skin-on fruits. Insoluble fiber has been recommended to treat digestive problems such as constipation, hemorrhoids, chronic diarrhea, and fecal incontinence.   ?Dietary fiber is the sum of all soluble and insoluble fiber.Fiber bulks the stool, making it softer and easier to  pass. Fiber helps the stool pass regularly, although it is not a laxative.   - Recommended daily dose of fiber is 25-35 gram. It is difficult to consume this amount of fiber from food alone. Therefore, I would suggest to take fiber supplement.  - Please start supplementation with a powdered soluble fiber. When used on a daily basis, this can help regulate the consistency of your stools.   - Metamucil (psyllium) and Citrucel are preferred examples. You can start with 1-2 teaspoons per day, with goal to gradually increase the dose  to 1 tablespoon daily. You can increase up to 1 tablespoon three times daily if needed.   - It is important to stay well-hydrated with use of fiber supplementation and to make sure that the fiber powder is well mixed with water as directed on the label.   - You may experience some bloating with initiation of fiber, which will improve over the first few weeks. We will evaluate the effect  of fiber in 3-6 months.   - Of note, many of the fiber products contain artificial sweeteners, which can cause bloating, gas, and diarrhea in those who may be sensitive to artificial sweeteners. If this is the case, I would recommend trying Metamucil Premium Blend (with Stevia), Metamucil 4-in-1 without Added Sweeteners, or Bellway (sweetened with Monk fruit extract).    Examples of fiber content in foods:    Fruits   Apple (with skin) 1 medium apple 4.4 g   Avocado 1/2 cup 5.0 g   Banana 1 medium banana 3.1 g   Blueberries 1 cup 3.6 g   Oranges 1 orange 3.1 g   Pear (with skin) 1 medium pear 5.5 g   Prunes 1 cup (pitted) 12.4 g   Raspberries 1 cup 8.0 g   Strawberries 1/2 cup 2.0 g   Juices   Apple (unsweetened, with added ascorbic acid) 1 cup 0.5 g   Grapefruit (white, canned, sweetened) 1 cup 0.2 g   Grape (unsweetened, with added ascorbic acid) 1 cup 0.5 g   Orange (with pulp) 1 cup 0.7 g   Prune 1 cup 2.6 g   Vegetables   Cooked   Carrots 1/2 cup (sliced) 2.3 g   Corn 1/2 cup 2.0 g   Green beans 1 cup  "4.0 g   Peas 1 cup 8.8 g   Potato (baked, with skin) 1 medium potato 3.8 g   Raw   Broccoli 1/2 cup 2.6 g   Celery 1 cup 2.0 g   Cucumber (with peel) 1 cucumber 1.5 g   Lettuce 1 cup (shredded) 0.5 g   Spinach 1 cup 0.7 g   Tomato 1 medium tomato 1.5 g   Legumes   Baked beans (canned, no salt added) 1/2 cup 5.6 g   Black beans (cooked) 1/2 cup 7.5 g   Chickpeas (cooked) 1/2 cup 6.2 g   Kidney beans (canned) 1/2 cup 5.7 g   Lentils (boiled) 1/2 cup 7.8 g   Lima beans (canned) 1/2 cup 6.6 g   Navy beans (cooked) 1/2 cup 9.5 g   Breads, pastas, flours   Bran muffins 1 medium muffin 5.2 g   Bread (rye) 1 slice 2.0 g   Bread (white) 1 slice 0.6 g   Bread (whole wheat) 1 slice 1.9 g   Bulgur (cooked) 1/2 cup 4.1 g   Cracker (rye wafers) 2 crackers 5.0 g   Oatmeal (cooked) 1 cup 4.0 g   Pasta and rice (cooked)   Macaroni 1 cup 2.5 g   Rice (brown) 1 cup 3.5 g   Rice (white) 1 cup 0.6 g   Spaghetti (regular) 1 cup 2.5 g   Spaghetti (whole wheat) 1 cup 5.4 g   Mar barley (cooked) 1 cup 6.0 g   Popcorn (air popped) 3 cups 3.6 g   Breakfast cereals*   Bran flakes 3/4 cup 5.5 g   \"High-fiber\" cereals 1/2 cup 14 g   Instant oatmeal 1 pouch (43 g) 3.0 g   Other cereals 1/2 cup 3.0 to 7.5 g   Nuts   Almonds 1/2 cup 8.7 g   Peanuts 1/2 cup 7.9 g        ____________________________________________________________________  Please see below for any additional questions and scheduling guidelines.    Sign up for [facility]: [facility] patient portal serves as a secure platform for accessing your medical records from the [facility]. Additionally, [employer] facilitates easy, timely, and secure messaging with your care team. If you have not signed up, you may do so by using the provided code or calling [phone].    Coordinating your care after your visit:  There are multiple options for scheduling your follow-up care based on your provider's recommendation.    How do I schedule a follow-up clinic appointment:   After your " appointment, you may receive scheduling assistance with the Clinic Coordinators by having a seat in the waiting room and a Clinic Coordinator will call you up to schedule.  Virtual visits or after you leave the clinic:  Your provider has placed a follow-up order in the [facility] portal for scheduling your return appointment. A member of the scheduling team will contact you to schedule.  [facility] Scheduling: Timely scheduling through [facility] is advised to ensure appointment availability.   Call to schedule: You may schedule your follow-up appointment(s) by calling [phone], option 1.    How do I schedule my endoscopy or colonoscopy procedure:  If a procedure, such as a colonoscopy or upper endoscopy was ordered by your provider, the scheduling team will contact you to schedule this procedure. Or you may choose to call to schedule at   [phone], option 2.  Please allow 20-30 minutes when scheduling a procedure.    How do I get my blood work done? To get your blood work done, you need to schedule a lab appointment at an [facility]. There are multiple ways to schedule:   At the clinic: The Clinic Coordinator you meet after your visit can help you schedule a lab appointment.   [facility] scheduling: [facility] offers online lab scheduling at all [facility] laboratory locations.   Call to schedule: You can call [phone] to schedule your lab appointment.    How do I schedule my imaging study: To schedule imaging studies, such as CT scans, ultrasounds, MRIs, or X-rays, contact Imaging Services at [phone].    How do I schedule a referral to another doctor: If your provider recommended a referral to another specialist(s), the referral order was placed by your provider. You will receive a phone call to schedule this referral, or you may choose to call the number attached to the referral to self-schedule.    For Post-Visit Question(s):  For any inquiries following today's visit:  Please utilize  [employer] messaging and allow 48 hours for reply or contact the Call Center during normal business hours at [phone], option 3.  For Emergent After-hours questions, contact the On-Call GI Fellow through the [facility]  at [phone].  In addition, you may contact your Nurse directly using the provided contact information.    Test Results:  Test results will be accessible via [employer] in compliance with the 21st Century Cures Act. This means that your results will be available to you at the same time as your provider. Often you may see your results before your provider does. Results are reviewed by staff within two weeks with communication follow-up. Results may be released in the patient portal prior to your care team review.    Prescription Refill(s):  Medication prescribed by your provider will be addressed during your visit. For future refills, please coordinate with your pharmacy. If you have not had a recent clinic visit or routine labs, for your safety, your provider may not be able to refill your prescription.     Sincerely,  STORMY Wilkins,  [facility],  Division of Gastroenterology   ([facility])

## 2025-07-16 ENCOUNTER — HOSPITAL ENCOUNTER (OUTPATIENT)
Dept: ULTRASOUND IMAGING | Facility: CLINIC | Age: 65
Discharge: HOME | End: 2025-07-16
Attending: INTERNAL MEDICINE
Payer: COMMERCIAL

## 2025-07-16 DIAGNOSIS — R09.89 ABNORMAL PULSE: ICD-10-CM

## 2025-07-16 PROCEDURE — 93925 LOWER EXTREMITY STUDY: CPT

## 2025-07-16 PROCEDURE — 93924 LWR XTR VASC STDY BILAT: CPT

## 2025-07-17 ENCOUNTER — TELEPHONE (OUTPATIENT)
Dept: CARDIOLOGY | Facility: CLINIC | Age: 65
End: 2025-07-17
Payer: COMMERCIAL

## 2025-07-17 NOTE — TELEPHONE ENCOUNTER
Health Call Center    Phone Message    May a detailed message be left on voicemail: yes     Reason for Call: Other: [employer] is calling and is needing the team to reach back out for a prior authorization for a cardiac study for Mary Lou. Please call [employer] back to discuss. Fax: [phone].     Action Taken: Other: Cardiology    Travel Screening: Not Applicable     Date of Service:

## 2025-07-17 NOTE — TELEPHONE ENCOUNTER
Called Pt and informed her if she has some type of testing ordered she should contact her insurance company to see if there is an issue. OXANA Kemp RN

## 2025-07-20 ENCOUNTER — OFFICE VISIT (OUTPATIENT)
Dept: URGENT CARE | Facility: URGENT CARE | Age: 65
End: 2025-07-20
Payer: COMMERCIAL

## 2025-07-20 VITALS
DIASTOLIC BLOOD PRESSURE: 80 MMHG | BODY MASS INDEX: 20.59 KG/M2 | RESPIRATION RATE: 19 BRPM | WEIGHT: 128.1 LBS | HEART RATE: 91 BPM | OXYGEN SATURATION: 97 % | SYSTOLIC BLOOD PRESSURE: 112 MMHG | TEMPERATURE: 98.3 F | HEIGHT: 66 IN

## 2025-07-20 DIAGNOSIS — R42 DIZZINESS: Primary | ICD-10-CM

## 2025-07-20 LAB
ALBUMIN SERPL-MCNC: 3.8 G/DL (ref 3.4–5)
ALP SERPL-CCNC: 78 U/L (ref 40–150)
ALT SERPL W P-5'-P-CCNC: 20 U/L (ref 0–50)
ANION GAP SERPL CALCULATED.3IONS-SCNC: 4 MMOL/L (ref 3–14)
AST SERPL W P-5'-P-CCNC: 24 U/L (ref 0–45)
BASOPHILS # BLD AUTO: 0.1 10E3/UL (ref 0–0.2)
BASOPHILS NFR BLD AUTO: 1 %
BILIRUB SERPL-MCNC: 0.7 MG/DL (ref 0.2–1.3)
BUN SERPL-MCNC: 17 MG/DL (ref 7–30)
CALCIUM SERPL-MCNC: 10 MG/DL (ref 8.5–10.1)
CHLORIDE BLD-SCNC: 104 MMOL/L (ref 94–109)
CO2 SERPL-SCNC: 30 MMOL/L (ref 20–32)
CREAT SERPL-MCNC: 0.8 MG/DL (ref 0.52–1.04)
EGFRCR SERPLBLD CKD-EPI 2021: 82 ML/MIN/1.73M2
EOSINOPHIL # BLD AUTO: 0.2 10E3/UL (ref 0–0.7)
EOSINOPHIL NFR BLD AUTO: 2 %
ERYTHROCYTE [DISTWIDTH] IN BLOOD BY AUTOMATED COUNT: 13.7 % (ref 10–15)
GLUCOSE BLD-MCNC: 210 MG/DL (ref 70–99)
HCT VFR BLD AUTO: 42.9 % (ref 35–47)
HGB BLD-MCNC: 15.1 G/DL (ref 11.7–15.7)
IMM GRANULOCYTES # BLD: 0 10E3/UL
IMM GRANULOCYTES NFR BLD: 0 %
LYMPHOCYTES # BLD AUTO: 2.7 10E3/UL (ref 0.8–5.3)
LYMPHOCYTES NFR BLD AUTO: 28 %
MCH RBC QN AUTO: 30.9 PG (ref 26.5–33)
MCHC RBC AUTO-ENTMCNC: 35.2 G/DL (ref 31.5–36.5)
MCV RBC AUTO: 88 FL (ref 78–100)
MONOCYTES # BLD AUTO: 0.5 10E3/UL (ref 0–1.3)
MONOCYTES NFR BLD AUTO: 5 %
NEUTROPHILS # BLD AUTO: 6.1 10E3/UL (ref 1.6–8.3)
NEUTROPHILS NFR BLD AUTO: 63 %
PLATELET # BLD AUTO: 249 10E3/UL (ref 150–450)
POTASSIUM BLD-SCNC: 4 MMOL/L (ref 3.4–5.3)
PROT SERPL-MCNC: 7.8 G/DL (ref 6.8–8.8)
RBC # BLD AUTO: 4.88 10E6/UL (ref 3.8–5.2)
SODIUM SERPL-SCNC: 138 MMOL/L (ref 135–145)
WBC # BLD AUTO: 9.6 10E3/UL (ref 4–11)

## 2025-07-20 PROCEDURE — 85025 COMPLETE CBC W/AUTO DIFF WBC: CPT

## 2025-07-20 PROCEDURE — 3079F DIAST BP 80-89 MM HG: CPT

## 2025-07-20 PROCEDURE — 3074F SYST BP LT 130 MM HG: CPT

## 2025-07-20 PROCEDURE — 99204 OFFICE O/P NEW MOD 45 MIN: CPT

## 2025-07-20 PROCEDURE — 36415 COLL VENOUS BLD VENIPUNCTURE: CPT

## 2025-07-20 PROCEDURE — 80053 COMPREHEN METABOLIC PANEL: CPT

## 2025-07-20 PROCEDURE — 93000 ELECTROCARDIOGRAM COMPLETE: CPT

## 2025-07-20 NOTE — PATIENT INSTRUCTIONS
I am not exactly sure what is causing your symptoms. Labs and EKG were normal today. Focus on eating a good healthy diet and staying hydrated.  If you start to have weakness, worsening balance, nausea or vomiting, speech changes, or other significant worsening of symptoms come back into ER. If things are stable but not improving follow up in the next week with PCP.

## 2025-07-20 NOTE — PROGRESS NOTES
Urgent Care Clinic Visit    Chief Complaint   Patient presents with    Dizziness     Patient presents with complain of dizziness and losing balance for the last few days. Also complain of numbness and burning sensation in the left leg. Hx of neuropathy.                7/20/2025    11:17 AM   Additional Questions   Roomed by Nahid Villa MA   Accompanied by Self

## 2025-07-20 NOTE — PROGRESS NOTES
Assessment & Plan     Dizziness  65-year-old with history of carotid stenosis, diabetes presenting with 3 days of dizziness and intermittent left foot numbness. 1 week of fatigue.  No localizing symptoms.  No recent medication changes. History is unrevealing.  Neurologic exam is unremarkable except for some diminished sensation on the left foot and distal ankle.  She does have peripheral neuropathy at baseline and most recent A1c is 9.9%.  Basic lab work today with CBC and CMP are unremarkable.  EKG shows normal sinus rhythm.  She has no significant or consistent focal neurologic findings making CVA unlikely and she is also outside of the window for any acute intervention and I did not feel that head imaging was indicated today.  She does note overall decreased appetite as she is on a GLP-1 medication and also doing a ketogenic diet.  She also wondered if her blood pressures have been running lower, however I do not note a significant difference from prior.  Recommended focusing on good hydration and nutrition in the next few days and if symptoms not resolving follow-up with PCP or if worsening follow-up in ER  - EKG 12-lead complete w/read - Clinics  - CBC with platelets and differential  - Comprehensive metabolic panel (BMP + Alb, Alk Phos, ALT, AST, Total. Bili, TP)       Return if symptoms worsen or fail to improve.    Neil Quintana MD  [facility] CenterCHANEL Barreto is a 65 year old female who presents to clinic today for the following health issues:  Chief Complaint   Patient presents with    Dizziness     Patient presents with complain of dizziness and losing balance for the last few days. Also complain of numbness and burning sensation in the left leg. Hx of neuropathy.      Last 3 days has had dizziness, loss of balance. Very fatigued over the last 1 week. She feels like she's spinning.   Has baseline neuropathic pain mostly at night. Has new numbness on top of foot  "which is intermittent.  No falls. Feeling weak and tired.  No vision or hearing changes.  Able to walk.  Has had symptoms like this before but a long time ago.  No recent med changes  BG has been good - below 90 she feels it.   Staying hydrated. Recently eating a lot less. On Keto diet for awhile.  No other symptoms - no fevers/chills, chest pain/SOB/palpitations, abdominal pain, nausea/vomiting, rash. NO vaginal or GI bleeding. No urine or stool incontinence.      Reviewed:  June labs - normal CBC, CMP, A1C 9.9, TSH low normal T4  US LE 7/16 - patent arteries with normal ARUNA bilaterally  CTA head/[facility] 7/3 - severe stenosis of left common cartoid origin, moderate stenosis of mid cervcial left common cartotid, moderate stenosis of right subclavian, mild stenosis of proximal right        Objective    /80 (BP Location: Left arm, Patient Position: Sitting, Cuff Size: Adult Regular)   Pulse 91   Temp 98.3  F (36.8  C) (Oral)   Resp 19   Ht 1.676 m (5' 6\")   Wt 58.1 kg (128 lb 1.6 oz)   SpO2 97%   BMI 20.68 kg/m    Physical Exam  Constitutional:       Appearance: Normal appearance. She is not toxic-appearing or diaphoretic.   HENT:      Head: Normocephalic and atraumatic.      Nose: Nose normal.      Mouth/Throat:      Mouth: Mucous membranes are moist.      Pharynx: Oropharynx is clear.   Eyes:      Extraocular Movements: Extraocular movements intact.      Conjunctiva/sclera: Conjunctivae normal.      Pupils: Pupils are equal, round, and reactive to light.   Cardiovascular:      Rate and Rhythm: Normal rate and regular rhythm.      Heart sounds: Normal heart sounds.   Pulmonary:      Effort: Pulmonary effort is normal.      Breath sounds: Normal breath sounds.   Musculoskeletal:         General: Normal range of motion.      Cervical back: Normal range of motion and neck supple. No tenderness.      Right lower leg: No edema.      Left lower leg: No edema.   Skin:     General: Skin is warm and dry. "   Neurological:      Mental Status: She is alert.      Cranial Nerves: Cranial nerves 2-12 are intact.      Sensory: Sensory deficit present.      Motor: Motor function is intact. No weakness.      Coordination: Coordination is intact.      Gait: Gait is intact.      Comments: Decreased sensation to light touch over the left lateral distal ankle and left dorsal foot.  Negative Elberfeld-Hallpike   Psychiatric:         Mood and Affect: Mood normal.          Recent Results (from the past 24 hours)   CBC with platelets and differential    Narrative    The following orders were created for panel order CBC with platelets and differential.  Procedure                               Abnormality         Status                     ---------                               -----------         ------                     CBC with platelets and ...[[id number]]                      Final result                 Please view results for these tests on the individual orders.   Comprehensive metabolic panel (BMP + Alb, Alk Phos, ALT, AST, Total. Bili, TP)   Result Value Ref Range    Sodium 138 135 - 145 mmol/L    Potassium 4.0 3.4 - 5.3 mmol/L    Chloride 104 94 - 109 mmol/L    Carbon Dioxide (CO2) 30 20 - 32 mmol/L    Anion Gap 4 3 - 14 mmol/L    Urea Nitrogen 17 7 - 30 mg/dL    Creatinine 0.80 0.52 - 1.04 mg/dL    GFR Estimate 82 >60 mL/min/1.73m2    Calcium 10.0 8.5 - 10.1 mg/dL    Glucose 210 (H) 70 - 99 mg/dL    Alkaline Phosphatase 78 40 - 150 U/L    AST 24 0 - 45 U/L    ALT 20 0 - 50 U/L    Protein Total 7.8 6.8 - 8.8 g/dL    Albumin 3.8 3.4 - 5.0 g/dL    Bilirubin Total 0.7 0.2 - 1.3 mg/dL   CBC with platelets and differential   Result Value Ref Range    WBC Count 9.6 4.0 - 11.0 10e3/uL    RBC Count 4.88 3.80 - 5.20 10e6/uL    Hemoglobin 15.1 11.7 - 15.7 g/dL    Hematocrit 42.9 35.0 - 47.0 %    MCV 88 78 - 100 fL    MCH 30.9 26.5 - 33.0 pg    MCHC 35.2 31.5 - 36.5 g/dL    RDW 13.7 10.0 - 15.0 %    Platelet Count 249 150 - 450 10e3/uL     % Neutrophils 63 %    % Lymphocytes 28 %    % Monocytes 5 %    % Eosinophils 2 %    % Basophils 1 %    % Immature Granulocytes 0 %    Absolute Neutrophils 6.1 1.6 - 8.3 10e3/uL    Absolute Lymphocytes 2.7 0.8 - 5.3 10e3/uL    Absolute Monocytes 0.5 0.0 - 1.3 10e3/uL    Absolute Eosinophils 0.2 0.0 - 0.7 10e3/uL    Absolute Basophils 0.1 0.0 - 0.2 10e3/uL    Absolute Immature Granulocytes 0.0 <=0.4 10e3/uL     EKG interpreted by me shows normal sinus rhythm, normal intervals

## 2025-07-24 ENCOUNTER — HOSPITAL ENCOUNTER (OUTPATIENT)
Dept: CARDIOLOGY | Facility: CLINIC | Age: 65
End: 2025-07-24
Attending: INTERNAL MEDICINE
Payer: COMMERCIAL

## 2025-07-24 VITALS — SYSTOLIC BLOOD PRESSURE: 101 MMHG | HEART RATE: 85 BPM | DIASTOLIC BLOOD PRESSURE: 66 MMHG

## 2025-07-24 DIAGNOSIS — R93.1 ABNORMAL CARDIAC CT ANGIOGRAPHY: ICD-10-CM

## 2025-07-24 DIAGNOSIS — I65.22 STENOSIS OF LEFT CAROTID ARTERY: ICD-10-CM

## 2025-07-24 DIAGNOSIS — I25.10 CORONARY ARTERY CALCIFICATION: ICD-10-CM

## 2025-07-24 LAB
CREAT BLD-MCNC: 0.9 MG/DL (ref 0.5–1)
EGFRCR SERPLBLD CKD-EPI 2021: >60 ML/MIN/1.73M2

## 2025-07-24 PROCEDURE — 82565 ASSAY OF CREATININE: CPT

## 2025-07-24 PROCEDURE — 75580 N-INVAS EST C FFR SW ALY CTA: CPT

## 2025-07-24 PROCEDURE — 250N000011 HC RX IP 250 OP 636: Performed by: INTERNAL MEDICINE

## 2025-07-24 PROCEDURE — 75574 CT ANGIO HRT W/3D IMAGE: CPT

## 2025-07-24 PROCEDURE — 250N000013 HC RX MED GY IP 250 OP 250 PS 637: Performed by: INTERNAL MEDICINE

## 2025-07-24 RX ORDER — DILTIAZEM HCL 60 MG
120 TABLET ORAL
Status: ACTIVE | OUTPATIENT
Start: 2025-07-24

## 2025-07-24 RX ORDER — METOPROLOL TARTRATE 25 MG/1
25-100 TABLET, FILM COATED ORAL
Status: COMPLETED | OUTPATIENT
Start: 2025-07-24 | End: 2025-07-24

## 2025-07-24 RX ORDER — NITROGLYCERIN 0.4 MG/1
0.4 TABLET SUBLINGUAL
Status: ACTIVE | OUTPATIENT
Start: 2025-07-24

## 2025-07-24 RX ORDER — DILTIAZEM HYDROCHLORIDE 5 MG/ML
10-15 INJECTION INTRAVENOUS
OUTPATIENT
Start: 2025-07-24

## 2025-07-24 RX ORDER — METOPROLOL TARTRATE 1 MG/ML
5-20 INJECTION, SOLUTION INTRAVENOUS
Status: ACTIVE | OUTPATIENT
Start: 2025-07-24

## 2025-07-24 RX ORDER — ONDANSETRON 2 MG/ML
4 INJECTION INTRAMUSCULAR; INTRAVENOUS
Status: ACTIVE | OUTPATIENT
Start: 2025-07-24

## 2025-07-24 RX ORDER — IVABRADINE 5 MG/1
5-15 TABLET, FILM COATED ORAL
Status: COMPLETED | OUTPATIENT
Start: 2025-07-24 | End: 2025-07-24

## 2025-07-24 RX ORDER — IOPAMIDOL 755 MG/ML
500 INJECTION, SOLUTION INTRAVASCULAR ONCE
Status: COMPLETED | OUTPATIENT
Start: 2025-07-24 | End: 2025-07-24

## 2025-07-24 RX ADMIN — NITROGLYCERIN 0.4 MG: 0.4 TABLET SUBLINGUAL at 15:54

## 2025-07-24 RX ADMIN — METOPROLOL TARTRATE 50 MG: 50 TABLET, FILM COATED ORAL at 14:23

## 2025-07-24 RX ADMIN — IOPAMIDOL 100 ML: 755 INJECTION, SOLUTION INTRAVENOUS at 16:07

## 2025-07-24 RX ADMIN — IVABRADINE 15 MG: 5 TABLET, FILM COATED ORAL at 14:23

## 2025-07-24 NOTE — PROGRESS NOTES
Pts bp only 101 systolic, so only 50mg PO Metoprolol given instead of the 100mg for a HR of 85 for Ct coronary. Plan discussed extensively with patient, verbalizes understanding. Meds given, IV started, blood drawn, and patient resting comfortably with warm blanket in chair. Call light within reach.

## 2025-07-29 ENCOUNTER — OFFICE VISIT (OUTPATIENT)
Dept: OTHER | Facility: CLINIC | Age: 65
End: 2025-07-29
Attending: INTERNAL MEDICINE
Payer: COMMERCIAL

## 2025-07-29 VITALS
SYSTOLIC BLOOD PRESSURE: 100 MMHG | OXYGEN SATURATION: 97 % | HEART RATE: 96 BPM | WEIGHT: 128.6 LBS | DIASTOLIC BLOOD PRESSURE: 67 MMHG | BODY MASS INDEX: 20.76 KG/M2

## 2025-07-29 DIAGNOSIS — E11.9 TYPE 2 DIABETES, HBA1C GOAL < 7% (H): ICD-10-CM

## 2025-07-29 DIAGNOSIS — E78.5 HYPERLIPIDEMIA LDL GOAL <70: ICD-10-CM

## 2025-07-29 DIAGNOSIS — Z72.0 TOBACCO USE: ICD-10-CM

## 2025-07-29 DIAGNOSIS — R09.89 ABNORMAL PULSE: ICD-10-CM

## 2025-07-29 DIAGNOSIS — I65.22 STENOSIS OF LEFT CAROTID ARTERY: ICD-10-CM

## 2025-07-29 DIAGNOSIS — I25.83 CORONARY ARTERY DISEASE DUE TO LIPID RICH PLAQUE: Primary | ICD-10-CM

## 2025-07-29 DIAGNOSIS — I25.10 CORONARY ARTERY DISEASE DUE TO LIPID RICH PLAQUE: Primary | ICD-10-CM

## 2025-07-29 DIAGNOSIS — I10 ESSENTIAL HYPERTENSION: ICD-10-CM

## 2025-07-29 PROCEDURE — 3078F DIAST BP <80 MM HG: CPT | Performed by: INTERNAL MEDICINE

## 2025-07-29 PROCEDURE — 99406 BEHAV CHNG SMOKING 3-10 MIN: CPT | Performed by: INTERNAL MEDICINE

## 2025-07-29 PROCEDURE — G2211 COMPLEX E/M VISIT ADD ON: HCPCS | Performed by: INTERNAL MEDICINE

## 2025-07-29 PROCEDURE — G0463 HOSPITAL OUTPT CLINIC VISIT: HCPCS | Performed by: INTERNAL MEDICINE

## 2025-07-29 PROCEDURE — 99215 OFFICE O/P EST HI 40 MIN: CPT | Performed by: INTERNAL MEDICINE

## 2025-07-29 PROCEDURE — 3074F SYST BP LT 130 MM HG: CPT | Performed by: INTERNAL MEDICINE

## 2025-07-29 RX ORDER — LISINOPRIL 20 MG/1
20 TABLET ORAL DAILY
Qty: 90 TABLET | Refills: 3 | Status: SHIPPED | OUTPATIENT
Start: 2025-07-29

## 2025-07-29 RX ORDER — ROSUVASTATIN CALCIUM 40 MG/1
40 TABLET, COATED ORAL DAILY
Qty: 90 TABLET | Refills: 3 | Status: SHIPPED | OUTPATIENT
Start: 2025-07-29

## 2025-07-29 RX ORDER — EZETIMIBE 10 MG/1
10 TABLET ORAL DAILY
Qty: 90 TABLET | Refills: 3 | Status: SHIPPED | OUTPATIENT
Start: 2025-07-29

## 2025-07-29 NOTE — PROGRESS NOTES
[facility]        Patient is here for a  follow up.    Pt is currently taking Aspirin and Statin.    /67 (BP Location: Right arm, Patient Position: Chair, Cuff Size: Adult Regular)   Pulse 96   Wt 128 lb 9.6 oz (58.3 kg)   SpO2 97%   BMI 20.76 kg/m      The provider has been notified that the patient has no concerns.     Questions patient would like addressed today are: N/A.    Refills are needed: N/A    Has homecare services and agency name:  No    Patient has been identified as a fall risk.    Interventions were completed as noted below:  []Exam tables/chairs remained in the lowest position.   []Patient remained in wheelchair.    []Provider requested patient in exam chair.  Patient required assist and use of transfer belt.  [x]Exam room door remained open unless with a provider.  [x]A bell provided to patient to notify staff if assistance was needed.  [x]Provider notified patient was identified as a fall risk.      Charleen Kovacs MA
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
## 2025-07-30 ENCOUNTER — TELEPHONE (OUTPATIENT)
Dept: OTHER | Facility: CLINIC | Age: 65
End: 2025-07-30
Payer: COMMERCIAL

## 2025-07-30 DIAGNOSIS — E78.5 HYPERLIPIDEMIA LDL GOAL <70: ICD-10-CM

## 2025-07-30 DIAGNOSIS — E11.9 TYPE 2 DIABETES, HBA1C GOAL < 7% (H): Primary | ICD-10-CM

## 2025-07-30 DIAGNOSIS — I10 ESSENTIAL HYPERTENSION: ICD-10-CM

## 2025-07-30 NOTE — TELEPHONE ENCOUNTER
Routing to scheduling to coordinate the following:    In person follow up   Please schedule this in 1 month     Appt note: Follow up to 7/29/25        Patient also needs to be scheduled for consult with vascular surgery:    Referred by Dr Cook for carotid stenosis    Previous imaging completed (pertinent to referral):  See EPIC    Routing to scheduling to coordinate the following:    NEW VASCULAR PATIENT consult with Vascular Surgery  Please schedule this at next available    Appt note:  Referral from Dr Cook for carotid stenosis      Abigail HOWARD, RN    [facility]  Office: [phone]  Fax: [phone]

## 2025-08-12 ENCOUNTER — ALLIED HEALTH/NURSE VISIT (OUTPATIENT)
Dept: CARDIOLOGY | Facility: CLINIC | Age: 65
End: 2025-08-12
Payer: COMMERCIAL

## 2025-08-12 DIAGNOSIS — I25.10 CORONARY ARTERY CALCIFICATION: Primary | ICD-10-CM

## 2025-08-12 DIAGNOSIS — E78.5 HYPERLIPIDEMIA LDL GOAL <70: ICD-10-CM

## 2025-08-12 DIAGNOSIS — I73.9 PERIPHERAL ARTERIAL DISEASE: ICD-10-CM

## 2025-08-12 DIAGNOSIS — I77.1 BRACHIOCEPHALIC ARTERY STENOSIS, RIGHT: ICD-10-CM

## 2025-08-12 DIAGNOSIS — I65.22 STENOSIS OF LEFT CAROTID ARTERY: ICD-10-CM

## 2025-08-25 ENCOUNTER — OFFICE VISIT (OUTPATIENT)
Dept: OTHER | Facility: CLINIC | Age: 65
End: 2025-08-25
Attending: STUDENT IN AN ORGANIZED HEALTH CARE EDUCATION/TRAINING PROGRAM
Payer: COMMERCIAL

## 2025-08-25 VITALS
WEIGHT: 128 LBS | RESPIRATION RATE: 16 BRPM | SYSTOLIC BLOOD PRESSURE: 114 MMHG | BODY MASS INDEX: 20.66 KG/M2 | DIASTOLIC BLOOD PRESSURE: 73 MMHG | OXYGEN SATURATION: 97 % | HEART RATE: 90 BPM

## 2025-08-25 DIAGNOSIS — F17.200 TOBACCO USE DISORDER: ICD-10-CM

## 2025-08-25 DIAGNOSIS — I65.22 ASYMPTOMATIC STENOSIS OF LEFT CAROTID ARTERY: Primary | ICD-10-CM

## 2025-08-25 DIAGNOSIS — E78.5 HYPERLIPIDEMIA LDL GOAL <70: ICD-10-CM

## 2025-08-25 PROCEDURE — 3078F DIAST BP <80 MM HG: CPT | Performed by: STUDENT IN AN ORGANIZED HEALTH CARE EDUCATION/TRAINING PROGRAM

## 2025-08-25 PROCEDURE — 3074F SYST BP LT 130 MM HG: CPT | Performed by: STUDENT IN AN ORGANIZED HEALTH CARE EDUCATION/TRAINING PROGRAM

## 2025-08-25 PROCEDURE — G0463 HOSPITAL OUTPT CLINIC VISIT: HCPCS | Performed by: STUDENT IN AN ORGANIZED HEALTH CARE EDUCATION/TRAINING PROGRAM

## 2025-08-25 PROCEDURE — 99204 OFFICE O/P NEW MOD 45 MIN: CPT | Performed by: STUDENT IN AN ORGANIZED HEALTH CARE EDUCATION/TRAINING PROGRAM

## (undated) RX ORDER — IVABRADINE 5 MG/1
TABLET, FILM COATED ORAL
Status: DISPENSED
Start: 2025-07-24

## (undated) RX ORDER — METOPROLOL TARTRATE 1 MG/ML
INJECTION, SOLUTION INTRAVENOUS
Status: DISPENSED
Start: 2025-07-24

## (undated) RX ORDER — REGADENOSON 0.08 MG/ML
INJECTION, SOLUTION INTRAVENOUS
Status: DISPENSED
Start: 2025-05-28

## (undated) RX ORDER — METOPROLOL TARTRATE 50 MG
TABLET ORAL
Status: DISPENSED
Start: 2025-07-24